# Patient Record
Sex: MALE | Race: WHITE | Employment: FULL TIME | URBAN - METROPOLITAN AREA
[De-identification: names, ages, dates, MRNs, and addresses within clinical notes are randomized per-mention and may not be internally consistent; named-entity substitution may affect disease eponyms.]

---

## 2017-05-17 ENCOUNTER — TELEPHONE (OUTPATIENT)
Dept: FAMILY MEDICINE | Facility: CLINIC | Age: 32
End: 2017-05-17

## 2017-05-17 NOTE — TELEPHONE ENCOUNTER
Acute Illness   Acute illness concerns: vomiting  Onset: this morning    Fever: YES    Chills/Sweats: YES    Headache (location?): YES    Sinus Pressure:no    Conjunctivitis:  no    Ear Pain: no    Rhinorrhea: no     Congestion: no     Sore Throat: no      Cough: no    Wheeze: no     Decreased Appetite: no     Nausea: YES    Vomiting: YES    Diarrhea:  no     Dysuria/Freq.: no     Fatigue/Achiness: YES    Sick/Strep Exposure: no      Therapies Tried and outcome: Advised taking small sips of fluids if feeling dehydated he would need to go to ER or UC.    The patient indicates understanding of these issues and agrees with the plan.    Zainab Nogueira RN    [facility]

## 2017-11-06 ENCOUNTER — HOSPITAL ENCOUNTER (INPATIENT)
Facility: CLINIC | Age: 32
LOS: 4 days | Discharge: HOME | DRG: 885 | End: 2017-11-10
Attending: EMERGENCY MEDICINE | Admitting: PSYCHIATRY & NEUROLOGY
Payer: COMMERCIAL

## 2017-11-06 DIAGNOSIS — F41.9 ANXIETY: ICD-10-CM

## 2017-11-06 DIAGNOSIS — E66.01 MORBID OBESITY (H): ICD-10-CM

## 2017-11-06 DIAGNOSIS — F33.2 SEVERE EPISODE OF RECURRENT MAJOR DEPRESSIVE DISORDER, WITHOUT PSYCHOTIC FEATURES (H): ICD-10-CM

## 2017-11-06 DIAGNOSIS — R07.89 OTHER CHEST PAIN: ICD-10-CM

## 2017-11-06 DIAGNOSIS — R45.851 SUICIDAL IDEATION: ICD-10-CM

## 2017-11-06 LAB
AMPHETAMINES UR QL SCN: NEGATIVE
BARBITURATES UR QL: NEGATIVE
BENZODIAZ UR QL: POSITIVE
CANNABINOIDS UR QL SCN: NEGATIVE
COCAINE UR QL: NEGATIVE
ETHANOL UR QL SCN: NEGATIVE
OPIATES UR QL SCN: NEGATIVE

## 2017-11-06 PROCEDURE — 80307 DRUG TEST PRSMV CHEM ANLYZR: CPT | Performed by: FAMILY MEDICINE

## 2017-11-06 PROCEDURE — 99285 EMERGENCY DEPT VISIT HI MDM: CPT | Mod: 25 | Performed by: EMERGENCY MEDICINE

## 2017-11-06 PROCEDURE — 93010 ELECTROCARDIOGRAM REPORT: CPT | Mod: Z6 | Performed by: EMERGENCY MEDICINE

## 2017-11-06 PROCEDURE — 25000132 ZZH RX MED GY IP 250 OP 250 PS 637: Performed by: PSYCHIATRY & NEUROLOGY

## 2017-11-06 PROCEDURE — 93005 ELECTROCARDIOGRAM TRACING: CPT | Performed by: EMERGENCY MEDICINE

## 2017-11-06 PROCEDURE — 80320 DRUG SCREEN QUANTALCOHOLS: CPT | Performed by: FAMILY MEDICINE

## 2017-11-06 PROCEDURE — 99223 1ST HOSP IP/OBS HIGH 75: CPT | Mod: AI | Performed by: PSYCHIATRY & NEUROLOGY

## 2017-11-06 PROCEDURE — 12400001 ZZH R&B MH UMMC

## 2017-11-06 PROCEDURE — 90791 PSYCH DIAGNOSTIC EVALUATION: CPT

## 2017-11-06 RX ORDER — ALPRAZOLAM 0.25 MG
0.5 TABLET ORAL 3 TIMES DAILY PRN
Status: DISCONTINUED | OUTPATIENT
Start: 2017-11-06 | End: 2017-11-10 | Stop reason: HOSPADM

## 2017-11-06 RX ORDER — OLANZAPINE 10 MG/2ML
10 INJECTION, POWDER, FOR SOLUTION INTRAMUSCULAR
Status: DISCONTINUED | OUTPATIENT
Start: 2017-11-06 | End: 2017-11-10 | Stop reason: HOSPADM

## 2017-11-06 RX ORDER — OLANZAPINE 10 MG/1
10 TABLET ORAL
Status: DISCONTINUED | OUTPATIENT
Start: 2017-11-06 | End: 2017-11-10 | Stop reason: HOSPADM

## 2017-11-06 RX ORDER — TRAZODONE HYDROCHLORIDE 50 MG/1
50 TABLET, FILM COATED ORAL
Status: DISCONTINUED | OUTPATIENT
Start: 2017-11-06 | End: 2017-11-10 | Stop reason: HOSPADM

## 2017-11-06 RX ORDER — HYDROXYZINE HYDROCHLORIDE 25 MG/1
25-50 TABLET, FILM COATED ORAL EVERY 4 HOURS PRN
Status: DISCONTINUED | OUTPATIENT
Start: 2017-11-06 | End: 2017-11-10 | Stop reason: HOSPADM

## 2017-11-06 RX ORDER — GABAPENTIN 100 MG/1
100-200 CAPSULE ORAL
Status: DISCONTINUED | OUTPATIENT
Start: 2017-11-06 | End: 2017-11-10 | Stop reason: HOSPADM

## 2017-11-06 RX ORDER — BISACODYL 10 MG
10 SUPPOSITORY, RECTAL RECTAL DAILY PRN
Status: DISCONTINUED | OUTPATIENT
Start: 2017-11-06 | End: 2017-11-10 | Stop reason: HOSPADM

## 2017-11-06 RX ORDER — TRAZODONE HYDROCHLORIDE 100 MG/1
200 TABLET ORAL AT BEDTIME
Status: DISCONTINUED | OUTPATIENT
Start: 2017-11-06 | End: 2017-11-10 | Stop reason: HOSPADM

## 2017-11-06 RX ORDER — ALUMINA, MAGNESIA, AND SIMETHICONE 2400; 2400; 240 MG/30ML; MG/30ML; MG/30ML
30 SUSPENSION ORAL EVERY 4 HOURS PRN
Status: DISCONTINUED | OUTPATIENT
Start: 2017-11-06 | End: 2017-11-10 | Stop reason: HOSPADM

## 2017-11-06 RX ORDER — ACETAMINOPHEN 325 MG/1
650 TABLET ORAL EVERY 4 HOURS PRN
Status: DISCONTINUED | OUTPATIENT
Start: 2017-11-06 | End: 2017-11-10 | Stop reason: HOSPADM

## 2017-11-06 RX ORDER — DULOXETIN HYDROCHLORIDE 30 MG/1
60 CAPSULE, DELAYED RELEASE ORAL DAILY
Status: DISCONTINUED | OUTPATIENT
Start: 2017-11-07 | End: 2017-11-07

## 2017-11-06 RX ADMIN — TRAZODONE HYDROCHLORIDE 200 MG: 100 TABLET ORAL at 20:47

## 2017-11-06 RX ADMIN — GABAPENTIN 200 MG: 100 CAPSULE ORAL at 20:47

## 2017-11-06 ASSESSMENT — ACTIVITIES OF DAILY LIVING (ADL)
ORAL_HYGIENE: INDEPENDENT
LAUNDRY: WITH SUPERVISION
DRESS: 0-->INDEPENDENT
LAUNDRY: WITH SUPERVISION
DRESS: INDEPENDENT
FALL_HISTORY_WITHIN_LAST_SIX_MONTHS: NO
BATHING: 0-->INDEPENDENT
RETIRED_EATING: 0-->INDEPENDENT
RETIRED_COMMUNICATION: 0-->UNDERSTANDS/COMMUNICATES WITHOUT DIFFICULTY
DRESS: INDEPENDENT
GROOMING: INDEPENDENT
ORAL_HYGIENE: INDEPENDENT
SWALLOWING: 0-->SWALLOWS FOODS/LIQUIDS WITHOUT DIFFICULTY
AMBULATION: 0-->INDEPENDENT
TRANSFERRING: 0-->INDEPENDENT
GROOMING: INDEPENDENT
TOILETING: 0-->INDEPENDENT
COGNITION: 0 - NO COGNITION ISSUES REPORTED

## 2017-11-06 ASSESSMENT — ENCOUNTER SYMPTOMS
DYSPHORIC MOOD: 1
FATIGUE: 1
APPETITE CHANGE: 1
NERVOUS/ANXIOUS: 1

## 2017-11-06 NOTE — PLAN OF CARE
Problem: Patient Care Overview  Goal: Plan of Care/Patient Progress Review  Personal Plan of Care     Reasons you are in the Hospital  1. Bottomed out   2. Couldn't put it off any longer   3.  4.     Goals for Discharge   1. Wants to know the plan and speak with doctor.   2.  3.

## 2017-11-06 NOTE — PLAN OF CARE
"Problem: Mood Impairment (Depressive Signs/Symptoms) (Adult)  Goal: Improved Mood Symptoms (Depressive Signs/Symptoms)  Signs and symptoms will be absent, minimized or managed:  A. Mood impairment  B. Feelings of worthlessness, hopelessness or excessive guilt  C. Decreased participation/engagement  D. Sleep impairment  E. Social/occupational or functional impairment  F. Psychomotor impairment  G. Energy/vigor impairment  H. Cognitive impairment  Outcome: No Change  Patient was admitted to station 10 from [facility] emergency department. Patient reports one previous mental health admission at [facility] in 2011 where he completed \"one course\" of ECT with Dr. Weber and currently sees Dr. Weber about once every 3 months. Patient has had several stressors including the death of 2 grandparents and a friend. Patient has felt increasingly suicidal over the last week and had thoughts to mix alcohol and pills to end his life while he was at a wedding on Saturday. States he did not follow through because he did not believe he had enough pills. Patient states he has never attempted suicide and if he had \"I would have succeeded.\"Patient states he currently does not wish to die because \"xanax helps, it's the only thing that helps.\" Patient denies abuse of xanax but does admit to taking 0.5 mg 3 times most days when it is prescribed for twice daily. Patient denies drug use except \"I smoke weed maybe once a month\" and drinks less than monthly but on occasions when he does drink he often has more than 10 drinks. Patient contracts for safety on the unit and states he will tell staff if he feels like hurting himself.       "

## 2017-11-06 NOTE — IP AVS SNAPSHOT
[address] [address]    Phone:  [phone]                                       After Visit Summary   11/6/2017    Abel Ward    MRN: [id number]           After Visit Summary Signature Page     I have received my discharge instructions, and my questions have been answered. I have discussed any challenges I see with this plan with the nurse or doctor.    ..........................................................................................................................................  Patient/Patient Representative Signature      ..........................................................................................................................................  Patient Representative Print Name and Relationship to Patient    ..................................................               ................................................  Date                                            Time    ..........................................................................................................................................  Reviewed by Signature/Title    ...................................................              ..............................................  Date                                                            Time

## 2017-11-06 NOTE — PLAN OF CARE
Problem: Patient Care Overview  Goal: Team Discussion  Team Plan:   BEHAVIORAL TEAM DISCUSSION     Participants: Dr. Chuckie Samaniego MD, Margarita SHERIFF, and Mildred Currie RN.   Progress: Initial team discussion.   Continued Stay Criteria/Rationale: Pt was voluntarily admitted due to increased suicidal ideations and depression.   Medical/Physical: See medical consult.   Precautions:   Behavioral Orders   Procedures     Suicide precautions     Plan: The plan is to assess the patient for mental health and medication needs.  The patient will be prescribed medications to treat the identified symptoms.  Upon discharge the patient will be referred to services as appropriate based on the assessment.  Rationale for change in precautions or plan: No change initial plan.

## 2017-11-06 NOTE — ED PROVIDER NOTES
History     Chief Complaint   Patient presents with     Suicidal     HPI  Abel Ward is a 32 year old male with a history of major depressive disorder and social anxiety disorder who presents to the ED for suicidal ideation. Patient states he has been having SI for the past week without a plan, but does have thoughts about using alcohol, medications, or razors to end his life. He states if he did have an attempt it would be with intent to harm himself. He states his family is his reason to live. He states he has had a significant amount of stressors in his life including, recently getting , 2 of his grandparents passed away earlier this year who he was very close to, work, and his older friend passed away last week from heart attack. He has been attending work, but had to leave today and had a bad day on Friday, 3 days ago, due to his mood. He is feeling fatigued, has less energy, and anxious. He also had complaints of chest tightness, headaches, and overeating. He has had intermittent chest tightness for the last 6-12 months which lasts seconds to minutes and comes about without trigger.  Occurs with activity and with rest.  Last episode was a few days ago.  Denies any associated SOB, lightheadedness, nausea or diaphoresis.  In addition he noted headaches which he describes as a sensation of fogginess for the last two years.  No vision changes, vomiting, focal weakness or recent trauma.  No fevers or neck pain.  He was previously on lisinopril for HTN which was discontinued 1 year ago and has had slowly increasing BP, no currently on medication.  He is currently taking Xanax and consuming 2 large energy drinks/day. He also admits to using marijuana 1x/month and was noted to consume a lot of alcohol at his sister's wedding. He is here seeking voluntary admission. He was last seen here in [id number] and is following up with a doctor as an outpatient.     PAST MEDICAL HISTORY  Past Medical History:    Diagnosis Date     Attention deficit disorder with hyperactivity(314.01) 2001     DEPRESS PSYCHOSIS-MILD 4/15/2008     Esophageal reflux     Dr Starks- had EGD ~2202     Hypertension      Infectious mononucleosis 12/03     Major depressive disorder, single episode in full remission (H) 10/9/2008     Mild intermittent asthma      NONSPECIFIC MEDICAL HISTORY     low testosterone - Dr Samson     Social anxiety disorder      PAST SURGICAL HISTORY  Past Surgical History:   Procedure Laterality Date     OTHER SURGICAL HISTORY      wisdom teeth extraction     FAMILY HISTORY  Family History   Problem Relation Age of Onset     DIABETES Paternal Grandfather      Hypertension Mother      Hypertension Father      Hypertension Maternal Grandfather      Hypertension Paternal Grandfather      Breast Cancer Paternal Grandmother      Depression Father      HEART DISEASE Paternal Grandfather      Lipids Father      Obesity Mother      Obesity Father      Thyroid Disease Mother      Thyroid Disease Father      SOCIAL HISTORY  Social History   Substance Use Topics     Smoking status: Never Smoker     Smokeless tobacco: Never Used     Alcohol use 0.0 oz/week      Comment: occasionally, last use 2 days ago at a wedding     MEDICATIONS  No current facility-administered medications for this encounter.      Current Outpatient Prescriptions   Medication     gabapentin (NEURONTIN) 100 MG capsule     TRAZODONE HCL PO     omeprazole (PRILOSEC) 20 MG capsule     ALPRAZolam (XANAX) 0.5 MG tablet     DULoxetine (CYMBALTA) 60 MG capsule     VITAMIN D, CHOLECALCIFEROL, PO     ALLERGIES  Allergies   Allergen Reactions     Lisinopril      Nausea, Lightheadedness and motion sickness       I have reviewed the Medications, Allergies, Past Medical and Surgical History, and Social History in the Epic system.    Review of Systems   Constitutional: Positive for appetite change (Eating more than usual) and fatigue.   Psychiatric/Behavioral: Positive for  "dysphoric mood and suicidal ideas. The patient is nervous/anxious.    All other systems reviewed and are negative.      Physical Exam   BP: (!) 146/103  Pulse: 73  Temp: 97.8  F (36.6  C)  Resp: 16  Height: 177.8 cm (5' 10\")  Weight: (!) 147.4 kg (325 lb)  SpO2: 100 %      Physical Exam   General: patient is alert and oriented and in no acute distress, flat affect  Head: atraumatic and normocephalic   EENT: moist mucus membranes without tonsillar erythema or exudates, pupils 3mm equal round and reactive   Neck: supple with full ROM  Cardiovascular: regular rate and rhythm, extremities warm and well perfused, no lower extremity edema  Pulmonary: lungs clear to auscultation bilaterally   Abdomen: soft, non-tender   Musculoskeletal: normal range of motion   Neurological: alert and oriented, moving all extremities symmetrically, CN II-XII intact, strength 5/5 and symmetric in , elbow flexion/extension, hip flexion/extension, knee flexion/extension and ankle plantar/dorsiflexion, sensation to light touch in distal upper and lower extremities intact, normal gait  Skin: warm, dry   Psych: flat affect, mood described as depressed, does not appear to be responding to internal stimuli, slow in movements and speech, endorses SI    ED Course     ED Course     Procedures             EKG Interpretation:      Interpreted by Jerilyn George  Time reviewed: 1028   Symptoms at time of EKG: none   Rhythm: normal sinus   Rate: normal  Axis: normal  Ectopy: none  Conduction: normal  ST Segments/ T Waves: No ST-T wave changes  Q Waves: none  Comparison to prior: No old EKG available    Clinical Impression: normal EKG            Critical Care time:  none           Labs Ordered and Resulted from Time of ED Arrival Up to the Time of Departure from the ED - No data to display         Assessments & Plan (with Medical Decision Making)   The patient is a 32 year old male with history of anxiety, depression, and hypertension who presents " with worsening depression and suicidal ideation.  He does meet multiple criteria for recurrent episode of major depression and does have active suicidal ideation without plan.  He does not have any psychotic features.  He denies recent ingestion or self injurious behavior. He does report associated headaches and episodes of chest pain over the last few years.  He describes these headaches as a general sense of fogginess.  He is neurologically intact. At this point, I do not feel that he requires emergent imaging.  In regards to his chest tightness,  he reports intermittent episodes lasting seconds to minutes that have been occurring over the past few months.  I have obtained an ECG which shows NSR without ischemic changes. I have a very low suspicion for pneumonia, pneumothorax, PE, or other emergent pathology.  He was previously on Lisinopril for hypertension. However,  this was discontinued,  and his blood pressure has been elevating.  Certainly this may be contributing to his headaches and intermittent chest pain.  In the Emergency Department,  his blood pressure is 146/103.  I have recommended that he follow up with his outpatient provider upon discharge for long-term blood pressure management.  He is voluntary and will be admitted to inpatient psychiatry for safety and stabilization.    This part of the medical record was transcribed by Carlos Evans Medical Scribe, from a dictation done by Jerilyn George MD.       I have reviewed the nursing notes.    I have reviewed the findings, diagnosis, plan and need for follow up with the patient.    New Prescriptions    No medications on file       Final diagnoses:   Severe episode of recurrent major depressive disorder, without psychotic features (H)   Suicidal ideation       11/6/2017   [facility], [place], EMERGENCY DEPARTMENT     Jerilyn George MD  11/06/17 1148

## 2017-11-06 NOTE — PROGRESS NOTES
11/06/17 1217   Patient Belongings   Did you bring any home meds/supplements to the hospital?  No   Patient Belongings clothing;shoes   Belongings Search Yes   Clothing Search Yes   Second Staff Carmen BENITO   General Info Comment In locker labeled with name    In locker:  1 pr of shoes  3 pr of socks  1 flannel shirt  1 pr of jeans  1 jacket  1 bottle of water    Pt had no medications, valuables (wallet, cash, credit cards) or cell phone in his belongings.     A               Admission:  I am responsible for any personal items that are not sent to the safe or pharmacy.  [facility] is not responsible for loss, theft or damage of any property in my possession.    Signature:  _________________________________ Date: _______  Time: _____                                              Staff Signature:  ____________________________ Date: ________  Time: _____      2nd Staff person, if patient is unable/unwilling to sign:    Signature: ________________________________ Date: ________  Time: _____     Discharge:  [facility] has returned all of my personal belongings:    Signature: _________________________________ Date: ________  Time: _____                                          Staff Signature:  ____________________________ Date: ________  Time: _____

## 2017-11-06 NOTE — H&P
"[facility]  Psychiatric History and Physical      Patient name: Abel Ward   MRN: [id number]    Age: 32 year old     YOB: 1985    Identifying information:   The patient is a 32 year old  male    Chief complaint:  \" the only thing that helps is Xanax.\"    History of present illness:   The patient has a history of major depressive disorder and anxiety who presented voluntarily to the emergency room reporting depressed mood and suicidal thoughts. On examination today, he reports that over the past few months, his mood and anxiety symptoms have progressively worsened. He is not able to relate this recent decompensation to any specific psychosocial stressor. Over the past few days, he has contemplated suicide further mentioning \"if I would actually try it I know it would be successful.\" He reports maintaining compliance with psychotropic medications and questions the effectiveness of his antidepressant. He stated several times that Xanax is the only medication that can alleviate his anxiety which results in some improvement in his mood. He did not report any overuse or misuse of the medicine. He inquired whether ECT would be helpful to address his current symptoms.    Psychiatric Review of Systems:    -- Depressive episode: Depressed mood later than a two-week period, low-energy, low appetite, anhedonia, Limited concentration, variable sleep, feels helpless and hopeless, endorses suicidal thoughts however is able to contract for safety on the unit, denied homicidal thoughts.  -- Sylvia:  denies symptoms  -- Psychosis:  denies symptoms  -- Anxiety:   Described himself as a generalized worrier, in excess of his peers, occasionally escalating to the point of panic.  -- PTSD: denies symptoms  -- OCD: denies  symptoms  -- Eating disorder: denies symptoms    Psychiatric History:    He has been receiving mental health treatment since an adolescent. He " "reports a diagnosis of major depressive disorder and anxiety. Prior hospitalizations reported. He recalled a prior course of ECT several years ago. He denied prior suicide attempts. His outpatient psychiatrist is Dr. Weber and he is currently prescribed Cymbalta and Xanax for management of his mental illness.    Substance Use History:     He reports occasional cannabis and alcohol usage with no specific pattern of usage corresponding to dependence or tolerance. He denied on verse effects of cannabis. He was able to identify enhancement of depressive symptoms from alcohol use which he states encourages him to refrain from using that substance.    Medical History: GERD      No current facility-administered medications on file prior to encounter.   Current Outpatient Prescriptions on File Prior to Encounter:  gabapentin (NEURONTIN) 100 MG capsule Take 1-2 capsules (100-200 mg) by mouth nightly as needed   TRAZODONE HCL PO Take by mouth At Bedtime   omeprazole (PRILOSEC) 20 MG capsule Take 1 capsule (20 mg) by mouth daily   ALPRAZolam (XANAX) 0.5 MG tablet Take 1 tablet (0.5 mg) by mouth 3 times daily as needed for anxiety (Patient taking differently: Take 0.5 mg by mouth 2 times daily as needed for anxiety )   DULoxetine (CYMBALTA) 60 MG capsule Take 1 capsule (60 mg) by mouth daily        Social History:  Refer to the psychosocial assessment completed by our .     Family History:    He suspects that an aunt may have bipolar disorder. No knowledge of completed suicides in the family.    Medical review of systems: 10 systems were reviewed and positive for psychiatric symptoms as noted above otherwise negative    Physical Exam:    B/P: 146/90, T: 98.3, P: 84, R: 18  Estimated body mass index is 45.15 kg/(m^2) as calculated from the following:    Height as of this encounter: 1.803 m (5' 11\").    Weight as of this encounter: 146.8 kg (323 lb 11.2 oz).    The rest of the physical examination was reviewed in the " emergency room note completed by the emergency room physician.      Mental status examination:  Appearance: He was laying in bed sleeping prior to meeting. Woke easily and sat up in bed.  Alert, fair hygiene, no acute distress.  Attitude:  Attempts to be cooperative  Eye Contact:  Mostly looking down or away.  Mood:  Depressed  Affect: Mood congruent and blunted  Speech:  Normal rates, tone, latency, volume. Not pushed or pressured.  Psychomotor Behavior:  No psychomotor abnormalities noted  Thought Process: Linear and logical; not tangential or circumstantial or disorganized  Associations:  Logical; no loose associations Noted  Thought Content:  No obvious paranoia, delusions, ideas of reference, or grandiosity noted. Denies auditory or visual hallucinations. Endorsed suicidal Ideations. Denies homicidal ideations.  Insight:  Fair  Judgment:  Fair  Oriented to:  time, person, and place  Attention Span and Concentration:  Intact  Recent and Remote Memory: Intact based on interviewing and details provided  Language: Appropriate based on interviewing  Fund of Knowledge: Appropriate based on interviewing  Muscle Strength and Tone: Normal upon visual inspection  Gait and Station: Normal upon visual inspection            Diagnoses:    1.  Major depressive disorder - recurrent, severe  2. Generalized anxiety disorder  3. Gerd      Plan:  1.  The patient has been admitted to our behavioral health unit under voluntary status for reports of depressed mood and suicidal thoughts. Treatment will be continued voluntarily.    2.  His outpatient medications of been resumed including Cymbalta and Xanax for management of mood and anxiety symptoms. Trazodone has been continued to assist with sleep. I sent an email to Dr. Weber to notify him of the patients status as well as inquire regarding medication recommendations. The patient did express some interest in ECT for which  ECT consultation with Dr. Weber was also requested.    3.  Psychosocial treatments to be addressed with social work consult and groups.    4.  Anticipate a hospital stay of approximately one week as we target improvement in mood and remission of suicidal thoughts.
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
## 2017-11-06 NOTE — IP AVS SNAPSHOT
MRN:[id number]                      After Visit Summary   11/6/2017    Abel Ward    MRN: [id number]           Thank you!     Thank you for choosing [facility] for your care. Our goal is always to provide you with excellent care.        Patient Information     Date Of Birth          1985        Designated Caregiver       Most Recent Value    Caregiver    Will someone help with your care after discharge? no      About your hospital stay     You were admitted on:  November 6, 2017 You last received care in the:  [facility]    You were discharged on:  November 10, 2017       Who to Call     For medical emergencies, please call 911.  For non-urgent questions about your medical care, please call your primary care provider or clinic, [phone]          Attending Provider     Provider Specialty    Jerilyn George MD Emergency Medicine    Chuckie Samaniego MD Psychiatry       Primary Care Provider Office Phone # Fax #    David Denney [phone]      Your next 10 appointments already scheduled     Nov 13, 2017  8:15 AM CST   Electroconvulsive Therapy with Froy Weber MD   [facility] ([facility])    [address]  [facility] [address]   [phone]              Future tests that were ordered for you     Electroconvulsive therapy: Begin Outpatient       Series of up to 12 treatments. Begin Date: 11/13/2017  Treating Psychiatrist providing ECT:  Dr Weber  Notified on:  November 10, 2017                  Further instructions from your care team        Behavioral Discharge Planning and Instructions      Summary:  You were admitted on 11/6/2017  due to Depression, Anxiety and Suicidal Ideations.  You were treated by Dr. Chuckie Samaniego MD and discharged on 11/10/17 from [facility] Home    Principal Diagnosis: Major depressive disorder, recurrent    Health Care Follow-up Appointments:    [facility], [facility]   [address] [place] [address]  Phone: [phone]  Fax: [phone] HUC TO FAX DISCHARGE PAPERWORK     *You have an intake appointment for psychiatric medication management with Vincent Suarez scheduled for Thursday, November 16th at 10:30 am, however, please arrive at 10 am to complete paperwork.   *You have an intake for individual therapy with Kaitlynn Reynaga scheduled for Tuesday, December 5th at 3:00 pm, however, please arrive at 2:30 pm to complete paperwork.     Attend all scheduled appointments with your outpatient providers. Call at least 24 hours in advance if you need to reschedule an appointment to ensure continued access to your outpatient providers.   Major Treatments, Procedures and Findings:  You were provided with: a psychiatric assessment, assessed for medical stability, medication evaluation and/or management, group therapy and milieu management    Symptoms to Report: feeling more aggressive, increased confusion, losing more sleep, mood getting worse or thoughts of suicide    Early warning signs can include: increased depression or anxiety sleep disturbances increased thoughts or behaviors of suicide or self-harm  increased unusual thinking, such as paranoia or hearing voices    Safety and Wellness:  Take all medicines as directed.  Make no changes unless your doctor suggests them.      Follow treatment recommendations.  Refrain from alcohol and non-prescribed drugs.  Ask your support system to help you reduce your access to items that could harm yourself or others. If there is a concern for safety, call 911.    Resources:   Crisis Intervention: 570.354.5407 or 735-933-5920 (TTY: 686.542.6383).  Call anytime for help.  National [place] on Mental Illness (www.[place].anayeli.org): 466.306.2657 or 059-686-7535.  [place] Association for Children's Mental Health (www.macmh.org): 536.904.1755.  Alcoholics Anonymous  "(www.alcoholics-anonymous.org): Check your phone book for your local chapter.  Suicide Awareness Voices of Education (SAVE) (www.save.org): 899-136-TSLM (8821)  National Suicide Prevention Line (www.mentalhealthmn.org): 449-360-DUVC (5860)  Mental Health Consumer/Survivor Network of [place] (www.mhcsn.net): 727.140.1855 or 591-853-5774  Mental Health Association of [place] (www.mentalhealth.org): 325.633.4285 or 341-777-6528  Self- Management and Recovery Training., SMART-- Toll free: 903.218.9218  www.[employer]  Text 4 Life: txt \"LIFE\" to [address] for immediate support and crisis intervention  Crisis text line: Text \"START\" to 252-036. Free, confidential, 24/7.  Crisis Intervention: 468.437.7764 or 826-817-9104. Call anytime for help.     The treatment team has appreciated the opportunity to work with you.     Please take care and make your recovery a daily recovery.   If you have any questions or concerns our unit number is [phone].   Thank you.         Pending Results     No orders found from 11/4/2017 to 11/7/2017.            Admission Information     Date & Time Provider Department Dept. Phone    11/6/2017 Chuckie Samaniego MD [address] [phone]      Your Vitals Were     Blood Pressure Pulse Temperature Respirations Height Weight    137/67 86 98.3  F (36.8  C) 16 1.803 m (5' 11\") 144.4 kg (318 lb 4.8 oz)    Pulse Oximetry BMI (Body Mass Index)                97% 44.39 kg/m2          [facility] Information     [employer] gives you secure access to your electronic health record. If you see a primary care provider, you can also send messages to your care team and make appointments. If you have questions, please call your primary care clinic.  If you do not have a primary care provider, please call [phone] and they will assist you.        Care EveryWhere ID     This is your Care EveryWhere ID. This could be used by other organizations to access your [facility] medical records  [id number]        Equal Access to " Services     [facility]: Hadii aad ku hadmarknixon Esteladamien, waaxda luqadaha, qaybta kaalmada michelle, lauren baird . So [facility] [phone].    ATENCIÓN: Si mellisala mame, tiene a bagley disposición servicios gratuitos de asistencia lingüística. Llame al [phone].    We comply with applicable federal civil rights laws and [place] laws. We do not discriminate on the basis of race, color, national origin, age, disability, sex, sexual orientation, or gender identity.               Review of your medicines      START taking        Dose / Directions    desvenlafaxine succinate 50 MG 24 hr tablet   Commonly known as:  PRISTIQ        Dose:  50 mg   Take 1 tablet (50 mg) by mouth daily   Quantity:  30 tablet   Refills:  0       trifluoperazine 2 MG tablet   Commonly known as:  STELAZINE   Used for:  Anxiety        Dose:  2-4 mg   Take 1-2 tablets (2-4 mg) by mouth 3 times daily as needed (severe anxiety)   Quantity:  60 tablet   Refills:  0         CONTINUE these medicines which may have CHANGED, or have new prescriptions. If we are uncertain of the size of tablets/capsules you have at home, strength may be listed as something that might have changed.        Dose / Directions    ALPRAZolam 0.5 MG tablet   Commonly known as:  XANAX   This may have changed:  when to take this   Used for:  Anxiety        Dose:  0.5 mg   Take 1 tablet (0.5 mg) by mouth 3 times daily as needed for anxiety   Quantity:  20 tablet   Refills:  0         CONTINUE these medicines which have NOT CHANGED        Dose / Directions    gabapentin 100 MG capsule   Commonly known as:  NEURONTIN   Used for:  Restless legs syndrome        Dose:  100-200 mg   Take 1-2 capsules (100-200 mg) by mouth nightly as needed   Refills:  0       omeprazole 20 MG CR capsule   Commonly known as:  priLOSEC   Used for:  Esophageal reflux        Dose:  20 mg   Take 1 capsule (20 mg) by mouth daily   Quantity:  90 capsule   Refills:  3        TRAZODONE HCL PO   Used for:  Morbid obesity (H)        Dose:  200 mg   Take 200 mg by mouth At Bedtime   Refills:  0         STOP taking     DULoxetine 60 MG EC capsule   Commonly known as:  CYMBALTA                Where to get your medicines      These medications were sent to [facility] [place], [place] - [address]  [address] [place] [address], [facility] [address]     Phone:  [phone]     desvenlafaxine succinate 50 MG 24 hr tablet    trifluoperazine 2 MG tablet                Protect others around you: Learn how to safely use, store and throw away your medicines at www.disposemymeds.org.             Medication List: This is a list of all your medications and when to take them. Check marks below indicate your daily home schedule. Keep this list as a reference.      Medications           Morning Afternoon Evening Bedtime As Needed    ALPRAZolam 0.5 MG tablet   Commonly known as:  XANAX   Take 1 tablet (0.5 mg) by mouth 3 times daily as needed for anxiety   Last time this was given:  0.5 mg on 11/8/2017  5:55 PM                                desvenlafaxine succinate 50 MG 24 hr tablet   Commonly known as:  PRISTIQ   Take 1 tablet (50 mg) by mouth daily   Last time this was given:  50 mg on 11/10/2017 11:07 AM                                gabapentin 100 MG capsule   Commonly known as:  NEURONTIN   Take 1-2 capsules (100-200 mg) by mouth nightly as needed   Last time this was given:  200 mg on 11/8/2017  8:24 PM                                omeprazole 20 MG CR capsule   Commonly known as:  priLOSEC   Take 1 capsule (20 mg) by mouth daily   Last time this was given:  20 mg on 11/10/2017  7:51 AM                                TRAZODONE HCL PO   Take 200 mg by mouth At Bedtime   Last time this was given:  50 mg on 11/9/2017 10:07 PM                                trifluoperazine 2 MG tablet   Commonly known as:  STELAZINE   Take 1-2 tablets (2-4 mg) by mouth 3 times daily as needed (severe  anxiety)   Last time this was given:  2 mg on 11/10/2017 11:13 AM

## 2017-11-07 PROBLEM — F33.2 SEVERE EPISODE OF RECURRENT MAJOR DEPRESSIVE DISORDER, WITHOUT PSYCHOTIC FEATURES (H): Status: ACTIVE | Noted: 2017-11-07

## 2017-11-07 LAB
ALBUMIN SERPL-MCNC: 3.7 G/DL (ref 3.4–5)
ALP SERPL-CCNC: 71 U/L (ref 40–150)
ALT SERPL W P-5'-P-CCNC: 43 U/L (ref 0–70)
ANION GAP SERPL CALCULATED.3IONS-SCNC: 4 MMOL/L (ref 3–14)
AST SERPL W P-5'-P-CCNC: 18 U/L (ref 0–45)
BASOPHILS # BLD AUTO: 0 10E9/L (ref 0–0.2)
BASOPHILS NFR BLD AUTO: 0.6 %
BILIRUB SERPL-MCNC: 0.7 MG/DL (ref 0.2–1.3)
BUN SERPL-MCNC: 16 MG/DL (ref 7–30)
CALCIUM SERPL-MCNC: 8.9 MG/DL (ref 8.5–10.1)
CHLORIDE SERPL-SCNC: 104 MMOL/L (ref 94–109)
CHOLEST SERPL-MCNC: 193 MG/DL
CO2 SERPL-SCNC: 31 MMOL/L (ref 20–32)
CREAT SERPL-MCNC: 0.91 MG/DL (ref 0.66–1.25)
DIFFERENTIAL METHOD BLD: NORMAL
EOSINOPHIL # BLD AUTO: 0.2 10E9/L (ref 0–0.7)
EOSINOPHIL NFR BLD AUTO: 3.4 %
ERYTHROCYTE [DISTWIDTH] IN BLOOD BY AUTOMATED COUNT: 12.3 % (ref 10–15)
GFR SERPL CREATININE-BSD FRML MDRD: >90 ML/MIN/1.7M2
GLUCOSE SERPL-MCNC: 98 MG/DL (ref 70–99)
HCT VFR BLD AUTO: 43.5 % (ref 40–53)
HDLC SERPL-MCNC: 58 MG/DL
HGB BLD-MCNC: 14.7 G/DL (ref 13.3–17.7)
IMM GRANULOCYTES # BLD: 0 10E9/L (ref 0–0.4)
IMM GRANULOCYTES NFR BLD: 0.3 %
LDLC SERPL CALC-MCNC: 116 MG/DL
LYMPHOCYTES # BLD AUTO: 1.8 10E9/L (ref 0.8–5.3)
LYMPHOCYTES NFR BLD AUTO: 28.1 %
MCH RBC QN AUTO: 28.4 PG (ref 26.5–33)
MCHC RBC AUTO-ENTMCNC: 33.8 G/DL (ref 31.5–36.5)
MCV RBC AUTO: 84 FL (ref 78–100)
MONOCYTES # BLD AUTO: 0.6 10E9/L (ref 0–1.3)
MONOCYTES NFR BLD AUTO: 10.2 %
NEUTROPHILS # BLD AUTO: 3.6 10E9/L (ref 1.6–8.3)
NEUTROPHILS NFR BLD AUTO: 57.4 %
NONHDLC SERPL-MCNC: 135 MG/DL
NRBC # BLD AUTO: 0 10*3/UL
NRBC BLD AUTO-RTO: 0 /100
PLATELET # BLD AUTO: 236 10E9/L (ref 150–450)
POTASSIUM SERPL-SCNC: 4.5 MMOL/L (ref 3.4–5.3)
PROT SERPL-MCNC: 7.5 G/DL (ref 6.8–8.8)
RBC # BLD AUTO: 5.18 10E12/L (ref 4.4–5.9)
SODIUM SERPL-SCNC: 139 MMOL/L (ref 133–144)
TRIGL SERPL-MCNC: 96 MG/DL
TSH SERPL DL<=0.005 MIU/L-ACNC: 0.64 MU/L (ref 0.4–4)
WBC # BLD AUTO: 6.3 10E9/L (ref 4–11)

## 2017-11-07 PROCEDURE — 90686 IIV4 VACC NO PRSV 0.5 ML IM: CPT | Performed by: PSYCHIATRY & NEUROLOGY

## 2017-11-07 PROCEDURE — 93005 ELECTROCARDIOGRAM TRACING: CPT

## 2017-11-07 PROCEDURE — 99232 SBSQ HOSP IP/OBS MODERATE 35: CPT | Performed by: PSYCHIATRY & NEUROLOGY

## 2017-11-07 PROCEDURE — 36415 COLL VENOUS BLD VENIPUNCTURE: CPT | Performed by: PSYCHIATRY & NEUROLOGY

## 2017-11-07 PROCEDURE — 25000132 ZZH RX MED GY IP 250 OP 250 PS 637: Performed by: PSYCHIATRY & NEUROLOGY

## 2017-11-07 PROCEDURE — 25000128 H RX IP 250 OP 636: Performed by: PSYCHIATRY & NEUROLOGY

## 2017-11-07 PROCEDURE — 80061 LIPID PANEL: CPT | Performed by: PSYCHIATRY & NEUROLOGY

## 2017-11-07 PROCEDURE — 80053 COMPREHEN METABOLIC PANEL: CPT | Performed by: PSYCHIATRY & NEUROLOGY

## 2017-11-07 PROCEDURE — 12400007 ZZH R&B MH INTERMEDIATE UMMC

## 2017-11-07 PROCEDURE — 85025 COMPLETE CBC W/AUTO DIFF WBC: CPT | Performed by: PSYCHIATRY & NEUROLOGY

## 2017-11-07 PROCEDURE — 84443 ASSAY THYROID STIM HORMONE: CPT | Performed by: PSYCHIATRY & NEUROLOGY

## 2017-11-07 RX ORDER — TRIFLUOPERAZINE HYDROCHLORIDE 1 MG/1
2-4 TABLET, FILM COATED ORAL 3 TIMES DAILY PRN
Status: DISCONTINUED | OUTPATIENT
Start: 2017-11-07 | End: 2017-11-10 | Stop reason: HOSPADM

## 2017-11-07 RX ORDER — DULOXETIN HYDROCHLORIDE 20 MG/1
40 CAPSULE, DELAYED RELEASE ORAL DAILY
Status: DISCONTINUED | OUTPATIENT
Start: 2017-11-08 | End: 2017-11-09

## 2017-11-07 RX ORDER — DESVENLAFAXINE 25 MG/1
25 TABLET, EXTENDED RELEASE ORAL DAILY
Status: DISCONTINUED | OUTPATIENT
Start: 2017-11-08 | End: 2017-11-09

## 2017-11-07 RX ADMIN — TRIFLUOPERAZINE HYDROCHLORIDE 2 MG: 1 TABLET, FILM COATED ORAL at 17:18

## 2017-11-07 RX ADMIN — DULOXETINE HYDROCHLORIDE 60 MG: 30 CAPSULE, DELAYED RELEASE ORAL at 08:25

## 2017-11-07 RX ADMIN — INFLUENZA A VIRUS A/MICHIGAN/45/2015 X-275 (H1N1) ANTIGEN (FORMALDEHYDE INACTIVATED), INFLUENZA A VIRUS A/HONG KONG/4801/2014 X-263B (H3N2) ANTIGEN (FORMALDEHYDE INACTIVATED), INFLUENZA B VIRUS B/PHUKET/3073/2013 ANTIGEN (FORMALDEHYDE INACTIVATED), AND INFLUENZA B VIRUS B/BRISBANE/60/2008 ANTIGEN (FORMALDEHYDE INACTIVATED) 0.5 ML: 15; 15; 15; 15 INJECTION, SUSPENSION INTRAMUSCULAR at 12:56

## 2017-11-07 RX ADMIN — OMEPRAZOLE 20 MG: 20 CAPSULE, DELAYED RELEASE ORAL at 08:26

## 2017-11-07 RX ADMIN — TRIFLUOPERAZINE HYDROCHLORIDE 4 MG: 1 TABLET, FILM COATED ORAL at 12:31

## 2017-11-07 ASSESSMENT — ACTIVITIES OF DAILY LIVING (ADL)
GROOMING: HANDWASHING
ORAL_HYGIENE: INDEPENDENT
LAUNDRY: WITH SUPERVISION
DRESS: SCRUBS (BEHAVIORAL HEALTH)

## 2017-11-07 NOTE — PROGRESS NOTES
"[facility], [place]   Psychiatric Progress Note         Interim History and Subjective Reports:   The patient's care was discussed with the treatment team during the daily team meeting.  Staff reports: no acute issues    Depression severity scale 0-10 (10=most severe):  Today: 8    Feels slightly better \"I know were going to do something to help so that's got me feeling a bit hopeful.\"  Suicidal thoughts are present however becoming more passive.   Energy is low, appetite is low, concentration is poor, +anhedonia.  Patient denies psychosis/AH/VH./paranoia.    Past med trials: he recalls various SSRi's, effexor, cymbalta, wellbutrin.            Scheduled Medications:       influenza quadrivalent (PF) vacc age 3 yrs and older  0.5 mL Intramuscular Prior to discharge     DULoxetine  60 mg Oral Daily     omeprazole  20 mg Oral Daily     traZODone (DESYREL) tablet 200 mg  200 mg Oral At Bedtime          Allergies:      Allergies   Allergen Reactions     Lisinopril      Nausea, Lightheadedness and motion sickness          Labs:     Recent Results (from the past 24 hour(s))   CBC with platelets differential    Collection Time: 11/07/17  8:00 AM   Result Value Ref Range    WBC 6.3 4.0 - 11.0 10e9/L    RBC Count 5.18 4.4 - 5.9 10e12/L    Hemoglobin 14.7 13.3 - 17.7 g/dL    Hematocrit 43.5 40.0 - 53.0 %    MCV 84 78 - 100 fl    MCH 28.4 26.5 - 33.0 pg    MCHC 33.8 31.5 - 36.5 g/dL    RDW 12.3 10.0 - 15.0 %    Platelet Count 236 150 - 450 10e9/L    Diff Method Automated Method     % Neutrophils 57.4 %    % Lymphocytes 28.1 %    % Monocytes 10.2 %    % Eosinophils 3.4 %    % Basophils 0.6 %    % Immature Granulocytes 0.3 %    Nucleated RBCs 0 0 /100    Absolute Neutrophil 3.6 1.6 - 8.3 10e9/L    Absolute Lymphocytes 1.8 0.8 - 5.3 10e9/L    Absolute Monocytes 0.6 0.0 - 1.3 10e9/L    Absolute Eosinophils 0.2 0.0 - 0.7 10e9/L    Absolute Basophils 0.0 0.0 - 0.2 10e9/L    Abs Immature Granulocytes 0.0 0 " "- 0.4 10e9/L    Absolute Nucleated RBC 0.0    Comprehensive metabolic panel    Collection Time: 11/07/17  8:00 AM   Result Value Ref Range    Sodium 139 133 - 144 mmol/L    Potassium 4.5 3.4 - 5.3 mmol/L    Chloride 104 94 - 109 mmol/L    Carbon Dioxide 31 20 - 32 mmol/L    Anion Gap 4 3 - 14 mmol/L    Glucose 98 70 - 99 mg/dL    Urea Nitrogen 16 7 - 30 mg/dL    Creatinine 0.91 0.66 - 1.25 mg/dL    GFR Estimate >90 >60 mL/min/1.7m2    GFR Estimate If Black >90 >60 mL/min/1.7m2    Calcium 8.9 8.5 - 10.1 mg/dL    Bilirubin Total 0.7 0.2 - 1.3 mg/dL    Albumin 3.7 3.4 - 5.0 g/dL    Protein Total 7.5 6.8 - 8.8 g/dL    Alkaline Phosphatase 71 40 - 150 U/L    ALT 43 0 - 70 U/L    AST 18 0 - 45 U/L   TSH with free T4 reflex and/or T3 as indicated    Collection Time: 11/07/17  8:00 AM   Result Value Ref Range    TSH 0.64 0.40 - 4.00 mU/L   Lipid panel    Collection Time: 11/07/17  8:00 AM   Result Value Ref Range    Cholesterol 193 <200 mg/dL    Triglycerides 96 <150 mg/dL    HDL Cholesterol 58 >39 mg/dL    LDL Cholesterol Calculated 116 (H) <100 mg/dL    Non HDL Cholesterol 135 (H) <130 mg/dL          Vitals:   /90  Pulse 84  Temp 96.5  F (35.8  C) (Oral)  Resp 16  Ht 1.803 m (5' 11\")  Wt (!) 145.8 kg (321 lb 8 oz)  SpO2 97%  BMI 44.84 kg/m2  Weight: 321 lbs 8 oz          Height: 5' 11\"           Body mass index is 44.84 kg/(m^2).        Mental Status Examination:   Appearance: awake, alert  Attitude:  cooperative  Eye Contact:  fair  Mood:  depressed  Affect:  intensity is blunted  Speech:  clear, coherent  Psychomotor Behavior:  no evidence of tardive dyskinesia, dystonia, or tics  Throught Process:  linear  Associations:  no loose associations  Thought Content:  no evidence of psychotic thought and passive suicidal ideation present  Insight:  fair  Judgement:  intact  Oriented to:  time, person, and place  Attention Span and Concentration:  intact  Recent and Remote Memory:  intact         DIagnoses:     1. " Major depressive disorder - recurrent, severe      Rule out bipolar disorder  2. Generalized anxiety disorder  3. GERD         Plan:   1. Biological treatments:  --begin to transition off Cymbalta and onto Pristiq to address symptoms of MDD and FATOUMATA.   --add stelazine prn anxiety to avoid increasing xanax  --risks and benefits of these medications were reviewed.   --I was able to make contact with Dr Weber to discuss plans for ECT: if he can achieve a BMI of 45 or less then we can expect ECT to begin Friday. His BMI was < 45 today.     2. Psychosocial treatments:   --addressed with SW consult and groups  --psychology consult for neuropsych testing for a second option on diagnosis    3. Dispo:  --home once improved.

## 2017-11-07 NOTE — PROGRESS NOTES
Psychological Evaluation Note: Patient seen 1:1 for diagnostic interview, MMPI-2, MCMI-III, Rorschach Test and BDI-II. The MMPI-2 and MCMI-III were not completed as of this session. Other testing suggested some difficulty with emotional expression and accessing emotional content. Their relationships with others appear to be superficial. They are not psychologically minded. There is no evidence of cognitive slippage or psychotic processing. Dx: MDD, recurrent, moderate; Unspecified Anxiety Disorder; h/o ADHD, combined type. I don't see evidence of Bipolarity at this point, but a full report will follow.

## 2017-11-07 NOTE — PROGRESS NOTES
Pt isolative at the beginning of the shift, but out in the lounge watching TV and using the phone later in the shift.  Pt reported having suicidal thoughts, but contracts for safety.  Pt has flat affect, mood is blunted.  Pt concerned that he wont be able to go to treatment for his CD issues.  This is causing him anxiety.  Encouraged him to discuss with MD CAICEDO the am.  Pt not engaged with peers.  Denied pain.

## 2017-11-07 NOTE — PROGRESS NOTES
11/07/17 1344   Behavioral Health   Hallucinations denies / not responding to hallucinations   Thinking poor concentration   Orientation person: oriented;place: oriented   Memory baseline memory   Insight poor   Judgement impaired   Eye Contact at examiner   Affect blunted, flat   Mood anxious;depressed   Physical Appearance/Attire neat   Hygiene well groomed   Suicidality thoughts only   Self Injury other (see comment)  (denies currently)   Elopement (no value)   Activity withdrawn;isolative   Speech clear;coherent   Medication Sensitivity no stated side effects   Psychomotor / Gait balanced;steady     Pt was mostly isolative to his room today with intermittent periods of being out in the milieu. Pt did not interact with any peers or staff. Pt's mood appears depressed with a blunted affect. Pt endorses depressive symptoms with passive thoughts of suicide (no plan or intent). No psychotic symptoms observed or reported. No behavioral issues this shift.

## 2017-11-08 LAB
INTERPRETATION ECG - MUSE: NORMAL
INTERPRETATION ECG - MUSE: NORMAL

## 2017-11-08 PROCEDURE — 25000132 ZZH RX MED GY IP 250 OP 250 PS 637: Performed by: PSYCHIATRY & NEUROLOGY

## 2017-11-08 PROCEDURE — 99222 1ST HOSP IP/OBS MODERATE 55: CPT | Performed by: PHYSICIAN ASSISTANT

## 2017-11-08 PROCEDURE — 96103 ZZHC PSYCH TEST BY COMP, MMPI-2 PROFILE: CPT

## 2017-11-08 PROCEDURE — 99232 SBSQ HOSP IP/OBS MODERATE 35: CPT | Performed by: PSYCHIATRY & NEUROLOGY

## 2017-11-08 PROCEDURE — 12400007 ZZH R&B MH INTERMEDIATE UMMC

## 2017-11-08 PROCEDURE — 99207 ZZC CONSULT E&M CHANGED TO INITIAL LEVEL: CPT | Performed by: PHYSICIAN ASSISTANT

## 2017-11-08 PROCEDURE — 96103 ZZHC PSYCH TEST BY COMP, MCMI-3 PROFILE: CPT

## 2017-11-08 RX ADMIN — TRIFLUOPERAZINE HYDROCHLORIDE 2 MG: 1 TABLET, FILM COATED ORAL at 17:26

## 2017-11-08 RX ADMIN — DULOXETINE HYDROCHLORIDE 40 MG: 20 CAPSULE, DELAYED RELEASE ORAL at 07:12

## 2017-11-08 RX ADMIN — TRAZODONE HYDROCHLORIDE 200 MG: 100 TABLET ORAL at 20:24

## 2017-11-08 RX ADMIN — OMEPRAZOLE 20 MG: 20 CAPSULE, DELAYED RELEASE ORAL at 07:14

## 2017-11-08 RX ADMIN — ALPRAZOLAM 0.5 MG: 0.25 TABLET ORAL at 17:55

## 2017-11-08 RX ADMIN — DESVENLAFAXINE SUCCINATE 25 MG: 25 TABLET, EXTENDED RELEASE ORAL at 08:15

## 2017-11-08 RX ADMIN — TRIFLUOPERAZINE HYDROCHLORIDE 4 MG: 1 TABLET, FILM COATED ORAL at 07:12

## 2017-11-08 RX ADMIN — TRIFLUOPERAZINE HYDROCHLORIDE 2 MG: 1 TABLET, FILM COATED ORAL at 13:08

## 2017-11-08 RX ADMIN — GABAPENTIN 200 MG: 100 CAPSULE ORAL at 20:24

## 2017-11-08 ASSESSMENT — ACTIVITIES OF DAILY LIVING (ADL)
LAUNDRY: UNABLE TO COMPLETE
DRESS: INDEPENDENT
DRESS: SCRUBS (BEHAVIORAL HEALTH)
ORAL_HYGIENE: INDEPENDENT
LAUNDRY: WITH SUPERVISION
GROOMING: PROMPTS
ORAL_HYGIENE: INDEPENDENT;PROMPTS
GROOMING: SHOWER;INDEPENDENT;PROMPTS

## 2017-11-08 NOTE — PLAN OF CARE
Problem: Patient Care Overview  Goal: Plan of Care/Patient Progress Review  Illness Management Recovery model:  Warning Signs.     Patient identified the following early warning signs which may indicate that a relapse of their illness is startin. isolation

## 2017-11-08 NOTE — PROGRESS NOTES
Pt said he is very depressed and said he hates to be around a lot of people so he doesn't come out much. Dr. Weber came to see his and he told him to get out of bed and shower. Pt showered and came out and sat in the lounge . Pt said He hopes to start ECT on Fri 11/08/17 1146   Behavioral Health   Hallucinations denies / not responding to hallucinations   Thinking distractable;poor concentration   Orientation person: oriented;place: oriented;date: oriented;time: oriented   Memory baseline memory   Insight poor   Judgement impaired   Eye Contact at examiner   Affect blunted, flat   Mood depressed;anxious   Physical Appearance/Attire neat   Hygiene well groomed  (took a shower)   Suicidality other (see comments)  (denies)   Self Injury other (see comment)  (denies)   Elopement (no statements obout eloping)   Activity isolative;withdrawn;restless   Psychomotor / Gait slow;balanced;steady   Sleep/Rest/Relaxation   Sleep/Rest/Relaxation sleeping between care;sleep interrupted;unable to go back to sleep;unable to stay asleep;feeling unrested   Safety   Suicidality status 15   Coping/Psychosocial   Verbalized Emotional State anxiety;depression;frustration;hopefulness   Psycho Education   Type of Intervention 1:1 intervention   Response participates with encouragement   Hours 0.5   Treatment Detail (warning)   Activities of Daily Living   Hygiene/Grooming shower;independent;prompts   Oral Hygiene independent;prompts   Dress scrubs (behavioral health)   Laundry with supervision   Room Organization prompts

## 2017-11-08 NOTE — PROGRESS NOTES
11/07/17 2137   Behavioral Health   Hallucinations denies / not responding to hallucinations   Thinking poor concentration   Orientation time: oriented;date: oriented;place: oriented   Memory baseline memory   Insight insight appropriate to events   Judgement impaired   Eye Contact at examiner   Affect blunted, flat   Mood depressed   Physical Appearance/Attire attire appropriate to age and situation   Hygiene neglected grooming - unclean body, hair, teeth   Suicidality (nothing observed or stated )   Self Injury (Nothing observed or stated )   Elopement (Nothing observed or satated )   Activity isolative   Speech coherent;clear   Medication Sensitivity no stated side effects   Psychomotor / Gait balanced   Psycho Education   Type of Intervention structured groups   Response participates with encouragement   Hours 1   Activities of Daily Living   Hygiene/Grooming handwashing   Oral Hygiene independent   Dress scrubs (behavioral health)   Laundry with supervision   Room Organization independent   Activity   Activity Assistance Provided independent     Pt. Denied SI/SIB. Pt was calm and isolated to his own room for the most part of the shift. Pt. Denied any paranoid thoughts and psychosis symptoms. Pt's appetite was good and sleeping well at night.

## 2017-11-08 NOTE — CONSULTS
"    [facility]  Internal Medicine Consult    Abel Ward MRN# [id number]   Age: 32 year old YOB: 1985     Date of Admission: 11/6/2017  Date of Consult:  11/8/2017    Requesting Service: Behavioral Health - Chuckie Samaniego MD  Reason for Consult: Pre ECT Medicine Evaluation   Indication for ECT: MDD     Chief Complaint: Suicidal Ideation    HPI:  Abel Ward is a 32 year old male with a history of MDD, FATOUMATA, ADHD, HTN, GERD, and childhood asthma who was admitted to inpatient behavioral health (station 10N) on 11/6/17 with worsening depression and suicidal ideation. Internal Medicine was consulted today for pre ECT medical evaluation.     Review of Systems:   Cardiovascular: Negative for palpitations, tachycardia, irregular heart beat, chest pain, exertional chest pain or pressure, paroxysmal nocturnal dyspnea, orthopnea, lower extremity edema, syncope or near-syncope, cyanosis, and claudication.  Pulmonary: No shortness of breath, dyspnea on exertion, cough, or hemoptysis.  Neurological: Positive for intermittent numbness and tingling of the hands related to prior upper extremity nerve damage and tension headaches. Negative for migraine headaches, syncope, stroke, seizures, paralysis, local weakness and numbness or tingling of feet    Past Medical History:   Prior Anesthesia: Yes  If yes, any complications: No    Prior ECT: Yes - 5 to 6 years ago  Response: Reported as \"good\" - unable to further specify  Side Effects: Headaches, irritability    Cardiovascular: CAD - No, MI - No, HTN - Yes, CHF - No, pacemaker or ICD - No  Pulmonary: Asthma/COPD - Yes, Prior respiratory failure or need for emergent intubation - No, On theophylline - No   Neurological: Brain tumor - No, TIA/CVA - No, Dementia - No, Neuromuscular Disease (including post polio syndrome) - No, Seizures and/or Epilepsy - No, Head Injury - No, Intracranial Hemorrhage - No    Past Surgical History: " "  Removal of [place] Teeth    Allergies:   Patient reports no known drug allergies. EMR indicates allergy to Lisinopril (nausea, light-headedness, motion sickness)    Medication list reviewed.    Physical Exam:  /90  Pulse 84  Temp 97.7  F (36.5  C) (Oral)  Resp 16  Ht 1.803 m (5' 11\")  Wt (!) 145.8 kg (321 lb 8 oz)  SpO2 97%  BMI 44.84 kg/m2   Cardiovascular: RRR. S1, S2. No murmurs, rubs, gallops.   Pulmonary: Effort normal. Lungs CTAB without wheezing, rales, or rhonchi.   Neurological: A&Ox3. Moves all extremities. No focal deficits.     Data:  EKG: EKG from 11/6/17 with normal sinus rhythm without ST-T wave abnormalities.           Repeat EKG from 11/7/17 with significant artifact most notable in leads II, III, and aVF.           Both EKGs reviewed with Dr. Major who agreed with above assessment and did not feel further investigation was warranted.    Head Imaging: No prior head imaging in our system or Care Everywhere.    Labs:   CBC:  Recent Labs   Lab Test  11/07/17   0800   WBC  6.3   RBC  5.18   HGB  14.7   HCT  43.5   MCV  84   MCH  28.4   MCHC  33.8   RDW  12.3   PLT  236     CMP:  Recent Labs   Lab Test  11/07/17   0800   NA  139   POTASSIUM  4.5   CHLORIDE  104   INEZ  8.9   CO2  31   BUN  16   CR  0.91   GLC  98   AST  18   ALT  43   BILITOTAL  0.7   ALBUMIN  3.7   PROTTOTAL  7.5   ALKPHOS  71       Assessment and Plan:  Pre ECT Evaluation - Patient does not have absolute medical contraindications to proceeding with ECT at this time.    - Treatment plan per psychiatry.   - Diuretics and oral hypoglycemics should be held the morning of ECT.   - All other antihypertensive medications can be continued.     History of HTN - Previously on Lisinopril which was stopped in 9/2016 due to normotension and side effects (nausea, light-headedness, motion sickness). Not on any medications PTA. /72 currently.  - Continue to monitor. Notify Medicine if BP persistently >150/90.    Dyslipidemia - Lipid " panel (11/7/17) significant for  (H) and non  (H), otherwise WNL.  - Encourage lifestyle modifications including weight loss, dietary changes, and increased physical activity.  - Follow up with PCP for ongoing monitoring and management.    GERD - Continue PTA Prilosec 20 mg QD.    Childhood Asthma - Clinically quiescent. Not on any inhalers PTA. No current respiratory symptoms.    Medicine will sign off. Please page the on-call [facility] for any intercurrent medical issues which arise.    Vicki Arrington PA-C  Hospitalist Service  Pager: [phone]

## 2017-11-08 NOTE — PROGRESS NOTES
"[facility], [place]   Psychiatric Progress Note         Interim History and Subjective Reports:   The patient's care was discussed with the treatment team during the daily team meeting.  Staff reports: no acute issues    Depression severity scale 0-10 (10=most severe):  Today: 7-8    Suicidal thoughts are present however becoming more passive.   Energy is low, appetite is low, concentration is poor, +anhedonia.  Patient denies psychosis/AH/VH./paranoia.    Tolerating Pristiq without issues. No change reported since decreasing cymbalta.  He asked me to complete FMLA paper work.            Scheduled Medications:       DULoxetine  40 mg Oral Daily     desvenlafaxine succinate  25 mg Oral Daily     omeprazole  20 mg Oral Daily     traZODone (DESYREL) tablet 200 mg  200 mg Oral At Bedtime          Allergies:      Allergies   Allergen Reactions     Lisinopril      Nausea, Lightheadedness and motion sickness          Labs:     Recent Results (from the past 24 hour(s))   EKG 12-lead, tracing only    Collection Time: 11/07/17  2:35 PM   Result Value Ref Range    Interpretation ECG Click View Image link to view waveform and result           Vitals:   /90  Pulse 84  Temp 97.7  F (36.5  C) (Oral)  Resp 16  Ht 1.803 m (5' 11\")  Wt (!) 145.8 kg (321 lb 8 oz)  SpO2 97%  BMI 44.84 kg/m2  Weight: 321 lbs 8 oz          Height: 5' 11\"           Body mass index is 44.84 kg/(m^2).        Mental Status Examination:   Appearance: awake, alert  Attitude:  cooperative  Eye Contact:  fair  Mood:  depressed  Affect:  intensity is blunted  Speech:  clear, coherent  Psychomotor Behavior:  no evidence of tardive dyskinesia, dystonia, or tics  Throught Process:  linear  Associations:  no loose associations  Thought Content:  no evidence of psychotic thought and passive suicidal ideation present  Insight:  fair  Judgement:  intact  Oriented to:  time, person, and place  Attention Span and Concentration:  " intact  Recent and Remote Memory:  intact         DIagnoses:     1. Major depressive disorder - recurrent, severe      Rule out bipolar disorder  2. Generalized anxiety disorder  3. GERD         Plan:   1. Biological treatments:  --cont to transition off Cymbalta and onto Pristiq to address symptoms of MDD and FATOUMATA. Increase Pristiq tomorrow morning.   --cont stelazine prn anxiety to avoid increasing xanax  --planning to start ECT this Friday pending medical clearance and maintaining a BMI <45    2. Psychosocial treatments:   --addressed with SW consult and groups  --psychology consult received, awaiting test results. Plan to review with patient afterwards.   --[facility] paperwork will be completed and returned to the patient.     3. Dispo:  --home once improved. Anticipating discharge early next week.
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
## 2017-11-09 PROCEDURE — 25000132 ZZH RX MED GY IP 250 OP 250 PS 637: Performed by: PSYCHIATRY & NEUROLOGY

## 2017-11-09 PROCEDURE — 12400007 ZZH R&B MH INTERMEDIATE UMMC

## 2017-11-09 PROCEDURE — 99232 SBSQ HOSP IP/OBS MODERATE 35: CPT | Performed by: PSYCHIATRY & NEUROLOGY

## 2017-11-09 RX ORDER — DULOXETIN HYDROCHLORIDE 30 MG/1
30 CAPSULE, DELAYED RELEASE ORAL DAILY
Status: COMPLETED | OUTPATIENT
Start: 2017-11-10 | End: 2017-11-10

## 2017-11-09 RX ORDER — DESVENLAFAXINE 50 MG/1
50 TABLET, FILM COATED, EXTENDED RELEASE ORAL DAILY
Status: DISCONTINUED | OUTPATIENT
Start: 2017-11-10 | End: 2017-11-10 | Stop reason: HOSPADM

## 2017-11-09 RX ORDER — DESVENLAFAXINE 50 MG/1
50 TABLET, FILM COATED, EXTENDED RELEASE ORAL DAILY
Qty: 30 TABLET | Refills: 0 | Status: SHIPPED | OUTPATIENT
Start: 2017-11-10 | End: 2018-01-26 | Stop reason: DRUGHIGH

## 2017-11-09 RX ORDER — DULOXETIN HYDROCHLORIDE 20 MG/1
20 CAPSULE, DELAYED RELEASE ORAL DAILY
Status: DISCONTINUED | OUTPATIENT
Start: 2017-11-11 | End: 2017-11-10 | Stop reason: HOSPADM

## 2017-11-09 RX ORDER — TRIFLUOPERAZINE HYDROCHLORIDE 2 MG/1
2-4 TABLET, FILM COATED ORAL 3 TIMES DAILY PRN
Qty: 60 TABLET | Refills: 0 | Status: SHIPPED | OUTPATIENT
Start: 2017-11-09 | End: 2018-01-26

## 2017-11-09 RX ADMIN — TRAZODONE HYDROCHLORIDE 50 MG: 50 TABLET ORAL at 20:41

## 2017-11-09 RX ADMIN — TRAZODONE HYDROCHLORIDE 50 MG: 50 TABLET ORAL at 22:07

## 2017-11-09 RX ADMIN — DULOXETINE HYDROCHLORIDE 40 MG: 20 CAPSULE, DELAYED RELEASE ORAL at 08:55

## 2017-11-09 RX ADMIN — TRIFLUOPERAZINE HYDROCHLORIDE 4 MG: 1 TABLET, FILM COATED ORAL at 20:41

## 2017-11-09 RX ADMIN — DESVENLAFAXINE SUCCINATE 25 MG: 25 TABLET, EXTENDED RELEASE ORAL at 08:55

## 2017-11-09 RX ADMIN — TRIFLUOPERAZINE HYDROCHLORIDE 2 MG: 1 TABLET, FILM COATED ORAL at 08:55

## 2017-11-09 RX ADMIN — OMEPRAZOLE 20 MG: 20 CAPSULE, DELAYED RELEASE ORAL at 08:55

## 2017-11-09 RX ADMIN — TRIFLUOPERAZINE HYDROCHLORIDE 4 MG: 1 TABLET, FILM COATED ORAL at 14:48

## 2017-11-09 ASSESSMENT — ACTIVITIES OF DAILY LIVING (ADL)
GROOMING: INDEPENDENT
LAUNDRY: WITH SUPERVISION
DRESS: SCRUBS (BEHAVIORAL HEALTH)
ORAL_HYGIENE: INDEPENDENT

## 2017-11-09 NOTE — PROGRESS NOTES
Pt signed ANNA for [facility].     Writer called and made the following appointments:   [facility] [place], [facility]   [address] [place] [id number]  Phone: [phone]  Fax: [phone] HUC TO FAX DISCHARGE PAPERWORK     *You have an intake appointment for psychiatric medication management with Vincent Suarez scheduled for Thursday, November 16th at 10:30 am, however, please arrive at 10 am to complete paperwork.   *You have an intake for individual therapy with Kaitlynn Reynaga scheduled for Tuesday, December 5th at 3:00 pm, however, please arrive at 2:30 pm to complete paperwork.
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
## 2017-11-09 NOTE — PROGRESS NOTES
11/09/17 1202   Behavioral Health   Hallucinations denies / not responding to hallucinations   Thinking confused;poor concentration   Orientation person: oriented;place: oriented   Memory baseline memory   Insight poor   Judgement impaired   Eye Contact into space;staring   Affect blunted, flat;incongruent   Mood depressed;hopeless   Physical Appearance/Attire attire appropriate to age and situation   Hygiene other (see comment)  (Pt untidy )   Suicidality (Pt denies)   Self Injury (Pt denies)   Activity isolative;withdrawn   Speech flight of ideas;incoherent   Medication Sensitivity no stated side effects   Psychomotor / Gait balanced;steady     Pt withdrawn, isolated sleeping during shift. Pt goal was to rest and feel better. Pt mentions having shoulders pain. Pt states having good appetite and sleeping good. Pt denies SI and SIB. Pt seemed distractible,poorly concentrating and depressed. Pt slurring his words with partly closed eyes while in bed. Overall, pt seemed depressed withdrawn and isolated from all activities. Pt ate breakfast and spent rest of morning napping and sleeping.

## 2017-11-09 NOTE — PLAN OF CARE
"Problem: Depressive Symptoms  Goal: Depressive Symptoms  Signs and symptoms of listed problems will be absent or manageable.  Patient will report reduction in depression and anxiety  Patient will deny suicidal and self-harm thoughts  Patient will report improved sleep as evidenced by less difficulty falling asleep and staying asleep  Outcome: No Change  Dwighter c/o \"bad anxiety \" and feeling \"Caged in this evening\", he has been obtaining frequent medications for anxiety all evening. He became particularly anxious prior to a visit by his Aunt. Shakir's affect has been flat and blunt. He reports passive thoughts of suicide, denies any hallucinations. Spent 50% or more of the shift in his room.       "

## 2017-11-09 NOTE — PROGRESS NOTES
"[facility], [place]   Psychiatric Progress Note         Interim History and Subjective Reports:   The patient's care was discussed with the treatment team during the daily team meeting.  Staff reports: no acute issues    Out in the common area watching tv  Depression severity scale 0-10 (10=most severe):  Today: 7  Patient denies SI/HI.  Poor sleep (envirnmental)  Patient denies psychosis/AH/VH./paranoia.  Tolerating medications well without significant side effects    He is hoping to discharge home on Saturday.             Scheduled Medications:       DULoxetine  40 mg Oral Daily     desvenlafaxine succinate  25 mg Oral Daily     omeprazole  20 mg Oral Daily     traZODone (DESYREL) tablet 200 mg  200 mg Oral At Bedtime          Allergies:      Allergies   Allergen Reactions     Lisinopril      Nausea, Lightheadedness and motion sickness          Labs:   No results found for this or any previous visit (from the past 24 hour(s)).       Vitals:   /90  Pulse 84  Temp 96.5  F (35.8  C) (Oral)  Resp 18  Ht 1.803 m (5' 11\")  Wt (!) 144.4 kg (318 lb 4.8 oz)  SpO2 97%  BMI 44.39 kg/m2  Weight: 318 lbs 4.8 oz          Height: 5' 11\"           Body mass index is 44.39 kg/(m^2).        Mental Status Examination:   Appearance: awake, alert  Attitude:  cooperative  Eye Contact:  fair  Mood:  depressed  Affect:  intensity is blunted  Speech:  clear, coherent  Psychomotor Behavior:  no evidence of tardive dyskinesia, dystonia, or tics  Throught Process:  linear  Associations:  no loose associations  Thought Content:  no evidence of suicidal ideation or homicidal ideation and no evidence of psychotic thought  Insight:  fair  Judgement:  intact  Oriented to:  time, person, and place  Attention Span and Concentration:  intact  Recent and Remote Memory:  intact         DIagnoses:     1.  Major depressive disorder - recurrent, severe  2. Generalized anxiety disorder  3. Gerd         Plan:   1. " Biological treatments:  --increase pristiq  --taper off cymbalta    2. Psychosocial treatments:   --addressed with SW consult and groups  --referral for DBT    3. Dispo:  --anticipate discharge on Saturday per patients request pending tolerability of ECT on Friday.

## 2017-11-10 ENCOUNTER — ANESTHESIA (OUTPATIENT)
Dept: BEHAVIORAL HEALTH | Facility: CLINIC | Age: 32
End: 2017-11-10

## 2017-11-10 ENCOUNTER — ANESTHESIA EVENT (OUTPATIENT)
Dept: BEHAVIORAL HEALTH | Facility: CLINIC | Age: 32
End: 2017-11-10

## 2017-11-10 VITALS
BODY MASS INDEX: 44.1 KG/M2 | WEIGHT: 315 LBS | HEART RATE: 86 BPM | DIASTOLIC BLOOD PRESSURE: 67 MMHG | RESPIRATION RATE: 16 BRPM | SYSTOLIC BLOOD PRESSURE: 137 MMHG | OXYGEN SATURATION: 97 % | TEMPERATURE: 98.3 F | HEIGHT: 71 IN

## 2017-11-10 PROCEDURE — 90870 ELECTROCONVULSIVE THERAPY: CPT

## 2017-11-10 PROCEDURE — 40000671 ZZH STATISTIC ANESTHESIA CASE

## 2017-11-10 PROCEDURE — 25000128 H RX IP 250 OP 636: Performed by: ANESTHESIOLOGY

## 2017-11-10 PROCEDURE — 25000125 ZZHC RX 250: Performed by: PSYCHIATRY & NEUROLOGY

## 2017-11-10 PROCEDURE — GZB0ZZZ ELECTROCONVULSIVE THERAPY, UNILATERAL-SINGLE SEIZURE: ICD-10-PCS | Performed by: PSYCHIATRY & NEUROLOGY

## 2017-11-10 PROCEDURE — 25000132 ZZH RX MED GY IP 250 OP 250 PS 637: Performed by: PSYCHIATRY & NEUROLOGY

## 2017-11-10 PROCEDURE — 25000128 H RX IP 250 OP 636: Performed by: PSYCHIATRY & NEUROLOGY

## 2017-11-10 PROCEDURE — 25000125 ZZHC RX 250: Performed by: ANESTHESIOLOGY

## 2017-11-10 PROCEDURE — 25000132 ZZH RX MED GY IP 250 OP 250 PS 637: Performed by: PHYSICIAN ASSISTANT

## 2017-11-10 RX ORDER — ONDANSETRON 2 MG/ML
4 INJECTION INTRAMUSCULAR; INTRAVENOUS
Status: CANCELLED
Start: 2017-11-10 | End: 2017-11-10

## 2017-11-10 RX ORDER — KETOROLAC TROMETHAMINE 30 MG/ML
30 INJECTION, SOLUTION INTRAMUSCULAR; INTRAVENOUS
Status: CANCELLED
Start: 2017-11-10 | End: 2017-11-10

## 2017-11-10 RX ORDER — CAFFEINE AND SODIUM BENZOATE 125 MG/ML
500 INJECTION, SOLUTION INTRAMUSCULAR; INTRAVENOUS
Status: COMPLETED | OUTPATIENT
Start: 2017-11-10 | End: 2017-11-10

## 2017-11-10 RX ORDER — CAFFEINE AND SODIUM BENZOATE 125 MG/ML
500 INJECTION, SOLUTION INTRAMUSCULAR; INTRAVENOUS
Status: CANCELLED
Start: 2017-11-10 | End: 2017-11-10

## 2017-11-10 RX ORDER — IBUPROFEN 400 MG/1
400 TABLET, FILM COATED ORAL EVERY 6 HOURS PRN
Status: DISCONTINUED | OUTPATIENT
Start: 2017-11-10 | End: 2017-11-10 | Stop reason: HOSPADM

## 2017-11-10 RX ORDER — ONDANSETRON 2 MG/ML
4 INJECTION INTRAMUSCULAR; INTRAVENOUS
Status: COMPLETED | OUTPATIENT
Start: 2017-11-10 | End: 2017-11-10

## 2017-11-10 RX ORDER — KETOROLAC TROMETHAMINE 30 MG/ML
30 INJECTION, SOLUTION INTRAMUSCULAR; INTRAVENOUS
Status: COMPLETED | OUTPATIENT
Start: 2017-11-10 | End: 2017-11-10

## 2017-11-10 RX ADMIN — ALPRAZOLAM 0.5 MG: 0.25 TABLET ORAL at 16:33

## 2017-11-10 RX ADMIN — ONDANSETRON 4 MG: 2 INJECTION INTRAMUSCULAR; INTRAVENOUS at 09:25

## 2017-11-10 RX ADMIN — IBUPROFEN 400 MG: 400 TABLET ORAL at 12:55

## 2017-11-10 RX ADMIN — DULOXETINE HYDROCHLORIDE 30 MG: 30 CAPSULE, DELAYED RELEASE ORAL at 11:07

## 2017-11-10 RX ADMIN — TRIFLUOPERAZINE HYDROCHLORIDE 2 MG: 1 TABLET, FILM COATED ORAL at 11:13

## 2017-11-10 RX ADMIN — METHOHEXITAL SODIUM 18 MG: 500 INJECTION, POWDER, LYOPHILIZED, FOR SOLUTION INTRAMUSCULAR; INTRAVENOUS; RECTAL at 09:37

## 2017-11-10 RX ADMIN — DESVENLAFAXINE SUCCINATE 50 MG: 50 TABLET, EXTENDED RELEASE ORAL at 11:07

## 2017-11-10 RX ADMIN — KETOROLAC TROMETHAMINE 30 MG: 30 INJECTION, SOLUTION INTRAMUSCULAR at 09:25

## 2017-11-10 RX ADMIN — CAFFEINE AND SODIUM BENZOATE 500 MG: 125 INJECTION, SOLUTION INTRAMUSCULAR; INTRAVENOUS at 09:25

## 2017-11-10 RX ADMIN — Medication 160 MG: at 09:37

## 2017-11-10 RX ADMIN — ACETAMINOPHEN 650 MG: 325 TABLET, FILM COATED ORAL at 10:11

## 2017-11-10 RX ADMIN — OMEPRAZOLE 20 MG: 20 CAPSULE, DELAYED RELEASE ORAL at 07:51

## 2017-11-10 ASSESSMENT — ACTIVITIES OF DAILY LIVING (ADL)
GROOMING: INDEPENDENT
LAUNDRY: WITH SUPERVISION
ORAL_HYGIENE: INDEPENDENT
GROOMING: INDEPENDENT
DRESS: INDEPENDENT
ORAL_HYGIENE: INDEPENDENT
DRESS: INDEPENDENT

## 2017-11-10 NOTE — DISCHARGE SUMMARY
Discharge instructions reviewed with patient, all paper works signed, including medication education and future appointments. Left with all of his belongings. Denied feeling depressed and suicidal and homocidal ideations.

## 2017-11-10 NOTE — PROCEDURES
Procedure/Surgery Information   [facility], [place]    Bedside Procedure Note  Date of Service (when I performed the procedure): 11/10/2017    Abel Ward is a 32 year old male patient.  1. Severe episode of recurrent major depressive disorder, without psychotic features (H)    2. Suicidal ideation    3. Other chest pain    4. Anxiety    5. Morbid obesity (H)      Past Medical History:   Diagnosis Date     Attention deficit disorder with hyperactivity(314.01) 2001     Esophageal reflux     Dr Starks- had EGD ~2202     Generalized anxiety disorder      Hypertension      Infectious mononucleosis 12/03     Low testosterone      MDD (major depressive disorder)      Mild intermittent asthma      Temp: 96.2  F (35.7  C)         Resp: 16 SpO2: 99 %        Procedures     Froy Weber     [facility], [place]   ECT Procedure Note  11/10/2017    Abel Ward [id number]   32 year old 1985     Patient Status: Inpatient    Is this the first in a series of 12 treatments?  Yes    History and Physical: Reviewed in medical record    Consent: Informed consent was signed by: patient    Date Consent Signed: 11/8/    Allergies   Allergen Reactions     Lisinopril      Nausea, Lightheadedness and motion sickness       Weight:  318 lbs 4.8 oz              Indications for ECT:   Medications ineffective and History of good ECT response in one or more previous episodes of illness         Clinical Narrative:   Patient was admitted with worsening depression and suidical ideation.  He's starting ECT for depression.   NPO after .  Alert and Oriented x 3.  Mood is depressed.  He plans for discharge tomorrow and come back Monday for outpatient ECT.           Diagnosis:   Major depression  (F33.2)         Pause for the Cause:     Right patient Yes   Right procedure/laterality settings: Yes          Intra-Procedure Documentation:     ECT #: 1   Treatment number this  series: 1   Total treatment number: 13     Type of ECT:  Right, unilateral ultrabrief    ECT Medications:   Caffeine:  500mg   Toradol: 30mg  Zofran: 4mg  Etomidate:  18mg  Succinyl Choline: 160mg    ECT Strip Summary:   Energy Level: 100 percent  Motor Seizure Duration: 47 seconds  EEG Seizure Duration:  62 seconds    Complications: No    Plan:   Next ECT is outpatient 11/13/17 at 8:15 AM.  The attending doc should get PA from intake for outpatient ECT and order outpatient ECT for Monday 11/13/17.      Froy Weber MD/

## 2017-11-10 NOTE — ANESTHESIA PREPROCEDURE EVALUATION
Anesthesia Evaluation     .             ROS/MED HX    ENT/Pulmonary:     (+)asthma , . .    Neurologic:       Cardiovascular:     (+) hypertension----. : . . . :. .       METS/Exercise Tolerance:     Hematologic:         Musculoskeletal:         GI/Hepatic:     (+) GERD       Renal/Genitourinary:         Endo:     (+) Obesity, .      Psychiatric:     (+) psychiatric history depression      Infectious Disease:         Malignancy:         Other:                     Physical Exam  Normal systems: cardiovascular and pulmonary    Airway   Mallampati: II  TM distance: >3 FB  Neck ROM: full    Dental     Cardiovascular   Rhythm and rate: regular and normal      Pulmonary    breath sounds clear to auscultation                    Anesthesia Plan      History & Physical Review      ASA Status:  3 .    NPO Status:  > 8 hours    Plan for General with Intravenous induction. Maintenance will be TIVA.           Postoperative Care  Postoperative pain management:  IV analgesics.      Consents  Anesthetic plan, risks, benefits and alternatives discussed with:  Patient.  Use of blood products discussed: No .   .        ANESTHESIA PREOP EVALUATION    NPO Status: more then 6 hours    Procedure:     HPI:     PMHx/PSHx/ROS:  PAST MEDICAL HISTORY:   Past Medical History:   Diagnosis Date     Attention deficit disorder with hyperactivity(314.01) 2001     Esophageal reflux     Dr Starks- had EGD ~2202     Generalized anxiety disorder      Hypertension      Infectious mononucleosis 12/03     Low testosterone      MDD (major depressive disorder)      Mild intermittent asthma        PAST SURGICAL HISTORY:   Past Surgical History:   Procedure Laterality Date     OTHER SURGICAL HISTORY      wisdom teeth extraction       FAMILY HISTORY:   Family History   Problem Relation Age of Onset     DIABETES Paternal Grandfather      Hypertension Mother      Hypertension Father      Hypertension Maternal Grandfather      Hypertension Paternal Grandfather       Breast Cancer Paternal Grandmother      Depression Father      HEART DISEASE Paternal Grandfather      Lipids Father      Obesity Mother      Obesity Father      Thyroid Disease Mother      Thyroid Disease Father          Past Anes Hx: No personal or family h/o anesthesia problems    Soc Hx:   Tobacco:   EtOH:    Allergies:   Allergies   Allergen Reactions     Lisinopril      Nausea, Lightheadedness and motion sickness       Meds:   Prescriptions Prior to Admission   Medication Sig Dispense Refill Last Dose     gabapentin (NEURONTIN) 100 MG capsule Take 1-2 capsules (100-200 mg) by mouth nightly as needed   Past Week at Unknown time     TRAZODONE HCL PO Take 200 mg by mouth At Bedtime    Past Week at Unknown time     omeprazole (PRILOSEC) 20 MG capsule Take 1 capsule (20 mg) by mouth daily 90 capsule 3 11/6/2017 at Unknown time     ALPRAZolam (XANAX) 0.5 MG tablet Take 1 tablet (0.5 mg) by mouth 3 times daily as needed for anxiety (Patient taking differently: Take 0.5 mg by mouth 2 times daily as needed for anxiety ) 20 tablet 0 11/6/2017 at Unknown time     [DISCONTINUED] DULoxetine (CYMBALTA) 60 MG capsule Take 1 capsule (60 mg) by mouth daily 90 capsule 1 11/6/2017 at Unknown time       Current Outpatient Prescriptions   Medication Sig Dispense Refill     desvenlafaxine succinate (PRISTIQ) 50 MG 24 hr tablet Take 1 tablet (50 mg) by mouth daily 30 tablet 0     trifluoperazine (STELAZINE) 2 MG tablet Take 1-2 tablets (2-4 mg) by mouth 3 times daily as needed (severe anxiety) 60 tablet 0       Physical Exam:  VS: T 96.2, P 84, /90, R 16, SpO2 99%         BMP:  Lab Results   Component Value Date     11/07/2017      Lab Results   Component Value Date    POTASSIUM 4.5 11/07/2017     Lab Results   Component Value Date    CHLORIDE 104 11/07/2017     Lab Results   Component Value Date    INEZ 8.9 11/07/2017     Lab Results   Component Value Date    CO2 31 11/07/2017     Lab Results   Component Value Date     BUN 16 11/07/2017     Lab Results   Component Value Date    CR 0.91 11/07/2017     Lab Results   Component Value Date    GLC 98 11/07/2017        CBC:  Lab Results   Component Value Date    WBC 6.3 11/07/2017     Lab Results   Component Value Date    HGB 14.7 11/07/2017     Lab Results   Component Value Date    HCT 43.5 11/07/2017     Lab Results   Component Value Date     11/07/2017        Coags/Type and Screen  Lab Results   Component Value Date    INR 1.01 07/08/2015     No results found for: PT  Type and Screen:      Mahendra Daily MD    11/10/2017  9:23 AM  Risks, benefits, side effects and intended purposes discussed.

## 2017-11-10 NOTE — PROGRESS NOTES
1. What PRN did patient receive?tylenol 650 mg      2. What was the patient doing that led to the PRN medication? C/o headache      3. Did they require R/S? No      4. Side effects to PRN medication?none      5. After 1 Hour, patient appeared: tbd    Report called to unit post treatment.

## 2017-11-10 NOTE — DISCHARGE INSTRUCTIONS
Behavioral Discharge Planning and Instructions      Summary:  You were admitted on 11/6/2017  due to Depression, Anxiety and Suicidal Ideations.  You were treated by Dr. Chuckie Samaniego MD and discharged on 11/10/17 from [facility] to Home    Principal Diagnosis: Major depressive disorder, recurrent    Health Care Follow-up Appointments:   [facility] [place], [facility]   [address] [place] [address]  Phone: [phone]  Fax: [phone] HUC TO FAX DISCHARGE PAPERWORK     *You have an intake appointment for psychiatric medication management with Vincent Suarez scheduled for Thursday, November 16th at 10:30 am, however, please arrive at 10 am to complete paperwork.   *You have an intake for individual therapy with Kaitlynn Reynaga scheduled for Tuesday, December 5th at 3:00 pm, however, please arrive at 2:30 pm to complete paperwork.     Attend all scheduled appointments with your outpatient providers. Call at least 24 hours in advance if you need to reschedule an appointment to ensure continued access to your outpatient providers.   Major Treatments, Procedures and Findings:  You were provided with: a psychiatric assessment, assessed for medical stability, medication evaluation and/or management, group therapy and milieu management    Symptoms to Report: feeling more aggressive, increased confusion, losing more sleep, mood getting worse or thoughts of suicide    Early warning signs can include: increased depression or anxiety sleep disturbances increased thoughts or behaviors of suicide or self-harm  increased unusual thinking, such as paranoia or hearing voices    Safety and Wellness:  Take all medicines as directed.  Make no changes unless your doctor suggests them.      Follow treatment recommendations.  Refrain from alcohol and non-prescribed drugs.  Ask your support system to help you reduce your access to items that could harm yourself or others. If there is a concern for  "safety, call 911.    Resources:   Crisis Intervention: 675.211.4952 or 719-522-9910 (TTY: 226.894.9532).  Call anytime for help.  National [place] on Mental Illness (www.mn.anayeli.org): 368.716.8467 or 304-759-5522.  [place] Association for Children's Mental Health (www.mac.org): 482.823.4807.  Alcoholics Anonymous (www.alcoholics-anonymous.org): Check your phone book for your local chapter.  Suicide Awareness Voices of Education (SAVE) (www.save.org): 962-147-MBSF (3841)  National Suicide Prevention Line (www.mentalhealthmn.org): 730-318-IPYO (2736)  Mental Health Consumer/Survivor Network of [place] (www.mhcsn.net): 475.320.8918 or 995-709-6477  Mental Health Association of [place] (www.mentalhealth.org): 345.611.5131 or 368-313-5570  Self- Management and Recovery Training., SMART-- Toll free: 698.616.1248  www.Nanochip.BuzzStarter  Text 4 Life: txt \"LIFE\" to 58634 for immediate support and crisis intervention  Crisis text line: Text \"START\" to 658-638. Free, confidential, 24/7.  Crisis Intervention: 249.304.3989 or 192-358-1719. Call anytime for help.     The treatment team has appreciated the opportunity to work with you.     Please take care and make your recovery a daily recovery.   If you have any questions or concerns our unit number is [phone].   Thank you.       "

## 2017-11-10 NOTE — DISCHARGE SUMMARY
[facility], [place]  Department of Psychiatry    DATE OF ADMISSION:  11/6/2017    DATE OF DISCHARGE:  November 10, 2017    DISCHARGE DIAGNOSES:   1. Major depressive disorder - recurrent, severe  2. Generalized anxiety disorder  3. Gerd    HOSPITAL COURSE: (Refer to H&P, progress notes, and consult notes for details)    The patient was admitted to our Behavioral Health Unit under Voluntary status for depressed mood and suicidal thoughts. His outpatient medications were resumed and ECT was initiated for treatment of his depressive disorder and suicidal thoughts. To address the severity of his depressive symptoms while noting a positive response that ECT in the past, ECT was restarted. Cymbalta was tapered off suspecting limited response and replaced with Pristiq better address mood and anxiety symptoms. He tolerated the transition well. He reported bothersome anxiety and requested interventions. In an attempt to minimize usage of benzodiazepines, a trial of Stelazine was offered and found to be beneficial. He may continue taking Stelazine over the next 3 to 4 weeks until his primary antidepressant and ECT have reached therapeutic benefit.     After receiving one treatment of ECT, he requested to be discharged home so that he may continue treatment sessions on an outpatient basis.  He was no longer reporting suicidal thoughts and appeared hopeful and future oriented. Since the patient did not meet criteria to be placed under an involuntary hold, he was discharged home on his request and his care was transitioned the outpatient providers.     The patient did request time off from work so that he may dedicate the next few weeks to treatment of his mental illness. He provided me with documentation to support this and requested that I complete them. Documentations were completed and left on his chart for review. After discharge, the patient had forgotten to review these documents which were  subsequently not forwarded to his employer. His wife contacted me to help coordinate completion of these documents again which were then forwarded to his employer.    CONDITION AT DISCHARGE:  Improved.  The patients acute suicide risk is low due to the following factors:  improved mood/anxiety symptoms.  Denies suicidal ideations. Denies psychotic symptoms.  Not actively intoxicated and plans to abstain from illicit substances and alcohol.  Denies access to guns.  Denies feeling hopeless or helpless. At the time of discharge Abel Ward was determined to not be an immediate danger to herself or others. The patient's acute risk will be higher if noncompliant with treatment plan, medications, follow-up or using illicit substances or alcohol.  These findings along with the risks of noncompliance with medications and treatment plan, which could potentially cause decompensation and increase the risk for suicide, were discussed with the patient.  The patients chronic suicide risk is moderate given the following factors: white race; diagnosis of MDD, Denied a family history of suicide.  Preventative factors include: social supports, stable housing, employment, .      MENTAL STATUS EXAMINATION AT TIME OF DISCHARGE:  The patient is 32 year old White male who appears their stated age and is appropriately dressed with good hygiene.  Calm and cooperative with the interview questions.  No psychomotor abnormalities are noted. Eye contact is appropriate. Speech has normal rate, tone, latency and volume and is not pushed or pressured. Mood is depressed and affect is blunted.  The patient does not seem overtly anxious, manic or irritable.  Thought process is linear, logical and future oriented.  Thought process is not tangential, circumstantial or disorganized.  Thought content is not significant for apperant paranoia, delusions, ideas of reference or grandiosity.  The patient denies suicidal and homicidal  ideations as well as auditory and visual hallucinations.  Insight and judgment are fair.  Cognition appears intact to interviewing including orientation, recent and remote memory, fund of knowledge, use of language, attention span and concentration.  Muscle strength, tone and gait appear normal on visual inspection.      DISPOSITION:  The patient is discharged home     FOLLOWUP APPOINTMENTS:  ( per social workers notes and after visit summary)  1.   Psychiatric follow-up through [facility] on November 16. Individual therapy on December 5.  2.  The patient will continue outpatient ECT with Dr. Weber with his next treatment session this upcoming Monday.    DISCHARGE MEDICATIONS:   Current Discharge Medication List      START taking these medications    Details   desvenlafaxine succinate (PRISTIQ) 50 MG 24 hr tablet Take 1 tablet (50 mg) by mouth daily  Qty: 30 tablet, Refills: 0    Associated Diagnoses: Severe episode of recurrent major depressive disorder, without psychotic features (H)      trifluoperazine (STELAZINE) 2 MG tablet Take 1-2 tablets (2-4 mg) by mouth 3 times daily as needed (severe anxiety)  Qty: 60 tablet, Refills: 0    Associated Diagnoses: Anxiety         CONTINUE these medications which have NOT CHANGED    Details   gabapentin (NEURONTIN) 100 MG capsule Take 1-2 capsules (100-200 mg) by mouth nightly as needed    Associated Diagnoses: Restless legs syndrome      TRAZODONE HCL PO Take 200 mg by mouth At Bedtime     Associated Diagnoses: Morbid obesity (H)      omeprazole (PRILOSEC) 20 MG capsule Take 1 capsule (20 mg) by mouth daily  Qty: 90 capsule, Refills: 3    Associated Diagnoses: Esophageal reflux      ALPRAZolam (XANAX) 0.5 MG tablet Take 1 tablet (0.5 mg) by mouth 3 times daily as needed for anxiety  Qty: 20 tablet, Refills: 0    Associated Diagnoses: Anxiety         STOP taking these medications       DULoxetine (CYMBALTA) 60 MG capsule Comments:   Reason for Stopping:                 LABORATORY RESULTS: (past 14 days)  Recent Results (from the past 336 hour(s))   Drug abuse screen 6 urine (tox)    Collection Time: 11/06/17 10:15 AM   Result Value Ref Range    Amphetamine Qual Urine Negative NEG^Negative    Barbiturates Qual Urine Negative NEG^Negative    Benzodiazepine Qual Urine Positive (A) NEG^Negative    Cannabinoids Qual Urine Negative NEG^Negative    Cocaine Qual Urine Negative NEG^Negative    Ethanol Qual Urine Negative NEG^Negative    Opiates Qualitative Urine Negative NEG^Negative   EKG 12-lead, tracing only    Collection Time: 11/06/17 10:24 AM   Result Value Ref Range    Interpretation ECG Click View Image link to view waveform and result    CBC with platelets differential    Collection Time: 11/07/17  8:00 AM   Result Value Ref Range    WBC 6.3 4.0 - 11.0 10e9/L    RBC Count 5.18 4.4 - 5.9 10e12/L    Hemoglobin 14.7 13.3 - 17.7 g/dL    Hematocrit 43.5 40.0 - 53.0 %    MCV 84 78 - 100 fl    MCH 28.4 26.5 - 33.0 pg    MCHC 33.8 31.5 - 36.5 g/dL    RDW 12.3 10.0 - 15.0 %    Platelet Count 236 150 - 450 10e9/L    Diff Method Automated Method     % Neutrophils 57.4 %    % Lymphocytes 28.1 %    % Monocytes 10.2 %    % Eosinophils 3.4 %    % Basophils 0.6 %    % Immature Granulocytes 0.3 %    Nucleated RBCs 0 0 /100    Absolute Neutrophil 3.6 1.6 - 8.3 10e9/L    Absolute Lymphocytes 1.8 0.8 - 5.3 10e9/L    Absolute Monocytes 0.6 0.0 - 1.3 10e9/L    Absolute Eosinophils 0.2 0.0 - 0.7 10e9/L    Absolute Basophils 0.0 0.0 - 0.2 10e9/L    Abs Immature Granulocytes 0.0 0 - 0.4 10e9/L    Absolute Nucleated RBC 0.0    Comprehensive metabolic panel    Collection Time: 11/07/17  8:00 AM   Result Value Ref Range    Sodium 139 133 - 144 mmol/L    Potassium 4.5 3.4 - 5.3 mmol/L    Chloride 104 94 - 109 mmol/L    Carbon Dioxide 31 20 - 32 mmol/L    Anion Gap 4 3 - 14 mmol/L    Glucose 98 70 - 99 mg/dL    Urea Nitrogen 16 7 - 30 mg/dL    Creatinine 0.91 0.66 - 1.25 mg/dL    GFR Estimate >90  >60 mL/min/1.7m2    GFR Estimate If Black >90 >60 mL/min/1.7m2    Calcium 8.9 8.5 - 10.1 mg/dL    Bilirubin Total 0.7 0.2 - 1.3 mg/dL    Albumin 3.7 3.4 - 5.0 g/dL    Protein Total 7.5 6.8 - 8.8 g/dL    Alkaline Phosphatase 71 40 - 150 U/L    ALT 43 0 - 70 U/L    AST 18 0 - 45 U/L   TSH with free T4 reflex and/or T3 as indicated    Collection Time: 11/07/17  8:00 AM   Result Value Ref Range    TSH 0.64 0.40 - 4.00 mU/L   Lipid panel    Collection Time: 11/07/17  8:00 AM   Result Value Ref Range    Cholesterol 193 <200 mg/dL    Triglycerides 96 <150 mg/dL    HDL Cholesterol 58 >39 mg/dL    LDL Cholesterol Calculated 116 (H) <100 mg/dL    Non HDL Cholesterol 135 (H) <130 mg/dL   EKG 12-lead, tracing only    Collection Time: 11/07/17  2:35 PM   Result Value Ref Range    Interpretation ECG Click View Image link to view waveform and result        >30 minutes was spent on this discharge to allow for reviewing the patient's response to treatment, reviewing plan of care, education on medications and diagnosis, and conducting a risk assessment.

## 2017-11-10 NOTE — PROGRESS NOTES
Patient received 400 mg ibuprofen at 1255 for persistent headache following ECT. Patient reports this was somewhat effective. Patient requesting discharge at this time, writer contacted provider who approved this discharge. Provider was not near a computer and stated he would enter the orders at about 1630. Orders need to be updated regarding cymbalta taper.

## 2017-11-10 NOTE — ANESTHESIA POSTPROCEDURE EVALUATION
Patient: Abel Ward    * No procedures listed *    Diagnosis:Severe recurrent major depression without psychotic features (H) [F33.2]  Diagnosis Additional Information: No value filed.    Anesthesia Type:  General    Note:  Anesthesia Post Evaluation    Patient location during evaluation: PACU  Patient participation: Able to fully participate in evaluation  Level of consciousness: awake  Pain management: adequate  Airway patency: patent  Cardiovascular status: acceptable and stable  Respiratory status: acceptable and room air  Hydration status: acceptable  PONV: none     Anesthetic complications: None          Last vitals:  Vitals:    11/10/17 0956 11/10/17 1006 11/10/17 1016   BP: 132/76 152/73 (P) 137/67   Pulse: 89 86 (P) 86   Resp: 16 16 (P) 16   Temp:      SpO2: 97% 96% (P) 96%         Electronically Signed By: Martín Daily MD  November 10, 2017  10:26 AM

## 2017-11-10 NOTE — PROGRESS NOTES
Pt was visible in the milieu for most of this shift, though kept to himself. Pt denies SI/SIB, rates depression 3/10, and rates anxiety 5/10 today. Pt reports being hopeful and looks forward to ECT tomorrow, but is concerned that it will impair his memory like it has in the past.       11/09/17 1852   Behavioral Health   Hallucinations denies / not responding to hallucinations   Thinking distractable   Orientation person: oriented;place: oriented;date: oriented   Memory baseline memory   Insight poor   Judgement impaired   Eye Contact at examiner   Affect blunted, flat   Mood depressed   Physical Appearance/Attire attire appropriate to age and situation   Hygiene other (see comment)  (Fair)   Suicidality other (see comments)  (Denies)   Self Injury other (see comment)  (Denies)   Elopement (None observed)   Activity other (see comment)  (Visible in milieu, though keeps to self)   Speech clear;coherent   Medication Sensitivity no stated side effects   Psychomotor / Gait balanced;steady   Safety   Elopement status 15   Psycho Education   Type of Intervention 1:1 intervention   Response participates, initiates socially appropriate   Hours 0.5   Treatment Detail (Feedback, MH Check-in)   Activities of Daily Living   Hygiene/Grooming independent   Oral Hygiene independent   Dress scrubs (behavioral health)   Laundry with supervision   Room Organization independent   Behavioral Health Interventions   Depression maintain safety precautions;monitor need to revise level of observation;maintain safe secure environment;provide emotional support;establish therapeutic relationship   Social and Therapeutic Interventions (Depression) encourage effective boundaries with peers;encourage participation in therapeutic groups and milieu activities;encourage socialization with peers

## 2017-11-10 NOTE — PROGRESS NOTES
Patient returned from ECT complaining of a severe headache. Vital signs temp 98.3 oral, resp 16, O2 97% room air, /88 sitting, pulse 69. Patient had been given Toradol and tylenol before returning from ECT which patient states has been ineffective. Writer paged internal medicine who advised monitoring until an hour and a half after tylenol administration before ordering any other pain meds. Patient ate cereal and milk, was given a cold pack for his head and is resting in bed.

## 2017-11-10 NOTE — PROGRESS NOTES
Intake was notified regarding obtaining a PA for outpatient ECT which will be scheduled for Monday Nov 13.

## 2017-11-13 ENCOUNTER — ANESTHESIA EVENT (OUTPATIENT)
Dept: BEHAVIORAL HEALTH | Facility: CLINIC | Age: 32
End: 2017-11-13

## 2017-11-13 ENCOUNTER — ANESTHESIA (OUTPATIENT)
Dept: BEHAVIORAL HEALTH | Facility: CLINIC | Age: 32
End: 2017-11-13

## 2017-11-13 ENCOUNTER — HOSPITAL ENCOUNTER (OUTPATIENT)
Dept: BEHAVIORAL HEALTH | Facility: CLINIC | Age: 32
End: 2017-11-13
Attending: PSYCHIATRY & NEUROLOGY
Payer: COMMERCIAL

## 2017-11-13 ENCOUNTER — TELEPHONE (OUTPATIENT)
Dept: FAMILY MEDICINE | Facility: CLINIC | Age: 32
End: 2017-11-13

## 2017-11-13 VITALS
RESPIRATION RATE: 20 BRPM | HEART RATE: 87 BPM | OXYGEN SATURATION: 97 % | DIASTOLIC BLOOD PRESSURE: 75 MMHG | TEMPERATURE: 98.4 F | SYSTOLIC BLOOD PRESSURE: 133 MMHG

## 2017-11-13 DIAGNOSIS — F33.2 SEVERE EPISODE OF RECURRENT MAJOR DEPRESSIVE DISORDER, WITHOUT PSYCHOTIC FEATURES (H): ICD-10-CM

## 2017-11-13 PROCEDURE — 25000125 ZZHC RX 250: Performed by: ANESTHESIOLOGY

## 2017-11-13 PROCEDURE — 25000128 H RX IP 250 OP 636: Performed by: PSYCHIATRY & NEUROLOGY

## 2017-11-13 PROCEDURE — 40000671 ZZH STATISTIC ANESTHESIA CASE

## 2017-11-13 PROCEDURE — 25000128 H RX IP 250 OP 636: Performed by: ANESTHESIOLOGY

## 2017-11-13 PROCEDURE — 90870 ELECTROCONVULSIVE THERAPY: CPT

## 2017-11-13 PROCEDURE — 25000125 ZZHC RX 250: Performed by: PSYCHIATRY & NEUROLOGY

## 2017-11-13 RX ORDER — KETOROLAC TROMETHAMINE 30 MG/ML
30 INJECTION, SOLUTION INTRAMUSCULAR; INTRAVENOUS
Status: CANCELLED
Start: 2017-11-13 | End: 2017-11-13

## 2017-11-13 RX ORDER — KETOROLAC TROMETHAMINE 30 MG/ML
30 INJECTION, SOLUTION INTRAMUSCULAR; INTRAVENOUS
Status: COMPLETED | OUTPATIENT
Start: 2017-11-13 | End: 2017-11-13

## 2017-11-13 RX ORDER — ONDANSETRON 2 MG/ML
4 INJECTION INTRAMUSCULAR; INTRAVENOUS
Status: CANCELLED
Start: 2017-11-13 | End: 2017-11-13

## 2017-11-13 RX ORDER — CAFFEINE AND SODIUM BENZOATE 125 MG/ML
250 INJECTION, SOLUTION INTRAMUSCULAR; INTRAVENOUS
Status: CANCELLED
Start: 2017-11-13 | End: 2017-11-13

## 2017-11-13 RX ORDER — CAFFEINE AND SODIUM BENZOATE 125 MG/ML
500 INJECTION, SOLUTION INTRAMUSCULAR; INTRAVENOUS
Status: COMPLETED | OUTPATIENT
Start: 2017-11-13 | End: 2017-11-13

## 2017-11-13 RX ORDER — ONDANSETRON 2 MG/ML
4 INJECTION INTRAMUSCULAR; INTRAVENOUS
Status: COMPLETED | OUTPATIENT
Start: 2017-11-13 | End: 2017-11-13

## 2017-11-13 RX ORDER — ETOMIDATE 2 MG/ML
INJECTION INTRAVENOUS PRN
Status: DISCONTINUED | OUTPATIENT
Start: 2017-11-13 | End: 2017-11-13

## 2017-11-13 RX ORDER — CAFFEINE AND SODIUM BENZOATE 125 MG/ML
500 INJECTION, SOLUTION INTRAMUSCULAR; INTRAVENOUS
Status: CANCELLED
Start: 2017-11-13 | End: 2017-11-13

## 2017-11-13 RX ADMIN — KETOROLAC TROMETHAMINE 30 MG: 30 INJECTION, SOLUTION INTRAMUSCULAR at 09:15

## 2017-11-13 RX ADMIN — ONDANSETRON 4 MG: 2 INJECTION INTRAMUSCULAR; INTRAVENOUS at 09:14

## 2017-11-13 RX ADMIN — CAFFEINE AND SODIUM BENZOATE 500 MG: 125 INJECTION, SOLUTION INTRAMUSCULAR; INTRAVENOUS at 09:16

## 2017-11-13 RX ADMIN — ETOMIDATE 18 MG: 2 INJECTION INTRAVENOUS at 09:21

## 2017-11-13 RX ADMIN — Medication 160 MG: at 09:22

## 2017-11-13 NOTE — TELEPHONE ENCOUNTER
"ED / Discharge Outreach Protocol    Patient Contact    Attempt # 1    Was call answered?  Yes.  \"May I please speak with Shakir\"  Is patient available?   No.  Spoke with wife; best time to call back is tomorrow (pt had ECT today), but wife reported all details of pts hospital stay with triage without triage asking any questions or divulging information.    ED/Discharge Protocol    \"Hi, my name is Jade Parr, a registered nurse, and I am calling on behalf of Dr. Denney's office at [facility].  I am calling to follow up and see how things are going for you after your recent visit.\"    \"I see that you were in the (ER/UC/IP) on 11/10/2017.    How are you doing now that you are home?\" pt is doing ok, had therapy today, is sleeping    Is patient experiencing symptoms that may require a hospital visit?  no    Discharge Instructions    \"Let's review your discharge instructions.  What is/are the follow-up recommendations?  Pt. Response: follow up with Vincent Suarez for Psych Medication Management, then therapy scheduled 12/5/17 with a female provider.     \"Were you instructed to make a follow-up appointment?\"  Pt. Response: Yes.  Has appointment been made?   Yes      \"When you see the provider, I would recommend that you bring your discharge instructions with you.    Medications    \"How many new medications are you on since your hospitalization/ED visit?\"    0-1. Went off Cymbalta and added another.   \"How many of your current medicines changed (dose, timing, name, etc.) while you were in the hospital/ED visit?\"   0-1  \"Do you have questions about your medications?\"   No  \"Were you newly diagnosed with heart failure, COPD, diabetes or did you have a heart attack?\"   No  For patients on insulin: \"Did you start on insulin in the hospital or did you have your insulin dose changed?\"   No    Medication reconciliation completed? No, due to wife reporting details. Did not want to go over pt specific information, no CTC on file (it " "is in media tab but is not signed)    Was MTM referral placed (*Make sure to put transitions as reason for referral)?   No    Call Summary    \"Do you have any questions or concerns about your condition or care plan at the moment?\"    No  Triage nurse advice given: continue on current medications as prescribed, follow up for hospital follow up if desired or recommended to, follow up with appts as scheduled.     Patient was in ER 1x in the past year (assess appropriateness of ER visits.)      \"If you have questions or things don't continue to improve, we encourage you contact us through the main clinic number,  [phone].  Even if the clinic is not open, triage nurses are available 24/7 to help you.     We would like you to know that our clinic has extended hours (provide information).  We also have urgent care (provide details on closest location and hours/contact info)\"      \"Thank you for your time and take care!\"      Meghan Parr RN  [facility]      "

## 2017-11-13 NOTE — TELEPHONE ENCOUNTER
Patient discharged from [facility] for inpatient hospital stay on 11/10 for severe episode of recurrent major depressive disorder without psychotic features.    Please contact patient to follow up; no appointment scheduled at this time.    ER / IP:  0/0    Care Coordination:  garcia Macdonald

## 2017-11-13 NOTE — ANESTHESIA POSTPROCEDURE EVALUATION
Patient: Abel Ward    * No procedures listed *    Diagnosis:Severe recurrent major depression without psychotic features (H) [F33.2]  Diagnosis Additional Information: No value filed.    Anesthesia Type:  General    Note:  Anesthesia Post Evaluation    Patient location during evaluation: PACU and Bedside  Patient participation: Able to fully participate in evaluation  Level of consciousness: awake  Pain management: adequate  Airway patency: patent  Cardiovascular status: acceptable  Respiratory status: acceptable  Hydration status: acceptable  PONV: none     Anesthetic complications: None          Last vitals:  Vitals:    11/13/17 0940 11/13/17 0950 11/13/17 1002   BP: 140/75 171/77 133/75   Pulse: 81 86 87   Resp: 12 20    Temp:      SpO2: 93% 98% 97%         Electronically Signed By: Myrna Badillo MD  November 13, 2017  10:25 AM

## 2017-11-13 NOTE — PROCEDURES
Procedure/Surgery Information   [facility], [place]    Bedside Procedure Note  Date of Service (when I performed the procedure): 11/13/2017    Abel Ward is a 32 year old male patient.  No diagnosis found.  Past Medical History:   Diagnosis Date     Attention deficit disorder with hyperactivity(314.01) 2001     Esophageal reflux     Dr Starks- had EGD ~2202     Generalized anxiety disorder      Hypertension      Infectious mononucleosis 12/03     Low testosterone      MDD (major depressive disorder)      Mild intermittent asthma                           Procedures     Froy Weber     [facility], [place]   ECT Procedure Note  11/13/2017    Abel Ward [id number]   32 year old 1985     Patient Status: Outpatient    Is this the first in a series of 12 treatments?  No    History and Physical: Reviewed in medical record    Consent: Informed consent was signed by: patient    Date Consent Signed: 11/8/17    Allergies   Allergen Reactions     Lisinopril      Nausea, Lightheadedness and motion sickness       Weight:  0 lbs 0 oz              Indications for ECT:   Medications ineffective and History of good ECT response in one or more previous episodes of illness         Clinical Narrative:   Patient was recently admitted with worsening depression and suidical ideation.  He's continuing ECT as an outpatient for depression.   NPO after .  Alert and Oriented x 3.  Mood is depressed.  He has muscle aches after last ECT.           Diagnosis:   Major depression  (F33.2)         Pause for the Cause:     Right patient Yes   Right procedure/laterality settings: Yes          Intra-Procedure Documentation:     ECT #: 2   Treatment number this series: 2   Total treatment number: 14     Type of ECT:  Right, unilateral ultrabrief    ECT Medications:   Caffeine:  500mg (decrease to 250mg next Tx)   Toradol: 30mg  Zofran: 4mg (if still has nausea next  Tx can increase dose to 8mg)  Etomidate:  18mg  Succinyl Choline: 160mg    ECT Strip Summary:   Energy Level: 100 percent  Motor Seizure Duration:  80 seconds  EEG Seizure Duration:   90 seconds    Complications: No    Plan:   Next ECT is 11/15/17 at 8:15 AM.  Have him take Tylenol 1000mg po at home with a sip of water before coming to next ECT.      Froy Weber MD/

## 2017-11-13 NOTE — IP AVS SNAPSHOT
MRN:[id number]                      After Visit Summary   11/13/2017    Abel Ward    MRN: [id number]           Visit Information        Provider Department      11/13/2017  8:15 AM Froy Weber MD [facility]           Review of your medicines      CONTINUE these medicines which have NOT CHANGED        Dose / Directions    ALPRAZolam 0.5 MG tablet   Commonly known as:  XANAX   Used for:  Anxiety        Dose:  0.5 mg   Take 1 tablet (0.5 mg) by mouth 3 times daily as needed for anxiety   Quantity:  20 tablet   Refills:  0       desvenlafaxine succinate 50 MG 24 hr tablet   Commonly known as:  PRISTIQ   Used for:  Severe episode of recurrent major depressive disorder, without psychotic features (H)        Dose:  50 mg   Take 1 tablet (50 mg) by mouth daily   Quantity:  30 tablet   Refills:  0       gabapentin 100 MG capsule   Commonly known as:  NEURONTIN   Used for:  Restless legs syndrome        Dose:  100-200 mg   Take 1-2 capsules (100-200 mg) by mouth nightly as needed   Refills:  0       omeprazole 20 MG CR capsule   Commonly known as:  priLOSEC   Used for:  Esophageal reflux        Dose:  20 mg   Take 1 capsule (20 mg) by mouth daily   Quantity:  90 capsule   Refills:  3       TRAZODONE HCL PO   Used for:  Morbid obesity (H)        Dose:  200 mg   Take 200 mg by mouth At Bedtime   Refills:  0       trifluoperazine 2 MG tablet   Commonly known as:  STELAZINE   Used for:  Anxiety        Dose:  2-4 mg   Take 1-2 tablets (2-4 mg) by mouth 3 times daily as needed (severe anxiety)   Quantity:  60 tablet   Refills:  0                Protect others around you: Learn how to safely use, store and throw away your medicines at www.disposemymeds.org.         Follow-ups after your visit        Your next 10 appointments already scheduled     Nov 15, 2017  9:00 AM CST   Electroconvulsive Therapy with Froy Weber MD   [facility] ([facility]  [facility])    [address]  [facility] [place] [address] [phone]               Care Instructions        Further instructions from your care team       ECT Discharge Instructions      During your ECT series:      Do not drive or work heavy equipment until 7 days after your last treatment.    Do not drink alcohol or use street drugs (illicit drugs) while you are having treatments.    Do not make important decisions, including legal decisions.    After each treatment:      Get plenty of rest. A responsible adult must stay with your for at least 6 hours.    Avoid heavy or risky activities for 24 hours.    If you feel light-headed, sit for a few minutes before standing. Have someone help you get up.    If you have nausea (feel sick to your stomach): Drink only clear liquids such as apple juice, ginger ale, broth or 7UP, Be sure to drink plenty of liquids. Move to a normal diet as you feel able.     If you received Toradol, wait 6 hours before taking ibuprofen.    Call your doctor if:     You have a fever over 100F (37.7 C) (taken under the tongue), or a fever that last more than 24 hours.    Your IV site is red, swollen, very painful or is getting more tender.    You have nausea that gets very bad or does not improve.      If you have any symptoms after ECT, tell our staff before your next treatment.    The ECT Department can be reached at [phone].  The ECT Department is open Mondays, Wednesdays and Fridays from 7:00 AM to 2:00 PM.    To speak to a doctor, call: Dr. Weber's office # [phone] Fax # [phone]    Other: You received Zofran 4mg at 915a for nausea.  You have received Toradol today at 915a.  Toradol is an NSAID.  Do not take any NSAID's including: Ibuprofen, Naproxen, Aspirin, Advil, Motrin, Aleve, Dainela, etc., until 6 hours after the above time (315p).  If you have any questions, contact your physician or pharmacist.        Transported by: Andrea  wife      _________________________________________________  ________________________  Patient/Responsible party Signature      Date          ________________________________________________   ________________________  Nurse Signature        Date     Additional Information About Your Visit        [employer] Information     [employer] gives you secure access to your electronic health record. If you see a primary care provider, you can also send messages to your care team and make appointments. If you have questions, please call your primary care clinic.  If you do not have a primary care provider, please call [phone] and they will assist you.        Care EveryWhere ID     This is your Care EveryWhere ID. This could be used by other organizations to access your [facility] medical records  [id number]        Your Vitals Were     Blood Pressure Pulse Temperature Respirations Pulse Oximetry       140/75 81 98.4  F (36.9  C) (Skin) 12 93%        Primary Care Provider Office Phone # Fax #    David Denney [phone]      Equal Access to Services     [facility] : Hadii cate riggso Noris, waaxda luqmike, qaybta kaalray martinez, lauren baird . So [facility] [phone].    ATENCIÓN: Si habla español, tiene a bagley disposición servicios gratuitos de asistencia lingüística. Anais al [phone].    We comply with applicable federal civil rights laws and [place] laws. We do not discriminate on the basis of race, color, national origin, age, disability, sex, sexual orientation, or gender identity.            Thank you!     Thank you for choosing [facility] for your care. Our goal is always to provide you with excellent care. Hearing back from our patients is one way we can continue to improve our services. Please take a few minutes to complete the written survey that you may receive in the mail after you visit with us. Thank you!             Medication List: This is a list of  all your medications and when to take them. Check marks below indicate your daily home schedule. Keep this list as a reference.      Medications           Morning Afternoon Evening Bedtime As Needed    ALPRAZolam 0.5 MG tablet   Commonly known as:  XANAX   Take 1 tablet (0.5 mg) by mouth 3 times daily as needed for anxiety                                desvenlafaxine succinate 50 MG 24 hr tablet   Commonly known as:  PRISTIQ   Take 1 tablet (50 mg) by mouth daily                                gabapentin 100 MG capsule   Commonly known as:  NEURONTIN   Take 1-2 capsules (100-200 mg) by mouth nightly as needed                                omeprazole 20 MG CR capsule   Commonly known as:  priLOSEC   Take 1 capsule (20 mg) by mouth daily                                TRAZODONE HCL PO   Take 200 mg by mouth At Bedtime                                trifluoperazine 2 MG tablet   Commonly known as:  STELAZINE   Take 1-2 tablets (2-4 mg) by mouth 3 times daily as needed (severe anxiety)

## 2017-11-13 NOTE — ANESTHESIA PREPROCEDURE EVALUATION
Anesthesia Evaluation     . Pt has had prior anesthetic. Type: General           ROS/MED HX    ENT/Pulmonary:     (+)asthma , . .    Neurologic:       Cardiovascular:     (+) hypertension----. : . . . :. .       METS/Exercise Tolerance:     Hematologic:         Musculoskeletal:         GI/Hepatic:     (+) GERD       Renal/Genitourinary:         Endo:     (+) Obesity, .      Psychiatric:     (+) psychiatric history depression      Infectious Disease:         Malignancy:         Other:                     Physical Exam  Normal systems: cardiovascular and pulmonary    Airway   Mallampati: II  TM distance: >3 FB  Neck ROM: full    Dental     Cardiovascular   Rhythm and rate: regular and normal      Pulmonary    breath sounds clear to auscultation                        Anesthesia Plan      History & Physical Review  History and physical reviewed and following examination; no interval change.    ASA Status:  3 .    NPO Status:  > 8 hours    Plan for General with Intravenous induction. Maintenance will be Other.           Postoperative Care  Postoperative pain management:  IV analgesics.      Consents  Anesthetic plan, risks, benefits and alternatives discussed with:  Patient.  Use of blood products discussed: No .   .        ANESTHESIA PREOP EVALUATION    NPO Status: more then 6 hours    Procedure:     HPI:     PMHx/PSHx/ROS:  PAST MEDICAL HISTORY:   Past Medical History:   Diagnosis Date     Attention deficit disorder with hyperactivity(314.01) 2001     Esophageal reflux     Dr Starks- had EGD ~2202     Generalized anxiety disorder      Hypertension      Infectious mononucleosis 12/03     Low testosterone      MDD (major depressive disorder)      Mild intermittent asthma        PAST SURGICAL HISTORY:   Past Surgical History:   Procedure Laterality Date     OTHER SURGICAL HISTORY      wisdom teeth extraction       FAMILY HISTORY:   Family History   Problem Relation Age of Onset     DIABETES Paternal Grandfather       Hypertension Mother      Hypertension Father      Hypertension Maternal Grandfather      Hypertension Paternal Grandfather      Breast Cancer Paternal Grandmother      Depression Father      HEART DISEASE Paternal Grandfather      Lipids Father      Obesity Mother      Obesity Father      Thyroid Disease Mother      Thyroid Disease Father          Past Anes Hx: No personal or family h/o anesthesia problems    Soc Hx:   Tobacco:   EtOH:    Allergies:   Allergies   Allergen Reactions     Lisinopril      Nausea, Lightheadedness and motion sickness       Meds:     (Not in a hospital admission)    Current Outpatient Prescriptions   Medication Sig Dispense Refill     desvenlafaxine succinate (PRISTIQ) 50 MG 24 hr tablet Take 1 tablet (50 mg) by mouth daily 30 tablet 0     trifluoperazine (STELAZINE) 2 MG tablet Take 1-2 tablets (2-4 mg) by mouth 3 times daily as needed (severe anxiety) 60 tablet 0     gabapentin (NEURONTIN) 100 MG capsule Take 1-2 capsules (100-200 mg) by mouth nightly as needed       TRAZODONE HCL PO Take 200 mg by mouth At Bedtime        omeprazole (PRILOSEC) 20 MG capsule Take 1 capsule (20 mg) by mouth daily 90 capsule 3     ALPRAZolam (XANAX) 0.5 MG tablet Take 1 tablet (0.5 mg) by mouth 3 times daily as needed for anxiety (Patient taking differently: Take 0.5 mg by mouth 2 times daily as needed for anxiety ) 20 tablet 0       Physical Exam:  VS: T 96.2, P 84, /90, R 16, SpO2           BMP:  Lab Results   Component Value Date     11/07/2017      Lab Results   Component Value Date    POTASSIUM 4.5 11/07/2017     Lab Results   Component Value Date    CHLORIDE 104 11/07/2017     Lab Results   Component Value Date    INEZ 8.9 11/07/2017     Lab Results   Component Value Date    CO2 31 11/07/2017     Lab Results   Component Value Date    BUN 16 11/07/2017     Lab Results   Component Value Date    CR 0.91 11/07/2017     Lab Results   Component Value Date    GLC 98 11/07/2017        CBC:  Lab  Results   Component Value Date    WBC 6.3 11/07/2017     Lab Results   Component Value Date    HGB 14.7 11/07/2017     Lab Results   Component Value Date    HCT 43.5 11/07/2017     Lab Results   Component Value Date     11/07/2017        Coags/Type and Screen  Lab Results   Component Value Date    INR 1.01 07/08/2015     No results found for: PT  Type and Screen:      Mahendra Daily MD    11/10/2017  9:23 AM  Risks, benefits, side effects and intended purposes discussed.

## 2017-11-13 NOTE — DISCHARGE INSTRUCTIONS
ECT Discharge Instructions      During your ECT series:      Do not drive or work heavy equipment until 7 days after your last treatment.    Do not drink alcohol or use street drugs (illicit drugs) while you are having treatments.    Do not make important decisions, including legal decisions.    After each treatment:      Get plenty of rest. A responsible adult must stay with your for at least 6 hours.    Avoid heavy or risky activities for 24 hours.    If you feel light-headed, sit for a few minutes before standing. Have someone help you get up.    If you have nausea (feel sick to your stomach): Drink only clear liquids such as apple juice, ginger ale, broth or 7UP, Be sure to drink plenty of liquids. Move to a normal diet as you feel able.     If you received Toradol, wait 6 hours before taking ibuprofen.    Call your doctor if:     You have a fever over 100F (37.7 C) (taken under the tongue), or a fever that last more than 24 hours.    Your IV site is red, swollen, very painful or is getting more tender.    You have nausea that gets very bad or does not improve.      If you have any symptoms after ECT, tell our staff before your next treatment.    The ECT Department can be reached at [phone].  The ECT Department is open Mondays, Wednesdays and Fridays from 7:00 AM to 2:00 PM.    To speak to a doctor, call: Dr. Weber's office # [phone] Fax # [phone]    Other: You received Zofran 4mg at 915a for nausea.  You have received Toradol today at 915a.  Toradol is an NSAID.  Do not take any NSAID's including: Ibuprofen, Naproxen, Aspirin, Advil, Motrin, Aleve, Daniela, etc., until 6 hours after the above time (315p).  If you have any questions, contact your physician or pharmacist.        Transported by: Andrea, wife      _________________________________________________  ________________________  Patient/Responsible party  Signature      Date          ________________________________________________   ________________________  Nurse Signature        Date

## 2017-11-13 NOTE — IP AVS SNAPSHOT
[facility]    [facility] [address] [address]    Phone:  [phone]                                       After Visit Summary   11/13/2017    Abel Ward    MRN: [id number]           After Visit Summary Signature Page     I have received my discharge instructions, and my questions have been answered. I have discussed any challenges I see with this plan with the nurse or doctor.    ..........................................................................................................................................  Patient/Patient Representative Signature      ..........................................................................................................................................  Patient Representative Print Name and Relationship to Patient    ..................................................               ................................................  Date                                            Time    ..........................................................................................................................................  Reviewed by Signature/Title    ...................................................              ..............................................  Date                                                            Time

## 2017-11-15 ENCOUNTER — HOSPITAL ENCOUNTER (OUTPATIENT)
Dept: BEHAVIORAL HEALTH | Facility: CLINIC | Age: 32
End: 2017-11-15
Attending: PSYCHIATRY & NEUROLOGY
Payer: COMMERCIAL

## 2017-11-15 ENCOUNTER — ANESTHESIA EVENT (OUTPATIENT)
Dept: BEHAVIORAL HEALTH | Facility: CLINIC | Age: 32
End: 2017-11-15

## 2017-11-15 ENCOUNTER — ANESTHESIA (OUTPATIENT)
Dept: BEHAVIORAL HEALTH | Facility: CLINIC | Age: 32
End: 2017-11-15

## 2017-11-15 VITALS
HEART RATE: 71 BPM | OXYGEN SATURATION: 95 % | SYSTOLIC BLOOD PRESSURE: 137 MMHG | RESPIRATION RATE: 20 BRPM | DIASTOLIC BLOOD PRESSURE: 84 MMHG | TEMPERATURE: 98.4 F

## 2017-11-15 DIAGNOSIS — F33.2 SEVERE EPISODE OF RECURRENT MAJOR DEPRESSIVE DISORDER, WITHOUT PSYCHOTIC FEATURES (H): ICD-10-CM

## 2017-11-15 PROCEDURE — 25000125 ZZHC RX 250: Performed by: PSYCHIATRY & NEUROLOGY

## 2017-11-15 PROCEDURE — 90870 ELECTROCONVULSIVE THERAPY: CPT

## 2017-11-15 PROCEDURE — 25000128 H RX IP 250 OP 636: Performed by: ANESTHESIOLOGY

## 2017-11-15 PROCEDURE — 25000125 ZZHC RX 250: Performed by: ANESTHESIOLOGY

## 2017-11-15 PROCEDURE — 40000671 ZZH STATISTIC ANESTHESIA CASE

## 2017-11-15 PROCEDURE — 25000128 H RX IP 250 OP 636: Performed by: PSYCHIATRY & NEUROLOGY

## 2017-11-15 RX ORDER — KETOROLAC TROMETHAMINE 30 MG/ML
30 INJECTION, SOLUTION INTRAMUSCULAR; INTRAVENOUS
Status: COMPLETED | OUTPATIENT
Start: 2017-11-15 | End: 2017-11-15

## 2017-11-15 RX ORDER — KETOROLAC TROMETHAMINE 30 MG/ML
30 INJECTION, SOLUTION INTRAMUSCULAR; INTRAVENOUS
Status: CANCELLED
Start: 2017-11-15 | End: 2017-11-15

## 2017-11-15 RX ORDER — ONDANSETRON 2 MG/ML
4 INJECTION INTRAMUSCULAR; INTRAVENOUS
Status: CANCELLED
Start: 2017-11-15 | End: 2017-11-15

## 2017-11-15 RX ORDER — ONDANSETRON 2 MG/ML
4 INJECTION INTRAMUSCULAR; INTRAVENOUS
Status: COMPLETED | OUTPATIENT
Start: 2017-11-15 | End: 2017-11-15

## 2017-11-15 RX ORDER — ETOMIDATE 2 MG/ML
INJECTION INTRAVENOUS PRN
Status: DISCONTINUED | OUTPATIENT
Start: 2017-11-15 | End: 2017-11-15

## 2017-11-15 RX ORDER — CAFFEINE AND SODIUM BENZOATE 125 MG/ML
250 INJECTION, SOLUTION INTRAMUSCULAR; INTRAVENOUS
Status: CANCELLED
Start: 2017-11-15 | End: 2017-11-15

## 2017-11-15 RX ORDER — CAFFEINE AND SODIUM BENZOATE 125 MG/ML
250 INJECTION, SOLUTION INTRAMUSCULAR; INTRAVENOUS
Status: COMPLETED | OUTPATIENT
Start: 2017-11-15 | End: 2017-11-15

## 2017-11-15 RX ADMIN — CAFFEINE AND SODIUM BENZOATE 250 MG: 125 INJECTION, SOLUTION INTRAMUSCULAR; INTRAVENOUS at 09:31

## 2017-11-15 RX ADMIN — Medication 100 MG: at 09:38

## 2017-11-15 RX ADMIN — ONDANSETRON 4 MG: 2 INJECTION INTRAMUSCULAR; INTRAVENOUS at 09:29

## 2017-11-15 RX ADMIN — KETOROLAC TROMETHAMINE 30 MG: 30 INJECTION, SOLUTION INTRAMUSCULAR at 09:30

## 2017-11-15 RX ADMIN — ETOMIDATE 18 MG: 2 INJECTION INTRAVENOUS at 09:38

## 2017-11-15 RX ADMIN — MIDAZOLAM HYDROCHLORIDE 2 MG: 1 INJECTION, SOLUTION INTRAMUSCULAR; INTRAVENOUS at 09:47

## 2017-11-15 RX ADMIN — SODIUM CHLORIDE, POTASSIUM CHLORIDE, SODIUM LACTATE AND CALCIUM CHLORIDE 1000 ML: 600; 310; 30; 20 INJECTION, SOLUTION INTRAVENOUS at 09:46

## 2017-11-15 RX ADMIN — ETOMIDATE 10 MG: 2 INJECTION INTRAVENOUS at 09:41

## 2017-11-15 RX ADMIN — Medication 100 MG: at 09:41

## 2017-11-15 NOTE — IP AVS SNAPSHOT
[facility]    [facility] [address] [address]    Phone:  [phone]                                       After Visit Summary   11/15/2017    Abel Ward    MRN: [id number]           After Visit Summary Signature Page     I have received my discharge instructions, and my questions have been answered. I have discussed any challenges I see with this plan with the nurse or doctor.    ..........................................................................................................................................  Patient/Patient Representative Signature      ..........................................................................................................................................  Patient Representative Print Name and Relationship to Patient    ..................................................               ................................................  Date                                            Time    ..........................................................................................................................................  Reviewed by Signature/Title    ...................................................              ..............................................  Date                                                            Time

## 2017-11-15 NOTE — ANESTHESIA PREPROCEDURE EVALUATION
Anesthesia Evaluation     . Pt has had prior anesthetic. Type: General           ROS/MED HX    ENT/Pulmonary:     (+)asthma , . .    Neurologic:       Cardiovascular:     (+) hypertension----. : . . . :. .       METS/Exercise Tolerance:     Hematologic:         Musculoskeletal:         GI/Hepatic:     (+) GERD       Renal/Genitourinary:         Endo:     (+) Obesity, .      Psychiatric:     (+) psychiatric history depression      Infectious Disease:         Malignancy:         Other:                     Physical Exam  Normal systems: cardiovascular and pulmonary    Airway   Mallampati: II  TM distance: >3 FB  Neck ROM: full    Dental     Cardiovascular   Rhythm and rate: regular and normal      Pulmonary    breath sounds clear to auscultation                        Anesthesia Plan      History & Physical Review  History and physical reviewed and following examination; no interval change.    ASA Status:  3 .    NPO Status:  > 8 hours    Plan for General with Intravenous induction. Maintenance will be Other.           Postoperative Care  Postoperative pain management:  IV analgesics.      Consents  Anesthetic plan, risks, benefits and alternatives discussed with:  Patient.  Use of blood products discussed: No .   .        ANESTHESIA PREOP EVALUATION    NPO Status: more then 6 hours    Procedure:     HPI:     PMHx/PSHx/ROS:  PAST MEDICAL HISTORY:   Past Medical History:   Diagnosis Date     Attention deficit disorder with hyperactivity(314.01) 2001     Esophageal reflux     Dr Starks- had EGD ~2202     Generalized anxiety disorder      Hypertension      Infectious mononucleosis 12/03     Low testosterone      MDD (major depressive disorder)      Mild intermittent asthma        PAST SURGICAL HISTORY:   Past Surgical History:   Procedure Laterality Date     OTHER SURGICAL HISTORY      wisdom teeth extraction       FAMILY HISTORY:   Family History   Problem Relation Age of Onset     DIABETES Paternal Grandfather       Hypertension Mother      Hypertension Father      Hypertension Maternal Grandfather      Hypertension Paternal Grandfather      Breast Cancer Paternal Grandmother      Depression Father      HEART DISEASE Paternal Grandfather      Lipids Father      Obesity Mother      Obesity Father      Thyroid Disease Mother      Thyroid Disease Father          Past Anes Hx: No personal or family h/o anesthesia problems    Soc Hx:   Tobacco:   EtOH:    Allergies:   No Known Allergies    Meds:     (Not in a hospital admission)    Current Outpatient Prescriptions   Medication Sig Dispense Refill     desvenlafaxine succinate (PRISTIQ) 50 MG 24 hr tablet Take 1 tablet (50 mg) by mouth daily 30 tablet 0     trifluoperazine (STELAZINE) 2 MG tablet Take 1-2 tablets (2-4 mg) by mouth 3 times daily as needed (severe anxiety) 60 tablet 0     gabapentin (NEURONTIN) 100 MG capsule Take 1-2 capsules (100-200 mg) by mouth nightly as needed       TRAZODONE HCL PO Take 200 mg by mouth At Bedtime        omeprazole (PRILOSEC) 20 MG capsule Take 1 capsule (20 mg) by mouth daily 90 capsule 3     ALPRAZolam (XANAX) 0.5 MG tablet Take 1 tablet (0.5 mg) by mouth 3 times daily as needed for anxiety (Patient taking differently: Take 0.5 mg by mouth 2 times daily as needed for anxiety ) 20 tablet 0       Physical Exam:  VS: T 96.2, P 84, /90, R 16, SpO2           BMP:  Lab Results   Component Value Date     11/07/2017      Lab Results   Component Value Date    POTASSIUM 4.5 11/07/2017     Lab Results   Component Value Date    CHLORIDE 104 11/07/2017     Lab Results   Component Value Date    INEZ 8.9 11/07/2017     Lab Results   Component Value Date    CO2 31 11/07/2017     Lab Results   Component Value Date    BUN 16 11/07/2017     Lab Results   Component Value Date    CR 0.91 11/07/2017     Lab Results   Component Value Date    GLC 98 11/07/2017        CBC:  Lab Results   Component Value Date    WBC 6.3 11/07/2017     Lab Results   Component  Value Date    HGB 14.7 11/07/2017     Lab Results   Component Value Date    HCT 43.5 11/07/2017     Lab Results   Component Value Date     11/07/2017        Coags/Type and Screen  Lab Results   Component Value Date    INR 1.01 07/08/2015     No results found for: PT  Type and Screen:      Mahendra Daily MD    11/10/2017  9:23 AM  Risks, benefits, side effects and intended purposes discussed.

## 2017-11-15 NOTE — PROCEDURES
Procedure/Surgery Information   [facility], [place]    Bedside Procedure Note  Date of Service (when I performed the procedure): 11/15/2017    Abel Ward is a 32 year old male patient.  1. Severe episode of recurrent major depressive disorder, without psychotic features (H)      Past Medical History:   Diagnosis Date     Attention deficit disorder with hyperactivity(314.01) 2001     Esophageal reflux     Dr Starks- had EGD ~2202     Generalized anxiety disorder      Hypertension      Infectious mononucleosis 12/03     Low testosterone      MDD (major depressive disorder)      Mild intermittent asthma      Temp: 96  F (35.6  C)   BP: 139/81 Pulse: 89   Resp: 16 SpO2: 94 %        Procedures     Froy Weber     [facility], [place]   ECT Procedure Note  11/15/2017    Abel Ward [id number]   32 year old 1985     Patient Status: Outpatient    Is this the first in a series of 12 treatments?  No    History and Physical: Reviewed in medical record    Consent: Informed consent was signed by: patient    Date Consent Signed: 11/8/17    No Known Allergies    Weight:  0 lbs 0 oz              Indications for ECT:   Medications ineffective and History of good ECT response in one or more previous episodes of illness         Clinical Narrative:   Patient was recently admitted with worsening depression and suidical ideation.  He's continuing ECT as an outpatient for depression.   NPO after 2400.  Alert and Oriented x 3.  Mood remains depressed.  He had nausea after last ECT, but he has nausea today before he has even had his IV placed or had treatment.           Diagnosis:   Major depression  (F33.2)         Pause for the Cause:     Right patient Yes   Right procedure/laterality settings: Yes          Intra-Procedure Documentation:     ECT #: 3   Treatment number this series: 3   Total treatment number: 15     Type of ECT:  Right, unilateral  ultrabrief    ECT Medications:   Lactated Ringers:  1 liter  Caffeine:  250mg   Toradol: 30mg  Zofran: 4mg   Etomidate:  18mg + 10mg (due to leaky IV and arm movement)  Succinyl Choline: 160mg + 100mg (due to leaky IV and arm movement)  Versed:  2mg IV after    ECT Strip Summary:   Energy Level: 80 percent  Motor Seizure Duration:  40 seconds  EEG Seizure Duration:   61 seconds    Complications: IV was loose and leaking    Plan:   Next ECT is 11/17/17   Have him take Tylenol 1000mg po at home with a sip of water before coming to next ECT.      Froy Weber MD/

## 2017-11-15 NOTE — PROCEDURES
Procedure/Surgery Information   [facility], [place]    Bedside Procedure Note  Date of Service (when I performed the procedure): 11/15/2017    Abel Ward is a 32 year old male patient.  1. Severe episode of recurrent major depressive disorder, without psychotic features (H)      Past Medical History:   Diagnosis Date     Attention deficit disorder with hyperactivity(314.01) 2001     Esophageal reflux     Dr Starks- had EGD ~2202     Generalized anxiety disorder      Hypertension      Infectious mononucleosis 12/03     Low testosterone      MDD (major depressive disorder)      Mild intermittent asthma      Temp: 96  F (35.6  C)   BP: 139/81 Pulse: 89   Resp: 16 SpO2: 94 %        Procedures     Froy Weber     [facility], [place]   ECT Procedure Note  11/15/2017    Abel Ward [id number]   32 year old 1985     Patient Status: Outpatient    Is this the first in a series of 12 treatments?  No    History and Physical: Reviewed in medical record    Consent: Informed consent was signed by: patient    Date Consent Signed: 11/8/17    No Known Allergies    Weight:  0 lbs 0 oz              Indications for ECT:   Medications ineffective and History of good ECT response in one or more previous episodes of illness         Clinical Narrative:   Patient was recently admitted with worsening depression and suidical ideation.  He's continuing ECT as an outpatient for depression.   NPO after 2400.  Alert and Oriented x 3.  Mood remains depressed.  He had nausea after last ECT, but he has nausea today before he has even had his IV placed or had treatment.           Diagnosis:   Major depression  (F33.2)         Pause for the Cause:     Right patient Yes   Right procedure/laterality settings: Yes          Intra-Procedure Documentation:     ECT #: 3   Treatment number this series: 3   Total treatment number: 15     Type of ECT:  Right, unilateral  ultrabrief    ECT Medications:   Lactated Ringers:  1 liter  Caffeine:  250mg   Toradol: 30mg  Zofran: 4mg   Etomidate:  18mg + 10mg (due to leaky IV and arm movement)  Succinyl Choline: 160mg + 100mg (due to leaky IV and arm movement)  Versed:      ECT Strip Summary:   Energy Level: 80 percent  Motor Seizure Duration:  40 seconds  EEG Seizure Duration:   61 seconds    Complications: IV was loose and leaking    Plan:   Next ECT is 11/17/17   Have him take Tylenol 1000mg po at home with a sip of water before coming to next ECT.      Froy Weber MD/

## 2017-11-15 NOTE — DISCHARGE INSTRUCTIONS
ECT Discharge Instructions      During your ECT series:      Do not drive or work heavy equipment until 7 days after your last treatment.    Do not drink alcohol or use street drugs (illicit drugs) while you are having treatments.    Do not make important decisions, including legal decisions.    After each treatment:      Get plenty of rest. A responsible adult must stay with your for at least 6 hours.    Avoid heavy or risky activities for 24 hours.    If you feel light-headed, sit for a few minutes before standing. Have someone help you get up.    If you have nausea (feel sick to your stomach): Drink only clear liquids such as apple juice, ginger ale, broth or 7UP, Be sure to drink plenty of liquids. Move to a normal diet as you feel able.     If you received Toradol, wait 6 hours before taking ibuprofen.    Call your doctor if:     You have a fever over 100F (37.7 C) (taken under the tongue), or a fever that last more than 24 hours.    Your IV site is red, swollen, very painful or is getting more tender.    You have nausea that gets very bad or does not improve.      If you have any symptoms after ECT, tell our staff before your next treatment.    The ECT Department can be reached at [phone].  The ECT Department is open Mondays, Wednesdays and Fridays from 7:00 AM to 2:00 PM.    To speak to a doctor, call: Dr. Weber's office # [phone] Fax # [phone]    Other: You have received Toradol today at 930a.  Toradol is an NSAID.  Do not take any NSAID's including: Ibuprofen, Naproxen, Aspirin, Advil, Motrin, Aleve, Daniela, etc., until 6 hours after the above time (330p).  If you have any questions, contact your physician or pharmacist.  You also received Zofran 4mg at 930a.       Transported by: Christina, mother      _________________________________________________  ________________________  Patient/Responsible party  Signature      Date          ________________________________________________   ________________________  Nurse Signature        Date

## 2017-11-15 NOTE — ANESTHESIA POSTPROCEDURE EVALUATION
Patient: Abel Ward    * No procedures listed *    Diagnosis:Severe recurrent major depression without psychotic features (H) [F33.2]  Diagnosis Additional Information: No value filed.    Anesthesia Type:  General    Note:  Anesthesia Post Evaluation    Patient location during evaluation: PACU  Patient participation: Able to fully participate in evaluation  Level of consciousness: awake and alert  Pain management: adequate  Airway patency: patent  Cardiovascular status: acceptable  Respiratory status: acceptable  Hydration status: acceptable  PONV: none     Anesthetic complications: None          Last vitals:  Vitals:    11/15/17 0859 11/15/17 0953 11/15/17 0958   BP: 139/81 (!) 153/93 143/82   Pulse: 89 79 83   Resp: 16 20 20   Temp: 35.6  C (96  F) 37.5  C (99.5  F)    SpO2: 94% 96% 94%         Electronically Signed By: Dottie Rdz MD  November 15, 2017  10:10 AM

## 2017-11-16 ENCOUNTER — TELEPHONE (OUTPATIENT)
Dept: PSYCHIATRY | Facility: CLINIC | Age: 32
End: 2017-11-16

## 2017-11-17 ENCOUNTER — ANESTHESIA EVENT (OUTPATIENT)
Dept: BEHAVIORAL HEALTH | Facility: CLINIC | Age: 32
End: 2017-11-17

## 2017-11-17 ENCOUNTER — HOSPITAL ENCOUNTER (OUTPATIENT)
Dept: BEHAVIORAL HEALTH | Facility: CLINIC | Age: 32
End: 2017-11-17
Attending: PSYCHIATRY & NEUROLOGY
Payer: COMMERCIAL

## 2017-11-17 ENCOUNTER — ANESTHESIA (OUTPATIENT)
Dept: BEHAVIORAL HEALTH | Facility: CLINIC | Age: 32
End: 2017-11-17

## 2017-11-17 VITALS
TEMPERATURE: 98.7 F | HEART RATE: 74 BPM | RESPIRATION RATE: 16 BRPM | OXYGEN SATURATION: 95 % | SYSTOLIC BLOOD PRESSURE: 148 MMHG | DIASTOLIC BLOOD PRESSURE: 80 MMHG

## 2017-11-17 DIAGNOSIS — F33.2 SEVERE EPISODE OF RECURRENT MAJOR DEPRESSIVE DISORDER, WITHOUT PSYCHOTIC FEATURES (H): ICD-10-CM

## 2017-11-17 PROCEDURE — 25000128 H RX IP 250 OP 636: Performed by: PSYCHIATRY & NEUROLOGY

## 2017-11-17 PROCEDURE — 40000671 ZZH STATISTIC ANESTHESIA CASE

## 2017-11-17 PROCEDURE — 25000125 ZZHC RX 250: Performed by: PSYCHIATRY & NEUROLOGY

## 2017-11-17 PROCEDURE — 25000125 ZZHC RX 250: Performed by: ANESTHESIOLOGY

## 2017-11-17 PROCEDURE — 90870 ELECTROCONVULSIVE THERAPY: CPT

## 2017-11-17 PROCEDURE — 25000128 H RX IP 250 OP 636: Performed by: ANESTHESIOLOGY

## 2017-11-17 RX ORDER — KETOROLAC TROMETHAMINE 30 MG/ML
30 INJECTION, SOLUTION INTRAMUSCULAR; INTRAVENOUS
Status: CANCELLED
Start: 2017-11-17 | End: 2017-11-17

## 2017-11-17 RX ORDER — CAFFEINE AND SODIUM BENZOATE 125 MG/ML
250 INJECTION, SOLUTION INTRAMUSCULAR; INTRAVENOUS
Status: CANCELLED
Start: 2017-11-17 | End: 2017-11-17

## 2017-11-17 RX ORDER — CAFFEINE AND SODIUM BENZOATE 125 MG/ML
250 INJECTION, SOLUTION INTRAMUSCULAR; INTRAVENOUS
Status: COMPLETED | OUTPATIENT
Start: 2017-11-17 | End: 2017-11-17

## 2017-11-17 RX ORDER — ONDANSETRON 2 MG/ML
4 INJECTION INTRAMUSCULAR; INTRAVENOUS
Status: COMPLETED | OUTPATIENT
Start: 2017-11-17 | End: 2017-11-17

## 2017-11-17 RX ORDER — ONDANSETRON 2 MG/ML
4 INJECTION INTRAMUSCULAR; INTRAVENOUS
Status: CANCELLED
Start: 2017-11-17 | End: 2017-11-17

## 2017-11-17 RX ORDER — ETOMIDATE 2 MG/ML
INJECTION INTRAVENOUS PRN
Status: DISCONTINUED | OUTPATIENT
Start: 2017-11-17 | End: 2017-11-17

## 2017-11-17 RX ORDER — KETOROLAC TROMETHAMINE 30 MG/ML
30 INJECTION, SOLUTION INTRAMUSCULAR; INTRAVENOUS
Status: COMPLETED | OUTPATIENT
Start: 2017-11-17 | End: 2017-11-17

## 2017-11-17 RX ADMIN — CAFFEINE AND SODIUM BENZOATE 250 MG: 125 INJECTION, SOLUTION INTRAMUSCULAR; INTRAVENOUS at 08:30

## 2017-11-17 RX ADMIN — KETOROLAC TROMETHAMINE 30 MG: 30 INJECTION, SOLUTION INTRAMUSCULAR at 08:31

## 2017-11-17 RX ADMIN — Medication 160 MG: at 08:40

## 2017-11-17 RX ADMIN — ONDANSETRON 4 MG: 2 INJECTION INTRAMUSCULAR; INTRAVENOUS at 08:28

## 2017-11-17 RX ADMIN — ETOMIDATE 26 MG: 2 INJECTION INTRAVENOUS at 08:40

## 2017-11-17 NOTE — IP AVS SNAPSHOT
[facility]    [facility] [address] [address]    Phone:  [phone]                                       After Visit Summary   11/17/2017    Abel Ward    MRN: [id number]           After Visit Summary Signature Page     I have received my discharge instructions, and my questions have been answered. I have discussed any challenges I see with this plan with the nurse or doctor.    ..........................................................................................................................................  Patient/Patient Representative Signature      ..........................................................................................................................................  Patient Representative Print Name and Relationship to Patient    ..................................................               ................................................  Date                                            Time    ..........................................................................................................................................  Reviewed by Signature/Title    ...................................................              ..............................................  Date                                                            Time

## 2017-11-17 NOTE — ANESTHESIA POSTPROCEDURE EVALUATION
Patient: Abel Ward    * No procedures listed *    Diagnosis:Severe recurrent major depression without psychotic features (H) [F33.2]  Diagnosis Additional Information: No value filed.    Anesthesia Type:  General    Note:  Anesthesia Post Evaluation    Patient location during evaluation: PACU  Patient participation: Able to fully participate in evaluation  Level of consciousness: awake and alert  Pain management: adequate  Airway patency: patent  Cardiovascular status: acceptable  Respiratory status: acceptable  Hydration status: acceptable  PONV: none     Anesthetic complications: None          Last vitals:  Vitals:    11/17/17 0734   BP: 147/65   Pulse: 83   Resp: 16   Temp: 35.8  C (96.5  F)   SpO2: 96%         Electronically Signed By: Dottie Rdz MD  November 17, 2017  9:05 AM

## 2017-11-17 NOTE — IP AVS SNAPSHOT
MRN:[id number]                      After Visit Summary   11/17/2017    Abel Ward    MRN: [id number]           Visit Information        Provider Department      11/17/2017  7:30 AM Froy Weber MD [facility]           Review of your medicines      CONTINUE these medicines which have NOT CHANGED        Dose / Directions    ALPRAZolam 0.5 MG tablet   Commonly known as:  XANAX   Used for:  Anxiety        Dose:  0.5 mg   Take 1 tablet (0.5 mg) by mouth 3 times daily as needed for anxiety   Quantity:  20 tablet   Refills:  0       desvenlafaxine succinate 50 MG 24 hr tablet   Commonly known as:  PRISTIQ   Used for:  Severe episode of recurrent major depressive disorder, without psychotic features (H)        Dose:  50 mg   Take 1 tablet (50 mg) by mouth daily   Quantity:  30 tablet   Refills:  0       gabapentin 100 MG capsule   Commonly known as:  NEURONTIN   Used for:  Restless legs syndrome        Dose:  100-200 mg   Take 1-2 capsules (100-200 mg) by mouth nightly as needed   Refills:  0       omeprazole 20 MG CR capsule   Commonly known as:  priLOSEC   Used for:  Esophageal reflux        Dose:  20 mg   Take 1 capsule (20 mg) by mouth daily   Quantity:  90 capsule   Refills:  3       TRAZODONE HCL PO   Used for:  Morbid obesity (H)        Dose:  200 mg   Take 200 mg by mouth At Bedtime   Refills:  0       trifluoperazine 2 MG tablet   Commonly known as:  STELAZINE   Used for:  Anxiety        Dose:  2-4 mg   Take 1-2 tablets (2-4 mg) by mouth 3 times daily as needed (severe anxiety)   Quantity:  60 tablet   Refills:  0                Protect others around you: Learn how to safely use, store and throw away your medicines at www.disposemymeds.org.         Follow-ups after your visit        Your next 10 appointments already scheduled     Nov 20, 2017  6:45 AM CST   Electroconvulsive Therapy with Froy Weber MD   [facility] ([facility]  [facility])    [address]  [facility] [address]   [phone]            Nov 22, 2017  8:00 AM CST   Electroconvulsive Therapy with Froy Weber MD   [facility] ([facility])    [address]  [facility] [address]   [phone]            Nov 24, 2017  7:00 AM CST   Electroconvulsive Therapy with Froy Weber MD   [facility] ([facility])    [address]  [facility] [address]   [phone]               Care Instructions        Further instructions from your care team       ECT Discharge Instructions      During your ECT series:      Do not drive for 24 hours after treatment.  If you will have more than one treatment within a week, no driving until 7 days after your last ECT treatment.    Do not drink alcohol or use street drugs (illicit drugs) while you are having treatments.    Do not make important decisions, including legal decisions.    After each treatment:      Get plenty of rest. A responsible adult must stay with your for at least 6 hours.    Avoid heavy or risky activities for 24 hours.    If you feel light-headed, sit for a few minutes before standing. Have someone help you get up.    If you have nausea (feel sick to your stomach): Drink only clear liquids such as apple juice, ginger ale, broth or 7UP, Be sure to drink plenty of liquids. Move to a normal diet as you feel able.     If you received Toradol, wait 6 hours before taking ibuprofen.    Call your doctor if:     You have a fever over 100F (37.7 C) (taken under the tongue), or a fever that last more than 24 hours.    Your IV site is red, swollen, very painful or is getting more tender.    You have nausea that gets very bad or does not improve.      If you have any symptoms after ECT, tell our staff before your next treatment.    The ECT Department can be reached at  [phone].  The ECT Department is open Mondays, Wednesdays and Fridays from 7:00 AM to 2:00 PM.      To speak to a doctor, call: Dr. Weber  Office #  [phone] and Fax # [phone]    Other: You had Toradol at  8:31 AM which is a NSAID medication.  Do not take any Ibuprofen, Motrin, Aspirin, Advil, Aleve, Excedrin, or any other NSAID medication until after 2:31 PM.   Questions,  check with your physician or pharmacist.    Take Tylenol 1000mg po at home with a small sip of water before coming to next ECT.     Additional Information About Your Visit        [employer] Team Connect Information     [employer] gives you secure access to your electronic health record. If you see a primary care provider, you can also send messages to your care team and make appointments. If you have questions, please call your primary care clinic.  If you do not have a primary care provider, please call [phone] and they will assist you.        Care EveryWhere ID     This is your Care EveryWhere ID. This could be used by other organizations to access your [facility] medical records  [id number]        Your Vitals Were     Blood Pressure Pulse Temperature Respirations Pulse Oximetry       147/65 83 96.5  F (35.8  C) 16 96%        Primary Care Provider Office Phone # Fax #    David Denney [phone]      Equal Access to Services     [facility] : Hadii cate power hadasho Sodamien, waaxda luqadaha, qaybta kaalmada lauren martinez . So [facility] [phone].    ATENCIÓN: Si habla español, tiene a bagley disposición servicios gratuitos de asistencia lingüística. Anais al [phone].    We comply with applicable federal civil rights laws and [place] laws. We do not discriminate on the basis of race, color, national origin, age, disability, sex, sexual orientation, or gender identity.            Thank you!     Thank you for choosing [facility] for your care. Our goal is always to provide you with excellent  care. Hearing back from our patients is one way we can continue to improve our services. Please take a few minutes to complete the written survey that you may receive in the mail after you visit with us. Thank you!             Medication List: This is a list of all your medications and when to take them. Check marks below indicate your daily home schedule. Keep this list as a reference.      Medications           Morning Afternoon Evening Bedtime As Needed    ALPRAZolam 0.5 MG tablet   Commonly known as:  XANAX   Take 1 tablet (0.5 mg) by mouth 3 times daily as needed for anxiety                                desvenlafaxine succinate 50 MG 24 hr tablet   Commonly known as:  PRISTIQ   Take 1 tablet (50 mg) by mouth daily                                gabapentin 100 MG capsule   Commonly known as:  NEURONTIN   Take 1-2 capsules (100-200 mg) by mouth nightly as needed                                omeprazole 20 MG CR capsule   Commonly known as:  priLOSEC   Take 1 capsule (20 mg) by mouth daily                                TRAZODONE HCL PO   Take 200 mg by mouth At Bedtime                                trifluoperazine 2 MG tablet   Commonly known as:  STELAZINE   Take 1-2 tablets (2-4 mg) by mouth 3 times daily as needed (severe anxiety)

## 2017-11-17 NOTE — PROCEDURES
Procedure/Surgery Information   [facility], [place]    Bedside Procedure Note  Date of Service (when I performed the procedure): 11/17/2017    Abel Ward is a 32 year old male patient.  1. Severe episode of recurrent major depressive disorder, without psychotic features (H)      Past Medical History:   Diagnosis Date     Attention deficit disorder with hyperactivity(314.01) 2001     Esophageal reflux     Dr Starks- had EGD ~2202     Generalized anxiety disorder      Hypertension      Infectious mononucleosis 12/03     Low testosterone      MDD (major depressive disorder)      Mild intermittent asthma      Temp: 96.5  F (35.8  C)   BP: 147/65 Pulse: 83   Resp: 16 SpO2: 96 %        Procedures     Froy Weber     [facility], [place]   ECT Procedure Note  11/17/2017    Abel Ward [id number]   32 year old 1985     Patient Status: Outpatient    Is this the first in a series of 12 treatments?  No    History and Physical: Reviewed in medical record    Consent: Informed consent was signed by: patient    Date Consent Signed: 11/8/17    No Known Allergies    Weight:  0 lbs 0 oz              Indications for ECT:   Medications ineffective and History of good ECT response in one or more previous episodes of illness         Clinical Narrative:   Patient was recently admitted with worsening depression and suidical ideation.  He's continuing ECT as an outpatient for depression.   NPO after 2400.  Alert and Oriented x 3.  Mood remains depressed.  He remembers choking with last ECT right as he was going under anesthesia.           Diagnosis:   Major depression  (F33.2)         Pause for the Cause:     Right patient Yes   Right procedure/laterality settings: Yes          Intra-Procedure Documentation:     ECT #: 4   Treatment number this series: 4   Total treatment number: 16     Type of ECT:  Right, unilateral ultrabrief    ECT Medications:    Lactated Ringers:  1 liter  Caffeine:  250mg   Toradol: 30mg  Zofran: 4mg   Etomidate:  20mg + 6mg  Succinyl Choline: 160mg     ECT Strip Summary:   Energy Level: 100 percent  Motor Seizure Duration:  39 seconds  EEG Seizure Duration:   61 seconds    Complications:  none    Plan:   Next ECT is 11/20/17  Have him take Tylenol 1000mg po at home with a sip of water before coming to next ECT.      Froy Weber MD/

## 2017-11-17 NOTE — DISCHARGE INSTRUCTIONS
ECT Discharge Instructions      During your ECT series:      Do not drive for 24 hours after treatment.  If you will have more than one treatment within a week, no driving until 7 days after your last ECT treatment.    Do not drink alcohol or use street drugs (illicit drugs) while you are having treatments.    Do not make important decisions, including legal decisions.    After each treatment:      Get plenty of rest. A responsible adult must stay with your for at least 6 hours.    Avoid heavy or risky activities for 24 hours.    If you feel light-headed, sit for a few minutes before standing. Have someone help you get up.    If you have nausea (feel sick to your stomach): Drink only clear liquids such as apple juice, ginger ale, broth or 7UP, Be sure to drink plenty of liquids. Move to a normal diet as you feel able.     If you received Toradol, wait 6 hours before taking ibuprofen.    Call your doctor if:     You have a fever over 100F (37.7 C) (taken under the tongue), or a fever that last more than 24 hours.    Your IV site is red, swollen, very painful or is getting more tender.    You have nausea that gets very bad or does not improve.      If you have any symptoms after ECT, tell our staff before your next treatment.    The ECT Department can be reached at [phone].  The ECT Department is open Mondays, Wednesdays and Fridays from 7:00 AM to 2:00 PM.      To speak to a doctor, call: Dr. Weber  Office #  [phone] and Fax # [phone]    Other: You had Toradol at  8:31 AM which is a NSAID medication.  Do not take any Ibuprofen, Motrin, Aspirin, Advil, Aleve, Excedrin, or any other NSAID medication until after 2:31 PM.   Questions,  check with your physician or pharmacist.    Take Tylenol 1000mg po at home with a small sip of water before coming to next ECT.

## 2017-11-20 ENCOUNTER — ANESTHESIA EVENT (OUTPATIENT)
Dept: BEHAVIORAL HEALTH | Facility: CLINIC | Age: 32
End: 2017-11-20

## 2017-11-20 ENCOUNTER — ANESTHESIA (OUTPATIENT)
Dept: BEHAVIORAL HEALTH | Facility: CLINIC | Age: 32
End: 2017-11-20

## 2017-11-20 NOTE — ADDENDUM NOTE
Encounter addended by: Froy Weber MD on: 11/20/2017  5:25 PM<BR>     Actions taken: Sign clinical note

## 2017-11-20 NOTE — PROGRESS NOTES
[facility]   Dr. Weber's Psychiatric Progress Note  2017      Patient:  Abel Ward   Medical Record Number:  [id number]  :  1985          Interim History:   The patient's care was discussed with the treatment team and chart notes were reviewed.  Pt came in for ECT today.  His wife was with him.  He's still very depressed.  He had a choking sensation with ECT #2 but not with his 3rd treatment.  He doesn't want to do any more ECT.  He hates the feeling of going under anesthesia.   He's depressed.  He went out with his wife to a couple social functions this weekend, but he says he did not enjoy them.  He just wants to go back to work.  Wife tells him he needs to get his depression better first.   He asks re: TMS.  He has [employer] insurance.     Mood:  Depressed  Sleep:  Too much  Appetite:normal  Eating:normal  Energy Level:LOW  Concentration/Memory Problems:  YES  Suicidal Thoughts:No  Homicidal Thoughts:No  Psychotic Symptoms: No  Medication Side Effects:No  Medication Compliance:Yes   Physical Complaints:negative         Medications:     Current Outpatient Prescriptions   Medication Sig     desvenlafaxine succinate (PRISTIQ) 50 MG 24 hr tablet Take 1 tablet (50 mg) by mouth daily     trifluoperazine (STELAZINE) 2 MG tablet Take 1-2 tablets (2-4 mg) by mouth 3 times daily as needed (severe anxiety)     gabapentin (NEURONTIN) 100 MG capsule Take 1-2 capsules (100-200 mg) by mouth nightly as needed     TRAZODONE HCL PO Take 200 mg by mouth At Bedtime      omeprazole (PRILOSEC) 20 MG capsule Take 1 capsule (20 mg) by mouth daily     No current facility-administered medications for this encounter.            Allergies:   No Known Allergies         Psychiatric Examination:   /80  Pulse 74  Temp 98.7  F (37.1  C) (Tympanic)  Resp 16  SpO2 95%  Weight is 0 lbs 0 oz  There is no height or weight on file to calculate BMI.    Appearance:  awake,  alert  Attitude:  cooperative  Eye Contact:  poor   Mood:  sad   Affect:  mood congruent  Speech:  clear, coherent  Psychomotor Behavior:  no evidence of tardive dyskinesia, dystonia, or tics  Throught Process:  logical  Associations:  no loose associations  Thought Content:  no evidence of suicidal ideation or homicidal ideation and no evidence of psychotic thought  Insight:  fair  Judgement:  fair  Oriented to:  time, person, and place  Attention Span and Concentration:  intact  Recent and Remote Memory:  intact  Gait:Normal    Risk/Potential for Dangerousness:  Multiple Active Diagnoses:HIGH  Self Care:HIGH  Suicide:LOW  Assault:LOW  Self Injurious Behaviors:LOW  Inappropriate Sexual Behavior:LOW         Labs:   No results found for this or any previous visit (from the past 24 hour(s)).     Recent Results (from the past 1008 hour(s))   Drug abuse screen 6 urine (tox)    Collection Time: 11/06/17 10:15 AM   Result Value Ref Range    Amphetamine Qual Urine Negative NEG^Negative    Barbiturates Qual Urine Negative NEG^Negative    Benzodiazepine Qual Urine Positive (A) NEG^Negative    Cannabinoids Qual Urine Negative NEG^Negative    Cocaine Qual Urine Negative NEG^Negative    Ethanol Qual Urine Negative NEG^Negative    Opiates Qualitative Urine Negative NEG^Negative   EKG 12-lead, tracing only    Collection Time: 11/06/17 10:24 AM   Result Value Ref Range    Interpretation ECG Click View Image link to view waveform and result    CBC with platelets differential    Collection Time: 11/07/17  8:00 AM   Result Value Ref Range    WBC 6.3 4.0 - 11.0 10e9/L    RBC Count 5.18 4.4 - 5.9 10e12/L    Hemoglobin 14.7 13.3 - 17.7 g/dL    Hematocrit 43.5 40.0 - 53.0 %    MCV 84 78 - 100 fl    MCH 28.4 26.5 - 33.0 pg    MCHC 33.8 31.5 - 36.5 g/dL    RDW 12.3 10.0 - 15.0 %    Platelet Count 236 150 - 450 10e9/L    Diff Method Automated Method     % Neutrophils 57.4 %    % Lymphocytes 28.1 %    % Monocytes 10.2 %    % Eosinophils 3.4  %    % Basophils 0.6 %    % Immature Granulocytes 0.3 %    Nucleated RBCs 0 0 /100    Absolute Neutrophil 3.6 1.6 - 8.3 10e9/L    Absolute Lymphocytes 1.8 0.8 - 5.3 10e9/L    Absolute Monocytes 0.6 0.0 - 1.3 10e9/L    Absolute Eosinophils 0.2 0.0 - 0.7 10e9/L    Absolute Basophils 0.0 0.0 - 0.2 10e9/L    Abs Immature Granulocytes 0.0 0 - 0.4 10e9/L    Absolute Nucleated RBC 0.0    Comprehensive metabolic panel    Collection Time: 11/07/17  8:00 AM   Result Value Ref Range    Sodium 139 133 - 144 mmol/L    Potassium 4.5 3.4 - 5.3 mmol/L    Chloride 104 94 - 109 mmol/L    Carbon Dioxide 31 20 - 32 mmol/L    Anion Gap 4 3 - 14 mmol/L    Glucose 98 70 - 99 mg/dL    Urea Nitrogen 16 7 - 30 mg/dL    Creatinine 0.91 0.66 - 1.25 mg/dL    GFR Estimate >90 >60 mL/min/1.7m2    GFR Estimate If Black >90 >60 mL/min/1.7m2    Calcium 8.9 8.5 - 10.1 mg/dL    Bilirubin Total 0.7 0.2 - 1.3 mg/dL    Albumin 3.7 3.4 - 5.0 g/dL    Protein Total 7.5 6.8 - 8.8 g/dL    Alkaline Phosphatase 71 40 - 150 U/L    ALT 43 0 - 70 U/L    AST 18 0 - 45 U/L   TSH with free T4 reflex and/or T3 as indicated    Collection Time: 11/07/17  8:00 AM   Result Value Ref Range    TSH 0.64 0.40 - 4.00 mU/L   Lipid panel    Collection Time: 11/07/17  8:00 AM   Result Value Ref Range    Cholesterol 193 <200 mg/dL    Triglycerides 96 <150 mg/dL    HDL Cholesterol 58 >39 mg/dL    LDL Cholesterol Calculated 116 (H) <100 mg/dL    Non HDL Cholesterol 135 (H) <130 mg/dL   EKG 12-lead, tracing only    Collection Time: 11/07/17  2:35 PM   Result Value Ref Range    Interpretation ECG Click View Image link to view waveform and result          Impression:   This is a 32 year old male presents with continuing depression.  Mood remains depressed.  He started a course of ECT stating that ECT helped in the past.  He's now quitting ECT after 3 treatments.           DIagnoses:     Axis I: Major Depressive Disorder, recurrent;  Anxiety Disorder NOS;  h/o Alcohol Abuse;  ADD,  inattentive (by hx);    Axis II: Borderline PD     Axis III: GERD, Vitamin D deficiency, Hypogonadism, Asthma, Obesity           Plan:     Patient is refusing more ECT.  He's ending this series after 3 treatments.  Follow up with Dr. Weber at [facility].  Will re-evaluate meds at that visit.    Patient and wife are asking re: TMS.  Will submit a PA to [employer] for that.      Froy Weber MD

## 2017-12-18 NOTE — PROGRESS NOTES
"  SUBJECTIVE:                                                    Abel Ward is a 32 year old male who presents to clinic today for the following health issues:    Hypertension  150's/ does not know bottom number  Headaches-notes he has a hx of headaches  Pt has been on lisinopril but discontinued it due to mild nausea and mild lightheadedness    Heel pain  Back of left greater than right heel- near insertion of achilles tendon  Has gotten better the last few days but will still hurt if he leans forward  If patient leans forward it will be aggravated  Shoes are comfortable- does not think they cut into his heel    Frequent Epistaxis    Lateral Right hip pain   Ongoing, but worsened the past month, intermittent  Does not shoot down legs  Aggravated when standing or sitting for long periods    Mental Health  He has been on stelazine and pristiq about a month  Pt is doing TMS rather than ECT  Drinks very litlle alcohol     Problem list and histories reviewed & adjusted, as indicated.  Additional history: as documented    ROS:  Constitutional, HEENT, cardiovascular, pulmonary, GI, , musculoskeletal, neuro, skin, endocrine and psych systems are negative, except as otherwise noted.    This document serves as a record of the services and decisions personally performed and made by David Denney MD. It was created on his behalf by Swapna Card, a trained medical scribe. The creation of this document is based the provider's statements to the medical scribe.  Swapna Card   OBJECTIVE:                                                    /90 (BP Location: Left arm, Patient Position: Sitting, Cuff Size: Adult Large)  Pulse 91  Temp 98.4  F (36.9  C) (Oral)  Ht 1.803 m (5' 11\")  Wt (!) 146.5 kg (323 lb)  SpO2 97%  BMI 45.05 kg/m2 Body mass index is 45.05 kg/(m^2).   GENERAL: healthy, alert, well nourished, well hydrated, no distress  EYES: Eyes grossly normal to inspection, extraocular movements - " "intact  HENT: ear canals- normal; TMs- normal; Nose- normal; Mouth- no ulcers, no lesions  NECK: no tenderness, no adenopathy, no asymmetry, no masses, no stiffness; thyroid- normal to palpation  RESP: lungs clear to auscultation - no rales, no rhonchi, no wheezes  CV: regular rates and rhythm, normal S1 S2, no S3 or S4 and no murmur, no click or rub -  MS: Tender near insertion of left achilles tendon, right hip- mild tenderness of buttock muscle with palpation , otherwise, extremities- no gross deformities noted, no edema  SKIN: no suspicious lesions, no rashes  PSYCH: Alert and oriented times 3; speech- coherent , normal rate and volume; able to articulate logical thoughts, able to abstract reason, no tangential thoughts, no hallucinations or delusions, affect- normal  Diagnostic test results:  none      ASSESSMENT/PLAN:         Abel was seen today for hypertension.    Diagnoses and all orders for this visit:    Strain of right buttock, initial encounter: Not well controlled, discussed working on stretches with the patient and may try ice and heat on area    Morbid obesity (H): Not well controlled, discussed a healthy diet and adequate physical activity    Essential hypertension with goal blood pressure less than 140/90: Not well controlled, begin medication  -     Albumin Random Urine Quantitative with Creat Ratio  -     losartan (COZAAR) 50 MG tablet; Take 1 tablet (50 mg) by mouth daily    Risks, benefits and alternatives of treatments discussed. Plan agreed on.      Followup: 10-14 days bp recheck    Will call, return to clinic, or go to ED if worsening or symptoms not improving as discussed.    See patient instructions.       BMI:   Estimated body mass index is 45.05 kg/(m^2) as calculated from the following:    Height as of this encounter: 1.803 m (5' 11\").    Weight as of this encounter: 146.5 kg (323 lb).   Weight management plan: Discussed healthy diet and exercise guidelines and patient will " follow up in 6 months in clinic to re-evaluate.      The information in this document, created by the medical scribe for me, accurately reflects the services I personally performed and the decisions made by me. I have reviewed and approved this document for accuracy prior to leaving the patient care area.  David Denney MD December 22, 2017 7:55 AM        Jovany Denney MD   Pager: [phone]

## 2017-12-22 ENCOUNTER — OFFICE VISIT (OUTPATIENT)
Dept: FAMILY MEDICINE | Facility: CLINIC | Age: 32
End: 2017-12-22
Payer: COMMERCIAL

## 2017-12-22 VITALS
SYSTOLIC BLOOD PRESSURE: 142 MMHG | HEIGHT: 71 IN | TEMPERATURE: 98.4 F | OXYGEN SATURATION: 97 % | DIASTOLIC BLOOD PRESSURE: 90 MMHG | BODY MASS INDEX: 44.1 KG/M2 | HEART RATE: 91 BPM | WEIGHT: 315 LBS

## 2017-12-22 DIAGNOSIS — S76.011A STRAIN OF RIGHT BUTTOCK, INITIAL ENCOUNTER: Primary | ICD-10-CM

## 2017-12-22 DIAGNOSIS — I10 ESSENTIAL HYPERTENSION WITH GOAL BLOOD PRESSURE LESS THAN 140/90: ICD-10-CM

## 2017-12-22 DIAGNOSIS — E66.01 MORBID OBESITY (H): ICD-10-CM

## 2017-12-22 PROCEDURE — 99214 OFFICE O/P EST MOD 30 MIN: CPT | Performed by: FAMILY MEDICINE

## 2017-12-22 PROCEDURE — 82043 UR ALBUMIN QUANTITATIVE: CPT | Performed by: FAMILY MEDICINE

## 2017-12-22 RX ORDER — LOSARTAN POTASSIUM 50 MG/1
50 TABLET ORAL DAILY
Qty: 90 TABLET | Refills: 1 | Status: SHIPPED | OUTPATIENT
Start: 2017-12-22 | End: 2018-07-03

## 2017-12-22 NOTE — PATIENT INSTRUCTIONS
Achilles Tendon Injury               What is an Achilles tendon injury?   The Achilles tendon connects the heel bone to the calf muscle of the leg. Injury to the tendon may cause it to become inflamed or torn. It lets you point your toes up and down and walk by putting your heel down first and then your toes.   Tendons, are strong bands of connective tissue that attach muscle to bone. When a tendon is acutely injured it is called a strain. Achilles tendinopathy is an injury to your Achilles tendon from overuse. Tendonitis is when a tendon is inflamed. When there are micro-tears in a tendon from repeated injury it is called tendinosis. Tendinopathy is the term for both inflammation and micro-tears. This causes pain at the back of your leg by the heel.   How does it occur?   Achilles tendinopathy can be caused by:   overuse of the Achilles tendon   tight calf muscles   tight Achilles tendons   lots of uphill running   increasing the amount or intensity of sports training, sometimes along with switching to racing flats, which are racing shoes with less heel lift   over-pronation, a problem where your feet roll inward and flatten out more than normal when you walk or run   wearing high heels at work and then switching to lower-heeled shoes for exercise   An Achilles tendon may tear during sudden activity. For example the tendon might tear when you jump or start sprinting.   What are the symptoms?   Achilles tendinopathy causes pain and may cause swelling over the Achilles tendon. The tendon is tender and may be swollen. You will have pain when you rise up on your toes and pain when you stretch the tendon. The range of motion of your ankle may be limited.   When the tendon tears or ruptures, you may feel a pop. If there is a complete tear, you will be unable to lift your heel off the ground or point your toes.   How is it diagnosed?   Your healthcare provider will examine your leg, looking for tenderness  and swelling. Your provider will watch your feet when you walk or run to see if you over-pronate.   How is it treated?   To treat this condition:   Put an ice pack, gel pack, or package of frozen vegetables, wrapped in a cloth on the area every 3 to 4 hours, for up to 20 minutes at a time.   You could also do ice massage. To do this, first freeze water in a Styrofoam cup, then peel the top of the cup away to expose the ice. Hold the bottom of the cup and rub the ice over your tendon for 5 to 10 minutes. Do this 3 to 5 times a day for the first 2 days.   Raise your foot by putting a pillow under your lower leg when you sit or lie down.   Take an anti-inflammatory such as ibuprofen, or other medicine as directed by your provider. Nonsteroidal anti-inflammatory medicines (NSAIDs) may cause stomach bleeding and other problems. These risks increase with age. Read the label and take as directed. Unless recommended by your healthcare provider, do not take for more than 10 days.   While you are recovering from your injury, change your sport or activity to one that does not make your condition worse. For example, you may need to swim instead of run.   Follow your provider's instructions for doing exercises to help you recover.   After you recover from your acute injury, use moist heat for 10 to 15 minutes at a time before you do warm-up and stretching exercises. Do not use heat if you have swelling.   If you over-pronate, your healthcare provider may recommend shoe inserts, called orthotics, to keep your foot stable. You can buy orthotics at a pharmacy or athletic shoe store or they can be custom-made. If your healthcare provider prescribes a heel lift insert for your shoe, wear it at least until your tendon heals and possibly longer. The lift prevents extra stretching of your Achilles tendon.   In some severe cases of Achilles tendonitis, your foot may be put in a cast for several weeks.   A tear of the tendon may require  surgery. If you don't have surgery, your foot may be put in a cast for 6 to 10 weeks.   How long will the effects last?   The length of recovery depends on many factors such as your age, health, and if you have had a previous injury. Recovery time also depends on the severity of the injury. A tendon that is only mildly inflamed and has just started to hurt may improve within a few weeks. A tendon that is significantly inflamed and may have many tiny tears that has been painful for a long time may take up to a few months to improve. You need to stop doing the activities that cause pain until the tendon has healed. If you continue doing activities that cause the tendon pain, your symptoms will return and it will take longer to recover.   When can I return to my normal activities?   Everyone recovers from an injury at a different rate. Return to your activity depends on how soon your Achilles tendon recovers, not by how many days or weeks it has been since your injury has occurred. In general, the longer you have symptoms before you start treatment, the longer it will take to get better. The goal of rehabilitation is to return you to your normal activities as soon as is safely possible. If you return too soon you may worsen your injury.   You may safely return to your normal activities when, starting from the top of the list and progressing to the end, each of the following is true:   You have full range of motion in the injured leg compared to the uninjured leg.   You have full strength of the injured leg compared to the uninjured leg.   You can walk straight ahead without pain or limping.   How can I prevent Achilles tendinopathy?   The best way to prevent Achilles tendon injury is to stretch your calf muscles and Achilles tendons before exercise. If you have tight Achilles tendons or calf muscles, stretch them twice a day whether or not you are doing any sports activities that day.   If you have a tendency to get  Achilles tendinopathy, try to avoid running uphill.     Published by [employer].  This content is reviewed periodically and is subject to change as new health information becomes available. The information is intended to inform and educate and is not a replacement for medical evaluation, advice, diagnosis or treatment by a healthcare professional.   Written by Luis Padgett MD, for [employer].   ? 2010 [facility] and/or its affiliates. All Rights Reserved.   Copyright   [employer] 2011  Adult Health Advisor    Achilles Tendon Injury Rehabilitation Exercises               You can do the towel stretch right away. When the towel stretch is easy, try the standing calf stretch, soleus stretch, and leg lift. When you no longer have sharp pain in your calf or tendon, you can do the step-up, heel raises, and static and dynamic balance exercises.   Towel stretch: Sit on a hard surface with your injured leg stretched out in front of you. Loop a towel around your toes and the ball of your foot and pull the towel toward your body keeping your leg straight. Hold this position for 15 to 30 seconds and then relax. Repeat 3 times.   Standing calf stretch: Stand facing a wall with your hands on the wall at about eye level. Keep your injured leg back with your heel on the floor. Keep the other leg forward with the knee bent. Turn your back foot slightly inward (as if you were pigeon-toed). Slowly lean into the wall until you feel a stretch in the back of your calf. Hold the stretch for 15 to 30 seconds. Return to the starting position. Repeat 3 times. Do this exercise several times each day.   Standing soleus stretch: Stand facing a wall with your hands on the wall at about chest height. Keep your injured leg back with your heel on the floor. Keep the other leg forward with the knee bent. Turn your back foot slightly inward (as if you were pigeon-toed). Bend your back knee slightly and gently lean into the wall  until you feel a stretch in the lower calf of your injured leg. Hold the stretch for 15 to 30 seconds. Return to the starting position. Repeat 3 times.   Side-lying leg lift: Lie on your uninjured side. Tighten the front thigh muscles on your injured leg and lift that leg 8 to 10 inches away from the other leg. Keep the leg straight and lower it slowly. Do 3 sets of 10.   Step-up: Stand with the foot of your injured leg on a support 3 to 5 inches high (like a small step or block of wood). Keep your other foot flat on the floor. Shift your weight onto the injured leg on the support. Straighten your injured leg as the other leg comes off the floor. Return to the starting position by bending your injured leg and slowly lowering your uninjured leg back to the floor. Do 3 sets of 10.   Heel raise: Balance yourself while standing behind a chair or counter. Using the chair or counter as a support to help you, raise your body up onto your toes and hold for 5 seconds. Then slowly lower yourself down without holding onto the support. (It's OK to keep holding onto the support if you need to.) When this exercise becomes less painful, try lowering yourself down on the injured leg only. Repeat 10 times. Do 3 sets of 10. Rest 30 seconds between sets.   Balance and reach exercises Stand next to a chair with your injured leg further from the chair. The chair will provide support if you need it. Stand on just the foot of your injured leg. Try to raise the arch of this foot while keeping your big toe on the floor.   0. Keep your foot in this position and with the hand that is further away from the chair, reach forward in front of you. Allow the knee on your injured side to bend. Repeat this 10 times while keeping the arch height. To make the exercise more challenging, reach farther in front of you. Do 2 sets of 10.   0.  the same position as above. While keeping your arch height, reach the hand that is further away from the  chair across your body toward the chair. The farther you reach, the more challenging the exercise. Do 2 sets of 10.   Published by [employer].  This content is reviewed periodically and is subject to change as new health information becomes available. The information is intended to inform and educate and is not a replacement for medical evaluation, advice, diagnosis or treatment by a healthcare professional.   Written by Dana Archer, MS, PT, and Jasmin Meadows PT, [facility], [facility], for [employer].

## 2017-12-22 NOTE — NURSING NOTE
"Chief Complaint   Patient presents with     Hypertension       Initial /90 (BP Location: Left arm, Patient Position: Sitting, Cuff Size: Adult Large)  Pulse 91  Temp 98.4  F (36.9  C) (Oral)  Ht 5' 11\" (1.803 m)  Wt (!) 323 lb (146.5 kg)  SpO2 97%  BMI 45.05 kg/m2 Estimated body mass index is 45.05 kg/(m^2) as calculated from the following:    Height as of this encounter: 5' 11\" (1.803 m).    Weight as of this encounter: 323 lb (146.5 kg).  Medication Reconciliation: complete  "

## 2017-12-22 NOTE — MR AVS SNAPSHOT
After Visit Summary   12/22/2017    Abel Ward    MRN: [id number]           Patient Information     Date Of Birth          1985        Visit Information        Provider Department      12/22/2017 4:00 PM David Denney MD [facility], [place]        Today's Diagnoses     Strain of right buttock, initial encounter    -  1    Morbid obesity (H)        Essential hypertension with goal blood pressure less than 140/90          Care Instructions                     Achilles Tendon Injury               What is an Achilles tendon injury?   The Achilles tendon connects the heel bone to the calf muscle of the leg. Injury to the tendon may cause it to become inflamed or torn. It lets you point your toes up and down and walk by putting your heel down first and then your toes.   Tendons, are strong bands of connective tissue that attach muscle to bone. When a tendon is acutely injured it is called a strain. Achilles tendinopathy is an injury to your Achilles tendon from overuse. Tendonitis is when a tendon is inflamed. When there are micro-tears in a tendon from repeated injury it is called tendinosis. Tendinopathy is the term for both inflammation and micro-tears. This causes pain at the back of your leg by the heel.   How does it occur?   Achilles tendinopathy can be caused by:   overuse of the Achilles tendon   tight calf muscles   tight Achilles tendons   lots of uphill running   increasing the amount or intensity of sports training, sometimes along with switching to racing flats, which are racing shoes with less heel lift   over-pronation, a problem where your feet roll inward and flatten out more than normal when you walk or run   wearing high heels at work and then switching to lower-heeled shoes for exercise   An Achilles tendon may tear during sudden activity. For example the tendon might tear when you jump or start sprinting.   What are the symptoms?   Achilles tendinopathy  causes pain and may cause swelling over the Achilles tendon. The tendon is tender and may be swollen. You will have pain when you rise up on your toes and pain when you stretch the tendon. The range of motion of your ankle may be limited.   When the tendon tears or ruptures, you may feel a pop. If there is a complete tear, you will be unable to lift your heel off the ground or point your toes.   How is it diagnosed?   Your healthcare provider will examine your leg, looking for tenderness and swelling. Your provider will watch your feet when you walk or run to see if you over-pronate.   How is it treated?   To treat this condition:   Put an ice pack, gel pack, or package of frozen vegetables, wrapped in a cloth on the area every 3 to 4 hours, for up to 20 minutes at a time.   You could also do ice massage. To do this, first freeze water in a Styrofoam cup, then peel the top of the cup away to expose the ice. Hold the bottom of the cup and rub the ice over your tendon for 5 to 10 minutes. Do this 3 to 5 times a day for the first 2 days.   Raise your foot by putting a pillow under your lower leg when you sit or lie down.   Take an anti-inflammatory such as ibuprofen, or other medicine as directed by your provider. Nonsteroidal anti-inflammatory medicines (NSAIDs) may cause stomach bleeding and other problems. These risks increase with age. Read the label and take as directed. Unless recommended by your healthcare provider, do not take for more than 10 days.   While you are recovering from your injury, change your sport or activity to one that does not make your condition worse. For example, you may need to swim instead of run.   Follow your provider's instructions for doing exercises to help you recover.   After you recover from your acute injury, use moist heat for 10 to 15 minutes at a time before you do warm-up and stretching exercises. Do not use heat if you have swelling.   If you over-pronate, your healthcare  provider may recommend shoe inserts, called orthotics, to keep your foot stable. You can buy orthotics at a pharmacy or athletic shoe store or they can be custom-made. If your healthcare provider prescribes a heel lift insert for your shoe, wear it at least until your tendon heals and possibly longer. The lift prevents extra stretching of your Achilles tendon.   In some severe cases of Achilles tendonitis, your foot may be put in a cast for several weeks.   A tear of the tendon may require surgery. If you don't have surgery, your foot may be put in a cast for 6 to 10 weeks.   How long will the effects last?   The length of recovery depends on many factors such as your age, health, and if you have had a previous injury. Recovery time also depends on the severity of the injury. A tendon that is only mildly inflamed and has just started to hurt may improve within a few weeks. A tendon that is significantly inflamed and may have many tiny tears that has been painful for a long time may take up to a few months to improve. You need to stop doing the activities that cause pain until the tendon has healed. If you continue doing activities that cause the tendon pain, your symptoms will return and it will take longer to recover.   When can I return to my normal activities?   Everyone recovers from an injury at a different rate. Return to your activity depends on how soon your Achilles tendon recovers, not by how many days or weeks it has been since your injury has occurred. In general, the longer you have symptoms before you start treatment, the longer it will take to get better. The goal of rehabilitation is to return you to your normal activities as soon as is safely possible. If you return too soon you may worsen your injury.   You may safely return to your normal activities when, starting from the top of the list and progressing to the end, each of the following is true:   You have full range of motion in the injured leg  compared to the uninjured leg.   You have full strength of the injured leg compared to the uninjured leg.   You can walk straight ahead without pain or limping.   How can I prevent Achilles tendinopathy?   The best way to prevent Achilles tendon injury is to stretch your calf muscles and Achilles tendons before exercise. If you have tight Achilles tendons or calf muscles, stretch them twice a day whether or not you are doing any sports activities that day.   If you have a tendency to get Achilles tendinopathy, try to avoid running uphill.     Published by [employer].  This content is reviewed periodically and is subject to change as new health information becomes available. The information is intended to inform and educate and is not a replacement for medical evaluation, advice, diagnosis or treatment by a healthcare professional.   Written by Luis Padgett MD, for [employer].   ? 2010 [employer] and/or its affiliates. All Rights Reserved.   Copyright   [employer] 2011  Adult Health Advisor    Achilles Tendon Injury Rehabilitation Exercises               You can do the towel stretch right away. When the towel stretch is easy, try the standing calf stretch, soleus stretch, and leg lift. When you no longer have sharp pain in your calf or tendon, you can do the step-up, heel raises, and static and dynamic balance exercises.   Towel stretch: Sit on a hard surface with your injured leg stretched out in front of you. Loop a towel around your toes and the ball of your foot and pull the towel toward your body keeping your leg straight. Hold this position for 15 to 30 seconds and then relax. Repeat 3 times.   Standing calf stretch: Stand facing a wall with your hands on the wall at about eye level. Keep your injured leg back with your heel on the floor. Keep the other leg forward with the knee bent. Turn your back foot slightly inward (as if you were pigeon-toed). Slowly lean into the wall until you feel  a stretch in the back of your calf. Hold the stretch for 15 to 30 seconds. Return to the starting position. Repeat 3 times. Do this exercise several times each day.   Standing soleus stretch: Stand facing a wall with your hands on the wall at about chest height. Keep your injured leg back with your heel on the floor. Keep the other leg forward with the knee bent. Turn your back foot slightly inward (as if you were pigeon-toed). Bend your back knee slightly and gently lean into the wall until you feel a stretch in the lower calf of your injured leg. Hold the stretch for 15 to 30 seconds. Return to the starting position. Repeat 3 times.   Side-lying leg lift: Lie on your uninjured side. Tighten the front thigh muscles on your injured leg and lift that leg 8 to 10 inches away from the other leg. Keep the leg straight and lower it slowly. Do 3 sets of 10.   Step-up: Stand with the foot of your injured leg on a support 3 to 5 inches high (like a small step or block of wood). Keep your other foot flat on the floor. Shift your weight onto the injured leg on the support. Straighten your injured leg as the other leg comes off the floor. Return to the starting position by bending your injured leg and slowly lowering your uninjured leg back to the floor. Do 3 sets of 10.   Heel raise: Balance yourself while standing behind a chair or counter. Using the chair or counter as a support to help you, raise your body up onto your toes and hold for 5 seconds. Then slowly lower yourself down without holding onto the support. (It's OK to keep holding onto the support if you need to.) When this exercise becomes less painful, try lowering yourself down on the injured leg only. Repeat 10 times. Do 3 sets of 10. Rest 30 seconds between sets.   Balance and reach exercises Stand next to a chair with your injured leg further from the chair. The chair will provide support if you need it. Stand on just the foot of your injured leg. Try to raise  the arch of this foot while keeping your big toe on the floor.   0. Keep your foot in this position and with the hand that is further away from the chair, reach forward in front of you. Allow the knee on your injured side to bend. Repeat this 10 times while keeping the arch height. To make the exercise more challenging, reach farther in front of you. Do 2 sets of 10.   0.  the same position as above. While keeping your arch height, reach the hand that is further away from the chair across your body toward the chair. The farther you reach, the more challenging the exercise. Do 2 sets of 10.   Published by [employer].  This content is reviewed periodically and is subject to change as new health information becomes available. The information is intended to inform and educate and is not a replacement for medical evaluation, advice, diagnosis or treatment by a healthcare professional.   Written by Dana Archer, MS, PT, and Jasmin Meadows PT, [facility], [facility], for [facility].                       Follow-ups after your visit        Who to contact     If you have questions or need follow up information about today's clinic visit or your schedule please contact [facility] [place] directly at [phone].  Normal or non-critical lab and imaging results will be communicated to you by MyChart, letter or phone within 4 business days after the clinic has received the results. If you do not hear from us within 7 days, please contact the clinic through [employer] or phone. If you have a critical or abnormal lab result, we will notify you by phone as soon as possible.  Submit refill requests through [employer] or call your pharmacy and they will forward the refill request to us. Please allow 3 business days for your refill to be completed.          Additional Information About Your Visit        [employer] Information     [employer] gives you secure access to your electronic health record. If you see a primary care provider, you  "can also send messages to your care team and make appointments. If you have questions, please call your primary care clinic.  If you do not have a primary care provider, please call [phone] and they will assist you.        Care EveryWhere ID     This is your Care EveryWhere ID. This could be used by other organizations to access your [place] medical records  [id number]        Your Vitals Were     Pulse Temperature Height Pulse Oximetry BMI (Body Mass Index)       91 98.4  F (36.9  C) (Oral) 5' 11\" (1.803 m) 97% 45.05 kg/m2        Blood Pressure from Last 3 Encounters:   12/22/17 142/90   11/20/17 141/79   11/17/17 148/80    Weight from Last 3 Encounters:   12/22/17 (!) 323 lb (146.5 kg)   11/09/17 (!) 318 lb 4.8 oz (144.4 kg)   09/09/16 (!) 320 lb (145.2 kg)              We Performed the Following     Albumin Random Urine Quantitative with Creat Ratio          Today's Medication Changes          These changes are accurate as of: 12/22/17  4:42 PM.  If you have any questions, ask your nurse or doctor.               Start taking these medicines.        Dose/Directions    losartan 50 MG tablet   Commonly known as:  COZAAR   Used for:  Essential hypertension with goal blood pressure less than 140/90   Started by:  David Denney MD        Dose:  50 mg   Take 1 tablet (50 mg) by mouth daily   Quantity:  90 tablet   Refills:  1            Where to get your medicines      These medications were sent to [facility]/pharmacy #[id number] - [place] - [address]  [address] [facility] [place] [address]     Phone:  [phone]     losartan 50 MG tablet                Primary Care Provider Office Phone # Fax #    David Denney [phone]       [facility] [address]        Equal Access to Services     VALENTIN BRAN : Amber lester Sodamien, waaxda luqadaha, qaybta kaalray martinez, lauren eugene. [facility] [phone].    ATENCIÓN: Si luisa " español, tiene a bagley disposición servicios gratuitos de asistencia lingüística. Anais belcher [phone].    We comply with applicable federal civil rights laws and [place] laws. We do not discriminate on the basis of race, color, national origin, age, disability, sex, sexual orientation, or gender identity.            Thank you!     Thank you for choosing [facility]  for your care. Our goal is always to provide you with excellent care. Hearing back from our patients is one way we can continue to improve our services. Please take a few minutes to complete the written survey that you may receive in the mail after your visit with us. Thank you!             Your Updated Medication List - Protect others around you: Learn how to safely use, store and throw away your medicines at www.disposemymeds.org.          This list is accurate as of: 12/22/17  4:42 PM.  Always use your most recent med list.                   Brand Name Dispense Instructions for use Diagnosis    desvenlafaxine succinate 50 MG 24 hr tablet    PRISTIQ    30 tablet    Take 1 tablet (50 mg) by mouth daily    Severe episode of recurrent major depressive disorder, without psychotic features (H)       gabapentin 100 MG capsule    NEURONTIN     Take 1-2 capsules (100-200 mg) by mouth nightly as needed    Restless legs syndrome       losartan 50 MG tablet    COZAAR    90 tablet    Take 1 tablet (50 mg) by mouth daily    Essential hypertension with goal blood pressure less than 140/90       omeprazole 20 MG CR capsule    priLOSEC    90 capsule    Take 1 capsule (20 mg) by mouth daily    Esophageal reflux       TRAZODONE HCL PO      Take 200 mg by mouth At Bedtime    Morbid obesity (H)       trifluoperazine 2 MG tablet    STELAZINE    60 tablet    Take 1-2 tablets (2-4 mg) by mouth 3 times daily as needed (severe anxiety)    Anxiety

## 2017-12-23 LAB
CREAT UR-MCNC: 110 MG/DL
MICROALBUMIN UR-MCNC: 5 MG/L
MICROALBUMIN/CREAT UR: 4.86 MG/G CR (ref 0–17)

## 2017-12-26 NOTE — PROGRESS NOTES
Dear Shakir,    Here is a summary of your recent test results:  -Microalbumin (urine protein) test is normal.  ADVISE: recheck annually    Thank you very much for trusting me and [facility] [place].     Healthy regards,  Jovany Denney MD

## 2018-01-26 ENCOUNTER — HOSPITAL ENCOUNTER (INPATIENT)
Facility: CLINIC | Age: 33
LOS: 3 days | Discharge: HOME | DRG: 885 | End: 2018-01-29
Attending: PSYCHIATRY & NEUROLOGY | Admitting: PSYCHIATRY & NEUROLOGY
Payer: COMMERCIAL

## 2018-01-26 DIAGNOSIS — F33.2 SEVERE EPISODE OF RECURRENT MAJOR DEPRESSIVE DISORDER, WITHOUT PSYCHOTIC FEATURES (H): ICD-10-CM

## 2018-01-26 DIAGNOSIS — R45.851 SUICIDAL IDEATION: ICD-10-CM

## 2018-01-26 PROCEDURE — 90791 PSYCH DIAGNOSTIC EVALUATION: CPT

## 2018-01-26 PROCEDURE — 80320 DRUG SCREEN QUANTALCOHOLS: CPT | Performed by: FAMILY MEDICINE

## 2018-01-26 PROCEDURE — 99285 EMERGENCY DEPT VISIT HI MDM: CPT | Mod: 25 | Performed by: PSYCHIATRY & NEUROLOGY

## 2018-01-26 PROCEDURE — 80307 DRUG TEST PRSMV CHEM ANLYZR: CPT | Performed by: FAMILY MEDICINE

## 2018-01-26 PROCEDURE — 99285 EMERGENCY DEPT VISIT HI MDM: CPT | Mod: Z6 | Performed by: PSYCHIATRY & NEUROLOGY

## 2018-01-26 PROCEDURE — 12400007 ZZH R&B MH INTERMEDIATE UMMC

## 2018-01-26 RX ORDER — ALPRAZOLAM 1 MG
.5-1 TABLET ORAL 2 TIMES DAILY PRN
COMMUNITY
End: 2018-06-28

## 2018-01-26 RX ORDER — ALPRAZOLAM 0.25 MG
.5-1 TABLET ORAL 2 TIMES DAILY PRN
Status: DISCONTINUED | OUTPATIENT
Start: 2018-01-26 | End: 2018-01-29 | Stop reason: HOSPADM

## 2018-01-26 RX ORDER — DESVENLAFAXINE 100 MG/1
100 TABLET, EXTENDED RELEASE ORAL DAILY
COMMUNITY
End: 2019-02-05

## 2018-01-26 RX ORDER — LOSARTAN POTASSIUM 50 MG/1
50 TABLET ORAL DAILY
Status: DISCONTINUED | OUTPATIENT
Start: 2018-01-27 | End: 2018-01-29 | Stop reason: HOSPADM

## 2018-01-26 RX ORDER — TRAZODONE HYDROCHLORIDE 50 MG/1
50-150 TABLET, FILM COATED ORAL
COMMUNITY

## 2018-01-26 RX ORDER — HYDROXYZINE HYDROCHLORIDE 25 MG/1
25-50 TABLET, FILM COATED ORAL EVERY 4 HOURS PRN
Status: DISCONTINUED | OUTPATIENT
Start: 2018-01-26 | End: 2018-01-29 | Stop reason: HOSPADM

## 2018-01-26 RX ORDER — ALUMINA, MAGNESIA, AND SIMETHICONE 2400; 2400; 240 MG/30ML; MG/30ML; MG/30ML
30 SUSPENSION ORAL EVERY 4 HOURS PRN
Status: DISCONTINUED | OUTPATIENT
Start: 2018-01-26 | End: 2018-01-29 | Stop reason: HOSPADM

## 2018-01-26 RX ORDER — TRIFLUOPERAZINE HYDROCHLORIDE 1 MG/1
1-2 TABLET, FILM COATED ORAL 3 TIMES DAILY PRN
Status: DISCONTINUED | OUTPATIENT
Start: 2018-01-26 | End: 2018-01-29 | Stop reason: HOSPADM

## 2018-01-26 RX ORDER — ACETAMINOPHEN 325 MG/1
650 TABLET ORAL EVERY 4 HOURS PRN
Status: DISCONTINUED | OUTPATIENT
Start: 2018-01-26 | End: 2018-01-29 | Stop reason: HOSPADM

## 2018-01-26 RX ORDER — BISACODYL 10 MG
10 SUPPOSITORY, RECTAL RECTAL DAILY PRN
Status: DISCONTINUED | OUTPATIENT
Start: 2018-01-26 | End: 2018-01-29 | Stop reason: HOSPADM

## 2018-01-26 RX ORDER — GABAPENTIN 100 MG/1
100-200 CAPSULE ORAL
Status: DISCONTINUED | OUTPATIENT
Start: 2018-01-26 | End: 2018-01-29 | Stop reason: HOSPADM

## 2018-01-26 RX ORDER — DESVENLAFAXINE 50 MG/1
100 TABLET, FILM COATED, EXTENDED RELEASE ORAL DAILY
Status: DISCONTINUED | OUTPATIENT
Start: 2018-01-27 | End: 2018-01-29 | Stop reason: HOSPADM

## 2018-01-26 RX ORDER — MELATONIN 10 MG
5000 TABLET, SUBLINGUAL SUBLINGUAL DAILY
COMMUNITY

## 2018-01-26 RX ORDER — TRAZODONE HYDROCHLORIDE 50 MG/1
50-150 TABLET, FILM COATED ORAL
Status: DISCONTINUED | OUTPATIENT
Start: 2018-01-26 | End: 2018-01-29 | Stop reason: HOSPADM

## 2018-01-26 RX ORDER — TRIFLUOPERAZINE HYDROCHLORIDE 1 MG/1
1-2 TABLET, FILM COATED ORAL PRN
Status: ON HOLD | COMMUNITY
End: 2018-07-03

## 2018-01-26 ASSESSMENT — ENCOUNTER SYMPTOMS
DECREASED CONCENTRATION: 1
APPETITE CHANGE: 1
NERVOUS/ANXIOUS: 1
EYES NEGATIVE: 1
ACTIVITY CHANGE: 1
NEUROLOGICAL NEGATIVE: 1
ENDOCRINE NEGATIVE: 1
RESPIRATORY NEGATIVE: 1
HALLUCINATIONS: 0
CARDIOVASCULAR NEGATIVE: 1
GASTROINTESTINAL NEGATIVE: 1
MUSCULOSKELETAL NEGATIVE: 1
HEMATOLOGIC/LYMPHATIC NEGATIVE: 1
SLEEP DISTURBANCE: 1
HYPERACTIVE: 0

## 2018-01-26 ASSESSMENT — ACTIVITIES OF DAILY LIVING (ADL)
DRESS: INDEPENDENT
ORAL_HYGIENE: INDEPENDENT
LAUNDRY: WITH SUPERVISION
GROOMING: INDEPENDENT

## 2018-01-26 NOTE — PHARMACY-ADMISSION MEDICATION HISTORY
"Admission medication history for the January 26, 2018 admission is complete.     Interview sources:  Patient, patient's mother, [facility]; [phone])    Reliability of source: Moderate - Patient used monotone voice throughout interview and answered questions with very short response.s    Medication compliance: Moderate - refill history was up to date, appears patient rarely uses prn medications (gabapentin, trazodone) and he stopped as needed Stelazine on own    Changes made to PTA medication list (reason)  Added: Stelazine (per pt and pharmacy), vitamin D (per pt)  Deleted: none  Changed:   - Pristiq 50 mg daily --> 100 mg daily (dose increase per pt and pharmacy)  - trazodone 200 mg at bedtime -->  mg at bedtime as needed (per pharmacy)    Additional medication history information:   - alprazolam - dispensed on 1/18/18, # 60 tablets (30 day supply), pt reports he always takes at least 1 mg daily, today he took 1.5 mg prior to coming to the hospital  - gabapentin - for RLS -rarely uses; dispensed on 11/16/17, #60 capsules (30 day supply)   - Stelazine - prescribed to take as needed for anxiety, pt reports he didn't think this was helpful so he stopped it about 1 month ago  - omeprazole - pt reports he buys this over-the-counter because insurance does not cover it  - vitamin D 10,000 units - pt's mother was having pt take this dose because they have had \"historically low vitamin D levels in the family.\" Consider evaluating if this high of a daily dose is necessary.   - Influenza vaccine status: completed 11/7/17 (per pt and [facility])  - Medication allergies: pt confirmed NKDA    Prior to Admission medications    Medication Sig Last Dose Taking? Auth Provider   Cholecalciferol (VITAMIN D3) 23263 UNITS TABS Take 10,000 Units by mouth daily (patient purchases over-the-counter) this dose was NOT prescribed by a doctor. 1/26/2018 at AM Yes Unknown, Entered By History   ALPRAZolam (XANAX) 1 MG tablet Take 0.5-1 " mg by mouth 2 times daily as needed for anxiety 1/26/2018 at AM Yes Unknown, Entered By History   desvenlafaxine succinate (PRISTIQ) 100 MG 24 hr tablet Take 100 mg by mouth daily 1/26/2018 at AM Yes Unknown, Entered By History   traZODone (DESYREL) 50 MG tablet Take  mg by mouth nightly as needed for sleep Past Month Yes Unknown, Entered By History   trifluoperazine (STELAZINE) 1 MG tablet Take 1-2 mg by mouth 3 times daily as needed (anxiety)  Yes Unknown, Entered By History   losartan (COZAAR) 50 MG tablet Take 1 tablet (50 mg) by mouth daily 1/26/2018 at AM Yes David Denney MD   gabapentin (NEURONTIN) 100 MG capsule Take 100-200 mg by mouth nightly as needed (restless leg syndrome)  Past Month at prn Yes David Denney MD   omeprazole (PRILOSEC) 20 MG capsule Take 1 capsule (20 mg) by mouth daily 1/26/2018 at Unknown time Yes David Denney MD       Time spent: 30 minutes    Medication history completed by:   Meghan Dietrich, PharmD  [facility]  Available by pager daily from 1-9 PM: [phone]

## 2018-01-26 NOTE — IP AVS SNAPSHOT
[address] [address]    Phone:  [phone]                                       After Visit Summary   1/26/2018    Abel Ward    MRN: [id number]           After Visit Summary Signature Page     I have received my discharge instructions, and my questions have been answered. I have discussed any challenges I see with this plan with the nurse or doctor.    ..........................................................................................................................................  Patient/Patient Representative Signature      ..........................................................................................................................................  Patient Representative Print Name and Relationship to Patient    ..................................................               ................................................  Date                                            Time    ..........................................................................................................................................  Reviewed by Signature/Title    ...................................................              ..............................................  Date                                                            Time

## 2018-01-26 NOTE — ED PROVIDER NOTES
History     Chief Complaint   Patient presents with     Suicidal     Hx of depression, gradually gotten worse over past few days, having suicidal thoughts      The history is provided by the patient and medical records.     Abel Ward is a 33 year old male who is here brought by mother who is worried about his state of depression. Patient has recurrent MDD. He was hospitalized here in November 2017 and received ECT. He had good response and continued treatment with Dr Weber in the outpatient setting. He segued to TMS and has had 19 sessions. Patient also had Pristiq increased to 100 mg last week. Patient is not noticing any benefit. He has chronic SI and has been feeling worse past couple days when he was going through multiple plans. He was not able to contact his psychiatrist today and decided to come to the ED. Patient admits to feeling hopeless, withdrawn, helpless. He is reticent about being hospitalized as he feels it is boring being in the hospital. He was opting to go home and talk to his psychiatrist on Monday. After he spoke with his mother, he agreed to come in for stabilization. Patient does not exhibit psychosis or a thought disorder.     Please see DEC Crisis Assessment on 1/26/18 in [facility] for further details.    PERSONAL MEDICAL HISTORY  Past Medical History:   Diagnosis Date     Attention deficit disorder with hyperactivity(314.01) 2001     Esophageal reflux     Dr Starks- had EGD ~2202     Generalized anxiety disorder      Hypertension      Infectious mononucleosis 12/03     Low testosterone      Major depressive disorder, single episode in full remission (H)      Major depressive disorder, single episode, mild (H)      MDD (major depressive disorder)      Mild intermittent asthma      PAST SURGICAL HISTORY  Past Surgical History:   Procedure Laterality Date     OTHER SURGICAL HISTORY      wisdom teeth extraction     FAMILY HISTORY  Family History   Problem Relation Age of Onset      Hypertension Father      Depression Father      Lipids Father      Obesity Father      Thyroid Disease Father      DIABETES Paternal Grandfather      Hypertension Paternal Grandfather      HEART DISEASE Paternal Grandfather      Breast Cancer Paternal Grandmother      Hypertension Mother      Obesity Mother      Thyroid Disease Mother      Hypertension Maternal Grandfather      SOCIAL HISTORY  Social History   Substance Use Topics     Smoking status: Never Smoker     Smokeless tobacco: Never Used     Alcohol use 0.0 oz/week      Comment: occasionally, last use 2 days ago at a wedding     MEDICATIONS  No current facility-administered medications for this encounter.      Current Outpatient Prescriptions   Medication     ALPRAZolam (XANAX PO)     losartan (COZAAR) 50 MG tablet     desvenlafaxine succinate (PRISTIQ) 50 MG 24 hr tablet     gabapentin (NEURONTIN) 100 MG capsule     TRAZODONE HCL PO     omeprazole (PRILOSEC) 20 MG capsule     ALLERGIES  No Known Allergies      I have reviewed the Medications, Allergies, Past Medical and Surgical History, and Social History in the Epic system.    Review of Systems   Constitutional: Positive for activity change and appetite change.   HENT: Negative.    Eyes: Negative.    Respiratory: Negative.    Cardiovascular: Negative.    Gastrointestinal: Negative.    Endocrine: Negative.    Genitourinary: Negative.    Musculoskeletal: Negative.    Skin: Negative.    Neurological: Negative.    Hematological: Negative.    Psychiatric/Behavioral: Positive for decreased concentration, sleep disturbance and suicidal ideas. Negative for hallucinations. The patient is nervous/anxious. The patient is not hyperactive.    All other systems reviewed and are negative.      Physical Exam   BP: 141/84  Heart Rate: 68  Temp: 98  F (36.7  C)  Resp: 16  SpO2: 97 %      Physical Exam   Constitutional: He appears well-developed and well-nourished.   HENT:   Head: Normocephalic.   Eyes: Pupils are equal,  round, and reactive to light.   Neck: Normal range of motion.   Cardiovascular: Normal rate.    Pulmonary/Chest: Effort normal.   Abdominal: Soft.   Musculoskeletal: Normal range of motion.   Neurological: He is alert.   Skin: Skin is warm.   Psychiatric: His speech is normal. Judgment normal. His mood appears anxious. He is withdrawn. He is not agitated, not aggressive, not hyperactive, not actively hallucinating and not combative. Thought content is not paranoid and not delusional. Cognition and memory are normal. He exhibits a depressed mood. He expresses suicidal ideation. He expresses no homicidal ideation.   Nursing note and vitals reviewed.      ED Course     ED Course     Procedures      Labs Ordered and Resulted from Time of ED Arrival Up to the Time of Departure from the ED   DRUG ABUSE SCREEN 6 CHEM DEP URINE ([facility]) - Abnormal; Notable for the following:        Result Value    Benzodiazepine Qual Urine Positive (*)     All other components within normal limits            Assessments & Plan (with Medical Decision Making)   Patient with severe recurrent MDD who has been feeling suicidal past couple days. He has had multiple plans. He presently agrees to be hospitalized for stabilization.    I have reviewed the nursing notes.    I have reviewed the findings, diagnosis, plan and need for follow up with the patient.    New Prescriptions    No medications on file       Final diagnoses:   Severe episode of recurrent major depressive disorder, without psychotic features (H)       1/26/2018   [facility], [place], EMERGENCY DEPARTMENT     Cullen Mitchell MD  01/26/18 6340

## 2018-01-26 NOTE — LETTER
To Whom It May Concern,    Abel Ward was hospitalized at [facility] from 1/27/2018 - 1/29/2018. He has improved with medication and therapy. Please excuse his absence from work related responsibilities. He will be ok to return to work this week.    Sincerely,      Scotty Campo MD  [facility]

## 2018-01-26 NOTE — IP AVS SNAPSHOT
MRN:[id number]                      After Visit Summary   1/26/2018    Abel Ward    MRN: [id number]           Thank you!     Thank you for choosing [facility] for your care. Our goal is always to provide you with excellent care.        Patient Information     Date Of Birth          1985        About your hospital stay     You were admitted on:  January 26, 2018 You last received care in the:  [facility]    You were discharged on:  January 29, 2018       Who to Call     For medical emergencies, please call 911.  For non-urgent questions about your medical care, please call your primary care provider or clinic, [phone]          Attending Provider     Provider Specialty    Cullen Mitchell MD Psychiatry    Dongre, Scotty Michaels MD Psychiatry       Primary Care Provider Office Phone # Fax #    David Denney [phone]      Further instructions from your care team        Behavioral Discharge Planning and Instructions      Summary:  You were admitted on 1/26/2018  due to Depression.  You were treated by Dr Scotty Campo MD and discharged on 1/29/18 from [facility] to Home      Principal Diagnosis: Major Depressive Disorder, Recurrent, Severe      Health Care Follow-up Appointments:   Dr. Froy Weber (You plan to continue TMS on Tuesday, 1/30/18)  [facility]  [address] [place] [address]  [phone]  FAX: [phone]    Attend all scheduled appointments with your outpatient providers. Call at least 24 hours in advance if you need to reschedule an appointment to ensure continued access to your outpatient providers.   Major Treatments, Procedures and Findings:  You were provided with: a psychiatric assessment, assessed for medical stability, medication evaluation and/or management, group therapy and milieu management    Symptoms to Report: losing more sleep, mood getting worse or thoughts of  suicide    Early warning signs can include: increased depression or anxiety sleep disturbances increased thoughts or behaviors of suicide or self-harm     Safety and Wellness:  Take all medicines as directed.  Make no changes unless your doctor suggests them.      Follow treatment recommendations.  Refrain from alcohol and non-prescribed drugs.  If there is a concern for safety, call 911.    Resources:   Crisis Intervention: 460.996.9185 or [phone] (TTY: 925.152.4011).  Call anytime for help.  National [place] on Mental Illness (www.mn.anayeli.org): 325.981.1479 or 033-604-8865.    The treatment team has appreciated the opportunity to work with you.     If you have any questions or concerns our unit number is [phone].          Pending Results     No orders found from 1/24/2018 to 1/27/2018.            Admission Information     Date & Time Provider Department Dept. Phone    1/26/2018 Scotty Campo MD [address] [phone]      Your Vitals Were     Blood Pressure Pulse Temperature Respirations Weight Pulse Oximetry    136/85 66 97.2  F (36.2  C) (Oral) 16 143.7 kg (316 lb 12.8 oz) 96%    BMI (Body Mass Index)                   44.18 kg/m2           [facility] Information     [facility] gives you secure access to your electronic health record. If you see a primary care provider, you can also send messages to your care team and make appointments. If you have questions, please call your primary care clinic.  If you do not have a primary care provider, please call [phone] and they will assist you.        Care EveryWhere ID     This is your Care EveryWhere ID. This could be used by other organizations to access your [facility] medical records  [id number]        Equal Access to Services     ZANDER BRAN : lalit Kirk, lauren morgan. So [facility] [phone].    ATENCIÓN: Si habla español, tiene a bagley disposición servicios  jomar de asistencia lingüística. Anais belcher [phone].    We comply with applicable federal civil rights laws and [place] laws. We do not discriminate on the basis of race, color, national origin, age, disability, sex, sexual orientation, or gender identity.               Review of your medicines      START taking        Dose / Directions    ARIPiprazole 10 MG tablet   Commonly known as:  ABILIFY   Used for:  Severe episode of recurrent major depressive disorder, without psychotic features (H)        Dose:  10 mg   Start taking on:  1/30/2018   Take 1 tablet (10 mg) by mouth daily   Quantity:  30 tablet   Refills:  1         CONTINUE these medicines which have NOT CHANGED        Dose / Directions    ALPRAZolam 1 MG tablet   Commonly known as:  XANAX        Dose:  0.5-1 mg   Take 0.5-1 mg by mouth 2 times daily as needed for anxiety   Refills:  0       gabapentin 100 MG capsule   Commonly known as:  NEURONTIN   Used for:  Restless legs syndrome        Dose:  100-200 mg   Take 100-200 mg by mouth nightly as needed (restless leg syndrome)   Refills:  0       losartan 50 MG tablet   Commonly known as:  COZAAR   Used for:  Essential hypertension with goal blood pressure less than 140/90        Dose:  50 mg   Take 1 tablet (50 mg) by mouth daily   Quantity:  90 tablet   Refills:  1       omeprazole 20 MG CR capsule   Commonly known as:  priLOSEC   Used for:  Esophageal reflux        Dose:  20 mg   Take 1 capsule (20 mg) by mouth daily   Quantity:  90 capsule   Refills:  3       PRISTIQ 100 MG 24 hr tablet   Generic drug:  desvenlafaxine succinate        Dose:  100 mg   Take 100 mg by mouth daily   Refills:  0       traZODone 50 MG tablet   Commonly known as:  DESYREL        Dose:   mg   Take  mg by mouth nightly as needed for sleep   Refills:  0       trifluoperazine 1 MG tablet   Commonly known as:  STELAZINE        Dose:  1-2 mg   Take 1-2 mg by mouth 3 times daily as needed (anxiety)   Refills:  0        Vitamin D3 61814 UNITS Tabs        Dose:  15385 Units   Take 10,000 Units by mouth daily (patient purchases over-the-counter) this dose was NOT prescribed by a doctor.   Refills:  0            Where to get your medicines      These medications were sent to [facility] [place], [place] - [address]  [address] [facility], [facility] [address]     Phone:  [phone]     ARIPiprazole 10 MG tablet                Protect others around you: Learn how to safely use, store and throw away your medicines at www.disposemymeds.org.             Medication List: This is a list of all your medications and when to take them. Check marks below indicate your daily home schedule. Keep this list as a reference.      Medications           Morning Afternoon Evening Bedtime As Needed    ALPRAZolam 1 MG tablet   Commonly known as:  XANAX   Take 0.5-1 mg by mouth 2 times daily as needed for anxiety   Last time this was given:  1 mg on 1/29/2018  5:29 PM                                ARIPiprazole 10 MG tablet   Commonly known as:  ABILIFY   Take 1 tablet (10 mg) by mouth daily   Start taking on:  1/30/2018   Last time this was given:  10 mg on 1/29/2018  8:46 AM                                gabapentin 100 MG capsule   Commonly known as:  NEURONTIN   Take 100-200 mg by mouth nightly as needed (restless leg syndrome)                                losartan 50 MG tablet   Commonly known as:  COZAAR   Take 1 tablet (50 mg) by mouth daily   Last time this was given:  50 mg on 1/29/2018  8:46 AM                                omeprazole 20 MG CR capsule   Commonly known as:  priLOSEC   Take 1 capsule (20 mg) by mouth daily   Last time this was given:  20 mg on 1/29/2018  8:46 AM                                PRISTIQ 100 MG 24 hr tablet   Take 100 mg by mouth daily   Last time this was given:  100 mg on 1/29/2018  8:46 AM   Generic drug:  desvenlafaxine succinate                                traZODone 50 MG tablet    Commonly known as:  DESYREL   Take  mg by mouth nightly as needed for sleep                                trifluoperazine 1 MG tablet   Commonly known as:  STELAZINE   Take 1-2 mg by mouth 3 times daily as needed (anxiety)                                Vitamin D3 59434 UNITS Tabs   Take 10,000 Units by mouth daily (patient purchases over-the-counter) this dose was NOT prescribed by a doctor.                                          More Information        Depression  Depression is one of the most common mental health problems today. It is not just a state of unhappiness or sadness. It is a true disease. The cause seems to be related to a decrease in chemicals that transmit signals in the brain. Having a family history of depression, alcoholism, or suicide increases the risk. Chronic illness, chronic pain, migraine headaches and high emotional stress also increase the risk.  Depression is something we tend to recognize in others, but may have a hard time seeing in ourselves. It can show in many physical and emotional ways:    Loss of appetite    Over-eating    Not being able to sleep    Sleeping too much    Tiredness not related to physical exertion    Restlessness or irritability    Slowness of movement or speech    Feeling depressed or withdrawn    Loss of interest in things you once enjoyed    Trouble concentrating, poor memory, trouble making decisions    Thoughts of harming or killing oneself, or thoughts that life is not worth living    Low self-esteem  The treatment for depression may include both medicine and psychotherapy. Antidepressants can reduce suffering and can improve the ability to function during the depressed period. Therapy can offer emotional support and help you understand emotional factors that may be causing the depression.  Home care    On-going care and support helps people manage this disease.  Find a healthcare provider and therapist who meet your needs. Seek help when you feel  like you may be getting ill.    Be kind to yourself. Make it a point to do things that you enjoy (gardening, walking in nature, going to a movie, etc.). Reward yourself for small successes.    Take care of your physical body. Eat a balanced diet (low in saturated fat and high in fruits and vegetables). Exercise at least 3 times a week for 30 minutes. Even mild-moderate exercise (like brisk walking) can make you feel better.    Avoid alcohol, which can make depression worse.    Take medicine as prescribed.    Tell each of your healthcare providers about all of the prescription drugs, over-the-counter medicines, vitamins, and supplements you take. Certain supplements interact with medicines and can result in dangerous side effects. Ask your pharmacist when you have questions about drug interactions.    Talk with your family and trusted friends about your feelings and thoughts. Ask them to help you recognize behavior changes early so you can get help and, if needed, medicine can be adjusted.  Follow-up care  Follow up with your healthcare provider, or as advised.  Call 911  Call 911 if you:    Have suicidal thoughts, a suicide plan, and the means to carry out the plan    Have trouble breathing    Are very confused    Feel very drowsy or have trouble awakening    Faint or lose consciousness    Have new chest pain that becomes more severe, lasts longer, or spreads into your shoulder, arm, neck, jaw or back  When to seek medical advice  Call your healthcare provider right away if any of these occur:    Feeling extreme depression, fear, anxiety, or anger toward yourself or others    Feeling out of control    Feeling that you may try to harm yourself or another    Hearing voices that others do not hear    Seeing things that others do not see    Can t sleep or eat for 3 days in a row    Friends or family express concern over your behavior and ask you to seek help  Date Last Reviewed: 9/29/2015 2000-2017 [facility]  [employer]. [address] [place] [address], [place] [address]. All rights reserved. This information is not intended as a substitute for professional medical care. Always follow your healthcare professional's instructions.

## 2018-01-27 LAB
ALBUMIN SERPL-MCNC: 3.7 G/DL (ref 3.4–5)
ALP SERPL-CCNC: 82 U/L (ref 40–150)
ALT SERPL W P-5'-P-CCNC: 66 U/L (ref 0–70)
ANION GAP SERPL CALCULATED.3IONS-SCNC: 7 MMOL/L (ref 3–14)
AST SERPL W P-5'-P-CCNC: 30 U/L (ref 0–45)
BASOPHILS # BLD AUTO: 0 10E9/L (ref 0–0.2)
BASOPHILS NFR BLD AUTO: 0.5 %
BILIRUB SERPL-MCNC: 0.7 MG/DL (ref 0.2–1.3)
BUN SERPL-MCNC: 14 MG/DL (ref 7–30)
CALCIUM SERPL-MCNC: 8.9 MG/DL (ref 8.5–10.1)
CHLORIDE SERPL-SCNC: 105 MMOL/L (ref 94–109)
CO2 SERPL-SCNC: 28 MMOL/L (ref 20–32)
CREAT SERPL-MCNC: 0.78 MG/DL (ref 0.66–1.25)
DEPRECATED CALCIDIOL+CALCIFEROL SERPL-MC: 31 UG/L (ref 20–75)
DIFFERENTIAL METHOD BLD: NORMAL
EOSINOPHIL # BLD AUTO: 0.2 10E9/L (ref 0–0.7)
EOSINOPHIL NFR BLD AUTO: 2.8 %
ERYTHROCYTE [DISTWIDTH] IN BLOOD BY AUTOMATED COUNT: 12.4 % (ref 10–15)
GFR SERPL CREATININE-BSD FRML MDRD: >90 ML/MIN/1.7M2
GLUCOSE SERPL-MCNC: 98 MG/DL (ref 70–99)
HCT VFR BLD AUTO: 44.3 % (ref 40–53)
HGB BLD-MCNC: 15.1 G/DL (ref 13.3–17.7)
IMM GRANULOCYTES # BLD: 0 10E9/L (ref 0–0.4)
IMM GRANULOCYTES NFR BLD: 0.2 %
LYMPHOCYTES # BLD AUTO: 1.4 10E9/L (ref 0.8–5.3)
LYMPHOCYTES NFR BLD AUTO: 25 %
MCH RBC QN AUTO: 28.7 PG (ref 26.5–33)
MCHC RBC AUTO-ENTMCNC: 34.1 G/DL (ref 31.5–36.5)
MCV RBC AUTO: 84 FL (ref 78–100)
MONOCYTES # BLD AUTO: 0.5 10E9/L (ref 0–1.3)
MONOCYTES NFR BLD AUTO: 8 %
NEUTROPHILS # BLD AUTO: 3.6 10E9/L (ref 1.6–8.3)
NEUTROPHILS NFR BLD AUTO: 63.5 %
NRBC # BLD AUTO: 0 10*3/UL
NRBC BLD AUTO-RTO: 0 /100
PLATELET # BLD AUTO: 246 10E9/L (ref 150–450)
POTASSIUM SERPL-SCNC: 4.2 MMOL/L (ref 3.4–5.3)
PROT SERPL-MCNC: 7.8 G/DL (ref 6.8–8.8)
RBC # BLD AUTO: 5.27 10E12/L (ref 4.4–5.9)
SODIUM SERPL-SCNC: 140 MMOL/L (ref 133–144)
TSH SERPL DL<=0.005 MIU/L-ACNC: 0.62 MU/L (ref 0.4–4)
WBC # BLD AUTO: 5.7 10E9/L (ref 4–11)

## 2018-01-27 PROCEDURE — 36415 COLL VENOUS BLD VENIPUNCTURE: CPT | Performed by: PSYCHIATRY & NEUROLOGY

## 2018-01-27 PROCEDURE — 25000132 ZZH RX MED GY IP 250 OP 250 PS 637: Performed by: PSYCHIATRY & NEUROLOGY

## 2018-01-27 PROCEDURE — 80053 COMPREHEN METABOLIC PANEL: CPT | Performed by: PSYCHIATRY & NEUROLOGY

## 2018-01-27 PROCEDURE — 99207 ZZC CDG-MDM COMPONENT: MEETS LOW - DOWN CODED: CPT | Performed by: PSYCHIATRY & NEUROLOGY

## 2018-01-27 PROCEDURE — 82306 VITAMIN D 25 HYDROXY: CPT | Performed by: PSYCHIATRY & NEUROLOGY

## 2018-01-27 PROCEDURE — 99222 1ST HOSP IP/OBS MODERATE 55: CPT | Mod: AI | Performed by: PSYCHIATRY & NEUROLOGY

## 2018-01-27 PROCEDURE — 12400007 ZZH R&B MH INTERMEDIATE UMMC

## 2018-01-27 PROCEDURE — 85025 COMPLETE CBC W/AUTO DIFF WBC: CPT | Performed by: PSYCHIATRY & NEUROLOGY

## 2018-01-27 PROCEDURE — 84443 ASSAY THYROID STIM HORMONE: CPT | Performed by: PSYCHIATRY & NEUROLOGY

## 2018-01-27 RX ORDER — ARIPIPRAZOLE 10 MG/1
10 TABLET ORAL DAILY
Status: DISCONTINUED | OUTPATIENT
Start: 2018-01-28 | End: 2018-01-29 | Stop reason: HOSPADM

## 2018-01-27 RX ORDER — ARIPIPRAZOLE 5 MG/1
5 TABLET ORAL DAILY
Status: COMPLETED | OUTPATIENT
Start: 2018-01-27 | End: 2018-01-27

## 2018-01-27 RX ADMIN — LOSARTAN POTASSIUM 50 MG: 50 TABLET ORAL at 09:31

## 2018-01-27 RX ADMIN — ALPRAZOLAM 1 MG: 0.25 TABLET ORAL at 18:15

## 2018-01-27 RX ADMIN — ARIPIPRAZOLE 5 MG: 5 TABLET ORAL at 20:07

## 2018-01-27 RX ADMIN — DESVENLAFAXINE SUCCINATE 100 MG: 50 TABLET, EXTENDED RELEASE ORAL at 09:31

## 2018-01-27 RX ADMIN — OMEPRAZOLE 20 MG: 20 CAPSULE, DELAYED RELEASE ORAL at 09:31

## 2018-01-27 ASSESSMENT — ACTIVITIES OF DAILY LIVING (ADL)
TOILETING: 0-->INDEPENDENT
ORAL_HYGIENE: INDEPENDENT
DRESS: 0-->INDEPENDENT
RETIRED_COMMUNICATION: 0-->UNDERSTANDS/COMMUNICATES WITHOUT DIFFICULTY
BATHING: 0-->INDEPENDENT
GROOMING: INDEPENDENT
DRESS: INDEPENDENT
RETIRED_EATING: 0-->INDEPENDENT
SWALLOWING: 0-->SWALLOWS FOODS/LIQUIDS WITHOUT DIFFICULTY

## 2018-01-27 NOTE — PROGRESS NOTES
01/26/18 1904   Patient Belongings   Did you bring any home meds/supplements to the hospital?  No   Patient Belongings none   Belongings Search Yes   Clothing Search Yes   Second Staff OSMIN   General Info Comment NO BELONGINGS   A               Admission:  I am responsible for any personal items that are not sent to the safe or pharmacy.  [facility] is not responsible for loss, theft or damage of any property in my possession.    Signature:  _________________________________ Date: _______  Time: _____                                              Staff Signature:  ____________________________ Date: ________  Time: _____      2nd Staff person, if patient is unable/unwilling to sign:    Signature: ________________________________ Date: ________  Time: _____     Discharge:  [facility] has returned all of my personal belongings:    Signature: _________________________________ Date: ________  Time: _____                                          Staff Signature:  ____________________________ Date: ________  Time: _____

## 2018-01-27 NOTE — PROGRESS NOTES
01/27/18 1446   Behavioral Health   Hallucinations denies / not responding to hallucinations   Thinking poor concentration;distractable   Orientation person: oriented;place: oriented   Memory baseline memory   Insight poor   Judgement impaired   Eye Contact into space;at floor   Affect blunted, flat;sad   Mood depressed   Physical Appearance/Attire untidy   Hygiene neglected grooming - unclean body, hair, teeth   Suicidality other (see comments)  (denies currently)   1. Wish to be Dead Yes   2. Non-Specific Active Suicidal Thoughts  No   Self Injury other (see comment)  (denies currently)   Elopement (no value)   Activity isolative;withdrawn   Speech clear;coherent   Medication Sensitivity no stated side effects   Psychomotor / Gait balanced;steady     Pt was isolative to his room all day and appeared to be sleeping almost the entire shift. Mood appeared calm with a depressed affect. No current SI/SIB indicated. No psychotic symptoms, hallucinations, or paranoia. No behavioral issues.

## 2018-01-27 NOTE — PLAN OF CARE
"Problem: Depressive Symptoms  Goal: Depressive Symptoms  Signs and symptoms of listed problems will be absent or manageable.  Outcome: No Change    ADMISSION    33 year old male admitted on voluntary basis with increasing depression and SI. Plan to cut self or OD on meds. Reports increased sleep, anhedonia, social isolation, decreased functioning, hopelessness. Inpatient in November at which time he received ECT. Cooperative on arrival. Appears flat and depressed. Minimal verbal responses. Pt states he has been thinking about suicide recently and has thought of plans \"off and on\" but denied plan or intent currently. Agreed to inform staff if suicidal ideation worsens. Went straight to bed on arrival and stated \"I just want to sleep.\" Poor energy, psychomotor retardation. Profile not completed due to lack of energy, lack of motivation to participate. Safety search completed. VS stable except for elevated SBP. 15 min checks and suicide precautions initiated. Treatment plan initiated. Brief orientation to unit provided. See patient profile.      "
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
## 2018-01-27 NOTE — ED NOTES
"ED to Behavioral Floor Handoff    SITUATION  Abel Ward is a 33 year old male who speaks English and lives in a home with family members The patient arrived in the ED by private car from work   with a complaint of suicidal plans .The patient's current symptoms started/worsened for week(s) ago and during this time the symptoms have increased.   In the ED, pt was diagnosed with   Final diagnoses:   Severe episode of recurrent major depressive disorder, without psychotic features (H)   Suicidal ideation        Initial vitals were: BP: 141/84  Heart Rate: 68  Temp: 98  F (36.7  C)  Resp: 16  SpO2: 97 %   --------  Is the patient diabetic? No   If yes, last blood glucose? --     If yes, was this treated in the ED? --  --------  Is the patient inebriated (ETOH) No or Impaired on other substances? No  MSSA done? N/A  Last MSSA score: --    Were withdrawal symptoms treated? N/A  Does the patient have a seizure history? No. If yes, date of most recent seizure--  --------  Is the patient patient experiencing suicidal ideation? Yes, intent\" anything will do\"    Homicidal ideation? denies current or recent homicidal ideation or behaviors.    Self-injurious behavior/urges? denies current or recent self injurious behavior or ideation.  ------  Was pt aggressive in the ED No  Was a code called No  Is the pt now cooperative? Yes  -------  Meds given in ED: Medications - No data to display   Family present during ED course? Yes  Family currently present? No    BACKGROUND  Does the patient have a cognitive impairment or developmental disability? No  Allergies: No Known Allergies.   Social demographics are   Social History     Social History     Marital status:      Spouse name: N/A     Number of children: 0     Years of education: N/A     Occupational History      /  [employer]     Social History Main Topics     Smoking status: Never Smoker     Smokeless tobacco: Never Used     Alcohol use 0.0 " oz/week      Comment: occasionally, last use 2 days ago at a wedding     Drug use: Yes     Special: Marijuana      Comment: once a month or less, > 1 month     Sexual activity: Yes     Partners: Female     Birth control/ protection: Injection     Other Topics Concern      Service No     Blood Transfusions No     Caffeine Concern Yes     occas     Exercise No     Seat Belt Yes     Parent/Sibling W/ Cabg, Mi Or Angioplasty Before 65f 55m? No     Social History Narrative        ASSESSMENT  Labs results   Labs Ordered and Resulted from Time of ED Arrival Up to the Time of Departure from the ED   DRUG ABUSE SCREEN 6 CHEM DEP URINE ([facility]) - Abnormal; Notable for the following:        Result Value    Benzodiazepine Qual Urine Positive (*)     All other components within normal limits      Imaging Studies: No results found for this or any previous visit (from the past 24 hour(s)).   Most recent vital signs /84  Temp 98  F (36.7  C) (Oral)  Resp 16  SpO2 97%   Abnormal labs/tests/findings requiring intervention:---   Pain control: pt had none  Nausea control: pt had none    RECOMMENDATION  Are any infection precautions needed (MRSA, VRE, etc.)? No If yes, what infection? --  ---  Does the patient have mobility issues? independently. If yes, what device does the pt use? ---  ---  Is patient on 72 hour hold or commitment? No If on 72 hour hold, have hold and rights been given to patient? No  Are admitting orders written if after 10 p.m. ?No  Tasks needing to be completed:---     Kalyani hill-- [phone] [facility]   [phone] Psychiatric ED

## 2018-01-28 PROCEDURE — 12400007 ZZH R&B MH INTERMEDIATE UMMC

## 2018-01-28 PROCEDURE — 25000132 ZZH RX MED GY IP 250 OP 250 PS 637: Performed by: PSYCHIATRY & NEUROLOGY

## 2018-01-28 RX ADMIN — DESVENLAFAXINE SUCCINATE 100 MG: 50 TABLET, EXTENDED RELEASE ORAL at 08:19

## 2018-01-28 RX ADMIN — ALPRAZOLAM 1 MG: 0.25 TABLET ORAL at 17:42

## 2018-01-28 RX ADMIN — ARIPIPRAZOLE 10 MG: 10 TABLET ORAL at 08:19

## 2018-01-28 RX ADMIN — LOSARTAN POTASSIUM 50 MG: 50 TABLET ORAL at 08:19

## 2018-01-28 RX ADMIN — ALPRAZOLAM 1 MG: 0.25 TABLET ORAL at 08:19

## 2018-01-28 RX ADMIN — OMEPRAZOLE 20 MG: 20 CAPSULE, DELAYED RELEASE ORAL at 08:19

## 2018-01-28 ASSESSMENT — ACTIVITIES OF DAILY LIVING (ADL)
ORAL_HYGIENE: INDEPENDENT
LAUNDRY: WITH SUPERVISION
DRESS: SCRUBS (BEHAVIORAL HEALTH);INDEPENDENT
GROOMING: INDEPENDENT

## 2018-01-28 NOTE — PLAN OF CARE
Problem: Overarching Goals (Adult)  Goal: Adheres to Safety Considerations for Self and Others    Intervention: Develop/Maintain Individualized Safety Plan   01/28/18 1243   Develop/Maintain Individualized Safety Plan   Safety Measures belongings check completed;clinical history reviewed;environmental rounds completed;safety plan mutually developed;safety plan reviewed;safety rounds completed;self-directed behavior promoted;suicide assessment completed;suicide check-in completed   Homicide Risk   Feels Like Hurting Others no   Suicide Risk   Have you ever thought about hurting yourself now or in the past? yes       Goal: Optimized Coping Skills in Response to Life Stressors  Outcome: No Change   01/28/18 1243   OTHER   Optimized Coping Skills in Response to Life Stressors achieves outcome;making progress toward outcome     Intervention: Promote Effective Coping Strategies   01/28/18 1243   Coping/Psychosocial Interventions   Supportive Measures active listening utilized;decision-making supported;goal setting facilitated;journaling promoted;positive reinforcement provided;relaxation techniques promoted;self-care encouraged;self-reflection promoted;self-responsibility promoted       Pts BP was 219/118 sitting and standing was 147/68. Recheck sitting BP was 131/80. Pt asked if he could leave the hospital. Writer went over plan and pt stated he would stay and see the doctor on Monday. Pt understands he started Abilify last night and it was increased today.  Pt was given PRN xanax with some relief.  Pt has been in his room laying in bed except for meals, medications and to take a shower. Pt denies SI and SIB. Rates his depression 5/10 and his anxiety 6/10 shortly after taking the xanax.  Pt's appetite is good. No goal for today.  Pt is isolative, depressed and cooperative.

## 2018-01-28 NOTE — H&P
"[facility]   Psychiatric History & Physical  Admission date: 1/26/2018        Chief Complaint:   \"I just want medications to help with my depression\"         HPI:     Shakir is a 33 year old male with history of Major Depressive Disorder and Anxiety who was admitted on a voluntary basis for worsening depression. The patient had undergone ECT during his last hospitalization in 11/2017, and has more recently gone though 19 sessions of TMS (which he gets 4-5x a week), although has not found any significant relief from his depression. He continues to endorse low depressive moods, fatigue, ruminative thoughts, guilt, hopelessness, worthlessness, low energy, diminished appetite, poor libido, along with chronic SI. Denies any current suicidal thoughts. Patient mentions he's been on many antidepressants in the past since adolescence. Recently , he had his Pristiq increase to 100 mg last week. He has trouble getting though the day, and admits that his depressive moods lead to much dysfunction in his occupational, social, and interpersonal life.         Past Psychiatric History:   Past inpatient treatment: Recent [facility] admission from 11/6/17-11/10/17.   Current outpatient psychiatrist: Dr. Weber  Current outpatient therapist: None  Other (ACT team etc): None  Past suicide attempts: Denies   History of commitments: Denies   History of ECT: Recent ECT treatment in 11/2017. Also had 19 session of TMS.         Substance Use and History:   Occational cannabis and alcohol use. Utox was negative.         Past Medical History:   PAST MEDICAL HISTORY:   Past Medical History:   Diagnosis Date     Attention deficit disorder with hyperactivity(314.01) 2001     Esophageal reflux     Dr Starks- had EGD ~2202     Generalized anxiety disorder      Hypertension      Infectious mononucleosis 12/03     Low testosterone      Major depressive disorder, single episode in full remission (H)      Major " depressive disorder, single episode, mild (H)      MDD (major depressive disorder)      Mild intermittent asthma        PAST SURGICAL HISTORY:   Past Surgical History:   Procedure Laterality Date     OTHER SURGICAL HISTORY      wisdom teeth extraction             Family History:   FAMILY HISTORY:   Family History   Problem Relation Age of Onset     Hypertension Father      Depression Father      Lipids Father      Obesity Father      Thyroid Disease Father      DIABETES Paternal Grandfather      Hypertension Paternal Grandfather      HEART DISEASE Paternal Grandfather      Breast Cancer Paternal Grandmother      Hypertension Mother      Obesity Mother      Thyroid Disease Mother      Hypertension Maternal Grandfather            Social History:   SOCIAL HISTORY:   Social History   Substance Use Topics     Smoking status: Never Smoker     Smokeless tobacco: Never Used     Alcohol use 0.0 oz/week      Comment: occasionally, last use 2 days ago at a wedding            Physical ROS:   The patient endorsed fatigue. The remainder of 10-point review of systems was negative except as noted in HPI.         PTA Medications:     Prescriptions Prior to Admission   Medication Sig Dispense Refill Last Dose     Cholecalciferol (VITAMIN D3) 70971 UNITS TABS Take 10,000 Units by mouth daily (patient purchases over-the-counter) this dose was NOT prescribed by a doctor.   1/26/2018 at AM     ALPRAZolam (XANAX) 1 MG tablet Take 0.5-1 mg by mouth 2 times daily as needed for anxiety   1/26/2018 at AM     desvenlafaxine succinate (PRISTIQ) 100 MG 24 hr tablet Take 100 mg by mouth daily   1/26/2018 at AM     traZODone (DESYREL) 50 MG tablet Take  mg by mouth nightly as needed for sleep   Past Week at HS     trifluoperazine (STELAZINE) 1 MG tablet Take 1-2 mg by mouth 3 times daily as needed (anxiety)   Past Month at Unknown time     losartan (COZAAR) 50 MG tablet Take 1 tablet (50 mg) by mouth daily 90 tablet 1 1/26/2018 at AM      gabapentin (NEURONTIN) 100 MG capsule Take 100-200 mg by mouth nightly as needed (restless leg syndrome)    Past Month at prn     omeprazole (PRILOSEC) 20 MG capsule Take 1 capsule (20 mg) by mouth daily 90 capsule 3 1/26/2018 at AM          Allergies:   No Known Allergies       Labs:     Recent Results (from the past 48 hour(s))   Drug abuse screen 6 urine (chem dep)    Collection Time: 01/26/18 11:27 AM   Result Value Ref Range    Amphetamine Qual Urine Negative NEG^Negative    Barbiturates Qual Urine Negative NEG^Negative    Benzodiazepine Qual Urine Positive (A) NEG^Negative    Cannabinoids Qual Urine Negative NEG^Negative    Cocaine Qual Urine Negative NEG^Negative    Ethanol Qual Urine Negative NEG^Negative    Opiates Qualitative Urine Negative NEG^Negative   CBC with platelets differential    Collection Time: 01/27/18  7:20 AM   Result Value Ref Range    WBC 5.7 4.0 - 11.0 10e9/L    RBC Count 5.27 4.4 - 5.9 10e12/L    Hemoglobin 15.1 13.3 - 17.7 g/dL    Hematocrit 44.3 40.0 - 53.0 %    MCV 84 78 - 100 fl    MCH 28.7 26.5 - 33.0 pg    MCHC 34.1 31.5 - 36.5 g/dL    RDW 12.4 10.0 - 15.0 %    Platelet Count 246 150 - 450 10e9/L    Diff Method Automated Method     % Neutrophils 63.5 %    % Lymphocytes 25.0 %    % Monocytes 8.0 %    % Eosinophils 2.8 %    % Basophils 0.5 %    % Immature Granulocytes 0.2 %    Nucleated RBCs 0 0 /100    Absolute Neutrophil 3.6 1.6 - 8.3 10e9/L    Absolute Lymphocytes 1.4 0.8 - 5.3 10e9/L    Absolute Monocytes 0.5 0.0 - 1.3 10e9/L    Absolute Eosinophils 0.2 0.0 - 0.7 10e9/L    Absolute Basophils 0.0 0.0 - 0.2 10e9/L    Abs Immature Granulocytes 0.0 0 - 0.4 10e9/L    Absolute Nucleated RBC 0.0    Comprehensive metabolic panel    Collection Time: 01/27/18  7:20 AM   Result Value Ref Range    Sodium 140 133 - 144 mmol/L    Potassium 4.2 3.4 - 5.3 mmol/L    Chloride 105 94 - 109 mmol/L    Carbon Dioxide 28 20 - 32 mmol/L    Anion Gap 7 3 - 14 mmol/L    Glucose 98 70 - 99 mg/dL    Urea  Nitrogen 14 7 - 30 mg/dL    Creatinine 0.78 0.66 - 1.25 mg/dL    GFR Estimate >90 >60 mL/min/1.7m2    GFR Estimate If Black >90 >60 mL/min/1.7m2    Calcium 8.9 8.5 - 10.1 mg/dL    Bilirubin Total 0.7 0.2 - 1.3 mg/dL    Albumin 3.7 3.4 - 5.0 g/dL    Protein Total 7.8 6.8 - 8.8 g/dL    Alkaline Phosphatase 82 40 - 150 U/L    ALT 66 0 - 70 U/L    AST 30 0 - 45 U/L   TSH with free T4 reflex and/or T3 as indicated    Collection Time: 01/27/18  7:20 AM   Result Value Ref Range    TSH 0.62 0.40 - 4.00 mU/L   Vitamin D    Collection Time: 01/27/18  7:20 AM   Result Value Ref Range    Vitamin D Deficiency screening 31 20 - 75 ug/L          Physical and Psychiatric Examination:     /85  Pulse 66  Temp 97  F (36.1  C) (Oral)  Resp 16  SpO2 96%  Weight is 0 lbs 0 oz  There is no height or weight on file to calculate BMI.    Physical Exam:  I have reviewed the physical exam as documented in ED, and agree with findings and assessment and have no additional findings to add at this time.    Mental Status Exam:  Appearance: Overweight male, disheveled, malodorous. No acute distress.   Attitude:  cooperative and guarded  Eye Contact:  poor   Mood:  depressed  Affect:  intensity is blunted  Speech:  clear, coherent  Language: fluent and intact in English  Psychomotor, Gait, Musculoskeletal:  no evidence of tardive dyskinesia, dystonia, or tics  Throught Process:  logical and linear  Associations:  no loose associations  Thought Content:  no evidence of suicidal ideation or homicidal ideation, no evidence of psychotic thought, no auditory hallucinations present and no visual hallucinations present  Insight:  fair  Judgement:  fair  Oriented to:  time, person, and place  Attention Span and Concentration:  fair  Recent and Remote Memory:  fair  Fund of Knowledge:  appropriate         Admission Diagnoses:      Severe episode of recurrent major depressive disorder, without psychotic features (H)  Generalized Anxiety Disorder           Assessment & Plan:     Assessment:  Patient appears to have treatment resistent depression. Has undergone ECT and 19 sessions of TMS recently. Discussed adding on Abilify, which he was open to. Wants to continue TMS procedures.     Plan:  - Start Abilify 5 mg today, and increase to 10 mg tomorrow.   - Continue Pristiq 100 mg  - Continue Xanax 0.5-1 mg    Legal:  Voluntary     Disposition:  Likely back home in a few days.      Scotty Campo MD  [facility]

## 2018-01-28 NOTE — PROGRESS NOTES
Abel was isolative to room entirety of evening, sleeping on and off. He had a visit with his wife this evening and signed a ANNA for his wife afterwards. Pt appears depressed, sad, with a flat/blunted affect. Pt did not voice any concerns.       01/27/18 2200   Behavioral Health   Hallucinations denies / not responding to hallucinations   Thinking other (see comment)  (difficult to assess, sleeping most of shift)   Orientation person: oriented;place: oriented   Memory baseline memory   Insight poor   Judgement impaired   Eye Contact into space   Affect blunted, flat;sad   Mood depressed   Physical Appearance/Attire untidy   Hygiene neglected grooming - unclean body, hair, teeth   Suicidality other (see comments)  (denies)   1. Wish to be Dead No   2. Non-Specific Active Suicidal Thoughts  No   Self Injury other (see comment)  (denies)   Elopement (none stated or observed)   Activity isolative;withdrawn   Speech clear;coherent   Medication Sensitivity sedation;no stated side effects   Psychomotor / Gait balanced;steady   Psycho Education   Type of Intervention 1:1 intervention   Response participates, initiates socially appropriate   Hours 0.5   Activities of Daily Living   Hygiene/Grooming independent   Oral Hygiene independent   Dress independent   Room Organization independent   Behavioral Health Interventions   Depression maintain safety precautions;monitor need to revise level of observation;maintain safe secure environment;encourage participation / independence with adls   Social and Therapeutic Interventions (Depression) encourage socialization with peers;encourage effective boundaries with peers;encourage participation in therapeutic groups and milieu activities

## 2018-01-29 VITALS
OXYGEN SATURATION: 96 % | SYSTOLIC BLOOD PRESSURE: 136 MMHG | DIASTOLIC BLOOD PRESSURE: 85 MMHG | BODY MASS INDEX: 44.18 KG/M2 | WEIGHT: 315 LBS | HEART RATE: 66 BPM | RESPIRATION RATE: 16 BRPM | TEMPERATURE: 97.2 F

## 2018-01-29 PROCEDURE — 25000132 ZZH RX MED GY IP 250 OP 250 PS 637: Performed by: PSYCHIATRY & NEUROLOGY

## 2018-01-29 PROCEDURE — 99238 HOSP IP/OBS DSCHRG MGMT 30/<: CPT | Performed by: PSYCHIATRY & NEUROLOGY

## 2018-01-29 RX ORDER — ARIPIPRAZOLE 10 MG/1
10 TABLET ORAL DAILY
Qty: 30 TABLET | Refills: 1 | Status: ON HOLD | OUTPATIENT
Start: 2018-01-30 | End: 2018-07-03

## 2018-01-29 RX ADMIN — HYDROXYZINE HYDROCHLORIDE 50 MG: 25 TABLET ORAL at 13:46

## 2018-01-29 RX ADMIN — ALPRAZOLAM 1 MG: 0.25 TABLET ORAL at 08:45

## 2018-01-29 RX ADMIN — ALPRAZOLAM 1 MG: 0.25 TABLET ORAL at 17:29

## 2018-01-29 RX ADMIN — ARIPIPRAZOLE 10 MG: 10 TABLET ORAL at 08:46

## 2018-01-29 RX ADMIN — LOSARTAN POTASSIUM 50 MG: 50 TABLET ORAL at 08:46

## 2018-01-29 RX ADMIN — OMEPRAZOLE 20 MG: 20 CAPSULE, DELAYED RELEASE ORAL at 08:46

## 2018-01-29 RX ADMIN — DESVENLAFAXINE SUCCINATE 100 MG: 50 TABLET, EXTENDED RELEASE ORAL at 08:46

## 2018-01-29 ASSESSMENT — ACTIVITIES OF DAILY LIVING (ADL)
ORAL_HYGIENE: INDEPENDENT
HYGIENE/GROOMING: INDEPENDENT
DRESS: SCRUBS (BEHAVIORAL HEALTH)
LAUNDRY: UNABLE TO COMPLETE

## 2018-01-29 NOTE — PROGRESS NOTES
Pt has not attended scheduled occupational therapy sessions. Encourage attendance and participation.

## 2018-01-29 NOTE — PROGRESS NOTES
Patient is being discharged home today.  He has ongoing appointments with Dr. Weber and will continue his TMS treatment with him on Tuesday, 1/30/18.

## 2018-01-29 NOTE — DISCHARGE INSTRUCTIONS
Behavioral Discharge Planning and Instructions      Summary:  You were admitted on 1/26/2018  due to Depression.  You were treated by Dr Scotty Campo MD and discharged on 1/29/18 from [facility]      Principal Diagnosis: Major Depressive Disorder, Recurrent, Severe      Health Care Follow-up Appointments:   Dr. Froy Weber (You plan to continue TMS on Tuesday, 1/30/18)  [facility]  [address] [place] [address]  [phone]  FAX: [phone]    Attend all scheduled appointments with your outpatient providers. Call at least 24 hours in advance if you need to reschedule an appointment to ensure continued access to your outpatient providers.   Major Treatments, Procedures and Findings:  You were provided with: a psychiatric assessment, assessed for medical stability, medication evaluation and/or management, group therapy and milieu management    Symptoms to Report: losing more sleep, mood getting worse or thoughts of suicide    Early warning signs can include: increased depression or anxiety sleep disturbances increased thoughts or behaviors of suicide or self-harm     Safety and Wellness:  Take all medicines as directed.  Make no changes unless your doctor suggests them.      Follow treatment recommendations.  Refrain from alcohol and non-prescribed drugs.  If there is a concern for safety, call 911.    Resources:   Crisis Intervention: 825.133.2813 or [phone] (TTY: 551.113.3427).  Call anytime for help.  [employer] [place] [employer]ness (www.mn.anayeli.org): 556.524.7043 or 578-133-4791.    The treatment team has appreciated the opportunity to work with you.     If you have any questions or concerns our unit number is [phone].

## 2018-01-29 NOTE — PROGRESS NOTES
01/29/18 1438   Behavioral Health   Thinking distractable;poor concentration   Orientation person: oriented;place: oriented;date: oriented   Memory baseline memory   Insight poor   Judgement impaired   Eye Contact at floor   Affect blunted, flat   Mood depressed;anxious   Physical Appearance/Attire attire appropriate to age and situation   Hygiene neglected grooming - unclean body, hair, teeth   Suicidality other (see comments)  (Pt. denies )   1. Wish to be Dead No   2. Non-Specific Active Suicidal Thoughts  No   Activities of Daily Living   Hygiene/Grooming independent   Oral Hygiene independent   Dress scrubs (behavioral health)   Laundry unable to complete   Room Organization independent   Behavioral Health Interventions   Depression maintain safety precautions   Social and Therapeutic Interventions (Depression) encourage socialization with peers     Pt. Was isolative and withdrawn Pt. Did not attend groups. PT. Spent most of the shift in his room sleeping. PT. Only came out for meals. PT. Says that he feels anxious about starting work. PT. Denies SI and SIB.

## 2018-01-29 NOTE — PLAN OF CARE
"Problem: Depressive Symptoms  Goal: Depressive Symptoms  Signs and symptoms of listed problems will be absent or manageable.   Outcome: Improving    RN ASSESSMENT    Pt continues to c/o anxiety. Currently rates severity at 6/10. Requested & received Xanax 1 mg po PRN. Pt showered today. Eye contact improved. Speech more spontaneous, less guarded. Energy appears improved. Visible in lounge watching TV for first time today. Reports depression is \"better\" today. Continues to deny SI/SIB currently. Appetite improved. Sleep good, probably excessive, but improving. Hygiene good. Med-compliant. Pleasant & cooperative. Continue with current treatment plan and recommendations. Continue to monitor and reassess symptoms. Monitor response to medications. Monitor progress towards treatment goals. Encourage groups and participation.      "

## 2018-01-30 ENCOUNTER — TELEPHONE (OUTPATIENT)
Dept: FAMILY MEDICINE | Facility: CLINIC | Age: 33
End: 2018-01-30

## 2018-01-30 NOTE — TELEPHONE ENCOUNTER
Attempt # 1    Called # [phone] (home)     Left a non detailed VM     Carmen Campos RN, BSN  [facility]

## 2018-01-30 NOTE — TELEPHONE ENCOUNTER
Patient discharged from [facility] for inpatient hospital stay on 1/29 for severe episode of recurrent major depressive disorder without psychotic features.    Please contact patient to follow up; no appointment scheduled at this time.    ER / IP:  0/0    Care Coordination:  garcia Macdonald

## 2018-01-31 NOTE — TELEPHONE ENCOUNTER
Attempt # 2    Called # [phone] (home)     Left a non detailed VM     Carmen Campos RN, BSN  [facility]

## 2018-02-01 NOTE — TELEPHONE ENCOUNTER
ED / Discharge Outreach Protocol    Patient Contact    Attempt # 3    Was call answered?  No.  Left message on voicemail with information to call me back.    Estelle Friend RN  [facility]

## 2018-02-02 NOTE — DISCHARGE SUMMARY
[facility], [place]   Psychiatric Discharge Summary      Abel Ward MRN# [id number]   Age: 33 year old YOB: 1985     Date of Admission:  1/26/2018  Date of Discharge:  01/29/18  Admitting Physician:  Scotty Campo,     Discharge Physician:  Scotty Campo         Summary/Hospital Course/Disposition:   Reason for Hospitalization: Shakir is a 33 year old male with history of Major Depressive Disorder and Anxiety who was admitted on a voluntary basis for worsening depression. The patient had undergone ECT during his last hospitalization in 11/2017, and has more recently gone though 19 sessions of TMS (which he gets 4-5x a week), although has not found any significant relief from his depression. He continues to endorse low depressive moods, fatigue, ruminative thoughts, guilt, hopelessness, worthlessness, low energy, diminished appetite, poor libido, along with chronic SI.    Hospital Course: Patient was started on Abilify 5 mg, and quicky titrated up to 10 mg, which he tolerated. His Pristiq was continued, along with his Xanax. He endorsed subjective benefit in his depressive moods and ruminative thoughts enough to be discharged. He denied any residual suicidal ideation, intent or plan. He was future oriented, and plans to continuing TMS procedures with Dr. Weber on an outpatient basis.          DIagnoses:   Severe episode of recurrent major depressive disorder, without psychotic features (H)  Generalized Anxiety Disorder          Labs:   No results found for this or any previous visit (from the past 24 hour(s)).         Consults:   None            Discharge Medications:        Review of your medicines      START taking       Dose / Directions    ARIPiprazole 10 MG tablet   Commonly known as:  ABILIFY   Used for:  Severe episode of recurrent major depressive disorder, without psychotic features (H)        Dose:  10 mg   Take 1 tablet (10 mg) by mouth daily    Quantity:  30 tablet   Refills:  1         CONTINUE these medicines which have NOT CHANGED       Dose / Directions    ALPRAZolam 1 MG tablet   Commonly known as:  XANAX        Dose:  0.5-1 mg   Take 0.5-1 mg by mouth 2 times daily as needed for anxiety   Refills:  0       gabapentin 100 MG capsule   Commonly known as:  NEURONTIN   Used for:  Restless legs syndrome        Dose:  100-200 mg   Take 100-200 mg by mouth nightly as needed (restless leg syndrome)   Refills:  0       losartan 50 MG tablet   Commonly known as:  COZAAR   Used for:  Essential hypertension with goal blood pressure less than 140/90        Dose:  50 mg   Take 1 tablet (50 mg) by mouth daily   Quantity:  90 tablet   Refills:  1       omeprazole 20 MG CR capsule   Commonly known as:  priLOSEC   Used for:  Esophageal reflux        Dose:  20 mg   Take 1 capsule (20 mg) by mouth daily   Quantity:  90 capsule   Refills:  3       PRISTIQ 100 MG 24 hr tablet   Generic drug:  desvenlafaxine succinate        Dose:  100 mg   Take 100 mg by mouth daily   Refills:  0       traZODone 50 MG tablet   Commonly known as:  DESYREL        Dose:   mg   Take  mg by mouth nightly as needed for sleep   Refills:  0       trifluoperazine 1 MG tablet   Commonly known as:  STELAZINE        Dose:  1-2 mg   Take 1-2 mg by mouth 3 times daily as needed (anxiety)   Refills:  0       Vitamin D3 73236 UNITS Tabs        Dose:  75322 Units   Take 10,000 Units by mouth daily (patient purchases over-the-counter) this dose was NOT prescribed by a doctor.   Refills:  0            Where to get your medicines      These medications were sent to [facility] [place], [place] - [address]  [address] [address]     Phone:  [phone]      ARIPiprazole 10 MG tablet                  Mental Status Examination:   Appearance: Overweight male, disheveled, malodorous. No acute distress.   Attitude:  cooperative and guarded  Eye Contact:   poor   Mood:  depressed  Affect:  intensity is blunted  Speech:  clear, coherent  Language: fluent and intact in English  Psychomotor, Gait, Musculoskeletal:  no evidence of tardive dyskinesia, dystonia, or tics  Throught Process:  logical and linear  Associations:  no loose associations  Thought Content:  no evidence of suicidal ideation or homicidal ideation, no evidence of psychotic thought, no auditory hallucinations present and no visual hallucinations present  Insight:  fair  Judgement:  fair  Oriented to:  time, person, and place  Attention Span and Concentration:  fair  Recent and Remote Memory:  fair  Fund of Knowledge:  appropriate         Discharge Plan:   No discharge procedures on file.    - Patient was discharged back home     Scotty Campo MD  [facility]

## 2018-03-09 ENCOUNTER — OFFICE VISIT (OUTPATIENT)
Dept: SLEEP MEDICINE | Facility: CLINIC | Age: 33
End: 2018-03-09
Payer: COMMERCIAL

## 2018-03-09 VITALS
HEIGHT: 71 IN | BODY MASS INDEX: 44.1 KG/M2 | RESPIRATION RATE: 18 BRPM | HEART RATE: 114 BPM | OXYGEN SATURATION: 97 % | SYSTOLIC BLOOD PRESSURE: 128 MMHG | DIASTOLIC BLOOD PRESSURE: 83 MMHG | WEIGHT: 315 LBS

## 2018-03-09 DIAGNOSIS — E61.1 IRON DEFICIENCY: ICD-10-CM

## 2018-03-09 DIAGNOSIS — G47.33 OSA (OBSTRUCTIVE SLEEP APNEA): Primary | ICD-10-CM

## 2018-03-09 DIAGNOSIS — R09.02 HYPOXEMIA: ICD-10-CM

## 2018-03-09 PROCEDURE — 99205 OFFICE O/P NEW HI 60 MIN: CPT | Performed by: INTERNAL MEDICINE

## 2018-03-09 NOTE — PROGRESS NOTES
[facility] [place]  Outpatient Sleep Medicine Consultation  March 9, 2018      Name: Abel Ward MRN# [id number]   Age: 33 year old YOB: 1985     Date of Consultation: March 9, 2018  Consultation is requested by: No referring provider defined for this encounter.  Primary care provider: David Denney           Reason for Sleep Consult:     Abel Ward is a 33 year old male with complaints of restless sleep, daytime sleepiness, loud snoring developing over past 10 years with 90#          Assessment and Plan:     Summary Sleep Diagnoses:      Obstructive sleep apnea    Restless legs syndrome on gabapentin    Psychophysiological insomnia     Hypertension    GERD    Depression/anxiety on alprazolam in the morning, lithium at night    Summary Recommendations:      Sleep study with CO2 monitoring and CPAP titration    Ferritin  Discuss your omeprazole, trazodone with your doctor    Summary Counseling:  See instructions    Counseling included a comprehensive review of diagnostic and therapeutic strategies as well as risks of inadequate therapy.  Educational materials provided in instructions.             History of Present Illness:     Abel Ward is a 33 year old male with chronic depression and anxiety who is having increasing daytime sleepiness and nonrestorative sleep over the past 10 years associated with increased snoring and a 90 pound weight gain  To his current peak lifetime weight and BMI of 46.  He snores 7 nights per week and has pathologic daytime sleepiness with an [place] Sleepiness Scale of 11 but no near miss car accidents or drowsy driving.  He does not have features of hypocretin deficiency such as cataplexy or hypnagogic hallucinations and is only had sleep paralysis on one occasion.      GERD-currently controlled with proton pump inhibitor however he does use this agent continuously for several years        SLEEP-WAKE SCHEDULE:     Weekday and  weekend bedtime 8 PM  Awakens 5:30 AM on weekdays without an alarm clock and 7 AM on weekends  30 minute sleep latency without nocturnal awakenings  Estimated sleep time 8-1/2-10 hours    2 naps per week each lasting 2 hours    Poor sleep habits with use of television [because of active mind] phones, computer in bed, reading in bed and eating in bed. Prominent symptoms suggesting restless leg syndrome with prior diagnosis    Loud snoring 7 nights per week without choking or gasping    SCALES       SLEEP APNEA: [place] score 4       SLEEPINESS: [place] sleepiness scale (ESS):  11   Drowsy driving/near accidents over past 3 months/year: 0       SLEEP COMPLAINTS:  Cardio-respiratory    Snoring- 0/week  Dyspnea- no  Morning headaches or confusion-no  Coexisting Lung disease: no    Coexisting Heart disease: no    Does patient have a bed partner: no  Has bed partner been sleeping separately because of snoring:  no            RLS Screen: When you try to relax in the evening or sleep at  night, do you ever have unpleasant, restless feelings in your  legs that can be relieved by walking or movement? Yes, urge to move 2-3x/week on gabapentin    Periodic limb movement: no    Narcolepsy:       denies sudden urges of sleep attacks     denies cataplexy     denies sleep paralysis      denies hallucinations     Sleep Behaviors:     denies leg symptoms/movements     denies motor restlessness     denies night terrors     denies bruxism     denies automatic behaviors    Other subjective complaints:     denies anxiety or rumination      denies pain and discomfort at  night     denies waking up with heart pounding or racing     denies GERD or aspiration on omeprazole         Parasomnia:   NREM - denies recurrent persistent confusional arousal, night eating, sleep walking or sleep terrors   REM  - denies dream enactment; injuries              Therapy & Medications:     Sleep Treatments  02 supplement at night    Medications  Current  Outpatient Prescriptions   Medication Sig     LITHIUM CARBONATE PO      ARIPiprazole (ABILIFY) 10 MG tablet Take 1 tablet (10 mg) by mouth daily     Cholecalciferol (VITAMIN D3) 76295 UNITS TABS Take 10,000 Units by mouth daily (patient purchases over-the-counter) this dose was NOT prescribed by a doctor.     ALPRAZolam (XANAX) 1 MG tablet Take 0.5-1 mg by mouth 2 times daily as needed for anxiety     desvenlafaxine succinate (PRISTIQ) 100 MG 24 hr tablet Take 100 mg by mouth daily     traZODone (DESYREL) 50 MG tablet Take  mg by mouth nightly as needed for sleep     trifluoperazine (STELAZINE) 1 MG tablet Take 1-2 mg by mouth 3 times daily as needed (anxiety)     losartan (COZAAR) 50 MG tablet Take 1 tablet (50 mg) by mouth daily     gabapentin (NEURONTIN) 100 MG capsule Take 100-200 mg by mouth nightly as needed (restless leg syndrome)      omeprazole (PRILOSEC) 20 MG capsule Take 1 capsule (20 mg) by mouth daily     No current facility-administered medications for this visit.         No Known Allergies         Past Medical History:      Past Medical History:   Diagnosis Date     Attention deficit disorder with hyperactivity(314.01) 2001     Esophageal reflux     Dr Starks- had EGD ~2202     Generalized anxiety disorder      Hypertension      Infectious mononucleosis 12/03     Low testosterone      Major depressive disorder, single episode in full remission (H)      Major depressive disorder, single episode, mild (H)      MDD (major depressive disorder)      Mild intermittent asthma              Past Surgical History:     previous upper airway surgery   Past Surgical History:   Procedure Laterality Date     OTHER SURGICAL HISTORY      wisdom teeth extraction            Social History:     Social History   Substance Use Topics     Smoking status: Never Smoker     Smokeless tobacco: Never Used     Alcohol use 0.0 oz/week      Comment: occasionally, last use 2 days ago at a wedding         Chemical History:      Tobacco: no     Uses 2 energy drink in morning    EtOH: occasionally               Family History:     Family History   Problem Relation Age of Onset     Hypertension Father      Depression Father      Lipids Father      Obesity Father      Thyroid Disease Father      DIABETES Paternal Grandfather      Hypertension Paternal Grandfather      HEART DISEASE Paternal Grandfather      Breast Cancer Paternal Grandmother      Hypertension Mother      Obesity Mother      Thyroid Disease Mother      Hypertension Maternal Grandfather         Sleep Family Hx:        RLS- no   MENDEZ - father           Review of Systems:     A complete 10 point review of systems was negative other than HPI or as commented below:         CONSTITUTIONAL: NEGATIVE for weight gain/loss, fever, chills, sweats or night sweats, drug allergies.  EYES:  NEGATIVE for changes in vision, blind spots, double vision.  ENT:  NEGATIVE for ear pain, sore throat, sinus pain, post-nasal drip, runny nose, bloody nose  CARDIAC:  NEGATIVE for fast heartbeats or fluttering in chest, chest pain or pressure, breathlessness when lying flat, swollen legs or swollen feet.  NEUROLOGIC:  NEGATIVE headaches, weakness or numbness in the arms or legs.  DERMATOLOGIC:  NEGATIVE for rashes, new moles or change in mole(s)  PULMONARY:  NEGATIVE SOB at rest, SOB with activity, dry cough, productive cough, coughing up blood, wheezing or whistling when breathing.    GASTROINTESTINAL:  NEGATIVE for nausea or vomitting, loose or watery stools, fat or grease in stools, constipation, abdominal pain, bowel movements black in color or blood noted.  GENITOURINARY:  NEGATIVE for pain during urination, blood in urine, urinating more frequently than usual,  MUSCULOSKELETAL: NEGATIVE for muscle pain, bone or joint pain, swollen joints.  ENDOCRINE: NEGATIVE for increased thirst or urination, diabetes.  LYMPHATIC: NEGATIVE for swollen lymph nodes, lumps or bumps in the breasts or nipple  "discharge.         Physical Examination:   /83  Pulse 114  Resp 18  Ht 1.803 m (5' 11\")  Wt (!) 149.7 kg (330 lb)  SpO2 97%  BMI 46.03 kg/m2   Neck Circumference: 19 inches    Constitutional: . Awake, alert, cooperative, dressed casually, good eye contact, comfortably sitting in a chair, in no apparent distress  Mood: euthymic; affect congruent with full range and intensity.  Attention/Concentration:  Normal   Eyes: No icterus.  ENT: Mallampati Class: 2.            Data: All pertinent previous laboratory data reviewed     No results found for: PH, PHARTERIAL, PO2, [id number], SAT, PCO2, HCO3, BASEEXCESS, LOIDA, BEB  Lab Results   Component Value Date    TSH 0.62 01/27/2018    TSH 0.64 11/07/2017     Lab Results   Component Value Date    GLC 98 01/27/2018    GLC 98 11/07/2017     Lab Results   Component Value Date    HGB 15.1 01/27/2018    HGB 14.7 11/07/2017     Lab Results   Component Value Date    BUN 14 01/27/2018    BUN 16 11/07/2017    CR 0.78 01/27/2018    CR 0.91 11/07/2017           Copy to: David Denney MD 3/9/2018       Total time spent with patient: 60 min >50% counseling            "

## 2018-03-09 NOTE — NURSING NOTE
"Chief Complaint   Patient presents with     Consult     Snores per wife, Takes trazadone, Sleep study at age 14 but doesn't remember where.  No Cpap       Initial /83  Pulse 114  Resp 18  Ht 1.803 m (5' 11\")  Wt (!) 149.7 kg (330 lb)  SpO2 97%  BMI 46.03 kg/m2 Estimated body mass index is 46.03 kg/(m^2) as calculated from the following:    Height as of this encounter: 1.803 m (5' 11\").    Weight as of this encounter: 149.7 kg (330 lb).  Medication Reconciliation: complete       Neck 48cm  19in  Ess 11    Geraldine Johnson LPN/CMA  "

## 2018-03-09 NOTE — MR AVS SNAPSHOT
"              After Visit Summary   3/9/2018    Abel Ward    MRN: [id number]           Patient Information     Date Of Birth          1985        Visit Information        Provider Department      3/9/2018 1:00 PM Virgil Nazario MD [facility]        Today's Diagnoses     MENDEZ (obstructive sleep apnea)    -  1    Iron deficiency        Hypoxemia          Care Instructions    MY TREATMENT INFORMATION FOR SLEEP APNEA-  Abel Ward    DOCTOR : VIRGIL NAZARIO  Summary Sleep Diagnoses:      Obstructive sleep apnea    Restless legs syndrome on gabapentin    Psychophysiological insomnia     Hypertension    GERD    Depression/anxiety on alprazolam in the morning, lithium at night    Summary Recommendations:      Sleep study with CO2 monitoring and CPAP titration    Ferritin  Discuss your omeprazole, trazodone with your doctor    Am I having a sleep study at a sleep center?  Make sure you have an appointment for the study before you leave!    Am I having a home sleep study?  Watch this video:  https://www.UCAN.com/watch?v=CteI_GhyP9g&[email or web address]    Frequently asked questions:  1. What is Obstructive Sleep Apnea (MENDEZ)? MENDEZ is the most common type of sleep apnea. Apnea means, \"without breath.\"  Apnea is most often caused by narrowing or collapse of the upper airway as muscles relax during sleep.   Almost everyone has occasional apneas. Most people with sleep apnea have had brief interruptions at night frequently for many years.  The severity of sleep apnea is related to how frequent and severe the events are.   2. What are the consequences of MENDEZ? Symptoms include: feeling sleepy during the day, snoring loudly, gasping or stopping of breathing, trouble sleeping, and occasionally morning headaches or heartburn at night.  Sleepiness can be serious and even increase the risk of falling asleep while driving. Other health consequences may include " development of high blood pressure and other cardiovascular disease in persons who are susceptible. Untreated MENDEZ  can contribute to heart disease, stroke and diabetes.   3. What are the treatment options? In most situations, sleep apnea is a lifelong disease that must be managed with daily therapy. Medications are not effective for sleep apnea and surgery is generally not considered until other therapies have been tried. Your treatment is your choice . Continuous Positive Airway (CPAP) works right away and is the therapy that is effective in nearly everyone. An oral device to hold your jaw forward is usually the next most reliable option. Other options include postioning devices (to keep you off your back), weight loss, and surgery including a tongue pacing device. There is more detail about some of these options below.    Important tips for using CPAP and similar devices   Know your equipment:  CPAP is continuous positive airway pressure that prevents obstructive sleep apnea by keeping the throat from collapsing while you are sleeping. In most cases, the device is  smart  and can slowly self-adjusts if your throat collapses and keeps a record every day of how well you are treated-this information is available to you and your care team.  BPAP is bilevel positive airway pressure that keeps your throat open and also assists each breath with a pressure boost to maintain adequate breathing.  Special kinds of BPAP are used in patients who have inadequate breathing from lung or heart disease. In most cases, the device is  smart  and can slowly self-adjusts to assist breathing. Like CPAP, the device keeps a record of how well you are treated.  Your mask is your connection to the device. You get to choose what feels most comfortable and the staff will help to make sure if fits. Here: are some examples of the different masks that are available:       Key points to remember on your journey with sleep apnea:  1. Sleep study.   PAP devices often need to be adjusted during a sleep study to show that they are effective and adjusted right.  2. Good tips to remember: Try wearing just the mask during a quiet time during the day so your body adapts to wearing it. A humidifier is recommended for comfort in most cases to prevent drying of your nose and throat. Allergy medication from your provider may help you if you are having nasal congestion.  3. Getting settled-in. It takes more than one night for most of us to get used to wearing a mask. Try wearing just the mask during a quiet time during the day so your body adapts to wearing it. A humidifier is recommended for comfort in most cases. Our team will work with you carefully on the first day and will be in contact within 4 days and again at 2 and 4 weeks for advice and remote device adjustments. Your therapy is evaluated by the device each day.   4. Use it every night. The more you are able to sleep naturally for 7-8 hours, the more likely you will have good sleep and to prevent health risks or symptoms from sleep apnea. Even if you use it 4 hours it helps. Occasionally all of us are unable to use a medical therapy, in sleep apnea, it is not dangerous to miss one night.   5. Communicate. Call our skilled team on the number provided on the first day if your visit for problems that make it difficult to wear the device. Over 2 out of 3 patients can learn to wear the device long-term with help from our team. Remember to call our team or your sleep providers if you are unable to wear the device as we may have other solutions for those who cannot adapt to mask CPAP therapy. It is recommended that you sleep your sleep provider within the first 3 months and yearly after that if you are not having problems.   Take care of your equipment. Make sure you clean your mask and tubing using directions every day and that your filter and mask are replaced as recommended or if they are not working.     BESIDES  CPAP, WHAT OTHER THERAPIES ARE THERE?    Positioning Device  Positioning devices are generally used when sleep apnea is mild and only occurs on your back.This example shows a pillow that straps around the waist. It may be appropriate for those whose sleep study shows milder sleep apnea that occurs primarily when lying flat on one's back. Preliminary studies have shown benefit but effectiveness at home may need to be verified by a home sleep test. These devices are generally not covered by medical insurance.  Examples of devices that maintain sleeping on the back to prevent snoring and mild sleep apnea.    Belt type body positioner  Http://[place].Peach/    Electronic reminder  Http://nightshifttherapy.com/  Http://www.[place].Peach.au/    Oral Appliance  What is oral appliance therapy?  An oral appliance device fits on your teeth at night like a retainer used after having braces. The device is made by a specialized dentist and requires several visits over 1-2 months before a manufactured device is made to fit your teeth and is adjusted to prevent your sleep apnea. Once an oral device is working properly, snoring should be improved. A home sleep test may be recommended at that time if to determine whether the sleep apnea is adequately treated.       Some things to remember:  -Oral devices are often, but not always, covered by your medical insurance. Be sure to check with your insurance provider.   -If you are referred for oral therapy, you will be given a list of specialized dentists to consider or you may choose to visit the Web site of the American Academy of Dental Sleep Medicine  -Oral devices are less likely to work if you have severe sleep apnea or are extremely overweight.     More detailed information  An oral appliance is a small acrylic device that fits over the upper and lower teeth  (similar to a retainer or a mouth guard). This device slightly moves jaw forward, which moves the base of the tongue forward,  opens the airway, improves breathing for effective treat snoring and obstructive sleep apnea in perhaps 7 out of 10 people .  The best working devices are custom-made by a dental device  after a mold is made of the teeth 1, 2, 3.  When is an oral appliance indicated?  Oral appliance therapy is recommended as a first-line treatment for patients with primary snoring, mild sleep apnea, and for patients with moderate sleep apnea who prefer appliance therapy to use of CPAP4, 5. Severity of sleep apnea is determined by sleep testing and is based on the number of respiratory events per hour of sleep.   How successful is oral appliance therapy?  The success rate of oral appliance therapy in patients with mild sleep apnea is 75-80% while in patients with moderate sleep apnea it is 50-70%. The chance of success in patients with severe sleep apnea is 40-50%. The research also shows that oral appliances have a beneficial effect on the cardiovascular health of MENDEZ patients at the same magnitude as CPAP therapy7.  Oral appliances should be a second-line treatment in cases of severe sleep apnea, but if not completely successful then a combination therapy utilizing CPAP plus oral appliance therapy may be effective. Oral appliances tend to be effective in a broad range of patients although studies show that the patients who have the highest success are females, younger patients, those with milder disease, and less severe obesity. 3, 6.   Finding a dentist that practices dental sleep medicine  Specific training is available through the American Academy of Dental Sleep Medicine for dentists interested in working in the field of sleep. To find a dentist who is educated in the field of sleep and the use of oral appliances, near you, visit the Web site of the American Academy of Dental Sleep Medicine.    References  1. Chel et al. Objectively measured vs self-reported compliance during oral appliance therapy for  sleep-disordered breathing. Chest 2013; 144(5): 5420-5506.  2. Aym, et al. Objective measurement of compliance during oral appliance therapy for sleep-disordered breathing. Thorax 2013; 68(1): 91-96.  3. Daniel et al. Mandibular advancement devices in 620 men and women with MENDEZ and snoring: tolerability and predictors of treatment success. Chest 2004; 125: 6972-8396.  4. Reed et al. Oral appliances for snoring and MENDEZ: a review. Sleep 2006; 29: 244-262.  5. Dulce et al. Oral appliance treatment for MENDEZ: an update. J Clin Sleep Med 2014; 10(2): 215-227.  6. Gema et al. Predictors of OSAH treatment outcome. J Dent Res 2007; 86: 4849-7781.      Weight Loss:    Weight loss is a long-term strategy that may improve sleep apnea in some patients.    Weight management is a personal decision and the decision should be based on your interest and the potential benefits.  If you are interested in exploring weight loss strategies, the following discussion covers the impact on weight loss on sleep apnea and the approaches that may be successful.    Being overweight does not necessarily mean you will have health consequences.  Those who have BMI over 35 or over 27 with existing medical conditions carries greater risk.   Weight loss decreases severity of sleep apnea in most people with obesity. For those with mild obesity who have developed snoring with weight gain, even 15-30 pound weight loss can improve and occasionally eliminate sleep apnea.  Structured and life-long dietary and health habits are necessary to lose weight and keep healthier weight levels.     Though there may be significant health benefits from weight loss, long-term weight loss is very difficult to achieve- studies show success with dietary management in less than 10% of people. In addition, substantial weight loss may require years of dietary control and may be difficult if patients have severe obesity. In these cases, surgical  management may be considered.  Finally, older individuals who have tolerated obesity without health complications may be less likely to benefit from weight loss strategies.        Your BMI is Body mass index is 46.03 kg/(m^2).  Weight management is a personal decision.  If you are interested in exploring weight loss strategies, the following discussion covers the approaches that may be successful. Body mass index (BMI) is one way to tell whether you are at a healthy weight, overweight, or obese. It measures your weight in relation to your height.  A BMI of 18.5 to 24.9 is in the healthy range. A person with a BMI of 25 to 29.9 is considered overweight, and someone with a BMI of 30 or greater is considered obese. More than two-thirds of American adults are considered overweight or obese.  Being overweight or obese increases the risk for further weight gain. Excess weight may lead to heart disease and diabetes.  Creating and following plans for healthy eating and physical activity may help you improve your health.  Weight control is part of healthy lifestyle and includes exercise, emotional health, and healthy eating habits. Careful eating habits lifelong are the mainstay of weight control. Though there are significant health benefits from weight loss, long-term weight loss with diet alone may be very difficult to achieve- studies show long-term success with dietary management in less than 10% of people. Attaining a healthy weight may be especially difficult to achieve in those with severe obesity. In some cases, medications, devices and surgical management might be considered.  What can you do?  If you are overweight or obese and are interested in methods for weight loss, you should discuss this with your provider.     Consider reducing daily calorie intake by 500 calories.     Keep a food journal.     Avoiding skipping meals, consider cutting portions instead.    Diet combined with exercise helps maintain muscle  while optimizing fat loss. Strength training is particularly important for building and maintaining muscle mass. Exercise helps reduce stress, increase energy, and improves fitness. Increasing exercise without diet control, however, may not burn enough calories to loose weight.       Start walking three days a week 10-20 minutes at a time    Work towards walking thirty minutes five days a week     Eventually, increase the speed of your walking for 1-2 minutes at time    In addition, we recommend that you review healthy lifestyles and methods for weight loss available through the National Institutes of Health patient information sites:  http://win.niddk.nih.gov/publications/index.htm    And look into health and wellness programs that may be available through your health insurance provider, employer, local community center, or angel club.    Weight management plan: Patient was referred to their PCP to discuss a diet and exercise plan.      Surgery:    Surgery for obstructive sleep apnea is considered generally only when other therapies fail to work. Surgery may be discussed with you if you are having a difficult time tolerating CPAP and or when there is an abnormal structure that requires surgical correction.  Nose and throat surgeries often enlarge the airway to prevent collapse.  Most of these surgeries create pain for 1-2 weeks and up to half of the most common surgeries are not effective throughout life.  You should carefully discuss the benefits and drawbacks to surgery with your sleep provider and surgeon to determine if it is the best solution for you.   More information  Surgery for MENDEZ is directed at areas that are responsible for narrowing or complete obstruction of the airway during sleep.  There are a wide range of procedures available to enlarge and/or stabilize the airway to prevent blockage of breathing in the three major areas where it can occur: the palate, tongue, and nasal regions.  Successful  surgical treatment depends on the accurate identification of the factors responsible for obstructive sleep apnea in each person.  A personalized approach is required because there is no single treatment that works well for everyone.  Because of anatomic variation, consultation with an examination by a sleep surgeon is a critical first step in determining what surgical options are best for each patient.  In some cases, examination during sedation may be recommended in order to guide the selection of procedures.  Patients will be counseled about risks and benefits as well as the typical recovery course after surgery. Surgery is typically not a cure for a person s MENDEZ.  However, surgery will often significantly improve one s MENDEZ severity (termed  success rate ).  Even in the absence of a cure, surgery will decrease the cardiovascular risk associated with OSA7; improve overall quality of life8 (sleepiness, functionality, sleep quality, etc).      Palate Procedures:  Patients with MENDEZ often have narrowing of their airway in the region of their tonsils and uvula.  The goals of palate procedures are to widen the airway in this region as well as to help the tissues resist collapse.  Modern palate procedure techniques focus on tissue conservation and soft tissue rearrangement, rather than tissue removal.  Often the uvula is preserved in this procedure. Residual sleep apnea is common in patient after pharyngoplasty with an average reduction in sleep apnea events of 33%2.      Tongue Procedures:  ExamWhile patients are awake, the muscles that surround the throat are active and keep this region open for breathing. These muscles relax during sleep, allowing the tongue and other structures to collapse and block breathing.  There are several different tongue procedures available.  Selection of a tongue base procedure depends on characteristics seen on physical exam.  Generally, procedures are aimed at removing bulky tissues in  this area or preventing the back of the tongue from falling back during sleep.  Success rates for tongue surgery range from 50-62%3.    Hypoglossal Nerve Stimulation:  Hypoglossal nerve stimulation has recently received approval from the [place] Food and Drug Administration for the treatment of obstructive sleep apnea.  This is based on research showing that the system was safe and effective in treating sleep apnea6.  Results showed that the median AHI score decreased 68%, from 29.3 to 9.0. This therapy uses an implant system that senses breathing patterns and delivers mild stimulation to airway muscles, which keeps the airway open during sleep.  The system consists of three fully implanted components: a small generator (similar in size to a pacemaker), a breathing sensor, and a stimulation lead.  Using a small handheld remote, a patient turns the therapy on before bed and off upon awakening.    Candidates for this device must be greater than 22 years of age, have moderate to severe MENDEZ (AHI between 20-65), BMI less than 32, have tried CPAP/oral appliance without success, and have appropriate upper airway anatomy (determined by a sleep endoscopy performed by Dr. Braxton).    Hypoglossal Nerve Stimulation Pathway:    The sleep surgeon s office will work with the patient through the insurance prior-authorization process (including communications and appeals).    Nasal Procedures:  Nasal obstruction can interfere with nasal breathing during the day and night.  Studies have shown that relief of nasal obstruction can improve the ability of some patients to tolerate positive airway pressure therapy for obstructive sleep apnea1.  Treatment options include medications such as nasal saline, topical corticosteroid and antihistamine sprays, and oral medications such as antihistamines or decongestants. Non-surgical treatments can include external nasal dilators for selected patients. If these are not successful by  themselves, surgery can improve the nasal airway either alone or in combination with these other options.      Combination Procedures:  Combination of surgical procedures and other treatments may be recommended, particularly if patients have more than one area of narrowing or persistent positional disease.  The success rate of combination surgery ranges from 66-80%2,3.    References  1. Livan WEBSTER. The Role of the Nose in Snoring and Obstructive Sleep Apnoea: An Update.  Eur Arch Otorhinolaryngol. 2011; 268: 1365-73.  2.  Dania SM; Neeraj JA; Josefina JR; Pallanch JF; Blayne MB; Vini SG; Miguel BOBBY. Surgical modifications of the upper airway for obstructive sleep apnea in adults: a systematic review and meta-analysis. SLEEP 2010;33(10):7295-5568. Carmella MCADAMS. Hypopharyngeal surgery in obstructive sleep apnea: an evidence-based medicine review.  Arch Otolaryngol Head Neck Surg. 2006 Feb;132(2):206-13.  3. Valeriano YH1, Anand Y, Chester WARREN. The efficacy of anatomically based multilevel surgery for obstructive sleep apnea. Otolaryngol Head Neck Surg. 2003 Oct;129(4):327-35.  4. Carmella MCADAMS, Goldberg A. Hypopharyngeal Surgery in Obstructive Sleep Apnea: An Evidence-Based Medicine Review. Arch Otolaryngol Head Neck Surg. 2006 Feb;132(2):206-13.  5. Renee PJ et al. Upper-Airway Stimulation for Obstructive Sleep Apnea.  N Engl J Med. 2014 Jan 9;370(2):139-49.  6. Flora Y et al. Increased Incidence of Cardiovascular Disease in Middle-aged Men with Obstructive Sleep Apnea. Am J Respir Crit Care Med; 2002 166: 159-165  7. Mariscal EM et al. Studying Life Effects and Effectiveness of Palatopharyngoplasty (SLEEP) study: Subjective Outcomes of Isolated Uvulopalatopharyngoplasty. Otolaryngol Head Neck Surg. 2011; 144: 623-631.                Your BMI is Body mass index is 46.03 kg/(m^2).  Weight management is a personal decision.  If you are interested in exploring weight loss strategies, the following discussion covers the approaches  that may be successful. Body mass index (BMI) is one way to tell whether you are at a healthy weight, overweight, or obese. It measures your weight in relation to your height.  A BMI of 18.5 to 24.9 is in the healthy range. A person with a BMI of 25 to 29.9 is considered overweight, and someone with a BMI of 30 or greater is considered obese. More than two-thirds of American adults are considered overweight or obese.  Being overweight or obese increases the risk for further weight gain. Excess weight may lead to heart disease and diabetes.  Creating and following plans for healthy eating and physical activity may help you improve your health.  Weight control is part of healthy lifestyle and includes exercise, emotional health, and healthy eating habits. Careful eating habits lifelong are the mainstay of weight control. Though there are significant health benefits from weight loss, long-term weight loss with diet alone may be very difficult to achieve- studies show long-term success with dietary management in less than 10% of people. Attaining a healthy weight may be especially difficult to achieve in those with severe obesity. In some cases, medications, devices and surgical management might be considered.  What can you do?  If you are overweight or obese and are interested in methods for weight loss, you should discuss this with your provider.     Consider reducing daily calorie intake by 500 calories.     Keep a food journal.     Avoiding skipping meals, consider cutting portions instead.    Diet combined with exercise helps maintain muscle while optimizing fat loss. Strength training is particularly important for building and maintaining muscle mass. Exercise helps reduce stress, increase energy, and improves fitness. Increasing exercise without diet control, however, may not burn enough calories to loose weight.       Start walking three days a week 10-20 minutes at a time    Work towards walking thirty minutes  five days a week     Eventually, increase the speed of your walking for 1-2 minutes at time    In addition, we recommend that you review healthy lifestyles and methods for weight loss available through the National Institutes of Health patient information sites:  http://win.niddk.nih.gov/publications/index.htm    And look into health and wellness programs that may be available through your health insurance provider, employer, local community center, or angel club.    Weight management plan: Patient was referred to their PCP to discuss a diet and exercise plan.     Your blood pressure was checked while you were in clinic today.  Please read the guidelines below about what these numbers mean and what you should do about them.  Your systolic blood pressure is the top number.  This is the pressure when the heart is pumping.  Your diastolic blood pressure is the bottom number.  This is the pressure in between beats.  If your systolic blood pressure is less than 120 and your diastolic blood pressure is less than 80, then your blood pressure is normal. There is nothing more that you need to do about it  If your systolic blood pressure is 120-139 or your diastolic blood pressure is 80-89, your blood pressure may be higher than it should be.  You should have your blood pressure re-checked within a year by a primary care provider.  If your systolic blood pressure is 140 or greater or your diastolic blood pressure is 90 or greater, you may have high blood pressure.  High blood pressure is treatable, but if left untreated over time it can put you at risk for heart attack, stroke, or kidney failure.  You should have your blood pressure re-checked by a primary care provider within the next four weeks.              Follow-ups after your visit        Follow-up notes from your care team     Return if symptoms worsen or fail to improve.      Your next 10 appointments already scheduled     Mar 20, 2018  8:30 PM CDT   PSG Split with  BED 4 SH SLEEP   [facility] ([facility])    [address]  [place] [id number] [phone]            Apr 06, 2018  1:00 PM CDT   Return Sleep Patient with Virgil Nazario MD   [facility] ([facility]    [address]  [place] [facility] [place] [id number] [phone]              Future tests that were ordered for you today     Open Future Orders        Priority Expected Expires Ordered    Comprehensive Sleep Study Routine  9/5/2018 3/9/2018    Ferritin Routine  5/8/2018 3/9/2018    ABG-Blood Gas Arterial ([place] / [place]) Routine  5/8/2018 3/9/2018            Who to contact     If you have questions or need follow up information about today's clinic visit or your schedule please contact [facility] directly at [phone].  Normal or non-critical lab and imaging results will be communicated to you by [employer], letter or phone within 4 business days after the clinic has received the results. If you do not hear from us within 7 days, please contact the clinic through [employer] or phone. If you have a critical or abnormal lab result, we will notify you by phone as soon as possible.  Submit refill requests through [employer] or call your pharmacy and they will forward the refill request to us. Please allow 3 business days for your refill to be completed.          Additional Information About Your Visit        [employer] Information     [employer] gives you secure access to your electronic health record. If you see a primary care provider, you can also send messages to your care team and make appointments. If you have questions, please call your primary care clinic.  If you do not have a primary care provider, please call [phone] and they will assist you.        Care EveryWhere ID     This is your Care EveryWhere ID. This could be used by other organizations to access your  "[place] medical records  [id number]        Your Vitals Were     Pulse Respirations Height Pulse Oximetry BMI (Body Mass Index)       114 18 1.803 m (5' 11\") 97% 46.03 kg/m2        Blood Pressure from Last 3 Encounters:   03/09/18 128/83   01/26/18 136/85   12/22/17 142/90    Weight from Last 3 Encounters:   03/09/18 (!) 149.7 kg (330 lb)   01/28/18 (!) 143.7 kg (316 lb 12.8 oz)   12/22/17 (!) 146.5 kg (323 lb)               Primary Care Provider Office Phone # Fax #    David Denney [phone] [address] [facility] [address]        Equal Access to Services     [facility] YINA : Hadii cate power hadasho Soomaali, waaxda luqadaha, qaybta kaalmada adeegyada, lauren baird . So [facility] [phone].    ATENCIÓN: Si habla español, tiene a bagley disposición servicios gratuitos de asistencia lingüística. Anais al [phone].    We comply with applicable federal civil rights laws and [place] laws. We do not discriminate on the basis of race, color, national origin, age, disability, sex, sexual orientation, or gender identity.            Thank you!     Thank you for choosing [facility]  for your care. Our goal is always to provide you with excellent care. Hearing back from our patients is one way we can continue to improve our services. Please take a few minutes to complete the written survey that you may receive in the mail after your visit with us. Thank you!             Your Updated Medication List - Protect others around you: Learn how to safely use, store and throw away your medicines at www.disposemymeds.org.          This list is accurate as of 3/9/18  2:24 PM.  Always use your most recent med list.                   Brand Name Dispense Instructions for use Diagnosis    ALPRAZolam 1 MG tablet    XANAX     Take 0.5-1 mg by mouth 2 times daily as needed for anxiety        ARIPiprazole 10 MG tablet    ABILIFY    30 tablet    Take 1 tablet " (10 mg) by mouth daily    Severe episode of recurrent major depressive disorder, without psychotic features (H)       gabapentin 100 MG capsule    NEURONTIN     Take 100-200 mg by mouth nightly as needed (restless leg syndrome)    Restless legs syndrome       LITHIUM CARBONATE PO           losartan 50 MG tablet    COZAAR    90 tablet    Take 1 tablet (50 mg) by mouth daily    Essential hypertension with goal blood pressure less than 140/90       omeprazole 20 MG CR capsule    priLOSEC    90 capsule    Take 1 capsule (20 mg) by mouth daily    Esophageal reflux       PRISTIQ 100 MG 24 hr tablet   Generic drug:  desvenlafaxine succinate      Take 100 mg by mouth daily        traZODone 50 MG tablet    DESYREL     Take  mg by mouth nightly as needed for sleep        trifluoperazine 1 MG tablet    STELAZINE     Take 1-2 mg by mouth 3 times daily as needed (anxiety)        Vitamin D3 89458 UNITS Tabs      Take 10,000 Units by mouth daily (patient purchases over-the-counter) this dose was NOT prescribed by a doctor.

## 2018-03-09 NOTE — PATIENT INSTRUCTIONS
"MY TREATMENT INFORMATION FOR SLEEP APNEA-  Abel Ward    DOCTOR : ROSALINDA TURPIN  Summary Sleep Diagnoses:      Obstructive sleep apnea    Restless legs syndrome on gabapentin    Psychophysiological insomnia     Hypertension    GERD    Depression/anxiety on alprazolam in the morning, lithium at night    Summary Recommendations:      Sleep study with CO2 monitoring and CPAP titration    Ferritin  Discuss your omeprazole, trazodone with your doctor    Am I having a sleep study at a sleep center?  Make sure you have an appointment for the study before you leave!    Am I having a home sleep study?  Watch this video:  https://www.[employer].com/watch?v=CteI_GhyP9g&[email or web address]    Frequently asked questions:  1. What is Obstructive Sleep Apnea (MENDEZ)? MENDEZ is the most common type of sleep apnea. Apnea means, \"without breath.\"  Apnea is most often caused by narrowing or collapse of the upper airway as muscles relax during sleep.   Almost everyone has occasional apneas. Most people with sleep apnea have had brief interruptions at night frequently for many years.  The severity of sleep apnea is related to how frequent and severe the events are.   2. What are the consequences of MENDEZ? Symptoms include: feeling sleepy during the day, snoring loudly, gasping or stopping of breathing, trouble sleeping, and occasionally morning headaches or heartburn at night.  Sleepiness can be serious and even increase the risk of falling asleep while driving. Other health consequences may include development of high blood pressure and other cardiovascular disease in persons who are susceptible. Untreated MENDEZ  can contribute to heart disease, stroke and diabetes.   3. What are the treatment options? In most situations, sleep apnea is a lifelong disease that must be managed with daily therapy. Medications are not effective for sleep apnea and surgery is generally not considered until other therapies have been " tried. Your treatment is your choice . Continuous Positive Airway (CPAP) works right away and is the therapy that is effective in nearly everyone. An oral device to hold your jaw forward is usually the next most reliable option. Other options include postioning devices (to keep you off your back), weight loss, and surgery including a tongue pacing device. There is more detail about some of these options below.    Important tips for using CPAP and similar devices   Know your equipment:  CPAP is continuous positive airway pressure that prevents obstructive sleep apnea by keeping the throat from collapsing while you are sleeping. In most cases, the device is  smart  and can slowly self-adjusts if your throat collapses and keeps a record every day of how well you are treated-this information is available to you and your care team.  BPAP is bilevel positive airway pressure that keeps your throat open and also assists each breath with a pressure boost to maintain adequate breathing.  Special kinds of BPAP are used in patients who have inadequate breathing from lung or heart disease. In most cases, the device is  smart  and can slowly self-adjusts to assist breathing. Like CPAP, the device keeps a record of how well you are treated.  Your mask is your connection to the device. You get to choose what feels most comfortable and the staff will help to make sure if fits. Here: are some examples of the different masks that are available:       Key points to remember on your journey with sleep apnea:  1. Sleep study.  PAP devices often need to be adjusted during a sleep study to show that they are effective and adjusted right.  2. Good tips to remember: Try wearing just the mask during a quiet time during the day so your body adapts to wearing it. A humidifier is recommended for comfort in most cases to prevent drying of your nose and throat. Allergy medication from your provider may help you if you are having nasal  congestion.  3. Getting settled-in. It takes more than one night for most of us to get used to wearing a mask. Try wearing just the mask during a quiet time during the day so your body adapts to wearing it. A humidifier is recommended for comfort in most cases. Our team will work with you carefully on the first day and will be in contact within 4 days and again at 2 and 4 weeks for advice and remote device adjustments. Your therapy is evaluated by the device each day.   4. Use it every night. The more you are able to sleep naturally for 7-8 hours, the more likely you will have good sleep and to prevent health risks or symptoms from sleep apnea. Even if you use it 4 hours it helps. Occasionally all of us are unable to use a medical therapy, in sleep apnea, it is not dangerous to miss one night.   5. Communicate. Call our skilled team on the number provided on the first day if your visit for problems that make it difficult to wear the device. Over 2 out of 3 patients can learn to wear the device long-term with help from our team. Remember to call our team or your sleep providers if you are unable to wear the device as we may have other solutions for those who cannot adapt to mask CPAP therapy. It is recommended that you sleep your sleep provider within the first 3 months and yearly after that if you are not having problems.   Take care of your equipment. Make sure you clean your mask and tubing using directions every day and that your filter and mask are replaced as recommended or if they are not working.     BESIDES CPAP, WHAT OTHER THERAPIES ARE THERE?    Positioning Device  Positioning devices are generally used when sleep apnea is mild and only occurs on your back.This example shows a pillow that straps around the waist. It may be appropriate for those whose sleep study shows milder sleep apnea that occurs primarily when lying flat on one's back. Preliminary studies have shown benefit but effectiveness at home may  need to be verified by a home sleep test. These devices are generally not covered by medical insurance.  Examples of devices that maintain sleeping on the back to prevent snoring and mild sleep apnea.    Belt type body positioner  Http://[employer].Datometry/    Electronic reminder  Http://nightshifttherapy.com/  Http://www.[employer]rics.Datometry.au/    Oral Appliance  What is oral appliance therapy?  An oral appliance device fits on your teeth at night like a retainer used after having braces. The device is made by a specialized dentist and requires several visits over 1-2 months before a manufactured device is made to fit your teeth and is adjusted to prevent your sleep apnea. Once an oral device is working properly, snoring should be improved. A home sleep test may be recommended at that time if to determine whether the sleep apnea is adequately treated.       Some things to remember:  -Oral devices are often, but not always, covered by your medical insurance. Be sure to check with your insurance provider.   -If you are referred for oral therapy, you will be given a list of specialized dentists to consider or you may choose to visit the Web site of the American Academy of Dental Sleep Medicine  -Oral devices are less likely to work if you have severe sleep apnea or are extremely overweight.     More detailed information  An oral appliance is a small acrylic device that fits over the upper and lower teeth  (similar to a retainer or a mouth guard). This device slightly moves jaw forward, which moves the base of the tongue forward, opens the airway, improves breathing for effective treat snoring and obstructive sleep apnea in perhaps 7 out of 10 people .  The best working devices are custom-made by a dental device  after a mold is made of the teeth 1, 2, 3.  When is an oral appliance indicated?  Oral appliance therapy is recommended as a first-line treatment for patients with primary snoring, mild sleep apnea, and for  patients with moderate sleep apnea who prefer appliance therapy to use of CPAP4, 5. Severity of sleep apnea is determined by sleep testing and is based on the number of respiratory events per hour of sleep.   How successful is oral appliance therapy?  The success rate of oral appliance therapy in patients with mild sleep apnea is 75-80% while in patients with moderate sleep apnea it is 50-70%. The chance of success in patients with severe sleep apnea is 40-50%. The research also shows that oral appliances have a beneficial effect on the cardiovascular health of MENDEZ patients at the same magnitude as CPAP therapy7.  Oral appliances should be a second-line treatment in cases of severe sleep apnea, but if not completely successful then a combination therapy utilizing CPAP plus oral appliance therapy may be effective. Oral appliances tend to be effective in a broad range of patients although studies show that the patients who have the highest success are females, younger patients, those with milder disease, and less severe obesity. 3, 6.   Finding a dentist that practices dental sleep medicine  Specific training is available through the American Academy of Dental Sleep Medicine for dentists interested in working in the field of sleep. To find a dentist who is educated in the field of sleep and the use of oral appliances, near you, visit the Web site of the American Academy of Dental Sleep Medicine.    References  1. hCel, et al. Objectively measured vs self-reported compliance during oral appliance therapy for sleep-disordered breathing. Chest 2013; 144(5): 3581-7375.  2. Amy, et al. Objective measurement of compliance during oral appliance therapy for sleep-disordered breathing. Thorax 2013; 68(1): 91-96.  3. Daniel et al. Mandibular advancement devices in 620 men and women with MENDEZ and snoring: tolerability and predictors of treatment success. Chest 2004; 125: 9223-3118.  4. Reed et al. Oral  appliances for snoring and MENDEZ: a review. Sleep 2006; 29: 244-262.  5. Dulce et al. Oral appliance treatment for MENDEZ: an update. J Clin Sleep Med 2014; 10(2): 215-227.  6. timothy Ribeiro al. Predictors of OSAH treatment outcome. J Dent Res 2007; 86: 8546-1007.      Weight Loss:    Weight loss is a long-term strategy that may improve sleep apnea in some patients.    Weight management is a personal decision and the decision should be based on your interest and the potential benefits.  If you are interested in exploring weight loss strategies, the following discussion covers the impact on weight loss on sleep apnea and the approaches that may be successful.    Being overweight does not necessarily mean you will have health consequences.  Those who have BMI over 35 or over 27 with existing medical conditions carries greater risk.   Weight loss decreases severity of sleep apnea in most people with obesity. For those with mild obesity who have developed snoring with weight gain, even 15-30 pound weight loss can improve and occasionally eliminate sleep apnea.  Structured and life-long dietary and health habits are necessary to lose weight and keep healthier weight levels.     Though there may be significant health benefits from weight loss, long-term weight loss is very difficult to achieve- studies show success with dietary management in less than 10% of people. In addition, substantial weight loss may require years of dietary control and may be difficult if patients have severe obesity. In these cases, surgical management may be considered.  Finally, older individuals who have tolerated obesity without health complications may be less likely to benefit from weight loss strategies.        Your BMI is Body mass index is 46.03 kg/(m^2).  Weight management is a personal decision.  If you are interested in exploring weight loss strategies, the following discussion covers the approaches that may be successful. Body mass  index (BMI) is one way to tell whether you are at a healthy weight, overweight, or obese. It measures your weight in relation to your height.  A BMI of 18.5 to 24.9 is in the healthy range. A person with a BMI of 25 to 29.9 is considered overweight, and someone with a BMI of 30 or greater is considered obese. More than two-thirds of American adults are considered overweight or obese.  Being overweight or obese increases the risk for further weight gain. Excess weight may lead to heart disease and diabetes.  Creating and following plans for healthy eating and physical activity may help you improve your health.  Weight control is part of healthy lifestyle and includes exercise, emotional health, and healthy eating habits. Careful eating habits lifelong are the mainstay of weight control. Though there are significant health benefits from weight loss, long-term weight loss with diet alone may be very difficult to achieve- studies show long-term success with dietary management in less than 10% of people. Attaining a healthy weight may be especially difficult to achieve in those with severe obesity. In some cases, medications, devices and surgical management might be considered.  What can you do?  If you are overweight or obese and are interested in methods for weight loss, you should discuss this with your provider.     Consider reducing daily calorie intake by 500 calories.     Keep a food journal.     Avoiding skipping meals, consider cutting portions instead.    Diet combined with exercise helps maintain muscle while optimizing fat loss. Strength training is particularly important for building and maintaining muscle mass. Exercise helps reduce stress, increase energy, and improves fitness. Increasing exercise without diet control, however, may not burn enough calories to loose weight.       Start walking three days a week 10-20 minutes at a time    Work towards walking thirty minutes five days a week     Eventually,  increase the speed of your walking for 1-2 minutes at time    In addition, we recommend that you review healthy lifestyles and methods for weight loss available through the National Institutes of Health patient information sites:  http://win.niddk.nih.gov/publications/index.htm    And look into health and wellness programs that may be available through your health insurance provider, employer, local community center, or angel club.    Weight management plan: Patient was referred to their PCP to discuss a diet and exercise plan.      Surgery:    Surgery for obstructive sleep apnea is considered generally only when other therapies fail to work. Surgery may be discussed with you if you are having a difficult time tolerating CPAP and or when there is an abnormal structure that requires surgical correction.  Nose and throat surgeries often enlarge the airway to prevent collapse.  Most of these surgeries create pain for 1-2 weeks and up to half of the most common surgeries are not effective throughout life.  You should carefully discuss the benefits and drawbacks to surgery with your sleep provider and surgeon to determine if it is the best solution for you.   More information  Surgery for MENDEZ is directed at areas that are responsible for narrowing or complete obstruction of the airway during sleep.  There are a wide range of procedures available to enlarge and/or stabilize the airway to prevent blockage of breathing in the three major areas where it can occur: the palate, tongue, and nasal regions.  Successful surgical treatment depends on the accurate identification of the factors responsible for obstructive sleep apnea in each person.  A personalized approach is required because there is no single treatment that works well for everyone.  Because of anatomic variation, consultation with an examination by a sleep surgeon is a critical first step in determining what surgical options are best for each patient.  In some  cases, examination during sedation may be recommended in order to guide the selection of procedures.  Patients will be counseled about risks and benefits as well as the typical recovery course after surgery. Surgery is typically not a cure for a person s MENDEZ.  However, surgery will often significantly improve one s MENDEZ severity (termed  success rate ).  Even in the absence of a cure, surgery will decrease the cardiovascular risk associated with OSA7; improve overall quality of life8 (sleepiness, functionality, sleep quality, etc).      Palate Procedures:  Patients with MENDEZ often have narrowing of their airway in the region of their tonsils and uvula.  The goals of palate procedures are to widen the airway in this region as well as to help the tissues resist collapse.  Modern palate procedure techniques focus on tissue conservation and soft tissue rearrangement, rather than tissue removal.  Often the uvula is preserved in this procedure. Residual sleep apnea is common in patient after pharyngoplasty with an average reduction in sleep apnea events of 33%2.      Tongue Procedures:  ExamWhile patients are awake, the muscles that surround the throat are active and keep this region open for breathing. These muscles relax during sleep, allowing the tongue and other structures to collapse and block breathing.  There are several different tongue procedures available.  Selection of a tongue base procedure depends on characteristics seen on physical exam.  Generally, procedures are aimed at removing bulky tissues in this area or preventing the back of the tongue from falling back during sleep.  Success rates for tongue surgery range from 50-62%3.    Hypoglossal Nerve Stimulation:  Hypoglossal nerve stimulation has recently received approval from the [place] Food and Drug Administration for the treatment of obstructive sleep apnea.  This is based on research showing that the system was safe and effective in treating sleep  apnea6.  Results showed that the median AHI score decreased 68%, from 29.3 to 9.0. This therapy uses an implant system that senses breathing patterns and delivers mild stimulation to airway muscles, which keeps the airway open during sleep.  The system consists of three fully implanted components: a small generator (similar in size to a pacemaker), a breathing sensor, and a stimulation lead.  Using a small handheld remote, a patient turns the therapy on before bed and off upon awakening.    Candidates for this device must be greater than 22 years of age, have moderate to severe MENDEZ (AHI between 20-65), BMI less than 32, have tried CPAP/oral appliance without success, and have appropriate upper airway anatomy (determined by a sleep endoscopy performed by Dr. Braxton).    Hypoglossal Nerve Stimulation Pathway:    The sleep surgeon s office will work with the patient through the insurance prior-authorization process (including communications and appeals).    Nasal Procedures:  Nasal obstruction can interfere with nasal breathing during the day and night.  Studies have shown that relief of nasal obstruction can improve the ability of some patients to tolerate positive airway pressure therapy for obstructive sleep apnea1.  Treatment options include medications such as nasal saline, topical corticosteroid and antihistamine sprays, and oral medications such as antihistamines or decongestants. Non-surgical treatments can include external nasal dilators for selected patients. If these are not successful by themselves, surgery can improve the nasal airway either alone or in combination with these other options.      Combination Procedures:  Combination of surgical procedures and other treatments may be recommended, particularly if patients have more than one area of narrowing or persistent positional disease.  The success rate of combination surgery ranges from 66-80%2,3.    References  1. Livan WEBSTER. The Role of the Nose in  Snoring and Obstructive Sleep Apnoea: An Update.  Eur Arch Otorhinolaryngol. 2011; 268: 1365-73.  2.  Dania SM; Neeraj JA; Josefina JR; Pallanch JF; Blayne MB; Vini SG; Miguel BOBBY. Surgical modifications of the upper airway for obstructive sleep apnea in adults: a systematic review and meta-analysis. SLEEP 2010;33(10):7060-8671. Carmella MCADAMS. Hypopharyngeal surgery in obstructive sleep apnea: an evidence-based medicine review.  Arch Otolaryngol Head Neck Surg. 2006 Feb;132(2):206-13.  3. Valeriano YH1, Anand Y, Chester WARREN. The efficacy of anatomically based multilevel surgery for obstructive sleep apnea. Otolaryngol Head Neck Surg. 2003 Oct;129(4):327-35.  4. Kezirian E, Goldberg A. Hypopharyngeal Surgery in Obstructive Sleep Apnea: An Evidence-Based Medicine Review. Arch Otolaryngol Head Neck Surg. 2006 Feb;132(2):206-13.  5. Renee AMARO et al. Upper-Airway Stimulation for Obstructive Sleep Apnea.  N Engl J Med. 2014 Jan 9;370(2):139-49.  6. Flora Y et al. Increased Incidence of Cardiovascular Disease in Middle-aged Men with Obstructive Sleep Apnea. Am J Respir Crit Care Med; 2002 166: 159-165  7. Mariscal EM et al. Studying Life Effects and Effectiveness of Palatopharyngoplasty (SLEEP) study: Subjective Outcomes of Isolated Uvulopalatopharyngoplasty. Otolaryngol Head Neck Surg. 2011; 144: 623-631.                Your BMI is Body mass index is 46.03 kg/(m^2).  Weight management is a personal decision.  If you are interested in exploring weight loss strategies, the following discussion covers the approaches that may be successful. Body mass index (BMI) is one way to tell whether you are at a healthy weight, overweight, or obese. It measures your weight in relation to your height.  A BMI of 18.5 to 24.9 is in the healthy range. A person with a BMI of 25 to 29.9 is considered overweight, and someone with a BMI of 30 or greater is considered obese. More than two-thirds of American adults are considered overweight or obese.  Being  overweight or obese increases the risk for further weight gain. Excess weight may lead to heart disease and diabetes.  Creating and following plans for healthy eating and physical activity may help you improve your health.  Weight control is part of healthy lifestyle and includes exercise, emotional health, and healthy eating habits. Careful eating habits lifelong are the mainstay of weight control. Though there are significant health benefits from weight loss, long-term weight loss with diet alone may be very difficult to achieve- studies show long-term success with dietary management in less than 10% of people. Attaining a healthy weight may be especially difficult to achieve in those with severe obesity. In some cases, medications, devices and surgical management might be considered.  What can you do?  If you are overweight or obese and are interested in methods for weight loss, you should discuss this with your provider.     Consider reducing daily calorie intake by 500 calories.     Keep a food journal.     Avoiding skipping meals, consider cutting portions instead.    Diet combined with exercise helps maintain muscle while optimizing fat loss. Strength training is particularly important for building and maintaining muscle mass. Exercise helps reduce stress, increase energy, and improves fitness. Increasing exercise without diet control, however, may not burn enough calories to loose weight.       Start walking three days a week 10-20 minutes at a time    Work towards walking thirty minutes five days a week     Eventually, increase the speed of your walking for 1-2 minutes at time    In addition, we recommend that you review healthy lifestyles and methods for weight loss available through the National Institutes of Health patient information sites:  http://win.niddk.nih.gov/publications/index.htm    And look into health and wellness programs that may be available through your health insurance provider,  employer, local community center, or angel club.    Weight management plan: Patient was referred to their PCP to discuss a diet and exercise plan.     Your blood pressure was checked while you were in clinic today.  Please read the guidelines below about what these numbers mean and what you should do about them.  Your systolic blood pressure is the top number.  This is the pressure when the heart is pumping.  Your diastolic blood pressure is the bottom number.  This is the pressure in between beats.  If your systolic blood pressure is less than 120 and your diastolic blood pressure is less than 80, then your blood pressure is normal. There is nothing more that you need to do about it  If your systolic blood pressure is 120-139 or your diastolic blood pressure is 80-89, your blood pressure may be higher than it should be.  You should have your blood pressure re-checked within a year by a primary care provider.  If your systolic blood pressure is 140 or greater or your diastolic blood pressure is 90 or greater, you may have high blood pressure.  High blood pressure is treatable, but if left untreated over time it can put you at risk for heart attack, stroke, or kidney failure.  You should have your blood pressure re-checked by a primary care provider within the next four weeks.

## 2018-03-20 ENCOUNTER — THERAPY VISIT (OUTPATIENT)
Dept: SLEEP MEDICINE | Facility: CLINIC | Age: 33
End: 2018-03-20
Payer: COMMERCIAL

## 2018-03-20 DIAGNOSIS — R09.02 HYPOXEMIA: ICD-10-CM

## 2018-03-20 DIAGNOSIS — G47.33 OSA (OBSTRUCTIVE SLEEP APNEA): ICD-10-CM

## 2018-03-20 PROCEDURE — 95810 POLYSOM 6/> YRS 4/> PARAM: CPT | Performed by: INTERNAL MEDICINE

## 2018-03-20 NOTE — MR AVS SNAPSHOT
After Visit Summary   3/20/2018    Abel Ward    MRN: [id number]           Patient Information     Date Of Birth          1985        Visit Information        Provider Department      3/20/2018 8:30 PM BED 3  SLEEP [facility]        Today's Diagnoses     MENDEZ (obstructive sleep apnea)        Hypoxemia           Follow-ups after your visit        Your next 10 appointments already scheduled     Apr 02, 2018  1:00 PM CDT   Evaluation with Jessa Zhou PA-C   [facility])    [address]  [place] [address]   [phone]            Apr 02, 2018  2:00 PM CDT   Evaluation with  Wl Diet 1, RD   [facility] ([facility])    [address]  [place] [address] [phone]            Apr 06, 2018  1:00 PM CDT   Return Sleep Patient with Virgil Nazario MD   [facility]    [address]  [place] [place] [facility] [id number] [phone]              Who to contact     If you have questions or need follow up information about today's clinic visit or your schedule please contact [facility] directly at [phone].  Normal or non-critical lab and imaging results will be communicated to you by MyChart, letter or phone within 4 business days after the clinic has received the results. If you do not hear from us within 7 days, please contact the clinic through MyChart or phone. If you have a critical or abnormal lab result, we will notify you by phone as soon as possible.  Submit refill requests through [employer] or call your pharmacy and they will forward the refill request to us. Please allow 3 business days for your refill to be completed.          Additional Information About Your Visit        MyChart  Information     [employer] gives you secure access to your electronic health record. If you see a primary care provider, you can also send messages to your care team and make appointments. If you have questions, please call your primary care clinic.  If you do not have a primary care provider, please call [phone] and they will assist you.        Care EveryWhere ID     This is your Care EveryWhere ID. This could be used by other organizations to access your [place] medical records  [id number]         Blood Pressure from Last 3 Encounters:   03/09/18 128/83   01/26/18 136/85   12/22/17 142/90    Weight from Last 3 Encounters:   03/09/18 (!) 149.7 kg (330 lb)   01/28/18 (!) 143.7 kg (316 lb 12.8 oz)   12/22/17 (!) 146.5 kg (323 lb)              We Performed the Following     Comprehensive Sleep Study        Primary Care Provider Office Phone # Fax #    David Denney [phone]       [facility] [facility] [address]        Equal Access to Services     [facility]: Hadii aad ku hadasho Soomaali, waaxda luqadaha, qaybta kaalmada adebijuyadustin, lauren baird . So [facility] [phone].    ATENCIÓN: Si habla español, tiene a bagley disposición servicios gratuitos de asistencia lingüística. LlThe [facility] [phone].    We comply with applicable federal civil rights laws and [place] laws. We do not discriminate on the basis of race, color, national origin, age, disability, sex, sexual orientation, or gender identity.            Thank you!     Thank you for choosing [facility]  for your care. Our goal is always to provide you with excellent care. Hearing back from our patients is one way we can continue to improve our services. Please take a few minutes to complete the written survey that you may receive in the mail after your visit with us. Thank you!             Your Updated Medication List - Protect others around you: Learn how to safely use, store and  throw away your medicines at www.disposemymeds.org.          This list is accurate as of 3/20/18 11:59 PM.  Always use your most recent med list.                   Brand Name Dispense Instructions for use Diagnosis    ALPRAZolam 1 MG tablet    XANAX     Take 0.5-1 mg by mouth 2 times daily as needed for anxiety        ARIPiprazole 10 MG tablet    ABILIFY    30 tablet    Take 1 tablet (10 mg) by mouth daily    Severe episode of recurrent major depressive disorder, without psychotic features (H)       gabapentin 100 MG capsule    NEURONTIN     Take 100-200 mg by mouth nightly as needed (restless leg syndrome)    Restless legs syndrome       LITHIUM CARBONATE PO           losartan 50 MG tablet    COZAAR    90 tablet    Take 1 tablet (50 mg) by mouth daily    Essential hypertension with goal blood pressure less than 140/90       omeprazole 20 MG CR capsule    priLOSEC    90 capsule    Take 1 capsule (20 mg) by mouth daily    Esophageal reflux       PRISTIQ 100 MG 24 hr tablet   Generic drug:  desvenlafaxine succinate      Take 100 mg by mouth daily        traZODone 50 MG tablet    DESYREL     Take  mg by mouth nightly as needed for sleep        trifluoperazine 1 MG tablet    STELAZINE     Take 1-2 mg by mouth 3 times daily as needed (anxiety)        Vitamin D3 58341 UNITS Tabs      Take 10,000 Units by mouth daily (patient purchases over-the-counter) this dose was NOT prescribed by a doctor.

## 2018-03-20 NOTE — Clinical Note
PRSICILA. I went ahead and read this one for you.  Let me know if you have questions about my interp. - Luis

## 2018-03-30 PROBLEM — G47.33 OSA (OBSTRUCTIVE SLEEP APNEA): Status: ACTIVE | Noted: 2018-03-30

## 2018-03-30 LAB — SLPCOMP: NORMAL

## 2018-03-30 NOTE — PROCEDURES
" SLEEP STUDY INTERPRETATION  DIAGNOSTIC POLYSOMNOGRAPHY REPORT      Patient: SHANE KELLER  YOB: 1985  Study Date: 3/20/2018  MRN: [id number]  Referring Provider: No Referring MD  Ordering Provider: MD Nazario Conrad    Indications for Polysomnography: The patient is a 33 y old Male who is 5' 11\" and weighs 330.0 lbs. His BMI is 46.2, [place] sleepiness scale 11.0 and neck circumference is 48.0 cm. Relevant medical history includes RLS, HTN, dperession, anxiety, and morbid obesity. A diagnostic polysomnogram was performed to evaluate for MENDEZ, hypoventilation and hypoxemia.    Polysomnogram Data: A full night polysomnogram recorded the standard physiologic parameters including EEG, EOG, EMG, ECG, nasal and oral airflow. Respiratory parameters of chest and abdominal movements were recorded with respiratory inductance plethysmography. Oxygen saturation was recorded by pulse oximetry. Hypopnea scoring rule used: 1B 4%.    Sleep Architecture: Increased sleep latency despite Trazodone, normal arousal index, and normal sleep efficiency.  The total recording time of the polysomnogram was 512.6 minutes. The total sleep time was 448.5 minutes. Sleep latency was increased at 44.5 minutes with the use of a sleep aid (Trazodone). REM latency was 153.5 minutes. Arousal index was normal at 12.4 arousals per hour. Sleep efficiency was normal at 87.5%. Wake after sleep onset was 9.0 minutes. The patient spent 3.3% of total sleep time in Stage N1, 68.8% in Stage N2, 11.5% in Stage N3, and 16.4% in REM. Time in REM supine was 70.5 minutes.    Respiration: Tachypnea during REM (20 - 22 bpm).  Mild obstructive sleep apnea, primarily during REM sleep.    Events ? The polysomnogram revealed a presence of 3 obstructive, 2 central, and - mixed apneas resulting in an apnea index of 0.7 events per hour. There were 51 obstructive hypopneas and - central hypopneas resulting in an obstructive hypopnea index of 6.8 events " per hour. The combined apnea/hypopnea index was 7.5 events per hour (central apnea/hypopnea index was 0.3 events per hour). The REM AHI was 29.4 events per hour. The supine AHI was 10.3 events per hour. The RERA index was 2.8 events per hour.  The RDI was 10.3 events per hour.    Snoring - was reported as moderate to loud.    Respiratory rate and pattern - was notable for tachypnea during REM (20 - 22 bpm).    Sustained Sleep Associated Hypoventilation - Transcutaneous carbon dioxide monitoring was used, however significant hypoventilation was not present with a maximum change from 34.5 to 43.0 mmHg.    Sleep Associated Hypoxemia - (Greater than 5 minutes O2 sat at or below 88%) was not present. Baseline oxygen saturation was 94.3%. Lowest oxygen saturation was 84.2%. Time spent less than or equal to 88% was 0.3 minutes.     Movement Activity: Few PLMs throughout the night without evidence of clinical significance (history of RLS).    Periodic Limb Activity - There were 40 PLMs during the entire study. The PLM index was 5.4 movements per hour. The PLM Arousal Index was 0.8 per hour.    REM EMG Activity - Excessive muscle activity was not present.    Nocturnal Behavior - Abnormal sleep related behaviors were not noted.    Bruxism - None apparent.    Cardiac Summary: No significant arrhythmias noted.  The average pulse rate was 79.4 bpm. The minimum pulse rate was 56.9 bpm while the maximum pulse rate was 198.0 bpm.      Assessment:     Increased sleep latency despite Trazodone, normal arousal index, and normal sleep efficiency.    Tachypnea during REM (20 - 22 bpm).      Mild obstructive sleep apnea, primarily during REM sleep.    Few PLMs throughout the night without evidence of clinical significance (history of RLS).    No significant arrhythmias noted.    Recommendations:    Consider repeat polysomnography with full night titration of positive airway pressure therapy for the control of sleep disordered  breathing.    Based on the presence of mild obstructive sleep apnea and excessive daytime sleepiness, treatment could be empirically initiated with Auto?titrating PAP therapy with a range of 5 to 15 cmH2O. Recommend clinical follow up with sleep management team.    Patient may be a candidate for dental appliance through referral to Sleep Dentistry for the treatment of obstructive sleep apnea and/or socially disruptive snoring.    If devices are not acceptable or effective, patient may benefit from evaluation of possible surgical options. If he is interested, would recommend referral to specialized ENT-Sleep provider.    Advice regarding the risks of drowsy driving.    Suggest optimizing sleep schedule and avoiding sleep deprivation.    Weight management (if BMI > 30).    Pharmacologic therapy should be used for management of restless legs syndrome only if present and clinically indicated and not based on the presence of periodic limb movements alone.    Diagnostic Codes: Obstructive Sleep Apnea G47.33   _____________________________________   Electronically Signed By: Luis Skelton MD 3/30/2018

## 2018-04-02 ENCOUNTER — OFFICE VISIT (OUTPATIENT)
Dept: SURGERY | Facility: CLINIC | Age: 33
End: 2018-04-02
Payer: COMMERCIAL

## 2018-04-02 VITALS — HEIGHT: 71 IN | BODY MASS INDEX: 44.1 KG/M2 | WEIGHT: 315 LBS

## 2018-04-02 DIAGNOSIS — G47.33 OSA (OBSTRUCTIVE SLEEP APNEA): Primary | ICD-10-CM

## 2018-04-02 PROCEDURE — 99207 ZZC NO CHARGE LOS: CPT | Performed by: PHYSICIAN ASSISTANT

## 2018-04-02 NOTE — Clinical Note
Hi Dr. Denney,  I saw Shakir in our bariatric clinic to evaluate his obesity. Unfortunately, at this point he is not a candidate for bariatric surgery at our program.  Due to limited psychological resources at our clinic, pts must be greater than 1 year post suicidal ideation to start the surgical process. When he is able to achieve mental stability for over a year and with an ok from therapist he is welcome to return for an initial evaluation.   Sincerely,   MATTY CrespoC

## 2018-04-02 NOTE — MR AVS SNAPSHOT
After Visit Summary   4/2/2018    Abel Ward    MRN: [id number]           Patient Information     Date Of Birth          1985        Visit Information        Provider Department      4/2/2018 1:00 PM Jessa Zhou PA-C [facility]      Today's Diagnoses     MENDEZ (obstructive sleep apnea)    -  1       Follow-ups after your visit        Your next 10 appointments already scheduled     Apr 06, 2018  1:00 PM CDT   Return Sleep Patient with Virgil Nazario MD   [facility] ([facility])    [address]  [place] [address] [phone]              Who to contact     If you have questions or need follow up information about today's clinic visit or your schedule please contact [facility] directly at [phone].  Normal or non-critical lab and imaging results will be communicated to you by MyChart, letter or phone within 4 business days after the clinic has received the results. If you do not hear from us within 7 days, please contact the clinic through 55socialhart or phone. If you have a critical or abnormal lab result, we will notify you by phone as soon as possible.  Submit refill requests through [employer] or call your pharmacy and they will forward the refill request to us. Please allow 3 business days for your refill to be completed.          Additional Information About Your Visit        55socialhart Information     Cloud[employer] gives you secure access to your electronic health record. If you see a primary care provider, you can also send messages to your care team and make appointments. If you have questions, please call your primary care clinic.  If you do not have a primary care provider, please call [phone] and they will assist you.        Care EveryWhere ID     This is your Care EveryWhere ID. This could  "be used by other organizations to access your [place] medical records  [id number]        Your Vitals Were     Height BMI (Body Mass Index)                5' 11\" (1.803 m) 46.92 kg/m2           Blood Pressure from Last 3 Encounters:   03/09/18 128/83   01/26/18 136/85   12/22/17 142/90    Weight from Last 3 Encounters:   04/02/18 (!) 336 lb 7 oz (152.6 kg)   03/09/18 (!) 330 lb (149.7 kg)   01/28/18 (!) 316 lb 12.8 oz (143.7 kg)              Today, you had the following     No orders found for display       Primary Care Provider Office Phone # Fax #    David Denney [phone] [address] [address] [address]        Equal Access to Services     [facility]: Hadii cate power hadasho Soomaali, waaxda luqadaha, qaybta kaalmada adeegyada, lauren dc hayaamin baird . So [facility] [phone].    ATENCIÓN: Si habla español, tiene a bagley disposición servicios gratuitos de asistencia lingüística. Llame al [phone].    We comply with applicable federal civil rights laws and [place] laws. We do not discriminate on the basis of race, color, national origin, age, disability, sex, sexual orientation, or gender identity.            Thank you!     Thank you for choosing [facility]  for your care. Our goal is always to provide you with excellent care. Hearing back from our patients is one way we can continue to improve our services. Please take a few minutes to complete the written survey that you may receive in the mail after your visit with us. Thank you!             Your Updated Medication List - Protect others around you: Learn how to safely use, store and throw away your medicines at www.disposemymeds.org.          This list is accurate as of 4/2/18  1:17 PM.  Always use your most recent med list.                   Brand Name Dispense Instructions for use Diagnosis    ALPRAZolam 1 MG tablet    XANAX     Take 0.5-1 mg by mouth 2 times daily as " needed for anxiety        ARIPiprazole 10 MG tablet    ABILIFY    30 tablet    Take 1 tablet (10 mg) by mouth daily    Severe episode of recurrent major depressive disorder, without psychotic features (H)       gabapentin 100 MG capsule    NEURONTIN     Take 100-200 mg by mouth nightly as needed (restless leg syndrome)    Restless legs syndrome       LITHIUM CARBONATE PO      Take by mouth daily        losartan 50 MG tablet    COZAAR    90 tablet    Take 1 tablet (50 mg) by mouth daily    Essential hypertension with goal blood pressure less than 140/90       omeprazole 20 MG CR capsule    priLOSEC    90 capsule    Take 1 capsule (20 mg) by mouth daily    Esophageal reflux       PRISTIQ 100 MG 24 hr tablet   Generic drug:  desvenlafaxine succinate      Take 100 mg by mouth daily        traZODone 50 MG tablet    DESYREL     Take  mg by mouth nightly as needed for sleep        trifluoperazine 1 MG tablet    STELAZINE     Take 1-2 mg by mouth as needed (anxiety)        Vitamin D3 09621 UNITS Tabs      Take 10,000 Units by mouth daily (patient purchases over-the-counter) this dose was NOT prescribed by a doctor.

## 2018-04-02 NOTE — PROGRESS NOTES
2018    New Bariatric Surgery Consultation Note    RE: Abel Ward  MR#: [id number]  : 1985    Dear David Denney MD (General),    I had the pleasure of seeing your patient, Abel Ward, to evaluate his obesity and consider him for possible weight loss surgery. As you know, Abel Ward is 33 year old.  Upon review, I see he has a signifigant history of Major Depressive Disorder and Anxiety. His depression has worsened in the last year and was coupled with suicidal ideation requiring psychiatric admission on 18 and 17.    At this point he is not a candidate for bariatric surgery at our program.  Due to limited psychological resources at our clinic, pts must be greater than 1 year post suicidal ideation to start the surgical process.  Bariatric surgery effects every aspect of their life and can be very stressful psychologically.  It is important for us to see mental health stability before embarking in surgery.         Sincerely,      Jessa Zhou PA-C

## 2018-04-06 ENCOUNTER — OFFICE VISIT (OUTPATIENT)
Dept: SLEEP MEDICINE | Facility: CLINIC | Age: 33
End: 2018-04-06
Payer: COMMERCIAL

## 2018-04-06 VITALS
DIASTOLIC BLOOD PRESSURE: 89 MMHG | RESPIRATION RATE: 16 BRPM | WEIGHT: 315 LBS | SYSTOLIC BLOOD PRESSURE: 152 MMHG | BODY MASS INDEX: 44.1 KG/M2 | HEIGHT: 71 IN | OXYGEN SATURATION: 96 % | HEART RATE: 106 BPM

## 2018-04-06 DIAGNOSIS — E61.1 IRON DEFICIENCY: Primary | ICD-10-CM

## 2018-04-06 DIAGNOSIS — G47.33 OSA (OBSTRUCTIVE SLEEP APNEA): ICD-10-CM

## 2018-04-06 PROCEDURE — 99214 OFFICE O/P EST MOD 30 MIN: CPT | Performed by: INTERNAL MEDICINE

## 2018-04-06 NOTE — NURSING NOTE
"Chief Complaint   Patient presents with     Sleep Problem     Follow up psg results       Initial /89  Pulse 106  Resp 16  Ht 1.803 m (5' 11\")  Wt (!) 152.4 kg (336 lb)  SpO2 96%  BMI 46.86 kg/m2 Estimated body mass index is 46.86 kg/(m^2) as calculated from the following:    Height as of this encounter: 1.803 m (5' 11\").    Weight as of this encounter: 152.4 kg (336 lb).  Medication Reconciliation: complete     ESS 10  Wilma Patel MA      "

## 2018-04-06 NOTE — PATIENT INSTRUCTIONS

## 2018-04-06 NOTE — PROGRESS NOTES
[facility]  Outpatient Sleep Medicine Consultation  April 6, 2018      Name: Abel Ward MRN# [id number]   Age: 33 year old YOB: 1985     Date of Consultation: April 6, 2018  Consultation is requested by: No referring provider defined for this encounter.  Primary care provider: David Denney               Assessment and Plan:       Mild obstructive sleep apnea with hypersomnolence    RLS without PLMD    Medication-induced hypersomnolence [hypersomnolence out of proportion to sleep apnea severity]    Alprazolam    Trazodone    Aripiprazole    Gabapentin      Summary Recommendations:    aCPAP 5-15 RTC 1 month  Mask desensitization at home  Recommendation to Dr. Vincent Byrd to consider moving aripiprazole to night, replacing alprazolam  Check ferritin; iron/vitamin C if ferritin < 50             History of Present Illness:     Abel Ward is a 33 year old male with mild sleep apnea and rather severe hypersomnolence.         Medications:     Current Outpatient Prescriptions   Medication Sig     LITHIUM CARBONATE PO Take by mouth daily      ARIPiprazole (ABILIFY) 10 MG tablet Take 1 tablet (10 mg) by mouth daily     Cholecalciferol (VITAMIN D3) 52331 UNITS TABS Take 10,000 Units by mouth daily (patient purchases over-the-counter) this dose was NOT prescribed by a doctor.     ALPRAZolam (XANAX) 1 MG tablet Take 0.5-1 mg by mouth 2 times daily as needed for anxiety     desvenlafaxine succinate (PRISTIQ) 100 MG 24 hr tablet Take 100 mg by mouth daily     traZODone (DESYREL) 50 MG tablet Take  mg by mouth nightly as needed for sleep     trifluoperazine (STELAZINE) 1 MG tablet Take 1-2 mg by mouth as needed (anxiety)      losartan (COZAAR) 50 MG tablet Take 1 tablet (50 mg) by mouth daily     gabapentin (NEURONTIN) 100 MG capsule Take 100-200 mg by mouth nightly as needed (restless leg syndrome)      omeprazole (PRILOSEC) 20 MG capsule Take 1 capsule (20  "mg) by mouth daily     No current facility-administered medications for this visit.         No Known Allergies         Past Medical History:     Does not need 02 supplement at night   Past Medical History:   Diagnosis Date     Attention deficit disorder with hyperactivity(314.01) 2001     Esophageal reflux     Dr Starks- had EGD ~2202     Generalized anxiety disorder      Hypertension      Infectious mononucleosis 12/03     Low testosterone      Major depressive disorder, single episode in full remission (H)      Major depressive disorder, single episode, mild (H)      MDD (major depressive disorder)      Mild intermittent asthma              Past Surgical History:    No h/o  upper airway surgery  Past Surgical History:   Procedure Laterality Date     OTHER SURGICAL HISTORY      wisdom teeth extraction                      Physical Examination:   /89  Pulse 106  Resp 16  Ht 1.803 m (5' 11\")  Wt (!) 152.4 kg (336 lb)  SpO2 96%  BMI 46.86 kg/m2             Copy to: David Denney MD 4/6/2018         Total time spent with patient: 25 min >50% counseling  "

## 2018-04-06 NOTE — MR AVS SNAPSHOT
After Visit Summary   4/6/2018    Abel Ward    MRN: [id number]           Patient Information     Date Of Birth          1985        Visit Information        Provider Department      4/6/2018 1:00 PM Virgil Nazario MD [facility]        Today's Diagnoses     Iron deficiency    -  1    MENDEZ (obstructive sleep apnea)          Care Instructions      Your BMI is Body mass index is 46.86 kg/(m^2).  Weight management is a personal decision.  If you are interested in exploring weight loss strategies, the following discussion covers the approaches that may be successful. Body mass index (BMI) is one way to tell whether you are at a healthy weight, overweight, or obese. It measures your weight in relation to your height.  A BMI of 18.5 to 24.9 is in the healthy range. A person with a BMI of 25 to 29.9 is considered overweight, and someone with a BMI of 30 or greater is considered obese. More than two-thirds of American adults are considered overweight or obese.  Being overweight or obese increases the risk for further weight gain. Excess weight may lead to heart disease and diabetes.  Creating and following plans for healthy eating and physical activity may help you improve your health.  Weight control is part of healthy lifestyle and includes exercise, emotional health, and healthy eating habits. Careful eating habits lifelong are the mainstay of weight control. Though there are significant health benefits from weight loss, long-term weight loss with diet alone may be very difficult to achieve- studies show long-term success with dietary management in less than 10% of people. Attaining a healthy weight may be especially difficult to achieve in those with severe obesity. In some cases, medications, devices and surgical management might be considered.  What can you do?  If you are overweight or obese and are interested in methods for weight loss, you should discuss this  with your provider.     Consider reducing daily calorie intake by 500 calories.     Keep a food journal.     Avoiding skipping meals, consider cutting portions instead.    Diet combined with exercise helps maintain muscle while optimizing fat loss. Strength training is particularly important for building and maintaining muscle mass. Exercise helps reduce stress, increase energy, and improves fitness. Increasing exercise without diet control, however, may not burn enough calories to loose weight.       Start walking three days a week 10-20 minutes at a time    Work towards walking thirty minutes five days a week     Eventually, increase the speed of your walking for 1-2 minutes at time    In addition, we recommend that you review healthy lifestyles and methods for weight loss available through the National Institutes of Health patient information sites:  http://win.niddk.nih.gov/publications/index.htm    And look into health and wellness programs that may be available through your health insurance provider, employer, local community center, or angel club.    Weight management plan: Patient was referred to their PCP to discuss a diet and exercise plan.              Follow-ups after your visit        Follow-up notes from your care team     Return in about 1 month (around 5/6/2018).      Your next 10 appointments already scheduled     May 04, 2018  3:30 PM CDT   Return Sleep Patient with Virgil Nazario MD   [facility] [place] [facility] [place] [place]    [address] [address] [address] [phone]              Future tests that were ordered for you today     Open Future Orders        Priority Expected Expires Ordered    Ferritin Routine  6/5/2018 4/6/2018            Who to contact     If you have questions or need follow up information about today's clinic visit or your schedule please contact [facility] directly at [phone].  Normal or  "non-critical lab and imaging results will be communicated to you by MyChart, letter or phone within 4 business days after the clinic has received the results. If you do not hear from us within 7 days, please contact the clinic through [employer] or phone. If you have a critical or abnormal lab result, we will notify you by phone as soon as possible.  Submit refill requests through [employer] or call your pharmacy and they will forward the refill request to us. Please allow 3 business days for your refill to be completed.          Additional Information About Your Visit        [employer] Information     [employer] gives you secure access to your electronic health record. If you see a primary care provider, you can also send messages to your care team and make appointments. If you have questions, please call your primary care clinic.  If you do not have a primary care provider, please call [phone] and they will assist you.        Care EveryWhere ID     This is your Care EveryWhere ID. This could be used by other organizations to access your [facility] medical records  [id number]        Your Vitals Were     Pulse Respirations Height Pulse Oximetry BMI (Body Mass Index)       106 16 1.803 m (5' 11\") 96% 46.86 kg/m2        Blood Pressure from Last 3 Encounters:   04/06/18 152/89   03/09/18 128/83   01/26/18 136/85    Weight from Last 3 Encounters:   04/06/18 (!) 152.4 kg (336 lb)   04/02/18 (!) 152.6 kg (336 lb 7 oz)   03/09/18 (!) 149.7 kg (330 lb)              We Performed the Following     Comprehensive DME        Primary Care Provider Office Phone # Fax #    David Denney [phone] [address] [facility] [address]        Equal Access to Services     [facility]: Hadii cate Hamm, wareshmada luqadaha, qaybta kaalmada michelle, lauren eugene. So [facility] [phone].    ATENCIÓN: Si habla español, tiene a bagley disposición servicios gratuitos de " asistencia lingüística. Anais al [phone].    We comply with applicable federal civil rights laws and [place] laws. We do not discriminate on the basis of race, color, national origin, age, disability, sex, sexual orientation, or gender identity.            Thank you!     Thank you for choosing [facility]  for your care. Our goal is always to provide you with excellent care. Hearing back from our patients is one way we can continue to improve our services. Please take a few minutes to complete the written survey that you may receive in the mail after your visit with us. Thank you!             Your Updated Medication List - Protect others around you: Learn how to safely use, store and throw away your medicines at www.disposemymeds.org.          This list is accurate as of 4/6/18  1:33 PM.  Always use your most recent med list.                   Brand Name Dispense Instructions for use Diagnosis    ALPRAZolam 1 MG tablet    XANAX     Take 0.5-1 mg by mouth 2 times daily as needed for anxiety        ARIPiprazole 10 MG tablet    ABILIFY    30 tablet    Take 1 tablet (10 mg) by mouth daily    Severe episode of recurrent major depressive disorder, without psychotic features (H)       gabapentin 100 MG capsule    NEURONTIN     Take 100-200 mg by mouth nightly as needed (restless leg syndrome)    Restless legs syndrome       LITHIUM CARBONATE PO      Take by mouth daily        losartan 50 MG tablet    COZAAR    90 tablet    Take 1 tablet (50 mg) by mouth daily    Essential hypertension with goal blood pressure less than 140/90       omeprazole 20 MG CR capsule    priLOSEC    90 capsule    Take 1 capsule (20 mg) by mouth daily    Esophageal reflux       PRISTIQ 100 MG 24 hr tablet   Generic drug:  desvenlafaxine succinate      Take 100 mg by mouth daily        traZODone 50 MG tablet    DESYREL     Take  mg by mouth nightly as needed for sleep        trifluoperazine 1 MG tablet     STELAZINE     Take 1-2 mg by mouth as needed (anxiety)        Vitamin D3 16546 UNITS Tabs      Take 10,000 Units by mouth daily (patient purchases over-the-counter) this dose was NOT prescribed by a doctor.

## 2018-04-20 ENCOUNTER — DOCUMENTATION ONLY (OUTPATIENT)
Dept: SLEEP MEDICINE | Facility: CLINIC | Age: 33
End: 2018-04-20

## 2018-04-20 NOTE — PROGRESS NOTES
Patient has decided that he is going to wait until his sleep study is covered by his insurance and see how much will be covered before he proceeds to get the CPAP machine.

## 2018-06-28 ENCOUNTER — HOSPITAL ENCOUNTER (INPATIENT)
Facility: CLINIC | Age: 33
LOS: 5 days | Discharge: HOME | DRG: 885 | End: 2018-07-03
Attending: FAMILY MEDICINE | Admitting: PSYCHIATRY & NEUROLOGY
Payer: COMMERCIAL

## 2018-06-28 DIAGNOSIS — R45.851 SUICIDAL IDEATIONS: ICD-10-CM

## 2018-06-28 DIAGNOSIS — F41.9 ANXIETY: Primary | ICD-10-CM

## 2018-06-28 DIAGNOSIS — F33.2 SEVERE EPISODE OF RECURRENT MAJOR DEPRESSIVE DISORDER, WITHOUT PSYCHOTIC FEATURES (H): ICD-10-CM

## 2018-06-28 PROBLEM — F32.A DEPRESSION: Status: ACTIVE | Noted: 2018-06-28

## 2018-06-28 PROCEDURE — 80307 DRUG TEST PRSMV CHEM ANLYZR: CPT | Performed by: FAMILY MEDICINE

## 2018-06-28 PROCEDURE — 99285 EMERGENCY DEPT VISIT HI MDM: CPT | Mod: Z6 | Performed by: FAMILY MEDICINE

## 2018-06-28 PROCEDURE — 99285 EMERGENCY DEPT VISIT HI MDM: CPT | Mod: 25 | Performed by: FAMILY MEDICINE

## 2018-06-28 PROCEDURE — 12800005 ZZH R&B CD/MH INTERMEDIATE ADOLESCENT

## 2018-06-28 PROCEDURE — 12400007 ZZH R&B MH INTERMEDIATE UMMC

## 2018-06-28 PROCEDURE — 80320 DRUG SCREEN QUANTALCOHOLS: CPT | Performed by: FAMILY MEDICINE

## 2018-06-28 PROCEDURE — 90791 PSYCH DIAGNOSTIC EVALUATION: CPT

## 2018-06-28 RX ORDER — OLANZAPINE 5 MG/1
10 TABLET ORAL 2 TIMES DAILY PRN
Status: DISCONTINUED | OUTPATIENT
Start: 2018-06-28 | End: 2018-07-03 | Stop reason: HOSPADM

## 2018-06-28 RX ORDER — LOSARTAN POTASSIUM 50 MG/1
50 TABLET ORAL DAILY
Status: DISCONTINUED | OUTPATIENT
Start: 2018-06-29 | End: 2018-07-03 | Stop reason: HOSPADM

## 2018-06-28 RX ORDER — TRAZODONE HYDROCHLORIDE 50 MG/1
50-150 TABLET, FILM COATED ORAL
Status: DISCONTINUED | OUTPATIENT
Start: 2018-06-28 | End: 2018-07-03 | Stop reason: HOSPADM

## 2018-06-28 RX ORDER — OLANZAPINE 10 MG/2ML
10 INJECTION, POWDER, FOR SOLUTION INTRAMUSCULAR 2 TIMES DAILY PRN
Status: DISCONTINUED | OUTPATIENT
Start: 2018-06-28 | End: 2018-07-03 | Stop reason: HOSPADM

## 2018-06-28 RX ORDER — DESVENLAFAXINE 50 MG/1
100 TABLET, FILM COATED, EXTENDED RELEASE ORAL DAILY
Status: DISCONTINUED | OUTPATIENT
Start: 2018-06-29 | End: 2018-07-03 | Stop reason: HOSPADM

## 2018-06-28 RX ORDER — LITHIUM CARBONATE 600 MG/1
600 CAPSULE ORAL DAILY
Status: DISCONTINUED | OUTPATIENT
Start: 2018-06-29 | End: 2018-07-03 | Stop reason: HOSPADM

## 2018-06-28 RX ORDER — LAMOTRIGINE 25 MG/1
25 TABLET ORAL DAILY
Status: DISCONTINUED | OUTPATIENT
Start: 2018-06-29 | End: 2018-06-29

## 2018-06-28 RX ORDER — GABAPENTIN 100 MG/1
100-200 CAPSULE ORAL
Status: DISCONTINUED | OUTPATIENT
Start: 2018-06-28 | End: 2018-07-03 | Stop reason: HOSPADM

## 2018-06-28 RX ORDER — HYDROXYZINE HYDROCHLORIDE 25 MG/1
25 TABLET, FILM COATED ORAL EVERY 4 HOURS PRN
Status: DISCONTINUED | OUTPATIENT
Start: 2018-06-28 | End: 2018-07-03 | Stop reason: HOSPADM

## 2018-06-28 RX ORDER — NALTREXONE HYDROCHLORIDE 50 MG/1
TABLET, FILM COATED ORAL
Status: ON HOLD | COMMUNITY
Start: 2018-04-05 | End: 2018-07-03

## 2018-06-28 RX ORDER — NALTREXONE HYDROCHLORIDE 50 MG/1
50 TABLET, FILM COATED ORAL DAILY
Status: DISCONTINUED | OUTPATIENT
Start: 2018-06-29 | End: 2018-06-29

## 2018-06-28 ASSESSMENT — ACTIVITIES OF DAILY LIVING (ADL)
TOILETING: 0-->INDEPENDENT
FALL_HISTORY_WITHIN_LAST_SIX_MONTHS: NO
BATHING: 0-->INDEPENDENT
ORAL_HYGIENE: INDEPENDENT
AMBULATION: 0-->INDEPENDENT
DRESS: INDEPENDENT
GROOMING: INDEPENDENT
DRESS: 0-->INDEPENDENT
SWALLOWING: 0-->SWALLOWS FOODS/LIQUIDS WITHOUT DIFFICULTY
TRANSFERRING: 0-->INDEPENDENT
RETIRED_EATING: 0-->INDEPENDENT
COGNITION: 0 - NO COGNITION ISSUES REPORTED
RETIRED_COMMUNICATION: 0-->UNDERSTANDS/COMMUNICATES WITHOUT DIFFICULTY

## 2018-06-28 ASSESSMENT — ENCOUNTER SYMPTOMS
FEVER: 0
NAUSEA: 0
COLOR CHANGE: 0
SLEEP DISTURBANCE: 1
NECK STIFFNESS: 0
DIFFICULTY URINATING: 0
ACTIVITY CHANGE: 0
CONFUSION: 0
APPETITE CHANGE: 1
DYSPHORIC MOOD: 1
ARTHRALGIAS: 0
HEADACHES: 0
VOMITING: 0
DIARRHEA: 0
EYE REDNESS: 0
ABDOMINAL PAIN: 0
SHORTNESS OF BREATH: 0

## 2018-06-28 NOTE — ED PROVIDER NOTES
"    [facility] EMERGENCY DEPARTMENT ([facility])    6/28/18       History     Chief Complaint   Patient presents with     Suicidal     reports suicidal thoughts with multiple plans     The history is provided by the patient, a parent and medical records.     Abel Ward is a 33 year old male with a medical history significant for major depression, FATOUMATA and hypertension who presents to the Emergency Department for evaluation of suicidal ideation.  Patient has had suicidal ideation on and off for the past few months and today had increased suicidal ideations and patient \"snapped at work today\" and left work.  He also states that he is \"unable to take anymore\".  Patient has a plan to either overdose on pills, cut himself or by a gun.  Patient does not currently own a gun.  Patient a month ago put a belt around his neck, but he did not feel that this would work and does not have this as a plan currently.  Patient is feeling overwhelmed at work and at home.  Patient used to be content at his job, but he now \"hates every second of it\".  Patient works in welding.  Patient was laid off of his job over the winter and is currently working at a new job, patient does not enjoy where he is currently working.  Patient states that he pretty much hates everything now except for \"zoning out in front of the TV and sleeping\".  Patient is sleeping more than usual.  Patient lives at home with his wife and elena, the patient and his wife have been  for over a year.  Patient denies any homicidal ideation and denies any hallucinations.  Patient reports that he uses marijuana once weekly with his last use on 6/26/2018 and he reports his last use of alcohol was a few weeks ago.  The patient's mother who is currently here with him added some additional history.  The mother reports that the patient has been more disconnected since last summer.  She states that some of the patient's grandparents have passed away as well " as 1 of the patient's best friends.    The patient has a history of depression and FATOUMATA and has been hospitalized a few times in the past.  Patient was most recently hospitalized in November as well as January.  Patient underwent an ECT in November and around his admission in January had a TMS performed.    I have reviewed the Medications, Allergies, Past Medical and Surgical History, and Social History in the [employer] system.    Past Medical History:   Diagnosis Date     Anxiety      Attention deficit disorder with hyperactivity(314.01) 2001     Esophageal reflux     Dr Starks- had EGD ~2202     Generalized anxiety disorder      Hypertension      Infectious mononucleosis 12/03     Low testosterone      Major depressive disorder, single episode in full remission (H)      Major depressive disorder, single episode, mild (H)      MDD (major depressive disorder)      Mild intermittent asthma        Past Surgical History:   Procedure Laterality Date     OTHER SURGICAL HISTORY      wisdom teeth extraction       Family History   Problem Relation Age of Onset     Hypertension Father      Depression Father      Lipids Father      Obesity Father      Thyroid Disease Father      Diabetes Paternal Grandfather      Hypertension Paternal Grandfather      HEART DISEASE Paternal Grandfather      Breast Cancer Paternal Grandmother      Hypertension Mother      Obesity Mother      Thyroid Disease Mother      Hypertension Maternal Grandfather        Social History   Substance Use Topics     Smoking status: Never Smoker     Smokeless tobacco: Never Used     Alcohol use No       No current facility-administered medications for this encounter.      Current Outpatient Prescriptions   Medication     ARIPiprazole (ABILIFY) 10 MG tablet     Cholecalciferol (VITAMIN D3) 65305 UNITS TABS     desvenlafaxine succinate (PRISTIQ) 100 MG 24 hr tablet     DiazePAM (VALIUM PO)     gabapentin (NEURONTIN) 100 MG capsule     LAMOTRIGINE PO     LITHIUM  CARBONATE PO     losartan (COZAAR) 50 MG tablet     naltrexone (DEPADE;REVIA) 50 MG tablet     omeprazole (PRILOSEC) 20 MG capsule     traZODone (DESYREL) 50 MG tablet     trifluoperazine (STELAZINE) 1 MG tablet      No Known Allergies      Review of Systems   Constitutional: Positive for appetite change. Negative for activity change and fever.   HENT: Negative for congestion.    Eyes: Negative for redness.   Respiratory: Negative for shortness of breath.    Cardiovascular: Negative for chest pain.   Gastrointestinal: Negative for abdominal pain, diarrhea, nausea and vomiting.        Patient does report history of some GERD but currently is taking omeprazole which seems to palliate his symptoms currently   Genitourinary: Negative for difficulty urinating.   Musculoskeletal: Negative for arthralgias and neck stiffness.   Skin: Negative for color change and rash.   Neurological: Negative for headaches.   Psychiatric/Behavioral: Positive for dysphoric mood, sleep disturbance and suicidal ideas. Negative for confusion and self-injury.   All other systems reviewed and are negative.      Physical Exam   BP: 126/81  Pulse: 85  Temp: 97.1  F (36.2  C)  Resp: 16  Weight: (!) 153.5 kg (338 lb 8 oz)  SpO2: 97 %      Physical Exam   Constitutional: He is oriented to person, place, and time. He appears well-developed and well-nourished. He appears distressed.   Patient here with his mother.  She does offer a lot of information.  He is cooperative does admit to being suicidal multiple plans has not acted upon this yet.  Agrees with admission.   HENT:   Head: Normocephalic and atraumatic.   Eyes: EOM are normal. Pupils are equal, round, and reactive to light. No scleral icterus.   Neck: Normal range of motion. Neck supple.   Cardiovascular: Regular rhythm.    Pulmonary/Chest: No stridor. No respiratory distress.   Abdominal: He exhibits distension. There is no tenderness.   Musculoskeletal: He exhibits no edema, tenderness or  deformity.   Neurological: He is alert and oriented to person, place, and time. He has normal reflexes. No cranial nerve deficit. Coordination normal.   Skin: Skin is warm and dry. No rash noted. He is not diaphoretic. No erythema. No pallor.   Psychiatric:   Patient with anhedonia flat affect noted suicidal ideations with different plans no homicidal ideations no delusional thought pattern noted at this point he is cooperative voluntary.   Nursing note and vitals reviewed.      ED Course     ED Course     Procedures        There patient evaluated.  He is cooperative here.  Drug screen was positive for benzodiazepine.  Patient seen by  also feel at this point admission to mental health unit for major depression with suicidal ideations is appropriate patient agrees with plan will be admitted.  Patient is voluntary.    Critical Care time:  none             Labs Ordered and Resulted from Time of ED Arrival Up to the Time of Departure from the ED   DRUG ABUSE SCREEN 6 CHEM DEP URINE ([facility]) - Abnormal; Notable for the following:        Result Value    Benzodiazepine Qual Urine Positive (*)     All other components within normal limits     Results for orders placed or performed during the hospital encounter of 06/28/18   Drug abuse screen 6 urine (chem dep) ([facility])   Result Value Ref Range    Amphetamine Qual Urine Negative NEG^Negative    Barbiturates Qual Urine Negative NEG^Negative    Benzodiazepine Qual Urine Positive (A) NEG^Negative    Cannabinoids Qual Urine Negative NEG^Negative    Cocaine Qual Urine Negative NEG^Negative    Ethanol Qual Urine Negative NEG^Negative    Opiates Qualitative Urine Negative NEG^Negative            Assessments & Plan (with Medical Decision Making)  33-year-old male history of major depression disorders had ECT in the past a couple times.  Patient now increasing anhedonia loss of interest in life now feeling suicidal and more agitated now is used marijuana denies any other drug  use at this point.  Patient now feeling suicidal with multiple plans denies homicidal or delusional thought pattern.  Patient presented ER vitally stable seen by our assessors also at this point patient is voluntary drug screen positive for benzodiazepine.  Patient will be admitted to mental health unit is voluntary.         I have reviewed the nursing notes.    I have reviewed the findings, diagnosis, plan and need for follow up with the patient.    New Prescriptions    No medications on file       Final diagnoses:   Suicidal ideations   Severe episode of recurrent major depressive disorder, without psychotic features (H)     I, Jaxson Park, am serving as a trained medical scribe to document services personally performed by José Miguel Mayo MD, based on the provider's statements to me.   I, José Miguel Mayo MD, was physically present and have reviewed and verified the accuracy of this note documented by Jaxson Park.    6/28/2018   [facility], [place], EMERGENCY DEPARTMENT      This note was created at least in part by the use of dragon voice dictation system. Inadvertent typographical errors may still exist.  José Miguel Mayo MD.         José Miguel Mayo MD  06/28/18 0694

## 2018-06-28 NOTE — IP AVS SNAPSHOT
[facility]    [address]    [place] [address]    Phone:  [phone]                                       After Visit Summary   6/28/2018    Abel Ward    MRN: [id number]           After Visit Summary Signature Page     I have received my discharge instructions, and my questions have been answered. I have discussed any challenges I see with this plan with the nurse or doctor.    ..........................................................................................................................................  Patient/Patient Representative Signature      ..........................................................................................................................................  Patient Representative Print Name and Relationship to Patient    ..................................................               ................................................  Date                                            Time    ..........................................................................................................................................  Reviewed by Signature/Title    ...................................................              ..............................................  Date                                                            Time

## 2018-06-29 PROCEDURE — 12800005 ZZH R&B CD/MH INTERMEDIATE ADOLESCENT

## 2018-06-29 PROCEDURE — 99223 1ST HOSP IP/OBS HIGH 75: CPT | Mod: AI | Performed by: PSYCHIATRY & NEUROLOGY

## 2018-06-29 PROCEDURE — 12400007 ZZH R&B MH INTERMEDIATE UMMC

## 2018-06-29 PROCEDURE — 25000132 ZZH RX MED GY IP 250 OP 250 PS 637: Performed by: FAMILY MEDICINE

## 2018-06-29 RX ADMIN — OMEPRAZOLE 20 MG: 20 CAPSULE, DELAYED RELEASE ORAL at 08:57

## 2018-06-29 RX ADMIN — NALTREXONE HYDROCHLORIDE 50 MG: 50 TABLET, FILM COATED ORAL at 08:57

## 2018-06-29 RX ADMIN — LAMOTRIGINE 25 MG: 25 TABLET ORAL at 08:57

## 2018-06-29 RX ADMIN — LOSARTAN POTASSIUM 50 MG: 50 TABLET ORAL at 08:57

## 2018-06-29 RX ADMIN — Medication 7.5 MG: at 08:57

## 2018-06-29 RX ADMIN — TRAZODONE HYDROCHLORIDE 100 MG: 50 TABLET ORAL at 20:10

## 2018-06-29 RX ADMIN — LITHIUM CARBONATE 600 MG: 600 CAPSULE, GELATIN COATED ORAL at 08:57

## 2018-06-29 RX ADMIN — DESVENLAFAXINE SUCCINATE 100 MG: 50 TABLET, EXTENDED RELEASE ORAL at 08:57

## 2018-06-29 RX ADMIN — OLANZAPINE 10 MG: 5 TABLET, FILM COATED ORAL at 12:16

## 2018-06-29 ASSESSMENT — ACTIVITIES OF DAILY LIVING (ADL)
DRESS: SCRUBS (BEHAVIORAL HEALTH)
LAUNDRY: WITH SUPERVISION
ORAL_HYGIENE: INDEPENDENT
ORAL_HYGIENE: INDEPENDENT
GROOMING: INDEPENDENT
GROOMING: INDEPENDENT

## 2018-06-29 NOTE — H&P
"[facility], [place]   Psychiatric History & Physical  Admission date: 6/28/2018  Abel Ward  [id number]  06/29/18    Time: 79 minutes on encounter, >50% of which was spent in counseling and/or coordination of care consisting of: communication and education with the patient, communication with family and or friends if documented in note, lab/image/study evaluation, support staff communication, and other sources pertinent to excellent patient care.            Chief Complaint:   \"I am here because would like to get ECT \"        HPI:   Abel Ward with a past medical history of asthma, depression, low testosterone, hypertension, GERD, ADHD, mild obstructive sleep apnea, restless leg syndrome, generalized anxiety, cannabis use was admitted 6/28/2018 for suicidal thoughts and worsening depressive symptoms.    Abel according to reports has had worsening suicidal thoughts and feeling overwhelmed at work and has lost family members over the past year that were highly important to him.  He is also titrating off of aripiprazole and onto lamotrigine but is seeking electroconvulsive therapy.    Upon meeting with him he reports losing his grandparents being a lot of work stress and previously having improvement in his depression with electroconvulsive therapy.  Things have been difficult since approximately November 2017 including worsening sadness sleeping too much feeling as though he is a failure negative thoughts and was laid off from employment job where he had friends and has no longer had contact with those individuals.  He has not felt as though the medication adjustments have been benefiting him though he denies any thoughts of ending his life currently and feels hopeful about the future but still has energy problems concentration issues and guilty feelings.  Denies any anhedonia or thoughts of harming others.  Denies any gambling addiction pornography addiction " sexual addiction or shopping addiction.  Denies any obsessive-compulsive disorder symptoms posttraumatic stress disorder symptoms or any generalized anxiety social anxiety or eating disorder symptoms.  Denies any previous manic episodes or hallucinations or paranoia.    Physically he feels overweight and restless.    Current medications include aripiprazole 7.5, desvenlafaxine 100 mg daily, lamotrigine 25 mg daily, lithium 600 mg daily, naltrexone 50 mg daily.  He is unsure if any of these medications are actually beneficial.  The naltrexone he believes is for suicidality and depression as are the other medications.        Past Psychiatric History:     Psychiatric history started as a child with depression and he has never attempted suicide has been hospitalized about 5 times denies any traumatic brain injury or seizures but did have electroconvulsive therapy and also TMS.  The TMS he has not had any response to by electroconvulsive therapy previously helped.  Goes to [facility].  Previous medication trials include alprazolam, aripiprazole, bupropion, buspirone, clonazepam, desvenlafaxine, duloxetine, fluoxetine, escitalopram, hydroxyzine, gabapentin, lithium, lamotrigine, naltrexone, olanzapine, quetiapine, risperidone, trazodone, try fluphenazine, venlafaxine, zolpidem, Adderall, Metadate.  He has never been committed as mentally ill no USP time or custodial time or violence history.          Substance Use and History:     Substance use started at the age of 22 with cannabis use last use 1 week ago alcohol in the past not currently drinking no chemical dependency treatment no DUIs.          Past Medical History:   PAST MEDICAL HISTORY:   Past Medical History:   Diagnosis Date     Anxiety      Attention deficit disorder with hyperactivity(314.01) 2001     Esophageal reflux     Dr Starks- had EGD ~2202     Generalized anxiety disorder      Hypertension      Infectious  mononucleosis 12/03     Low testosterone      Major depressive disorder, single episode in full remission (H)      Major depressive disorder, single episode, mild (H)      MDD (major depressive disorder)      Mild intermittent asthma        PAST SURGICAL HISTORY:   Past Surgical History:   Procedure Laterality Date     OTHER SURGICAL HISTORY      wisdom teeth extraction             Family History:   FAMILY HISTORY:   Family History   Problem Relation Age of Onset     Hypertension Father      Depression Father      Lipids Father      Obesity Father      Thyroid Disease Father      Diabetes Paternal Grandfather      Hypertension Paternal Grandfather      HEART DISEASE Paternal Grandfather      Breast Cancer Paternal Grandmother      Hypertension Mother      Obesity Mother      Thyroid Disease Mother      Depression Mother      Hypertension Maternal Grandfather            Social History:   SOCIAL HISTORY:   Social History     Social History     Marital status:      Spouse name: N/A     Number of children: 0     Years of education: N/A     Occupational History      /  [employer]     Social History Main Topics     Smoking status: Never Smoker     Smokeless tobacco: Never Used     Alcohol use No     Drug use: Yes     Special: Marijuana      Comment: once a month or less, > 1 month     Sexual activity: Yes     Partners: Female     Birth control/ protection: Injection     Other Topics Concern      Service No     Blood Transfusions No     Caffeine Concern Yes     occas     Exercise No     Seat Belt Yes     Parent/Sibling W/ Cabg, Mi Or Angioplasty Before 65f 55m? No     Social History Narrative    Born and raised in [place].  Has 2 siblings that he has contact with raised by both mother and father no abuse history or neglect.  He had trouble in school primarily related to ADHD but did finish it and is working towards history degree at the [employer].  He works as a   for the past 5 years.  He is  has 1 stepson but no other children no  history lives in a duplex with his wife and elena denies any guns or weapons enjoys music and television.            Physical ROS:   The patient endorsed the above issues. The remainder of 10-point review of systems was negative except as noted in HPI.         PTA Medications:     Prescriptions Prior to Admission   Medication Sig Dispense Refill Last Dose     ARIPiprazole (ABILIFY) 10 MG tablet Take 1 tablet (10 mg) by mouth daily (Patient taking differently: Take 7.5 mg by mouth daily ) 30 tablet 1 Past Week at Unknown time     Cholecalciferol (VITAMIN D3) 90576 UNITS TABS Take 10,000 Units by mouth daily (patient purchases over-the-counter) this dose was NOT prescribed by a doctor.   Past Week at Unknown time     desvenlafaxine succinate (PRISTIQ) 100 MG 24 hr tablet Take 100 mg by mouth daily   6/28/2018 at Unknown time     DiazePAM (VALIUM PO) Take 10 mg by mouth 2 times daily as needed for anxiety   6/28/2018 at Unknown time     gabapentin (NEURONTIN) 100 MG capsule Take 100-200 mg by mouth nightly as needed (restless leg syndrome)    6/27/2018 at Unknown time     LAMOTRIGINE PO Take 25 mg by mouth daily   6/28/2018 at Unknown time     LITHIUM CARBONATE PO Take 600 mg by mouth daily    6/27/2018 at Unknown time     losartan (COZAAR) 50 MG tablet Take 1 tablet (50 mg) by mouth daily 90 tablet 1 Past Week at Unknown time     naltrexone (DEPADE;REVIA) 50 MG tablet    6/27/2018 at Unknown time     omeprazole (PRILOSEC) 20 MG capsule Take 1 capsule (20 mg) by mouth daily 90 capsule 3 6/28/2018 at Unknown time     traZODone (DESYREL) 50 MG tablet Take  mg by mouth nightly as needed for sleep   6/27/2018 at Unknown time     trifluoperazine (STELAZINE) 1 MG tablet Take 1-2 mg by mouth as needed (anxiety)    6/28/2018 at Unknown time          Allergies:   No Known Allergies       Labs:     Recent Results (from the past 48  hour(s))   Drug abuse screen 6 urine (chem dep) ([facility])    Collection Time: 06/28/18 11:51 AM   Result Value Ref Range    Amphetamine Qual Urine Negative NEG^Negative    Barbiturates Qual Urine Negative NEG^Negative    Benzodiazepine Qual Urine Positive (A) NEG^Negative    Cannabinoids Qual Urine Negative NEG^Negative    Cocaine Qual Urine Negative NEG^Negative    Ethanol Qual Urine Negative NEG^Negative    Opiates Qualitative Urine Negative NEG^Negative          Physical and Psychiatric Examination:     /60  Pulse 68  Temp 96.8  F (36  C) (Oral)  Resp 16  Wt (!) 153.5 kg (338 lb 8 oz)  SpO2 98%  BMI 47.21 kg/m2  Weight is 338 lbs 8 oz  Body mass index is 47.21 kg/(m^2).                Sitting Orthostatic BP: 128/95      Sitting Orthostatic Pulse: 93 bpm      Standing Orthostatic BP: 122/62      Standing Orthostatic Pulse: 94 bpm     Last 4 weights:  Wt Readings from Last 4 Encounters:   06/28/18 (!) 153.5 kg (338 lb 8 oz)   04/06/18 (!) 152.4 kg (336 lb)   04/02/18 (!) 152.6 kg (336 lb 7 oz)   03/09/18 (!) 149.7 kg (330 lb)       Physical Exam:  I have reviewed the physical exam as documented by Gael on 6/28 and agree with findings and assessment and have no additional findings to add at this time.    Mental Status Exam:  Shakir is a 33-year-old male that is overweight wearing hospital scrubs and has a beard.  His speech is of an appropriate rate and tone in his language is intact.  His behavior is appropriate he does not have any abnormal movements.  His affect is subdued.  His mood he describes as sad and depressed.  His thought content consists of the above with negative thoughts no current thoughts of ending his life or harming others or delusional content.  Thought process is negative and ruminative without looseness of association.  He does not have any abnormal perceptions.  He is alert and aware of his current location and circumstances.  Attention and concentration appear adequate.  His  cognition and fund of knowledge appear normal.  His long-term/short-term/remote memory appear intact.  His insight and judgment are both limited.         Admission Diagnoses:   Major depressive disorder severe recurrent  History of ADHD  Cannabis use disorder  Mild obstructive sleep apnea  Restless leg syndrome         Assessment & Plan:     Assessment:  Shakir is unfortunately suffering with major depressive disorder and limited function.  He had previous benefit with electroconvulsive therapy and is seeking reinitiation of electroconvulsive therapy.  He is on current medications that potentially has not benefited and also might get in the way of electroconvulsive therapy specifically lamotrigine.  At this point in time we discussed discontinuing her omeprazole lamotrigine and naltrexone for them being unnecessary if we are pursuing electroconvulsive therapy for improvement of depressive symptoms.  Could potentially get a level of lithium make sure that is therapeutic and it is additionally a good idea with him to be on board for electroconvulsive therapy based on its ability to prolong improved results with electroconvulsive therapy.    Plan:  Initiating electroconvulsive therapy  Discontinuing unnecessary medications including aripiprazole, lamotrigine, naltrexone  Continue voluntary hospitalization               Santy Batista  [facility] Psychiat[facility]      The following document has been created with voice recognition software and may contain unintentional word substitutions.        Non clinically relevant CMS requirements:  Clinical Global Impressions  First:  Considering your total clinical experience with this particular patient population, how severe are the patient's symptoms at this time?: 6 (06/29/18 1459)  Compared to the patient's condition at the START of treatment, this patient's condition is:: 4 (06/29/18 1459)  Most recent:  Considering your total clinical experience with this  particular patient population, how severe are the patient's symptoms at this time?: 6 (06/29/18 8489)  Compared to the patient's condition at the START of treatment, this patient's condition is:: 4 (06/29/18 3344)    # Pain Assessment:  Current Pain Score 6/29/2018   Patient currently in pain? denies   Pain score (0-10) -   Pain location -       Any incidence of pain both chronic or acute reported will be documented in above documentation though further documentation can also be found in the internal medicine documentation or pain specialist documentation.
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
## 2018-06-29 NOTE — PLAN OF CARE
Problem: Patient Care Overview  Goal: Individualization & Mutuality  Personal Plan of Care    Reasons you are in the Hospital  1. Suicidal   2. Dissatisfied with life   3.  4.    Goals for Discharge   1. Talk with doctor   2. Get scheduled with ECT   3.

## 2018-06-29 NOTE — PROGRESS NOTES
06/28/18 2248   Patient Belongings   Did you bring any home meds/supplements to the hospital?  No   Patient Belongings clothing;shoes   Belongings Search Yes   Clothing Search Yes   Second Staff Otis ponce-Shorts, T-shirt, shoes     Security-nothing    A               Admission:  I am responsible for any personal items that are not sent to the safe or pharmacy.  [facility] is not responsible for loss, theft or damage of any property in my possession.    Signature:  _________________________________ Date: _______  Time: _____                                              Staff Signature:  ____________________________ Date: ________  Time: _____      2nd Staff person, if patient is unable/unwilling to sign:    Signature: ________________________________ Date: ________  Time: _____     Discharge:  [facility] has returned all of my personal belongings:    Signature: _________________________________ Date: ________  Time: _____                                          Staff Signature:  ____________________________ Date: ________  Time: _____

## 2018-06-29 NOTE — PLAN OF CARE
Problem: Patient Care Overview  Goal: Team Discussion  Team Plan:   BEHAVIORAL TEAM DISCUSSION    Continued Stay Criteria/Rationale: Patient admitted voluntarily due to suicidal ideation  Plan: Psychiatric Assessment. Medication Management. Therapeutic Mileu. Individual and group support.  Participants: Emilie tafoya RN, Zina PERRIN and Santy Alfaro MD  Summary/Recommendation: The plan is to assess the patient for mental health and medication needs.  The patient will be prescribed medications to treat the identified symptoms.  Upon discharge the patient will be referred to services as appropriate based on the assessment.  Medical/Physical: Per internal med consult  Progress: Initial assessment  Precautions:   Behavioral Orders   Procedures     Code 1 - Restrict to Unit     Routine Programming     As clinically indicated     Status 15     Every 15 minutes.     Suicide precautions     Patients on Suicide Precautions should have a Combination Diet ordered that includes a Diet selection(s) AND a Behavioral Tray selection for Safe Tray - with utensils, or Safe Tray - NO utensils

## 2018-06-29 NOTE — PROGRESS NOTES
Pt appears anxious, withdrawn and was napping a lot. Pt  Attended group and participated. Pt denies SI, SIB and pain. Pt rated depression 5/10 and anxiety 6/10. Pt said he feels a bit restless an have problems staying focused. Pt appetite is okay, sleep is minimal.      06/29/18 1230   Behavioral Health   Hallucinations denies / not responding to hallucinations   Thinking poor concentration;confused   Orientation person: oriented;place: oriented;date: oriented;time: oriented   Memory baseline memory   Insight poor;admits / accepts   Judgement impaired   Eye Contact at examiner   Affect tense;blunted, flat   Mood anxious;irritable   Physical Appearance/Attire attire appropriate to age and situation;appears stated age   Hygiene (groomed)   1. Wish to be Dead No   2. Non-Specific Active Suicidal Thoughts  No   Activity isolative;restless   Speech clear;coherent   Psychomotor / Gait balanced;steady   Activities of Daily Living   Hygiene/Grooming independent   Oral Hygiene independent   Room Organization independent

## 2018-06-29 NOTE — PLAN OF CARE
Problem: Depressive Symptoms  Goal: Depressive Symptoms  Signs and symptoms of listed problems will be absent or manageable.    06/28/18 9061   Depressive Symptoms   Depressive Symptoms Assessed all   Depressive Symptoms Present mood     Abel is a 33 year-old male who came to [facility] on a voluntary basis due increasing depression.  According to the ED notes, pt reports SI with thoughts to OD on pills, cut himself or buy a gun.  He reportedly put a belt around his neck last month to see if he would lose consciousness. Pt was IP in January 2018 and has had ECT in the past. Pt reports recent losses including grandparents and a friend. He reports excessive sleep and lack of motivation; sleeps more than 14 hours daily.  Pt reports using marijuana weekly and occasional alcohol use with last time being several weeks ago. However, denies any desire to act on his Suicidal thoughts in the hospital. Pt contracts for safety on the unit. During the admit interview, pt presented with depressed mood and blunted affect, but was cooperative and polite. Denies any physical issues.   Plan: Status 15s; Build trust with pt. Continue to build on strengths. Encourage healthy coping.

## 2018-06-29 NOTE — PROGRESS NOTES
Wife may be reached at . Email [email or web address]. This is the best way to reach wife as she is often not available through her work email. Employed by [employer]. And is not comfortable answering questions or info at work . Please contact her at any time through these methods.

## 2018-06-29 NOTE — ED NOTES
ED to Behavioral Floor Handoff    SITUATION  Abel Ward is a 33 year old male who speaks English and lives in a home with a spouse The patient arrived in the ED by private car from home with a complaint of Suicidal (reports suicidal thoughts with multiple plans)  .The patient's current symptoms started/worsened 2 month(s) ago and during this time the symptoms have increased.   In the ED, pt was diagnosed with   Final diagnoses:   Suicidal ideations   Severe episode of recurrent major depressive disorder, without psychotic features (H)        Initial vitals were: BP: 126/81  Pulse: 85  Temp: 97.1  F (36.2  C)  Resp: 16  Weight: (!) 153.5 kg (338 lb 8 oz)  SpO2: 97 %   --------  Is the patient diabetic? No   If yes, last blood glucose? --     If yes, was this treated in the ED? --  --------  Is the patient inebriated (ETOH) No or Impaired on other substances? No  MSSA done? No  Last MSSA score: --    Were withdrawal symptoms treated? N/A  Does the patient have a seizure history? No. If yes, date of most recent seizure--  --------  Is the patient patient experiencing suicidal ideation? reports occasional suicidal thoughts representing feeling that life is not worth feeling    Homicidal ideation? denies current or recent homicidal ideation or behaviors.    Self-injurious behavior/urges? reports current or recent self injurious behavior or ideation including ..  ------  Was pt aggressive in the ED No  Was a code called No  Is the pt now cooperative? Yes  -------  Meds given in ED: Medications - No data to display   Family present during ED course? No  Family currently present? No    BACKGROUND  Does the patient have a cognitive impairment or developmental disability? No  Allergies: No Known Allergies.   Social demographics are   Social History     Social History     Marital status:      Spouse name: N/A     Number of children: 0     Years of education: N/A     Occupational History      /   [employer]     Social History Main Topics     Smoking status: Never Smoker     Smokeless tobacco: Never Used     Alcohol use No     Drug use: Yes     Special: Marijuana      Comment: once a month or less, > 1 month     Sexual activity: Yes     Partners: Female     Birth control/ protection: Injection     Other Topics Concern      Service No     Blood Transfusions No     Caffeine Concern Yes     occas     Exercise No     Seat Belt Yes     Parent/Sibling W/ Cabg, Mi Or Angioplasty Before 65f 55m? No     Social History Narrative        ASSESSMENT  Labs results   Labs Ordered and Resulted from Time of ED Arrival Up to the Time of Departure from the ED   DRUG ABUSE SCREEN 6 CHEM DEP URINE ([facility]) - Abnormal; Notable for the following:        Result Value    Benzodiazepine Qual Urine Positive (*)     All other components within normal limits      Imaging Studies: No results found for this or any previous visit (from the past 24 hour(s)).   Most recent vital signs /81  Pulse 85  Temp 97.1  F (36.2  C) (Oral)  Resp 16  Wt (!) 153.5 kg (338 lb 8 oz)  SpO2 97%  BMI 47.21 kg/m2   Abnormal labs/tests/findings requiring intervention:---   Pain control: pt had none  Nausea control: pt had none    RECOMMENDATION  Are any infection precautions needed (MRSA, VRE, etc.)? No If yes, what infection? --  ---  Does the patient have mobility issues? independently. If yes, what device does the pt use? ---  ---  Is patient on 72 hour hold or commitment? No If on 72 hour hold, have hold and rights been given to patient? N/A  Are admitting orders written if after 10 p.m. ?N/A  Tasks needing to be completed:---     William Calix   [facility]-- [phone]   [phone] [facility] ED

## 2018-06-30 PROCEDURE — 12400007 ZZH R&B MH INTERMEDIATE UMMC

## 2018-06-30 PROCEDURE — 12800005 ZZH R&B CD/MH INTERMEDIATE ADOLESCENT

## 2018-06-30 PROCEDURE — 25000132 ZZH RX MED GY IP 250 OP 250 PS 637: Performed by: FAMILY MEDICINE

## 2018-06-30 RX ADMIN — LOSARTAN POTASSIUM 50 MG: 50 TABLET ORAL at 09:10

## 2018-06-30 RX ADMIN — DESVENLAFAXINE SUCCINATE 100 MG: 50 TABLET, EXTENDED RELEASE ORAL at 09:10

## 2018-06-30 RX ADMIN — HYDROXYZINE HYDROCHLORIDE 25 MG: 25 TABLET ORAL at 13:04

## 2018-06-30 RX ADMIN — OMEPRAZOLE 20 MG: 20 CAPSULE, DELAYED RELEASE ORAL at 09:10

## 2018-06-30 RX ADMIN — OLANZAPINE 10 MG: 5 TABLET, FILM COATED ORAL at 18:22

## 2018-06-30 RX ADMIN — TRAZODONE HYDROCHLORIDE 100 MG: 50 TABLET ORAL at 20:51

## 2018-06-30 RX ADMIN — LITHIUM CARBONATE 600 MG: 600 CAPSULE, GELATIN COATED ORAL at 09:10

## 2018-06-30 ASSESSMENT — ACTIVITIES OF DAILY LIVING (ADL)
DRESS: INDEPENDENT
ORAL_HYGIENE: INDEPENDENT
LAUNDRY: WITH SUPERVISION
ORAL_HYGIENE: INDEPENDENT
GROOMING: INDEPENDENT
GROOMING: INDEPENDENT
LAUNDRY: WITH SUPERVISION
DRESS: INDEPENDENT

## 2018-06-30 NOTE — PLAN OF CARE
"Problem: Depressive Symptoms  Goal: Depressive Symptoms  Signs and symptoms of listed problems will be absent or manageable.   Outcome: No Change  48 hour nursing assessment:  Pt evaluation continues. Assessed mood, anxiety, thoughts, and behavior. Is progressing towards goals. Encourage participation in groups and developing healthy coping skills. Pt denies auditory or visual  hallucinations. Refer to daily team meeting notes for individualized plan of care. Will continue to assess.    Pt presents with flat to blunted affect, mood appears calm yet pt reports feeling depressed at a 5 out of 10 (10 = worst) and anxious at a 6 out of 10 (same scale). Pt approached this writer shortly after check-in for PRN hydroxyzine for increasing anxiety. Pt gives short answers to questions and is difficult to assess. Pt denies thoughts of wanting to be dead, denies plans, thoughts or intent to harm self or anyone else. Pt spent most of the shift in his room, is isolative to self yet does cooperate with cares. Pt denies any physical concerns or medication SEs. Pt declined to watch ECT video, stating \"I've had ECT before, I don't have any questions\". No other concerns at this time. Nursing will continue to monitor and assess.       "

## 2018-07-01 PROCEDURE — 25000132 ZZH RX MED GY IP 250 OP 250 PS 637: Performed by: FAMILY MEDICINE

## 2018-07-01 PROCEDURE — 12800005 ZZH R&B CD/MH INTERMEDIATE ADOLESCENT

## 2018-07-01 PROCEDURE — 12400007 ZZH R&B MH INTERMEDIATE UMMC

## 2018-07-01 RX ADMIN — LOSARTAN POTASSIUM 50 MG: 50 TABLET ORAL at 08:43

## 2018-07-01 RX ADMIN — TRAZODONE HYDROCHLORIDE 100 MG: 50 TABLET ORAL at 20:04

## 2018-07-01 RX ADMIN — OMEPRAZOLE 20 MG: 20 CAPSULE, DELAYED RELEASE ORAL at 08:43

## 2018-07-01 RX ADMIN — OLANZAPINE 10 MG: 5 TABLET, FILM COATED ORAL at 12:12

## 2018-07-01 RX ADMIN — DESVENLAFAXINE SUCCINATE 100 MG: 50 TABLET, EXTENDED RELEASE ORAL at 08:43

## 2018-07-01 RX ADMIN — LITHIUM CARBONATE 600 MG: 600 CAPSULE, GELATIN COATED ORAL at 08:43

## 2018-07-01 ASSESSMENT — ACTIVITIES OF DAILY LIVING (ADL)
ORAL_HYGIENE: INDEPENDENT
DRESS: SCRUBS (BEHAVIORAL HEALTH);INDEPENDENT
DRESS: INDEPENDENT
LAUNDRY: WITH SUPERVISION
ORAL_HYGIENE: INDEPENDENT
GROOMING: HANDWASHING;SHOWER;INDEPENDENT
HYGIENE/GROOMING: INDEPENDENT

## 2018-07-01 NOTE — PLAN OF CARE
"Problem: Patient Care Overview  Goal: Plan of Care/Patient Progress Review    Pt has a rather flat affect; states his mood is \"normal.\" He said he would attend groups, because he has been encouraged to. Otherwise, he said he mainly keeps to himself. Pt denies si; rates his depression 5, anxiety 7. He also made his goal for the day \"to go to groups.\"      "

## 2018-07-01 NOTE — PROGRESS NOTES
"   06/30/18 1900   Behavioral Health   Hallucinations denies / not responding to hallucinations   Thinking poor concentration   Orientation person: oriented;place: oriented   Memory baseline memory   Insight poor   Judgement impaired   Eye Contact at examiner   Affect blunted, flat   Mood mood is calm   Physical Appearance/Attire neat   Hygiene well groomed   Suicidality other (see comments)  (denies)   1. Wish to be Dead No   2. Non-Specific Active Suicidal Thoughts  No   Self Injury other (see comment)  (denies )   Elopement (none reported or observed )   Activity other (see comment)  (visible in the milieu )   Speech clear;coherent   Activities of Daily Living   Hygiene/Grooming independent   Oral Hygiene independent   Dress independent   Laundry with supervision   Room Organization independent       Pt denied SI and SIB.  Pt reported feeling depressed (5), agitated (5), sad (3) and anxious (6) \" I might have a visit that's why I'm anxious.\"  Pt reported overall \" I feel fine.\"  Pt seems calm, ate supper and was visible in the milieu.  Pt daily goal \" I don't have one.\"  Pt goal after discharge \" just wait for ECT.\"  Pt reported no psychotic symptoms and no racing thoughts.  Pt reported feeling hopeful \" yeah about my treatment.\"  Pt reported appetite normal, sleep fine, no pain and not SE's.  Pt is independent with ADL's.  Pt wants visitors.    "

## 2018-07-02 PROCEDURE — 25000132 ZZH RX MED GY IP 250 OP 250 PS 637: Performed by: FAMILY MEDICINE

## 2018-07-02 PROCEDURE — G0177 OPPS/PHP; TRAIN & EDUC SERV: HCPCS

## 2018-07-02 PROCEDURE — 12400007 ZZH R&B MH INTERMEDIATE UMMC

## 2018-07-02 PROCEDURE — 12800005 ZZH R&B CD/MH INTERMEDIATE ADOLESCENT

## 2018-07-02 PROCEDURE — 99232 SBSQ HOSP IP/OBS MODERATE 35: CPT | Performed by: PSYCHIATRY & NEUROLOGY

## 2018-07-02 RX ADMIN — DESVENLAFAXINE SUCCINATE 100 MG: 50 TABLET, EXTENDED RELEASE ORAL at 08:35

## 2018-07-02 RX ADMIN — OMEPRAZOLE 20 MG: 20 CAPSULE, DELAYED RELEASE ORAL at 07:50

## 2018-07-02 RX ADMIN — LITHIUM CARBONATE 600 MG: 600 CAPSULE, GELATIN COATED ORAL at 08:35

## 2018-07-02 RX ADMIN — OLANZAPINE 10 MG: 5 TABLET, FILM COATED ORAL at 08:35

## 2018-07-02 RX ADMIN — TRAZODONE HYDROCHLORIDE 100 MG: 50 TABLET ORAL at 20:33

## 2018-07-02 RX ADMIN — LOSARTAN POTASSIUM 50 MG: 50 TABLET ORAL at 07:50

## 2018-07-02 ASSESSMENT — ACTIVITIES OF DAILY LIVING (ADL)
HYGIENE/GROOMING: INDEPENDENT
ORAL_HYGIENE: INDEPENDENT
HYGIENE/GROOMING: INDEPENDENT
LAUNDRY: WITH SUPERVISION
DRESS: INDEPENDENT
ORAL_HYGIENE: INDEPENDENT
DRESS: SCRUBS (BEHAVIORAL HEALTH)

## 2018-07-02 NOTE — PROGRESS NOTES
"Spoke with the patient's wife Lexy who works for [employer].  She asked about the ECT and was told that his BMI is too high to have ECT in the [facility] and he would need to have it administered in an OR.  The OR is closed now on Wednesday for the July 4th Holiday.  She also said that patient had the workup done for a Cpap through [employer] who has all the necessary testing paperwork.  She wondered if we could \"order\" him his new CPap and then he could use it here and take it home.  Nursing said this was not an option but he could use our equipment and then would have to order his CPap after discharge.  "

## 2018-07-02 NOTE — PROGRESS NOTES
INITIAL OT ASSESSMENT     07/02/18 1400   General Information   Date Initially Attended OT 07/02/18   Clinical Impression   Affect Flat   Orientation Oriented to person, place and time   Appearance and ADLs General cleanliness observed in most areas   Attention to Internal Stimuli No observed signs   Interaction Skills Interacts with prompts, minimal response;Guarded   Ability to Communicate Needs Does so with prompts   Verbal Content Appropriate to topic   Ability to Maintain Boundaries Maintains appropriate physical boundaries;Maintains appropriate verbal boundaries   Participation Participates with minimal encouragement   Concentration Concentrates 50 minutes   Ability to Concentrate With structure   Follows and Comprehends Directions Independently follows 2 step verbal directions   Memory Needs further assessment   Organization Independently organizes medium tasks   Decision Making Independent   Planning and Problem Solving Occasionally needs assist/feedback   Ability to Apply and Learn Concepts Comprehends concepts, but needs assist to apply   Frustrations / Stress Tolerance Needs further assessment   Level of Insight Some insight   Self Esteem Can identify positives;Accepts positive feedback   Social Supports Has knowledge of support systems

## 2018-07-02 NOTE — PLAN OF CARE
"Problem: Depressive Symptoms  Goal: Social and Therapeutic (Depression)  Signs and symptoms of listed problems will be absent or manageable.     INITIAL OT NOTE  Pt attended 1 out of 3 OT groups offered. Pt attended a structured OT group with a focus on self-awareness and socialization. Pt appeared guarded with minimal response to questions prompts at beginning of group, however was observed to become comfortable sharing positive personal information as group progressed. Pt was respectful in listening and responding to peers and his answers to question prompts were insightful and relevant to current context. Pt identified \"I am good at researching history and knowing about history\" as a positive personal strength. Pt was pleasant with a flat affect during group. Will continue to assess. Continue to encourage group participation.         "

## 2018-07-02 NOTE — PROGRESS NOTES
[facility], [place]   Psychiatric Progress Note  Abel Ward  [id number]  07/02/18    Chief Complaint: Continued medical care          Interim History:   The patient's care was discussed with the treatment team during the daily team meeting and/or staff's chart notes were reviewed.  Staff report patient continues to wait to get electroconvulsive therapy and has a visit with his family.  Unfortunately he cannot get electroconvulsive therapy because of his size until Friday and has to be taken to the OR.    Upon meeting with him he still has depressive symptoms and would like to start electroconvulsive therapy sooner rather than later.  He has been minimally participating in group activities and still has hopelessness and helplessness as well as energy problems denies any acute thoughts of harming himself or others but does have attention concentration problems.  He has not had any issue with us discontinuing medications and simplifying what he is taking does not have any paranoia or hallucinations or any other concerns.  He does request that he be discharged to potentially do outpatient electroconvulsive therapy and I told him I would like to make sure we can set that up properly prior to him leaving and he was accepting of that.         Medications:       desvenlafaxine succinate  100 mg Oral Daily     lithium capsule 600 mg  600 mg Oral Daily     losartan  50 mg Oral Daily     omeprazole  20 mg Oral Daily          Allergies:   No Known Allergies       Labs:   No results found for this or any previous visit (from the past 24 hour(s)).       Psychiatric Examination:     /60  Pulse 68  Temp 96.8  F (36  C) (Oral)  Resp 18  Wt (!) 153.5 kg (338 lb 8 oz)  SpO2 98%  BMI 47.21 kg/m2  Weight is 338 lbs 8 oz  Body mass index is 47.21 kg/(m^2).                Sitting Orthostatic BP: 135/72      Sitting Orthostatic Pulse: 73 bpm      Standing Orthostatic BP: 123/71       Standing Orthostatic Pulse: 92 bpm       Weight over time:  Vitals:    06/28/18 1107   Weight: (!) 153.5 kg (338 lb 8 oz)       Orthostatic Vitals       Most Recent      Sitting Orthostatic /72 07/02 0743    Sitting Orthostatic Pulse (bpm) 73 07/02 0743    Standing Orthostatic /71 07/02 0743    Standing Orthostatic Pulse (bpm) 92 07/02 0743            Shakir is a 33-year-old male that is overweight wearing hospital scrubs and has a beard.  His speech is of an appropriate rate and tone in his language is intact.  His behavior is appropriate he does not have any abnormal movements.  His affect is subdued.  His mood he describes as sad and depressed.  His thought content consists of the above with negative thoughts no current thoughts of ending his life or harming others or delusional content.  Thought process is negative and ruminative without looseness of association.  He does not have any abnormal perceptions.  He is alert and aware of his current location and circumstances.  Attention and concentration appear adequate.  His cognition and fund of knowledge appear normal.  His long-term/short-term/remote memory appear intact.  His insight and judgment are both limited.         Precautions:     Behavioral Orders   Procedures     Code 1 - Restrict to Unit     Electroconvulsive therapy     Needs to be treated in the OR due to his weight.  Call OR to schedule.  ECT every Monday, Wednesday, and Friday     Routine Programming     As clinically indicated     Status 15     Every 15 minutes.     Suicide precautions     Patients on Suicide Precautions should have a Combination Diet ordered that includes a Diet selection(s) AND a Behavioral Tray selection for Safe Tray - with utensils, or Safe Tray - NO utensils            DIagnoses:     Major depressive disorder severe recurrent  History of ADHD  Cannabis use disorder  Mild obstructive sleep apnea  Restless leg syndrome         Assessment & Plan:     Shakir  continues to have some depressive symptoms and lethargy and is interested in having electroconvulsive therapy but would like to do it as an outpatient.  Discussed we will need to make sure that that would be possible considering he needs the OR to do the procedure here.  He was accepting of that and we will discuss it further once we have more information.    Initiating electroconvulsive therapy  Continue voluntary hospitalization    The risks, benefits, alternatives and side effects have been discussed and are understood by the patient and other caregivers.      Santy Batista  [facility] Psychiatry      The following document has been created with voice recognition software and may contain unintentional word substitutions.    Non clinically relevant CMS requirements:  Clinical Global Impressions  First:  Considering your total clinical experience with this particular patient population, how severe are the patient's symptoms at this time?: 6 (06/29/18 1459)  Compared to the patient's condition at the START of treatment, this patient's condition is:: 4 (06/29/18 1459)  Most recent:  Considering your total clinical experience with this particular patient population, how severe are the patient's symptoms at this time?: 6 (06/29/18 1459)  Compared to the patient's condition at the START of treatment, this patient's condition is:: 4 (06/29/18 1459)    # Pain Assessment:  Current Pain Score 7/2/2018   Patient currently in pain? no   Pain score (0-10) -   Pain location -       Any incidence of pain both chronic or acute reported will be documented in above documentation though further documentation can also be found in the internal medicine documentation or pain specialist documentation.

## 2018-07-02 NOTE — PROGRESS NOTES
"   07/01/18 2044   Behavioral Health   Hallucinations denies / not responding to hallucinations   Thinking intact   Orientation person: oriented;place: oriented;date: oriented;time: oriented   Memory baseline memory   Insight poor   Judgement impaired   Affect blunted, flat   Mood mood is calm   Physical Appearance/Attire attire appropriate to age and situation;appears stated age   Hygiene well groomed   Suicidality other (see comments)  (pt denies; none observed)   1. Wish to be Dead No   2. Non-Specific Active Suicidal Thoughts  No   Self Injury other (see comment)  (pt denies; none observed)   Elopement (none stated or observed)   Activity isolative  (pt isolated in room)   Speech clear;coherent   Medication Sensitivity no stated side effects;no observed side effects;other (see comment)  (see note)   Psychomotor / Gait balanced;steady   Activities of Daily Living   Hygiene/Grooming independent   Oral Hygiene independent   Dress independent   Laundry with supervision   Room Organization independent       Pt denies SI, SIB, and hallucinations.   Pt stated depression and anxiety being \"average\" rated at 5 (on 1 out of 10 scale). Pt stated that reading is a coping skill if the numbers get higher.   Pt's mood was blunted and pt isolated to room for the majority of the shift.   Pt stated that their appetite is good, they are sleeping fine, no pain, and no stated or observed side effects. Pt did state that their sleeping was fine \"because medication regulates my sleep\"  Pt stated having good visits with coworkers, mom and wife.   Pt said their next step towards discharging is meeting with the ACT team and that they are hoping to discharge by the 4th of July.     Overall pt was pleasant with staff and peers and respectful of unit rules.     "

## 2018-07-02 NOTE — CONSULTS
Patient was seen 7/2/18 for ECT 2nd opinion consultation.   Note to be dictated.    Plan: Because of his BMI, he will have to have his whole series done in the OR.  The earliest he could start is Friday.  I've not been given word yet if the OR has availability to start this Friday 7/6/18 or not.  [facility] as they do all of their's in the [facility].      Froy Weber MD

## 2018-07-02 NOTE — PROGRESS NOTES
07/02/18 1300   Behavioral Health   Hallucinations denies / not responding to hallucinations   Thinking intact   Insight admits / accepts   Judgement impaired   Affect blunted, flat   Mood depressed;anxious  (Rates both dep and anx at 5/10)   1. Wish to be Dead No   2. Non-Specific Active Suicidal Thoughts  No   Activity isolative;withdrawn   Speech clear;coherent   Psychomotor / Gait balanced;steady   Sleep/Rest/Relaxation   Day/Evening Time Hours resting in bed   Number of hours resting in bed 6 hours   Activities of Daily Living   Hygiene/Grooming independent   Oral Hygiene independent   Dress scrubs (behavioral health)   Room Organization independent   Behavioral Health Interventions   Depression maintain safety precautions   Social and Therapeutic Interventions (Depression) encourage socialization with peers   Pt was isolative to his room for most of the shift, coming out only for meals. On approach his affect was very flat and he rated both his depression and anxiety at a 5/10. He denied other symptoms and SI.

## 2018-07-03 VITALS
TEMPERATURE: 97.3 F | SYSTOLIC BLOOD PRESSURE: 116 MMHG | OXYGEN SATURATION: 98 % | RESPIRATION RATE: 16 BRPM | DIASTOLIC BLOOD PRESSURE: 60 MMHG | HEART RATE: 68 BPM | BODY MASS INDEX: 46.61 KG/M2 | WEIGHT: 315 LBS

## 2018-07-03 DIAGNOSIS — I10 ESSENTIAL HYPERTENSION WITH GOAL BLOOD PRESSURE LESS THAN 140/90: ICD-10-CM

## 2018-07-03 PROCEDURE — 99239 HOSP IP/OBS DSCHRG MGMT >30: CPT | Performed by: PSYCHIATRY & NEUROLOGY

## 2018-07-03 PROCEDURE — G0177 OPPS/PHP; TRAIN & EDUC SERV: HCPCS

## 2018-07-03 PROCEDURE — 25000132 ZZH RX MED GY IP 250 OP 250 PS 637: Performed by: FAMILY MEDICINE

## 2018-07-03 RX ORDER — OLANZAPINE 10 MG/1
10 TABLET ORAL 2 TIMES DAILY PRN
Qty: 60 TABLET | Refills: 0 | Status: SHIPPED | OUTPATIENT
Start: 2018-07-03 | End: 2019-02-05

## 2018-07-03 RX ADMIN — LITHIUM CARBONATE 600 MG: 600 CAPSULE, GELATIN COATED ORAL at 08:40

## 2018-07-03 RX ADMIN — DESVENLAFAXINE SUCCINATE 100 MG: 50 TABLET, EXTENDED RELEASE ORAL at 08:40

## 2018-07-03 RX ADMIN — LOSARTAN POTASSIUM 50 MG: 50 TABLET ORAL at 08:41

## 2018-07-03 RX ADMIN — OLANZAPINE 10 MG: 5 TABLET, FILM COATED ORAL at 14:48

## 2018-07-03 RX ADMIN — OMEPRAZOLE 20 MG: 20 CAPSULE, DELAYED RELEASE ORAL at 08:40

## 2018-07-03 ASSESSMENT — ACTIVITIES OF DAILY LIVING (ADL)
DRESS: SCRUBS (BEHAVIORAL HEALTH)
GROOMING: INDEPENDENT
ORAL_HYGIENE: INDEPENDENT

## 2018-07-03 NOTE — PROGRESS NOTES
Pt has been in the milieu.  He has been attending groups and social with peers.  Pt reports that he is feeling better and will be discharged this afternoon.  Pt reports that he will be starting ECT on Friday and feels it has been helpful.  Pt denies SI or SIB thinking.     07/03/18 1227   Behavioral Health   Hallucinations denies / not responding to hallucinations   Thinking distractable;poor concentration   Orientation person: oriented;place: oriented;date: oriented   Insight (fair)   Judgement (fair)   Eye Contact at examiner   Affect blunted, flat   Mood depressed;anxious  (feeling better)   Physical Appearance/Attire attire appropriate to age and situation   Hygiene (adequate)   Suicidality (denies SI)   1. Wish to be Dead No   2. Non-Specific Active Suicidal Thoughts  No   Self Injury (denies SIB thinking)   Elopement (none observed)   Activity (in the milieu/social with peers/attending groups)   Speech clear;coherent   Psychomotor / Gait balanced;steady   Activities of Daily Living   Hygiene/Grooming independent   Oral Hygiene independent   Dress scrubs (behavioral health)   Room Organization independent

## 2018-07-03 NOTE — PROGRESS NOTES
Pt was discharged back home and will do out pt ect. Pt's wife picked him up.  Pt verbalized readiness for discharge and verbalized understanding of discharge plan including discharge medications and out pt ECT.  Pt denies SI and SIB.  PT ate supper before leaving. Pt left with personal belongings. Writer asked wife if she had any questions and she said no and only wanted her parking validated.

## 2018-07-03 NOTE — DISCHARGE INSTRUCTIONS
Behavioral Discharge Planning and Instructions      Summary:  You were admitted on 6/28/2018  due to Depression.  You were treated by Dr. Santy Jacobs MD and discharged on ***/***/*** from Station 10N/6AE to Home      Principal Diagnosis: Major Depressive Disorder; Recurrent; Severe      Health Care Follow-up Appointments:   GIN Kaur Erick - Next Appointment Friday July 6th (arrive 6:45am) for 7am  Therapist Marcell London - Appointment Thursday, July 5th at 6:45pm for 7pm    [facility]   [address] [place] [address]  [phone]  [phone]      Attend all scheduled appointments with your outpatient providers. Call at least 24 hours in advance if you need to reschedule an appointment to ensure continued access to your outpatient providers.   Major Treatments, Procedures and Findings:  You were provided with: a psychiatric assessment, assessed for medical stability, medication evaluation and/or management, group therapy and milieu management    Symptoms to Report: thoughts of suicide    Early warning signs can include: increased depression or anxiety sleep disturbances increased thoughts or behaviors of suicide or self-harm     Safety and Wellness:  Take all medicines as directed.  Make no changes unless your doctor suggests them.      Follow treatment recommendations.  Refrain from alcohol and non-prescribed drugs.  If there is a concern for safety, call 911.    Resources:   Crisis Intervention: 611.800.5224 or [phone] (TTY: 119.454.6425).  Call anytime for help.  National Prim on Mental Illness (www.[place].anayeli.org): 290.381.7087 or 028-455-8406.    The treatment team has appreciated the opportunity to work with you.     If you have any questions or concerns our unit number is [phone].

## 2018-07-03 NOTE — PROGRESS NOTES
"Pt is to dc as per avs, around 5pm; wife to transport. He denies si and HI; states his mood is \"neutral.\" He rates depression 5, anxiety 6. He verbalizes understanding of dc instructions; will take dc med and all belongings on discharge. His wife requested a phone call from pt's -which Dr Batista was going to do. He plans to continue ECT tx as outpt. Pt is a/o, no somatic c/o's, vss. Se dc qing and avs also.  "

## 2018-07-03 NOTE — DISCHARGE SUMMARY
[facility], [place]   Psychiatric Discharge Summary  Time: 38 minutes on encounter.    Abel Ward MRN# [id number]   Age: 33 year old YOB: 1985     Date of Admission:  6/28/2018  Date of Discharge:  07/03/18  Admitting Physician:  Santy Jacobs  Discharge Physician:  Santy Jacobs         Event Leading to Hospitalization and Hospital Course:   Abel Ward was admitted for suicidal thoughts and depressive symptoms.       See Admission note by Santy Jacobs   on 6/29 for additional details.     Abel Ward was hospitalized voluntarily for worsening depressive symptoms he was getting TMS and found it not to be beneficial and wanted to reinitiate electroconvulsive therapy which she had previous benefit with.  He was additionally on some unnecessary medications which we discontinued as below including aripiprazole, lamotrigine, diazepam, naltrexone, trifluoperazine.  He did have benefit with as needed anxiety with the olanzapine and requested this at discharge and wanted to receive electroconvulsive therapy in the outpatient setting.  We did not have any acute reason to keep him against his will and in the hospital and his wife was informed of current circumstances.  We did discuss safety planning in detail and on day of discharge she denied any thoughts of harming himself or any hopelessness or helplessness hallucinations or paranoia was future oriented still had sleep problems and attention concentration issues but felt as though his energy was better and he was participating in group activities.           DIagnoses:   Major depressive disorder severe recurrent  History of ADHD  Cannabis use disorder  Mild obstructive sleep apnea  Restless leg syndrome  Borderline personality disorder         Labs:   No results found for this or any previous visit (from the past 24 hour(s)).         Consults:   Electroconvulsive therapy            Discharge Medications:        Review of your medicines      START taking       Dose / Directions    OLANZapine 10 MG tablet   Commonly known as:  zyPREXA   Used for:  Severe episode of recurrent major depressive disorder, without psychotic features (H), Anxiety        Dose:  10 mg   Take 1 tablet (10 mg) by mouth 2 times daily as needed (anxiety)   Quantity:  60 tablet   Refills:  0         CONTINUE these medicines which have NOT CHANGED       Dose / Directions    gabapentin 100 MG capsule   Commonly known as:  NEURONTIN   Used for:  Restless legs syndrome        Dose:  100-200 mg   Take 100-200 mg by mouth nightly as needed (restless leg syndrome)   Refills:  0       LITHIUM CARBONATE PO        Dose:  600 mg   Take 600 mg by mouth daily   Refills:  0       losartan 50 MG tablet   Commonly known as:  COZAAR   Used for:  Essential hypertension with goal blood pressure less than 140/90        Dose:  50 mg   Take 1 tablet (50 mg) by mouth daily   Quantity:  90 tablet   Refills:  1       omeprazole 20 MG CR capsule   Commonly known as:  priLOSEC   Used for:  Esophageal reflux        Dose:  20 mg   Take 1 capsule (20 mg) by mouth daily   Quantity:  90 capsule   Refills:  3       PRISTIQ 100 MG 24 hr tablet   Generic drug:  desvenlafaxine succinate        Dose:  100 mg   Take 100 mg by mouth daily   Refills:  0       traZODone 50 MG tablet   Commonly known as:  DESYREL        Dose:   mg   Take  mg by mouth nightly as needed for sleep   Refills:  0       Vitamin D3 50759 units Tabs        Dose:  11783 Units   Take 10,000 Units by mouth daily (patient purchases over-the-counter) this dose was NOT prescribed by a doctor.   Refills:  0         STOP taking          ARIPiprazole 10 MG tablet   Commonly known as:  ABILIFY           LAMOTRIGINE PO           naltrexone 50 MG tablet   Commonly known as:  DEPADE;REVIA           trifluoperazine 1 MG tablet   Commonly known as:  STELAZINE           VALIUM PO                 Where to get your medicines      These medications were sent to [facility] [place], [place] - [address]  [address], [facility] [address]     Phone:  [phone]      OLANZapine 10 MG tablet                  Mental Status Examination:   Shakir is a 33-year-old male that is overweight wearing hospital scrubs and has a beard.  His speech is of an appropriate rate and tone in his language is intact.  His behavior is appropriate he does not have any abnormal movements.  His affect is subdued.  His mood he describes as ok.  His thought content consists of the above with negative thoughts no current thoughts of ending his life or harming others or delusional content.  Thought process is negative and ruminative without looseness of association.  He does not have any abnormal perceptions.  He is alert and aware of his current location and circumstances.  Attention and concentration appear adequate.  His cognition and fund of knowledge appear normal.  His long-term/short-term/remote memory appear intact.  His insight and judgment are both limited.         Discharge Plan:     ELECTROCONVULSIVE THERAPY,1 SEIZ     Medication Therapy Management Referral     Reason for your hospital stay   depression     Activity   Your activity upon discharge: activity as tolerated     Discharge Instructions   1. Please do not harm yourself or others.  2. Please continue to take your medications.  3. Please follow up with your outpatient care team.  4. Please do not take drugs or alcohol as this will worsen your condition.  5. Please do not take more than the prescribed doses of medications as this may make them dangerous.   6. Please follow your safety plan of action.  7. Please call crisis if having trouble.  8. If having thoughts of harming self or others please come in to the emergency department as soon as possible.     Full Code     Diet   Follow this diet upon discharge: Orders Placed This  Encounter     Regular Diet Adult             Further instructions from your care team        Behavioral Discharge Planning and Instructions      Summary:        Principal Diagnosis: Major Depressive Disorder; Recurrent; Severe      Health Care Follow-up Appointments:   GIN Suarez - Next Appointment Friday July 6th (arrive 6:45am) for 7am  Therapist Marcell London - Appointment Thursday, July 5th at 6:45pm for 7pm    [facility]   [address] [place] [address]  [phone]  [phone]      Attend all scheduled appointments with your outpatient providers. Call at least 24 hours in advance if you need to reschedule an appointment to ensure continued access to your outpatient providers.   Major Treatments, Procedures and Findings:  You were provided with: a psychiatric assessment, assessed for medical stability, medication evaluation and/or management, group therapy and milieu management    Symptoms to Report: thoughts of suicide    Early warning signs can include: increased depression or anxiety sleep disturbances increased thoughts or behaviors of suicide or self-harm     Safety and Wellness:  Take all medicines as directed.  Make no changes unless your doctor suggests them.      Follow treatment recommendations.  Refrain from alcohol and non-prescribed drugs.  If there is a concern for safety, call 911.    Resources:   Crisis Intervention: 260.429.8377 or 216-842-7298 (TTY: 306.488.4552).  Call anytime for help.  National [place] on Mental Illness (www.[place].anayeli.org): 375.695.9302 or 906-007-4057.    The treatment team has appreciated the opportunity to work with you.     If you have any questions or concerns our unit number is [phone].            Please send copy to Vincent Suarez    During hospitalizations patients have perpetuating, complicating, situational, acute, and chronic conditions that lead to risk for suicidality or homicidality. During  hospitalizations we mitigate any modifiable risk factors that may have been identified in the history and physical examination and throughout the hospitalization. Chronic non-modifiable risk factors are not able to be changed with acute hospitalization. As a patient that is discharging these risk factors have been modified as much as possible and a supportive network and safety plan has been put in place. Further modifications and assistance will have to be obtained in the outpatient setting and the inpatient hospitalization team is no longer responsible for outcomes.    Santy Batista  [facility]      The following document has been created with voice recognition software and may contain unintentional word substitutions.      Non clinically relevant CMS requirements:  Clinical Global Impressions  First:  Considering your total clinical experience with this particular patient population, how severe are the patient's symptoms at this time?: 6 (06/29/18 1451)  Compared to the patient's condition at the START of treatment, this patient's condition is:: 4 (06/29/18 1459)  Most recent:  Considering your total clinical experience with this particular patient population, how severe are the patient's symptoms at this time?: 6 (07/03/18 9767)  Compared to the patient's condition at the START of treatment, this patient's condition is:: 4 (07/03/18 1507)    # Pain Assessment:  Current Pain Score 7/3/2018   Patient currently in pain? denies   Pain score (0-10) -   Pain location -       Any incidence of pain both chronic or acute reported will be documented in above documentation though further documentation can also be found in the internal medicine documentation or pain specialist documentation.

## 2018-07-03 NOTE — PROGRESS NOTES
Patient being discharged today.  He is voluntary and does not want to do ECT as an inpatient. His wife called and is upset about the discharge but understands we cannot keep him here.  Outpatient appts in place. Patient and his wife plan to call Dr. Weber for scheduling outpatient ECT since he will need an OR each time due to BMI being over the limit to have it done in the [facility].

## 2018-07-03 NOTE — PROGRESS NOTES
Pt spent majority of the evening shift in his room sleeping, only coming out for meals and medication. Pt reports feeling upset that he will be here for awhile since he has to do ECT. Pt rates depression as 6/10 and anxiety 6/10. Pt denies psychotic symptoms. Pt denies SI and SIB. Pt denies changes in appetite and sleep. PT reports a worsening in his concentration but attributes this to his current situation. PT had an uneventful shift.      07/02/18 2200   Behavioral Health   Hallucinations denies / not responding to hallucinations   Thinking poor concentration   Orientation place: oriented;person: oriented;date: oriented;time: oriented   Memory baseline memory   Insight admits / accepts   Judgement impaired   Eye Contact at examiner   Affect blunted, flat   Mood anxious;depressed   Physical Appearance/Attire disheveled   Hygiene neglected grooming - unclean body, hair, teeth   Suicidality (Denies SI)   1. Wish to be Dead No   2. Non-Specific Active Suicidal Thoughts  No   Self Injury (Denies SIB)   Elopement (No elopment concerns)   Activity isolative;withdrawn  (sleeping)   Speech clear;coherent   Medication Sensitivity no stated side effects;no observed side effects   Psychomotor / Gait balanced;steady   Activities of Daily Living   Hygiene/Grooming independent   Oral Hygiene independent   Dress independent   Laundry with supervision   Room Organization independent
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
## 2018-07-05 ENCOUNTER — TELEPHONE (OUTPATIENT)
Dept: PHARMACY | Facility: OTHER | Age: 33
End: 2018-07-05

## 2018-07-05 ENCOUNTER — TELEPHONE (OUTPATIENT)
Dept: FAMILY MEDICINE | Facility: CLINIC | Age: 33
End: 2018-07-05

## 2018-07-05 RX ORDER — LOSARTAN POTASSIUM 50 MG/1
TABLET ORAL
Qty: 90 TABLET | Refills: 1 | Status: SHIPPED | OUTPATIENT
Start: 2018-07-05 | End: 2018-12-28

## 2018-07-05 NOTE — TELEPHONE ENCOUNTER
MTM referral from: Transitions of Care (recent hospital discharge or ED visit)    MTM referral outreach attempt #1 on July 5, 2018 at 1:02 PM      Outcome: Patient is not interested at this time because he feels well versed in his medications, will route to MTM Pharmacist/Provider as an FYI. Thank you for the referral.     Ellis Clement, MTM Coordinator

## 2018-07-05 NOTE — TELEPHONE ENCOUNTER
"Called patient @ [phone] (home)     ED/Discharge Protocol    \"Hi, my name is Estelle Friend, a registered nurse, and I am calling on behalf of Dr. Denney's office at [facility].  I am calling to follow up and see how things are going for you after your recent visit.\"    \"I see that you were in the (ER/UC/IP) on [place] on 7/3/2018 after being treated for Suicidal Ideations, Anxiety.    How are you doing now that you are home?\" Doing fine  DENIES: suicidal thoughts, ideation/ plan to harm self or others    Is patient experiencing symptoms that may require a hospital visit?  No    Discharge Instructions    \"Let's review your discharge instructions.  What is/are the follow-up recommendations?  Pt. Response: Nothing needed    \"Were you instructed to make a follow-up appointment?\"  Pt. Response: No.       \"When you see the provider, I would recommend that you bring your discharge instructions with you.    Medications    \"How many new medications are you on since your hospitalization/ED visit?\"    2 or more - Epic MTM referral needed  \"How many of your current medicines changed (dose, timing, name, etc.) while you were in the hospital/ED visit?\"   2 or more - Epic MTM referral needed  \"Do you have questions about your medications?\"   No  \"Were you newly diagnosed with heart failure, COPD, diabetes or did you have a heart attack?\"   No  For patients on insulin: \"Did you start on insulin in the hospital or did you have your insulin dose changed?\"   No    Medication reconciliation completed? Yes    Was MTM referral placed (*Make sure to put transitions as reason for referral)?   No    Call Summary    \"Do you have any questions or concerns about your condition or care plan at the moment?\"    No  Triage nurse advice given: Advised patient that if new or worsening symptoms appear (reviewed new & worsening symptoms) to call the clinic or be seen in the the ER  Patient stated an understanding and agreed with " "plan.    Patient was in ER 3 in the past year (assess appropriateness of ER visits.)      \"If you have questions or things don't continue to improve, we encourage you contact us through the main clinic number,  [phone].  Even if the clinic is not open, triage nurses are available 24/7 to help you.     We would like you to know that our clinic has extended hours (provide information).  We also have urgent care (provide details on closest location and hours/contact info)\"      \"Thank you for your time and take care!\"        Estelle Friend RN  [place] [facility]    "

## 2018-07-05 NOTE — TELEPHONE ENCOUNTER
Pt was DC'd from [place] on 7/3/2018 after being treated for Suicidal Ideations, Anxiety.  Next scheduled appt with PCP is not scheduled.  Please call patient.  Thank you!  Bessy Burt

## 2018-07-05 NOTE — TELEPHONE ENCOUNTER
"  Requested Prescriptions   Pending Prescriptions Disp Refills     losartan (COZAAR) 50 MG tablet [Pharmacy Med Name: LOSARTAN POTASSIUM 50 MG TAB]  Last Written Prescription Date:  12/22/17  Last Fill Quantity: 90 tablet,  # refills: 1   Last Office Visit: 12/22/2017   Future Office Visit:      90 tablet 1     Sig: TAKE 1 TABLET (50 MG) BY MOUTH DAILY    Angiotensin-II Receptors Passed    7/3/2018  6:31 PM       Passed - Blood pressure under 140/90 in past 12 months    BP Readings from Last 3 Encounters:   06/28/18 116/60   04/06/18 152/89   03/09/18 128/83          Passed - Recent (12 mo) or future (30 days) visit within the authorizing provider's specialty    Patient had office visit in the last 12 months or has a visit in the next 30 days with authorizing provider or within the authorizing provider's specialty.  See \"Patient Info\" tab in inbasket, or \"Choose Columns\" in Meds & Orders section of the refill encounter.           Passed - Patient is age 18 or older       Passed - Normal serum creatinine on file in past 12 months    Recent Labs   Lab Test  01/27/18   0720   CR  0.78            Passed - Normal serum potassium on file in past 12 months    Recent Labs   Lab Test  01/27/18   0720   POTASSIUM  4.2              "

## 2018-07-09 ENCOUNTER — DOCUMENTATION ONLY (OUTPATIENT)
Dept: SLEEP MEDICINE | Facility: CLINIC | Age: 33
End: 2018-07-09

## 2018-07-09 DIAGNOSIS — G47.33 OSA (OBSTRUCTIVE SLEEP APNEA): Primary | ICD-10-CM

## 2018-07-09 NOTE — PROGRESS NOTES
Patient was offered choice of vendor and chose [facility].  Patient Abel Ward was set up at [facility] on July 9, 2018. Patient received a Resmed AirSense 10 Auto. Pressures were set at 5-15 cm H2O.   Patient s ramp is 5 cm H2O for Auto and FLEX/EPR is 2.  Patient received a Resmed Mask name: AIRFIT F20  Full Face mask Size Medium, heated tubing and heated humidifier.  Patient is enrolled in the STM Program and does not need to meet compliance. Patient has a follow up on 9/21/18 with Dr. Nazario.    Margarita Azar

## 2018-07-12 ENCOUNTER — DOCUMENTATION ONLY (OUTPATIENT)
Dept: SLEEP MEDICINE | Facility: CLINIC | Age: 33
End: 2018-07-12

## 2018-07-12 DIAGNOSIS — G47.33 OSA (OBSTRUCTIVE SLEEP APNEA): ICD-10-CM

## 2018-07-12 NOTE — PROGRESS NOTES
3 DAY STM VISIT    Diagnostic AHI: 7.5     Patient contacted for 3 day STM visit  Subjective measures:  Pt states things are going well and has no issues or complaints.  Pt is benefiting from therapy.      Device type: Auto-CPAP  PAP settings from order::  CPAP min 5 cm  H20       CPAP max 15 cm  H20  Mask type:    Full Face Mask     Device settings from machine      Min CPAP 5.0            Max CPAP 15.0      Assessment: Nightly usage over four hours.  Action plan: Pt to have f/u 14 day STM visit.  Patient has a follow up visit scheduled:   yes within 31-90 days of set up.

## 2018-07-13 ENCOUNTER — HOSPITAL ENCOUNTER (OUTPATIENT)
Dept: SURGERY | Facility: CLINIC | Age: 33
End: 2018-07-13
Attending: PSYCHIATRY & NEUROLOGY
Payer: COMMERCIAL

## 2018-07-13 ENCOUNTER — RADIANT APPOINTMENT (OUTPATIENT)
Dept: GENERAL RADIOLOGY | Facility: CLINIC | Age: 33
End: 2018-07-13
Attending: PHYSICIAN ASSISTANT
Payer: COMMERCIAL

## 2018-07-13 ENCOUNTER — OFFICE VISIT (OUTPATIENT)
Dept: INTERNAL MEDICINE | Facility: CLINIC | Age: 33
End: 2018-07-13
Payer: COMMERCIAL

## 2018-07-13 VITALS
DIASTOLIC BLOOD PRESSURE: 78 MMHG | RESPIRATION RATE: 18 BRPM | HEART RATE: 88 BPM | BODY MASS INDEX: 47.96 KG/M2 | SYSTOLIC BLOOD PRESSURE: 126 MMHG | WEIGHT: 315 LBS

## 2018-07-13 DIAGNOSIS — Z01.818 PREOP GENERAL PHYSICAL EXAM: ICD-10-CM

## 2018-07-13 DIAGNOSIS — R05.9 COUGH: ICD-10-CM

## 2018-07-13 DIAGNOSIS — Z01.818 PREOPERATIVE EXAMINATION: ICD-10-CM

## 2018-07-13 DIAGNOSIS — Z11.4 SCREENING FOR HIV (HUMAN IMMUNODEFICIENCY VIRUS): Primary | ICD-10-CM

## 2018-07-13 LAB
ALBUMIN SERPL-MCNC: 3.8 G/DL (ref 3.4–5)
ALP SERPL-CCNC: 86 U/L (ref 40–150)
ALT SERPL W P-5'-P-CCNC: 144 U/L (ref 0–70)
ANION GAP SERPL CALCULATED.3IONS-SCNC: 4 MMOL/L (ref 3–14)
AST SERPL W P-5'-P-CCNC: 52 U/L (ref 0–45)
BASOPHILS # BLD AUTO: 0.1 10E9/L (ref 0–0.2)
BASOPHILS NFR BLD AUTO: 0.5 %
BILIRUB SERPL-MCNC: 0.5 MG/DL (ref 0.2–1.3)
BUN SERPL-MCNC: 9 MG/DL (ref 7–30)
CALCIUM SERPL-MCNC: 9.1 MG/DL (ref 8.5–10.1)
CHLORIDE SERPL-SCNC: 107 MMOL/L (ref 94–109)
CO2 SERPL-SCNC: 30 MMOL/L (ref 20–32)
CREAT SERPL-MCNC: 0.85 MG/DL (ref 0.66–1.25)
DIFFERENTIAL METHOD BLD: NORMAL
EOSINOPHIL # BLD AUTO: 0.4 10E9/L (ref 0–0.7)
EOSINOPHIL NFR BLD AUTO: 4.3 %
ERYTHROCYTE [DISTWIDTH] IN BLOOD BY AUTOMATED COUNT: 13 % (ref 10–15)
GFR SERPL CREATININE-BSD FRML MDRD: >90 ML/MIN/1.7M2
GLUCOSE SERPL-MCNC: 95 MG/DL (ref 70–99)
HCT VFR BLD AUTO: 42.8 % (ref 40–53)
HGB BLD-MCNC: 14.1 G/DL (ref 13.3–17.7)
LYMPHOCYTES # BLD AUTO: 1.6 10E9/L (ref 0.8–5.3)
LYMPHOCYTES NFR BLD AUTO: 16.2 %
MCH RBC QN AUTO: 28.5 PG (ref 26.5–33)
MCHC RBC AUTO-ENTMCNC: 32.9 G/DL (ref 31.5–36.5)
MCV RBC AUTO: 87 FL (ref 78–100)
MONOCYTES # BLD AUTO: 1 10E9/L (ref 0–1.3)
MONOCYTES NFR BLD AUTO: 9.7 %
NEUTROPHILS # BLD AUTO: 6.9 10E9/L (ref 1.6–8.3)
NEUTROPHILS NFR BLD AUTO: 69.3 %
PLATELET # BLD AUTO: 242 10E9/L (ref 150–450)
POTASSIUM SERPL-SCNC: 3.9 MMOL/L (ref 3.4–5.3)
PROT SERPL-MCNC: 7.6 G/DL (ref 6.8–8.8)
RBC # BLD AUTO: 4.95 10E12/L (ref 4.4–5.9)
SODIUM SERPL-SCNC: 141 MMOL/L (ref 133–144)
WBC # BLD AUTO: 10 10E9/L (ref 4–11)

## 2018-07-13 PROCEDURE — 85025 COMPLETE CBC W/AUTO DIFF WBC: CPT | Performed by: PHYSICIAN ASSISTANT

## 2018-07-13 PROCEDURE — 93000 ELECTROCARDIOGRAM COMPLETE: CPT | Performed by: PHYSICIAN ASSISTANT

## 2018-07-13 PROCEDURE — 71046 X-RAY EXAM CHEST 2 VIEWS: CPT | Mod: FY

## 2018-07-13 PROCEDURE — 99214 OFFICE O/P EST MOD 30 MIN: CPT | Performed by: PHYSICIAN ASSISTANT

## 2018-07-13 PROCEDURE — 80053 COMPREHEN METABOLIC PANEL: CPT | Performed by: PHYSICIAN ASSISTANT

## 2018-07-13 PROCEDURE — 40000879 ZZH CANCELLED SURGERY UP TO 16-30 MINS

## 2018-07-13 PROCEDURE — 36415 COLL VENOUS BLD VENIPUNCTURE: CPT | Performed by: PHYSICIAN ASSISTANT

## 2018-07-13 ASSESSMENT — PATIENT HEALTH QUESTIONNAIRE - PHQ9
10. IF YOU CHECKED OFF ANY PROBLEMS, HOW DIFFICULT HAVE THESE PROBLEMS MADE IT FOR YOU TO DO YOUR WORK, TAKE CARE OF THINGS AT HOME, OR GET ALONG WITH OTHER PEOPLE: EXTREMELY DIFFICULT
SUM OF ALL RESPONSES TO PHQ QUESTIONS 1-9: 24
SUM OF ALL RESPONSES TO PHQ QUESTIONS 1-9: 24

## 2018-07-13 NOTE — MR AVS SNAPSHOT
After Visit Summary   7/13/2018    Abel Ward    MRN: [id number]           Patient Information     Date Of Birth          1985        Visit Information        Provider Department      7/13/2018 11:00 AM Rhona Koch PA-C [facility]        Today's Diagnoses     Screening for HIV (human immunodeficiency virus)    -  1    Preoperative examination        Preop general physical exam        Cough          Care Instructions    Hold Losartan morning of surgery     Preventive Health Recommendations  Male Ages 26 - 39    Yearly exam:             See your health care provider every year in order to  o   Review health changes.   o   Discuss preventive care.    o   Review your medicines if your doctor has prescribed any.    You should be tested each year for STDs (sexually transmitted diseases), if you re at risk.     After age 35, talk to your provider about cholesterol testing. If you are at risk for heart disease, have your cholesterol tested at least every 5 years.     If you are at risk for diabetes, you should have a diabetes test (fasting glucose).  Shots: Get a flu shot each year. Get a tetanus shot every 10 years.     Nutrition:    Eat at least 5 servings of fruits and vegetables daily.     Eat whole-grain bread, whole-wheat pasta and brown rice instead of white grains and rice.     Get adequate Calcium and Vitamin D.     Lifestyle    Exercise for at least 150 minutes a week (30 minutes a day, 5 days a week). This will help you control your weight and prevent disease.     Limit alcohol to one drink per day.     No smoking.     Wear sunscreen to prevent skin cancer.     See your dentist every six months for an exam and cleaning.       Before Your Surgery      Call your surgeon if there is any change in your health. This includes signs of a cold or flu (such as a sore throat, runny nose, cough, rash or fever).    Do not smoke, drink alcohol or take over the  counter medicine (unless your surgeon or primary care doctor tells you to) for the 24 hours before and after surgery.    If you take prescribed drugs: Follow your doctor s orders about which medicines to take and which to stop until after surgery.    Eating and drinking prior to surgery: follow the instructions from your surgeon    Take a shower or bath the night before surgery. Use the soap your surgeon gave you to gently clean your skin. If you do not have soap from your surgeon, use your regular soap. Do not shave or scrub the surgery site.  Wear clean pajamas and have clean sheets on your bed.           Follow-ups after your visit        Your next 10 appointments already scheduled     Jul 16, 2018  7:30 AM CDT   Electroconvulsive Therapy with  PACU/PROC ROOM   [facility] PACU ([facility])    [address] [place] [address]   [phone]            Jul 18, 2018  7:15 AM CDT   Electroconvulsive Therapy with  PACU/PROC ROOM   [facility] PACU ([facility])    [address] [place] [address]   [phone]            Jul 20, 2018  7:30 AM CDT   Electroconvulsive Therapy with  PACU/PROC ROOM   [facility] PACU ([facility])    [address] [place] [address]   [phone]            Jul 23, 2018  6:45 AM CDT   Electroconvulsive Therapy with  PACU/PROC ROOM   [facility] PACU ([facility])    [address] [place] [address]   [phone]            Jul 25, 2018  6:30 AM CDT   Electroconvulsive Therapy with  PACU/PROC ROOM   [facility] PACU ([facility])    [address] [place] [address]   [phone]            Sep 21, 2018  4:00 PM CDT   Return Sleep Patient with Virgil Nazario MD   [facility]    [address] [facility] [address] [place] [id number] [phone]              Who to contact      If you have questions or need follow up information about today's clinic visit or your schedule please contact [facility] directly at [phone].  Normal or non-critical lab and imaging results will be communicated to you by MyChart, letter or phone within 4 business days after the clinic has received the results. If you do not hear from us within 7 days, please contact the clinic through [employer] or phone. If you have a critical or abnormal lab result, we will notify you by phone as soon as possible.  Submit refill requests through [employer] or call your pharmacy and they will forward the refill request to us. Please allow 3 business days for your refill to be completed.          Additional Information About Your Visit        [employer] Information     [employer] gives you secure access to your electronic health record. If you see a primary care provider, you can also send messages to your care team and make appointments. If you have questions, please call your primary care clinic.  If you do not have a primary care provider, please call [phone] and they will assist you.        Care EveryWhere ID     This is your Care EveryWhere ID. This could be used by other organizations to access your [facility] medical records  [id number]        Your Vitals Were     Respirations BMI (Body Mass Index)                18 47.96 kg/m2           Blood Pressure from Last 3 Encounters:   07/13/18 126/78   06/28/18 116/60   04/06/18 152/89    Weight from Last 3 Encounters:   07/13/18 (!) 343 lb 14.4 oz (156 kg)   07/03/18 (!) 334 lb 3.2 oz (151.6 kg)   04/06/18 (!) 336 lb (152.4 kg)              We Performed the Following     CBC with platelets and differential     Comprehensive metabolic panel     EKG 12-lead complete w/read - Clinics     XR Chest 2 Views        Primary Care Provider Office Phone # Fax #    David Denney [phone] [address] [facility] [address]         Equal Access to Services     [facility] : Hadii cate power keelynixon Rommelali, waaxda luqadaha, qaybta kaalmadustin martinez, lauren eugene. So [facility] [phone].    ATENCIÓN: Si habla español, tiene a bagley disposición servicios gratuitos de asistencia lingüística. Gabrielleame al [phone].    We comply with applicable federal civil rights laws and [place] laws. We do not discriminate on the basis of race, color, national origin, age, disability, sex, sexual orientation, or gender identity.            Thank you!     Thank you for choosing [facility]  for your care. Our goal is always to provide you with excellent care. Hearing back from our patients is one way we can continue to improve our services. Please take a few minutes to complete the written survey that you may receive in the mail after your visit with us. Thank you!             Your Updated Medication List - Protect others around you: Learn how to safely use, store and throw away your medicines at www.disposemymeds.org.          This list is accurate as of 7/13/18 12:10 PM.  Always use your most recent med list.                   Brand Name Dispense Instructions for use Diagnosis    gabapentin 100 MG capsule    NEURONTIN     Take 100-200 mg by mouth nightly as needed (restless leg syndrome)    Restless legs syndrome       LITHIUM CARBONATE PO      Take 600 mg by mouth daily        losartan 50 MG tablet    COZAAR    90 tablet    TAKE 1 TABLET (50 MG) BY MOUTH DAILY    Essential hypertension with goal blood pressure less than 140/90       OLANZapine 10 MG tablet    zyPREXA    60 tablet    Take 1 tablet (10 mg) by mouth 2 times daily as needed (anxiety)    Severe episode of recurrent major depressive disorder, without psychotic features (H), Anxiety       omeprazole 20 MG CR capsule    priLOSEC    90 capsule    Take 1 capsule (20 mg) by mouth daily    Esophageal reflux       PRISTIQ 100 MG 24 hr tablet   Generic  drug:  desvenlafaxine succinate      Take 100 mg by mouth daily        traZODone 50 MG tablet    DESYREL     Take  mg by mouth nightly as needed for sleep        Vitamin D3 49860 units Tabs      Take 10,000 Units by mouth daily (patient purchases over-the-counter) this dose was NOT prescribed by a doctor.

## 2018-07-13 NOTE — PROGRESS NOTES
.    [address] [address]  [phone]  Dept: [phone]    PRE-OP EVALUATION:  Today's date: 2018    Abel Wrad (: 1985) presents for pre-operative evaluation assessment as requested by .  He requires evaluation and anesthesia risk assessment prior to undergoing surgery/procedure for treatment of Electric Shock Therapy .    Proposed Surgery/ Procedure: 18  Date of Surgery/ Procedure: 18  Time of Surgery/ Procedure: 6:00 a.m  Hospital/Surgical Facility: [facility] [place]    Primary Physician: David Denney  Type of Anesthesia Anticipated: to be determined    Patient has a Health Care Directive or Living Will:  NO    1. NO - Do you have a history of heart attack, stroke, stent, bypass or surgery on an artery in the head, neck, heart or legs?  2. NO - Do you ever have any pain or discomfort in your chest?  3. NO - Do you have a history of  Heart Failure?  4. NO - Are you troubled by shortness of breath when: walking on the level, up a slight hill or at night?  5. YES - Do you currently have a cold, bronchitis or other respiratory infection? COLD  6. YES - Do you have a cough, shortness of breath or wheezing? COUGH  7.NO - Do you sometimes get pains in the calves of your legs when you walk?   8. YES - Do you or anyone in your family have previous history of blood clots? MATERIAL GRANDMA, unsure what kind   9. NO - Do you or does anyone in your family have a serious bleeding problem such as prolonged bleeding following surgeries or cuts?  10. NO - Have you ever had problems with anemia or been told to take iron pills?  11. NO - Have you had any abnormal blood loss such as black, tarry or bloody stools, or abnormal vaginal bleeding?  12. NO - Have you ever had a blood transfusion?  13. NO - Have you or any of your relatives ever had problems with anesthesia?  14. YES - Do you have sleep apnea,  excessive snoring or daytime drowsiness?SLEEP APNEA  15. NO - Do you have any prosthetic heart valves?  16. NO - Do you have prosthetic joints?  17. NO - Is there any chance that you may be pregnant?      HPI:     HPI related to upcoming procedure:     ECT for the depression. Pt has had this in the past, tolerated procedure okay at that time.   - no current thoughts of being better of dead   - no plan to harm himself or others  - pt feels safe     ASTHMA - Patient reports childhood asthma with no recent problems.     DEPRESSION - Patient has a long history of Depression requiring medication for control with recent symptoms being uncontrolled.                                                                                                                                                                                 .  HYPERTENSION - Patient has longstanding history of HTN on losartan.                                                                                                                                                                                    .  SLEEP PROBLEM - Patient has a longstanding history of sleep apnea on CPAP.     Pt has a cold currently and has had it for a few days. Pt reports cough and stuffy nose. Cough is non-productive. Pt denies chest pain and SOB. Pt denies fever, body ache, ear pain, and sore throat. Pt has noted some sinus pain for a few days.                                                                                                                            .    MEDICAL HISTORY:     Patient Active Problem List    Diagnosis Date Noted     Depression 06/28/2018     Priority: Medium     MENDEZ (obstructive sleep apnea) 03/30/2018     Priority: Medium     3/20/2018 [facility] Sleep Study (330.0 lbs) - AHI 7.5, RDI 10.3, Supine AHI 10.3, REM AHI 29.4, Low O2 84.2%, Time Spent ?88% 0.3 minutes / Time Spent ?89% 0.6 minutes.       Severe episode of recurrent major  depressive disorder, without psychotic features (H) 11/07/2017     Priority: Medium     Suicidal ideation 11/06/2017     Priority: Medium     Essential hypertension with goal blood pressure less than 140/90 09/05/2013     Priority: Medium     Hypotestosteronism 07/19/2011     Priority: Medium     Major depressive disorder, recurrent episode, moderate (H) 02/08/2011     Priority: Medium     Marcus score is 14 on 7-19-11       Anxiety 02/08/2011     Priority: Medium     CARDIOVASCULAR SCREENING; LDL GOAL LESS THAN 160 10/31/2010     Priority: Medium     Vitamin D deficiency 05/20/2009     Priority: Medium     Morbid obesity (H) 08/24/2007     Priority: Medium     Esophageal reflux      Priority: Medium     Dr Starks- had EGD ~2002       Attention deficit hyperactivity disorder (ADHD) 02/02/2005     Priority: Medium      Past Medical History:   Diagnosis Date     Anxiety      Attention deficit disorder with hyperactivity(314.01) 2001     Esophageal reflux     Dr Starks- had EGD ~2202     Generalized anxiety disorder      Hypertension      Infectious mononucleosis 12/03     Low testosterone      Major depressive disorder, single episode in full remission (H)      Major depressive disorder, single episode, mild (H)      MDD (major depressive disorder)      Mild intermittent asthma      Past Surgical History:   Procedure Laterality Date     OTHER SURGICAL HISTORY      wisdom teeth extraction     Current Outpatient Prescriptions   Medication Sig Dispense Refill     Cholecalciferol (VITAMIN D3) 28280 UNITS TABS Take 10,000 Units by mouth daily (patient purchases over-the-counter) this dose was NOT prescribed by a doctor.       desvenlafaxine succinate (PRISTIQ) 100 MG 24 hr tablet Take 100 mg by mouth daily       gabapentin (NEURONTIN) 100 MG capsule Take 100-200 mg by mouth nightly as needed (restless leg syndrome)        LITHIUM CARBONATE PO Take 600 mg by mouth daily        losartan (COZAAR) 50 MG tablet TAKE 1 TABLET  (50 MG) BY MOUTH DAILY 90 tablet 1     OLANZapine (ZYPREXA) 10 MG tablet Take 1 tablet (10 mg) by mouth 2 times daily as needed (anxiety) 60 tablet 0     omeprazole (PRILOSEC) 20 MG capsule Take 1 capsule (20 mg) by mouth daily 90 capsule 3     traZODone (DESYREL) 50 MG tablet Take  mg by mouth nightly as needed for sleep       OTC products: None, except as noted above    No Known Allergies   Latex Allergy: NO    Social History   Substance Use Topics     Smoking status: Never Smoker     Smokeless tobacco: Never Used     Alcohol use No     History   Drug Use     Yes     Special: Marijuana     Comment: once a month or less, > 1 month       REVIEW OF SYSTEMS:   CONSTITUTIONAL: NEGATIVE for fever, chills, change in weight  + gaining weight   INTEGUMENTARY/SKIN: NEGATIVE for worrisome rashes, moles or lesions  EYES: NEGATIVE for vision changes or irritation  ENT/MOUTH: NEGATIVE for ear, mouth and throat problems  RESP: NEGATIVE for SOB  + cough   CV: NEGATIVE for chest pain, palpitations or peripheral edema  GI: NEGATIVE for nausea, abdominal pain, heartburn, or change in bowel habits  : NEGATIVE for frequency, dysuria, or hematuria  MUSCULOSKELETAL: NEGATIVE for significant arthralgias or myalgia  NEURO: NEGATIVE for weakness, dizziness or paresthesias  ENDOCRINE: NEGATIVE for temperature intolerance, skin/hair changes  HEME: NEGATIVE for bleeding problems  PSYCHIATRIC:POSITIVE for depression     EXAM:   /78  Pulse (P) 103  Temp (P) 98.3  F (36.8  C) (Oral)  Resp 18  Wt (!) 343 lb 14.4 oz (156 kg)  SpO2 (P) 95%  BMI 47.96 kg/m2    GENERAL APPEARANCE: healthy, alert and no distress     EYES: EOMI,  PERRL     HENT: ear canals and TM's normal and nose and mouth without ulcers or lesions, no sinus tenderness     NECK: no adenopathy, no asymmetry, masses, or scars and thyroid normal to palpation     RESP: lungs clear to auscultation - no rales, rhonchi or wheezes     CV: regular rates and rhythm, normal  S1 S2, no S3 or S4 and no murmur, click or rub     ABDOMEN:  soft, nontender, no HSM or masses and bowel sounds normal     MS: extremities normal- no gross deformities noted, no evidence of inflammation in joints, FROM in all extremities.     SKIN: no suspicious lesions or rashes     NEURO: Normal strength and tone, sensory exam grossly normal, mentation intact and speech normal     PSYCH: mentation appears normal. and affect normal/bright     LYMPHATICS: No cervical adenopathy    DIAGNOSTICS:     EKG: appears normal, NSR, normal axis, normal intervals, no acute ST/T changes c/w ischemia, no LVH by voltage criteria, unchanged from previous tracings  Labs Resulted Today:   Results for orders placed or performed in visit on 07/13/18   XR Chest 2 Views    Narrative    XR CHEST 2 VW 7/13/2018 3:25 PM    HISTORY: cough; Cough; Preoperative examination      Impression    IMPRESSION: Negative.    POPEYE GILLETTE MD   CBC with platelets and differential   Result Value Ref Range    WBC 10.0 4.0 - 11.0 10e9/L    RBC Count 4.95 4.4 - 5.9 10e12/L    Hemoglobin 14.1 13.3 - 17.7 g/dL    Hematocrit 42.8 40.0 - 53.0 %    MCV 87 78 - 100 fl    MCH 28.5 26.5 - 33.0 pg    MCHC 32.9 31.5 - 36.5 g/dL    RDW 13.0 10.0 - 15.0 %    Platelet Count 242 150 - 450 10e9/L    Diff Method Automated Method     % Neutrophils 69.3 %    % Lymphocytes 16.2 %    % Monocytes 9.7 %    % Eosinophils 4.3 %    % Basophils 0.5 %    Absolute Neutrophil 6.9 1.6 - 8.3 10e9/L    Absolute Lymphocytes 1.6 0.8 - 5.3 10e9/L    Absolute Monocytes 1.0 0.0 - 1.3 10e9/L    Absolute Eosinophils 0.4 0.0 - 0.7 10e9/L    Absolute Basophils 0.1 0.0 - 0.2 10e9/L   Comprehensive metabolic panel   Result Value Ref Range    Sodium 141 133 - 144 mmol/L    Potassium 3.9 3.4 - 5.3 mmol/L    Chloride 107 94 - 109 mmol/L    Carbon Dioxide 30 20 - 32 mmol/L    Anion Gap 4 3 - 14 mmol/L    Glucose 95 70 - 99 mg/dL    Urea Nitrogen 9 7 - 30 mg/dL    Creatinine 0.85 0.66 - 1.25 mg/dL     GFR Estimate >90 >60 mL/min/1.7m2    GFR Estimate If Black >90 >60 mL/min/1.7m2    Calcium 9.1 8.5 - 10.1 mg/dL    Bilirubin Total 0.5 0.2 - 1.3 mg/dL    Albumin 3.8 3.4 - 5.0 g/dL    Protein Total 7.6 6.8 - 8.8 g/dL    Alkaline Phosphatase 86 40 - 150 U/L     (H) 0 - 70 U/L    AST 52 (H) 0 - 45 U/L       Recent Labs   Lab Test  01/27/18   0720  11/07/17   0800   07/08/15   1213   HGB  15.1  14.7   < >  15.3   PLT  246  236   < >  238   INR   --    --    --   1.01   NA  140  139   < >  138   POTASSIUM  4.2  4.5   < >  4.1   CR  0.78  0.91   < >  0.86   A1C   --    --    --   5.3    < > = values in this interval not displayed.        IMPRESSION:   Reason for surgery/procedure: depression   Diagnosis/reason for consult: pre-operative exam     The proposed surgical procedure is considered LOW risk.    REVISED CARDIAC RISK INDEX  The patient has the following serious cardiovascular risks for perioperative complications such as (MI, PE, VFib and 3  AV Block):  No serious cardiac risks  INTERPRETATION: 0 risks: Class I (very low risk - 0.4% complication rate)    The patient has the following additional risks for perioperative complications:  No identified additional risks      ICD-10-CM    1. Screening for HIV (human immunodeficiency virus) Z11.4    2. Preoperative examination Z01.818 EKG 12-lead complete w/read - Clinics   3. Preop general physical exam Z01.818        RECOMMENDATIONS:     --Consult hospital rounder / IM to assist post-op medical management    Cardiovascular Risk  Performs 4 METs exercise without symptoms (Climb a flight of stairs) .     Elevated liver enzymes on lab work, advise pt to follow up with pcp following procedure for further evaluation and treatment.     Pt has an ongoing suspected viral illness with normal CBC and negative xray, no indication to refrain from surgery as long he continues to improve over the weekend. I have advised pt that if his symptoms worsen or new symptoms arise from  now until surgery that he needs to be seen.     --Patient is to take all scheduled medications on the day of surgery EXCEPT for modifications listed below.  - HOLD LOSARTAN morning of surgery       APPROVAL GIVEN to proceed with proposed procedure, without further diagnostic evaluation       Signed Electronically by: Rhona Koch PA-C    Copy of this evaluation report is provided to requesting physician.    [place] Preop Guidelines    Revised Cardiac Risk Index

## 2018-07-13 NOTE — PATIENT INSTRUCTIONS
Hold Losartan morning of surgery     Preventive Health Recommendations  Male Ages 26 - 39    Yearly exam:             See your health care provider every year in order to  o   Review health changes.   o   Discuss preventive care.    o   Review your medicines if your doctor has prescribed any.    You should be tested each year for STDs (sexually transmitted diseases), if you re at risk.     After age 35, talk to your provider about cholesterol testing. If you are at risk for heart disease, have your cholesterol tested at least every 5 years.     If you are at risk for diabetes, you should have a diabetes test (fasting glucose).  Shots: Get a flu shot each year. Get a tetanus shot every 10 years.     Nutrition:    Eat at least 5 servings of fruits and vegetables daily.     Eat whole-grain bread, whole-wheat pasta and brown rice instead of white grains and rice.     Get adequate Calcium and Vitamin D.     Lifestyle    Exercise for at least 150 minutes a week (30 minutes a day, 5 days a week). This will help you control your weight and prevent disease.     Limit alcohol to one drink per day.     No smoking.     Wear sunscreen to prevent skin cancer.     See your dentist every six months for an exam and cleaning.       Before Your Surgery      Call your surgeon if there is any change in your health. This includes signs of a cold or flu (such as a sore throat, runny nose, cough, rash or fever).    Do not smoke, drink alcohol or take over the counter medicine (unless your surgeon or primary care doctor tells you to) for the 24 hours before and after surgery.    If you take prescribed drugs: Follow your doctor s orders about which medicines to take and which to stop until after surgery.    Eating and drinking prior to surgery: follow the instructions from your surgeon    Take a shower or bath the night before surgery. Use the soap your surgeon gave you to gently clean your skin. If you do not have soap from your surgeon,  use your regular soap. Do not shave or scrub the surgery site.  Wear clean pajamas and have clean sheets on your bed.

## 2018-07-13 NOTE — PROGRESS NOTES
ECT cancelled.  Pt did not have recent H&P.  Family updated and patient will return on Monday with current H&P

## 2018-07-13 NOTE — PROGRESS NOTES
"SUBJECTIVE:   CC: Abel Ward is an 33 year old male who presents for preventative health visit.     Physical   Annual:     Getting at least 3 servings of Calcium per day:  Yes    Bi-annual eye exam:  NO    Dental care twice a year:  NO    Sleep apnea or symptoms of sleep apnea:  Sleep apnea    Diet:  Regular (no restrictions)    Taking medications regularly:  Yes    Medication side effects:  None        {additional problems to add (Optional):570708}    Today's PHQ-2 Score:   PHQ-2 ( 1999 [employer]) 7/13/2018   Q1: Little interest or pleasure in doing things 3   Q2: Feeling down, depressed or hopeless 3   PHQ-2 Score 6   Q1: Little interest or pleasure in doing things Nearly every day   Q2: Feeling down, depressed or hopeless Nearly every day   PHQ-2 Score 6       Abuse: Current or Past(Physical, Sexual or Emotional)- No  Do you feel safe in your environment - Yes    Social History   Substance Use Topics     Smoking status: Never Smoker     Smokeless tobacco: Never Used     Alcohol use No     Alcohol Use 7/13/2018   If you drink alcohol do you typically have greater than 3 drinks per day OR greater than 7 drinks per week? No   No flowsheet data found.    Last PSA:   PSA   Date Value Ref Range Status   01/15/2013 0.28 0 - 4 ug/L Final       Reviewed orders with patient. Reviewed health maintenance and updated orders accordingly - Yes  {Chronicprobdata (Optional):234972}    Reviewed and updated as needed this visit by clinical staff  Allergies  Meds         Reviewed and updated as needed this visit by Provider        {HISTORY OPTIONS (Optional):760984}    Review of Systems  {  OBJECTIVE:   There were no vitals taken for this visit.    Physical Exam  {Exam Choices (Optional):609136}    {Diagnostic Test Results (Optional):407578::\"Diagnostic Test Results:\",\"none \"}    ASSESSMENT/PLAN:   {Diag Picklist:726605}    COUNSELING:     "

## 2018-07-14 ASSESSMENT — PATIENT HEALTH QUESTIONNAIRE - PHQ9: SUM OF ALL RESPONSES TO PHQ QUESTIONS 1-9: 24

## 2018-07-15 ENCOUNTER — ANESTHESIA EVENT (OUTPATIENT)
Dept: SURGERY | Facility: CLINIC | Age: 33
End: 2018-07-15

## 2018-07-16 ENCOUNTER — HOSPITAL ENCOUNTER (OUTPATIENT)
Dept: SURGERY | Facility: CLINIC | Age: 33
Discharge: HOME | End: 2018-07-16
Attending: PSYCHIATRY & NEUROLOGY | Admitting: PSYCHIATRY & NEUROLOGY
Payer: COMMERCIAL

## 2018-07-16 ENCOUNTER — ANESTHESIA (OUTPATIENT)
Dept: SURGERY | Facility: CLINIC | Age: 33
End: 2018-07-16

## 2018-07-16 VITALS
SYSTOLIC BLOOD PRESSURE: 134 MMHG | HEART RATE: 82 BPM | RESPIRATION RATE: 12 BRPM | TEMPERATURE: 98 F | HEIGHT: 70 IN | DIASTOLIC BLOOD PRESSURE: 76 MMHG | OXYGEN SATURATION: 95 %

## 2018-07-16 DIAGNOSIS — F33.2 SEVERE EPISODE OF RECURRENT MAJOR DEPRESSIVE DISORDER, WITHOUT PSYCHOTIC FEATURES (H): ICD-10-CM

## 2018-07-16 PROCEDURE — 25000128 H RX IP 250 OP 636: Performed by: NURSE ANESTHETIST, CERTIFIED REGISTERED

## 2018-07-16 PROCEDURE — 25000125 ZZHC RX 250: Performed by: NURSE ANESTHETIST, CERTIFIED REGISTERED

## 2018-07-16 PROCEDURE — 25000128 H RX IP 250 OP 636: Performed by: PSYCHIATRY & NEUROLOGY

## 2018-07-16 PROCEDURE — 37000008 ZZH ANESTHESIA TECHNICAL FEE, 1ST 30 MIN

## 2018-07-16 PROCEDURE — 25000125 ZZHC RX 250: Performed by: ANESTHESIOLOGY

## 2018-07-16 PROCEDURE — 25000128 H RX IP 250 OP 636: Performed by: ANESTHESIOLOGY

## 2018-07-16 PROCEDURE — 90870 ELECTROCONVULSIVE THERAPY: CPT

## 2018-07-16 PROCEDURE — 40000010 ZZH STATISTIC ANES STAT CODE-CRNA PER MINUTE

## 2018-07-16 RX ORDER — ALBUTEROL SULFATE 0.83 MG/ML
2.5 SOLUTION RESPIRATORY (INHALATION) ONCE
Status: COMPLETED | OUTPATIENT
Start: 2018-07-16 | End: 2018-07-16

## 2018-07-16 RX ORDER — ONDANSETRON 2 MG/ML
4 INJECTION INTRAMUSCULAR; INTRAVENOUS
Status: COMPLETED | OUTPATIENT
Start: 2018-07-16 | End: 2018-07-16

## 2018-07-16 RX ORDER — KETOROLAC TROMETHAMINE 30 MG/ML
30 INJECTION, SOLUTION INTRAMUSCULAR; INTRAVENOUS
Status: CANCELLED
Start: 2018-07-16 | End: 2018-07-16

## 2018-07-16 RX ORDER — CAFFEINE AND SODIUM BENZOATE 125 MG/ML
250 INJECTION, SOLUTION INTRAMUSCULAR; INTRAVENOUS
Status: CANCELLED
Start: 2018-07-16 | End: 2018-07-16

## 2018-07-16 RX ORDER — KETOROLAC TROMETHAMINE 30 MG/ML
30 INJECTION, SOLUTION INTRAMUSCULAR; INTRAVENOUS
Status: COMPLETED | OUTPATIENT
Start: 2018-07-16 | End: 2018-07-16

## 2018-07-16 RX ORDER — SODIUM CHLORIDE, SODIUM LACTATE, POTASSIUM CHLORIDE, CALCIUM CHLORIDE 600; 310; 30; 20 MG/100ML; MG/100ML; MG/100ML; MG/100ML
500 INJECTION, SOLUTION INTRAVENOUS CONTINUOUS
Status: DISCONTINUED | OUTPATIENT
Start: 2018-07-16 | End: 2018-07-17 | Stop reason: HOSPADM

## 2018-07-16 RX ORDER — CAFFEINE AND SODIUM BENZOATE 125 MG/ML
250 INJECTION, SOLUTION INTRAMUSCULAR; INTRAVENOUS
Status: ACTIVE | OUTPATIENT
Start: 2018-07-16 | End: 2018-07-16

## 2018-07-16 RX ORDER — ONDANSETRON 2 MG/ML
4 INJECTION INTRAMUSCULAR; INTRAVENOUS
Status: CANCELLED
Start: 2018-07-16 | End: 2018-07-16

## 2018-07-16 RX ADMIN — METHOHEXITAL SODIUM 200 MG: 500 INJECTION, POWDER, LYOPHILIZED, FOR SOLUTION INTRAMUSCULAR; INTRAVENOUS; RECTAL at 06:44

## 2018-07-16 RX ADMIN — ONDANSETRON 4 MG: 2 INJECTION INTRAMUSCULAR; INTRAVENOUS at 06:40

## 2018-07-16 RX ADMIN — SUCCINYLCHOLINE CHLORIDE 160 MG: 20 INJECTION, SOLUTION INTRAMUSCULAR; INTRAVENOUS; PARENTERAL at 06:44

## 2018-07-16 RX ADMIN — ALBUTEROL SULFATE 2.5 MG: 2.5 SOLUTION RESPIRATORY (INHALATION) at 07:02

## 2018-07-16 RX ADMIN — KETOROLAC TROMETHAMINE 30 MG: 30 INJECTION, SOLUTION INTRAMUSCULAR at 06:40

## 2018-07-16 RX ADMIN — SODIUM CHLORIDE, POTASSIUM CHLORIDE, SODIUM LACTATE AND CALCIUM CHLORIDE 500 ML: 600; 310; 30; 20 INJECTION, SOLUTION INTRAVENOUS at 06:30

## 2018-07-16 ASSESSMENT — ENCOUNTER SYMPTOMS: DYSRHYTHMIAS: 0

## 2018-07-16 NOTE — ANESTHESIA CARE TRANSFER NOTE
Patient: Abel Ward    * No procedures listed *    Diagnosis: * No pre-op diagnosis entered *  Diagnosis Additional Information: No value filed.    Anesthesia Type:   General     Note:  Airway :Nasal Cannula  Patient transferred to:PACU  Comments: Native airway general anesthetic.  Patient hyperventilated with 100% oxygen via mask prior to treatment.   Anesthesia induced using patent peripheral IV.    Bite block placed between molars to protect teeth and tongue.     After induction of seizure patient mask ventilated with 100% oxygen until spontaneous respirations returned.     At time of handoff to PACU, patient exhibited spontaneous respirations, adequate tidal volumes, airway patent. Oxygen via nasal cannula at 4 liters per minute to PACU in cart with siderails up, connected to wall O2 in PACU. All monitors and alarms on and functioning. Report given to PACU RN and questions answered. Handoff Report: Identifed the Patient, Identified the Reponsible Provider, Reviewed the pertinent medical history, Discussed the surgical course, Reviewed Intra-OP anesthesia mangement and issues during anesthesia, Set expectations for post-procedure period and Allowed opportunity for questions and acknowledgement of understanding      Vitals: (Last set prior to Anesthesia Care Transfer)    CRNA VITALS  7/16/2018 0626 - 7/16/2018 0656      7/16/2018             Resp Rate (set): 10                Electronically Signed By: THEO Sosa CRNA  July 16, 2018  6:56 AM

## 2018-07-16 NOTE — ANESTHESIA PREPROCEDURE EVALUATION
Procedure: ECT  Preop diagnosis: Depression    No Known Allergies  Past Medical History:   Diagnosis Date     Anxiety      Attention deficit disorder with hyperactivity(314.01) 2001     Esophageal reflux     Dr Starks- had EGD ~2202     Generalized anxiety disorder      Hypertension      Infectious mononucleosis 12/03     Low testosterone      Major depressive disorder, single episode in full remission (H)      Major depressive disorder, single episode, mild (H)      MDD (major depressive disorder)      Mild intermittent asthma      Past Surgical History:   Procedure Laterality Date     OTHER SURGICAL HISTORY      wisdom teeth extraction     Social History   Substance Use Topics     Smoking status: Never Smoker     Smokeless tobacco: Never Used     Alcohol use No     Prior to Admission medications    Medication Sig Start Date End Date Taking? Authorizing Provider   Cholecalciferol (VITAMIN D3) 02099 UNITS TABS Take 10,000 Units by mouth daily (patient purchases over-the-counter) this dose was NOT prescribed by a doctor.    Unknown, Entered By History   desvenlafaxine succinate (PRISTIQ) 100 MG 24 hr tablet Take 100 mg by mouth daily    Unknown, Entered By History   gabapentin (NEURONTIN) 100 MG capsule Take 100-200 mg by mouth nightly as needed (restless leg syndrome)  9/9/16   David Denney MD   LITHIUM CARBONATE PO Take 600 mg by mouth daily     Reported, Patient   losartan (COZAAR) 50 MG tablet TAKE 1 TABLET (50 MG) BY MOUTH DAILY 7/5/18   David Denney MD   OLANZapine (ZYPREXA) 10 MG tablet Take 1 tablet (10 mg) by mouth 2 times daily as needed (anxiety) 7/3/18   Santy Jacobs MD   omeprazole (PRILOSEC) 20 MG capsule Take 1 capsule (20 mg) by mouth daily 12/2/14   David Denney MD   traZODone (DESYREL) 50 MG tablet Take  mg by mouth nightly as needed for sleep    Unknown, Entered By History     Current Outpatient Prescriptions Ordered in Epic   Medication     Cholecalciferol  (VITAMIN D3) 83808 UNITS TABS     desvenlafaxine succinate (PRISTIQ) 100 MG 24 hr tablet     gabapentin (NEURONTIN) 100 MG capsule     LITHIUM CARBONATE PO     losartan (COZAAR) 50 MG tablet     OLANZapine (ZYPREXA) 10 MG tablet     omeprazole (PRILOSEC) 20 MG capsule     traZODone (DESYREL) 50 MG tablet     Current Facility-Administered Medications Ordered in Epic   Medication Dose Route Frequency Last Rate Last Dose     caffeine-sodium benzoate injection 250 mg  250 mg Intravenous Once in ECT         ketorolac (TORADOL) injection 30 mg  30 mg Intravenous Once in ECT         lactated ringers infusion  500 mL Intravenous Continuous 25 mL/hr at 07/16/18 0630 500 mL at 07/16/18 0630     ondansetron (ZOFRAN) injection 4 mg  4 mg Intravenous Once in ECT           lactated ringers 500 mL (07/16/18 0630)     Wt Readings from Last 1 Encounters:   07/13/18 (!) 156 kg (343 lb 14.4 oz)     Temp Readings from Last 1 Encounters:   07/16/18 36.5  C (97.7  F)     BP Readings from Last 6 Encounters:   07/16/18 128/68   07/13/18 126/78   06/28/18 116/60   04/06/18 152/89   03/09/18 128/83   01/26/18 136/85     Pulse Readings from Last 4 Encounters:   07/16/18 82   07/13/18 88   06/28/18 68   04/06/18 106     Resp Readings from Last 1 Encounters:   07/16/18 16     SpO2 Readings from Last 1 Encounters:   07/16/18 98%     Recent Labs   Lab Test  07/13/18   1224  01/27/18   0720   NA  141  140   POTASSIUM  3.9  4.2   CHLORIDE  107  105   CO2  30  28   ANIONGAP  4  7   GLC  95  98   BUN  9  14   CR  0.85  0.78   INEZ  9.1  8.9     Recent Labs   Lab Test  07/13/18   1224  01/27/18   0720   AST  52*  30   ALT  144*  66   ALKPHOS  86  82   BILITOTAL  0.5  0.7     Recent Labs   Lab Test  07/13/18   1224  01/27/18   0720   WBC  10.0  5.7   HGB  14.1  15.1   PLT  242  246     Recent Labs   Lab Test  10/30/15   1235  07/08/15   1213   INR   --   1.01   PTT  27   --       Recent Results (from the past 744 hour(s))   XR Chest 2 Views    Narrative     XR CHEST 2 VW 7/13/2018 3:25 PM    HISTORY: cough; Cough; Preoperative examination      Impression    IMPRESSION: Negative.    POPEYE GILLETTE MD       RECENT LABS:     ECG: NSR, no significant abnormalities    Anesthesia Evaluation     . Pt has had prior anesthetic. Type: General    No history of anesthetic complications          ROS/MED HX    ENT/Pulmonary:     (+)sleep apnea, asthma uses CPAP , recent URI unresolved only mild residual cough remains: . .    Neurologic:      (-) CVA   Cardiovascular:     (+) hypertension----. : . . . :. .      (-) angina, CAD, syncope, arrhythmias, irregular heartbeat/palpitations and angina   METS/Exercise Tolerance:     Hematologic:         Musculoskeletal:         GI/Hepatic:     (+) GERD Asymptomatic on medication,      (-) liver disease   Renal/Genitourinary:      (-) renal disease   Endo:     (+) Obesity, .   (-) Type II DM   Psychiatric:     (+) psychiatric history depression and anxiety (ADHD)      Infectious Disease:         Malignancy:         Other:                     Physical Exam  Normal systems: dental    Airway   Mallampati: III  TM distance: >3 FB  Neck ROM: full    Dental     Cardiovascular   Rhythm and rate: regular and normal  (-) no murmur    Pulmonary    breath sounds clear to auscultation(-) no wheezes                    Anesthesia Plan      History & Physical Review  History and physical reviewed and following examination; no interval change.    ASA Status:  3 .    NPO Status:  > 8 hours    Plan for General and Other with Other (Methohexital) induction.   PONV prophylaxis:  Ondansetron (or other 5HT-3)  Zofran, Toradol pre-procedure  Pt with hx of awareness with previous procedure   Initial dose Methohexital 200 and Succinylcholine 160 --  Would recommend increasing to 200mg of Sux next procedure   Also, pt with brief desaturation into the 60's during seizure       Postoperative Care      Consents  Anesthetic plan, risks, benefits and alternatives  discussed with:  Patient..

## 2018-07-16 NOTE — ANESTHESIA POSTPROCEDURE EVALUATION
Patient: Abel Ward    * No procedures listed *    Diagnosis:* No pre-op diagnosis entered *  Diagnosis Additional Information: No value filed.    Anesthesia Type:  General    Note:  Anesthesia Post Evaluation    Patient location during evaluation: PACU  Patient participation: Able to fully participate in evaluation  Level of consciousness: awake and alert  Pain management: adequate  Airway patency: patent  Cardiovascular status: acceptable and hemodynamically stable  Respiratory status: nonlabored ventilation, unassisted and acceptable  Hydration status: acceptable  PONV: none             Last vitals:  Vitals:    07/16/18 0715 07/16/18 0720 07/16/18 0725   BP: (!) 135/96 141/80 134/76   Pulse:      Resp: 22 13 12   Temp:   36.7  C (98  F)   SpO2: 96% 96% 95%         Electronically Signed By: Ashish Holt MD  July 16, 2018  7:55 AM

## 2018-07-16 NOTE — PROCEDURES
Procedure/Surgery Information   [facility] ECT Procedure Note    Date of Service (when I performed the procedure): 07/16/2018    Abel Ward  [id number]  33 year old    1985    Patient Status: Outpatient    No Known Allergies.    Weight:     Diagnosis:  1. Severe episode of recurrent major depressive disorder, without psychotic features (H)      Indications for ECT:   History    Past Medical History:   Diagnosis Date     Anxiety      Attention deficit disorder with hyperactivity(314.01) 2001     Esophageal reflux     Dr Starks- had EGD ~2202     Generalized anxiety disorder      Hypertension      Infectious mononucleosis 12/03     Low testosterone      Major depressive disorder, single episode in full remission (H)      Major depressive disorder, single episode, mild (H)      MDD (major depressive disorder)      Mild intermittent asthma      Temp: 97.7  F (36.5  C)   BP: 128/68 Pulse: 82   Resp: 16 SpO2: 98 % O2 Device: None (Room air)      Procedures       Brevitol 200 mg  Succinyl 160 mg  Bilateral  percent  GTC 49 seconds  Complications: pt had desaturization and went down to an O2 of 60. It was immediately addressed and only persisted for a few seconds. Went back up to 98%. See anesth note. Continue course of 6 total outpatient ECT bilateral. Next ECT scheduled for Wednesday, 7/18/2018.         Maik Matias

## 2018-07-16 NOTE — PROGRESS NOTES
[facility] ECT Procedure Note     Abel Ward [id number]   33 year old 1985     Patient Status: Outpatient    No Known Allergies    Weight:  0 lbs 0 oz    Patient Preparations:  (NA)     Diagnosis:     Major Depression, recurrent, severe, without psychotic features.      Indications for ECT:     Medications ineffective; History of good ECT response in one or more previous episodes of illness.      Clinical Narrative:     ECT administered by thymatron machine, consent signed, side effects, risks, benefits reviewed. Pt has been medically cleared for procedure, consent signed.      Pause for the Cause:     Right patient YES   Right procedure/laterality settings: YES   Right diagnosis YES      Intra-Procedure Documentation:     Date:  7/16/2018  Time:  6:50 AM    ECT # 1   Treatment number this series: 1   Total treatment number: 1     Type of ECT: Bilateral    ECT Medications:      Brevitol 200 mg  Succinyl Choline 160 mg    ECT Strip Summary:   Energy Level: 100 percent  Motor Seizure Duration: 49 seconds  EEG Seizure Duration: 49 seconds    Complications: Pt had desaturization and went down to an O2 of 60. It was immediately addressed and only persisted for a few seconds. O2 went back up to 98%. See anesthesiology note.     Plan: Continue course of 6 total outpatient ECT bilateral treatments. Next ECT scheduled for Wednesday, 7/18/2018.

## 2018-07-18 ENCOUNTER — ANESTHESIA (OUTPATIENT)
Dept: SURGERY | Facility: CLINIC | Age: 33
End: 2018-07-18

## 2018-07-18 ENCOUNTER — HOSPITAL ENCOUNTER (OUTPATIENT)
Dept: SURGERY | Facility: CLINIC | Age: 33
Discharge: HOME | End: 2018-07-18
Attending: PSYCHIATRY & NEUROLOGY | Admitting: PSYCHIATRY & NEUROLOGY
Payer: COMMERCIAL

## 2018-07-18 ENCOUNTER — ANESTHESIA EVENT (OUTPATIENT)
Dept: SURGERY | Facility: CLINIC | Age: 33
End: 2018-07-18

## 2018-07-18 VITALS
TEMPERATURE: 98.8 F | SYSTOLIC BLOOD PRESSURE: 147 MMHG | OXYGEN SATURATION: 95 % | RESPIRATION RATE: 20 BRPM | DIASTOLIC BLOOD PRESSURE: 87 MMHG

## 2018-07-18 DIAGNOSIS — F33.2 SEVERE EPISODE OF RECURRENT MAJOR DEPRESSIVE DISORDER, WITHOUT PSYCHOTIC FEATURES (H): ICD-10-CM

## 2018-07-18 PROCEDURE — 90870 ELECTROCONVULSIVE THERAPY: CPT

## 2018-07-18 PROCEDURE — 25000128 H RX IP 250 OP 636: Performed by: PSYCHIATRY & NEUROLOGY

## 2018-07-18 PROCEDURE — 40000010 ZZH STATISTIC ANES STAT CODE-CRNA PER MINUTE

## 2018-07-18 PROCEDURE — 37000008 ZZH ANESTHESIA TECHNICAL FEE, 1ST 30 MIN

## 2018-07-18 PROCEDURE — 25000125 ZZHC RX 250: Performed by: NURSE ANESTHETIST, CERTIFIED REGISTERED

## 2018-07-18 PROCEDURE — 25000128 H RX IP 250 OP 636: Performed by: NURSE ANESTHETIST, CERTIFIED REGISTERED

## 2018-07-18 PROCEDURE — 25000128 H RX IP 250 OP 636: Performed by: ANESTHESIOLOGY

## 2018-07-18 RX ORDER — CAFFEINE AND SODIUM BENZOATE 125 MG/ML
250 INJECTION, SOLUTION INTRAMUSCULAR; INTRAVENOUS
Status: CANCELLED
Start: 2018-07-18 | End: 2018-07-18

## 2018-07-18 RX ORDER — SODIUM CHLORIDE, SODIUM LACTATE, POTASSIUM CHLORIDE, CALCIUM CHLORIDE 600; 310; 30; 20 MG/100ML; MG/100ML; MG/100ML; MG/100ML
INJECTION, SOLUTION INTRAVENOUS ONCE
Status: COMPLETED | OUTPATIENT
Start: 2018-07-18 | End: 2018-07-18

## 2018-07-18 RX ORDER — ONDANSETRON 2 MG/ML
4 INJECTION INTRAMUSCULAR; INTRAVENOUS
Status: CANCELLED
Start: 2018-07-18 | End: 2018-07-18

## 2018-07-18 RX ORDER — KETOROLAC TROMETHAMINE 30 MG/ML
30 INJECTION, SOLUTION INTRAMUSCULAR; INTRAVENOUS
Status: COMPLETED | OUTPATIENT
Start: 2018-07-18 | End: 2018-07-18

## 2018-07-18 RX ORDER — KETOROLAC TROMETHAMINE 30 MG/ML
30 INJECTION, SOLUTION INTRAMUSCULAR; INTRAVENOUS
Status: CANCELLED
Start: 2018-07-18 | End: 2018-07-18

## 2018-07-18 RX ORDER — ONDANSETRON 2 MG/ML
4 INJECTION INTRAMUSCULAR; INTRAVENOUS
Status: COMPLETED | OUTPATIENT
Start: 2018-07-18 | End: 2018-07-18

## 2018-07-18 RX ADMIN — SUCCINYLCHOLINE CHLORIDE 140 MG: 20 INJECTION, SOLUTION INTRAMUSCULAR; INTRAVENOUS; PARENTERAL at 06:44

## 2018-07-18 RX ADMIN — METHOHEXITAL SODIUM 180 MG: 500 INJECTION, POWDER, LYOPHILIZED, FOR SOLUTION INTRAMUSCULAR; INTRAVENOUS; RECTAL at 06:44

## 2018-07-18 RX ADMIN — ONDANSETRON 4 MG: 2 INJECTION INTRAMUSCULAR; INTRAVENOUS at 06:37

## 2018-07-18 RX ADMIN — KETOROLAC TROMETHAMINE 30 MG: 30 INJECTION, SOLUTION INTRAMUSCULAR at 06:37

## 2018-07-18 RX ADMIN — SODIUM CHLORIDE, POTASSIUM CHLORIDE, SODIUM LACTATE AND CALCIUM CHLORIDE: 600; 310; 30; 20 INJECTION, SOLUTION INTRAVENOUS at 06:36

## 2018-07-18 ASSESSMENT — ENCOUNTER SYMPTOMS: DYSRHYTHMIAS: 0

## 2018-07-18 NOTE — ANESTHESIA PREPROCEDURE EVALUATION
Procedure: ECT  Preop diagnosis: Depression    No Known Allergies  Past Medical History:   Diagnosis Date     Anxiety      Attention deficit disorder with hyperactivity(314.01) 2001     Esophageal reflux     Dr Starks- had EGD ~2202     Generalized anxiety disorder      Hypertension      Infectious mononucleosis 12/03     Low testosterone      Major depressive disorder, single episode in full remission (H)      Major depressive disorder, single episode, mild (H)      MDD (major depressive disorder)      Mild intermittent asthma      Past Surgical History:   Procedure Laterality Date     OTHER SURGICAL HISTORY      wisdom teeth extraction     Social History   Substance Use Topics     Smoking status: Never Smoker     Smokeless tobacco: Never Used     Alcohol use No     Prior to Admission medications    Medication Sig Start Date End Date Taking? Authorizing Provider   Cholecalciferol (VITAMIN D3) 93275 UNITS TABS Take 10,000 Units by mouth daily (patient purchases over-the-counter) this dose was NOT prescribed by a doctor.    Unknown, Entered By History   desvenlafaxine succinate (PRISTIQ) 100 MG 24 hr tablet Take 100 mg by mouth daily    Unknown, Entered By History   gabapentin (NEURONTIN) 100 MG capsule Take 100-200 mg by mouth nightly as needed (restless leg syndrome)  9/9/16   David Denney MD   LITHIUM CARBONATE PO Take 600 mg by mouth daily     Reported, Patient   losartan (COZAAR) 50 MG tablet TAKE 1 TABLET (50 MG) BY MOUTH DAILY 7/5/18   David Denney MD   OLANZapine (ZYPREXA) 10 MG tablet Take 1 tablet (10 mg) by mouth 2 times daily as needed (anxiety) 7/3/18   Santy Jacobs MD   omeprazole (PRILOSEC) 20 MG capsule Take 1 capsule (20 mg) by mouth daily 12/2/14   David Denney MD   traZODone (DESYREL) 50 MG tablet Take  mg by mouth nightly as needed for sleep    Unknown, Entered By History     Current Outpatient Prescriptions Ordered in Epic   Medication     Cholecalciferol  (VITAMIN D3) 63335 UNITS TABS     desvenlafaxine succinate (PRISTIQ) 100 MG 24 hr tablet     gabapentin (NEURONTIN) 100 MG capsule     LITHIUM CARBONATE PO     losartan (COZAAR) 50 MG tablet     OLANZapine (ZYPREXA) 10 MG tablet     omeprazole (PRILOSEC) 20 MG capsule     traZODone (DESYREL) 50 MG tablet     Current Facility-Administered Medications Ordered in Epic   Medication Dose Route Frequency Last Rate Last Dose     lidocaine 1 % 0.5 mg  0.5 mg Subcutaneous Once         ondansetron (ZOFRAN) injection 4 mg  4 mg Intravenous Once in ECT   4 mg at 07/18/18 0637       Wt Readings from Last 1 Encounters:   07/13/18 (!) 156 kg (343 lb 14.4 oz)     Temp Readings from Last 1 Encounters:   07/18/18 36.9  C (98.4  F) (Bladder)     BP Readings from Last 6 Encounters:   07/18/18 145/86   07/16/18 134/76   07/13/18 126/78   06/28/18 116/60   04/06/18 152/89   03/09/18 128/83     Pulse Readings from Last 4 Encounters:   07/16/18 82   07/13/18 88   06/28/18 68   04/06/18 106     Resp Readings from Last 1 Encounters:   07/18/18 14     SpO2 Readings from Last 1 Encounters:   07/18/18 97%     Recent Labs   Lab Test  07/13/18   1224  01/27/18   0720   NA  141  140   POTASSIUM  3.9  4.2   CHLORIDE  107  105   CO2  30  28   ANIONGAP  4  7   GLC  95  98   BUN  9  14   CR  0.85  0.78   INEZ  9.1  8.9     Recent Labs   Lab Test  07/13/18   1224  01/27/18   0720   AST  52*  30   ALT  144*  66   ALKPHOS  86  82   BILITOTAL  0.5  0.7     Recent Labs   Lab Test  07/13/18   1224  01/27/18   0720   WBC  10.0  5.7   HGB  14.1  15.1   PLT  242  246     Recent Labs   Lab Test  10/30/15   1235  07/08/15   1213   INR   --   1.01   PTT  27   --       Recent Results (from the past 744 hour(s))   XR Chest 2 Views    Narrative    XR CHEST 2 VW 7/13/2018 3:25 PM    HISTORY: cough; Cough; Preoperative examination      Impression    IMPRESSION: Negative.    POPEYE GILLETTE MD       RECENT LABS:     ECG: NSR, no significant abnormalities    Anesthesia  Evaluation     . Pt has had prior anesthetic. Type: General    No history of anesthetic complications          ROS/MED HX    ENT/Pulmonary:     (+)sleep apnea, asthma uses CPAP , recent URI unresolved only mild residual cough remains: . .    Neurologic:      (-) CVA   Cardiovascular:     (+) hypertension----. : . . . :. .      (-) angina, CAD, syncope, arrhythmias, irregular heartbeat/palpitations and angina   METS/Exercise Tolerance:     Hematologic:         Musculoskeletal:         GI/Hepatic:     (+) GERD Asymptomatic on medication,      (-) liver disease   Renal/Genitourinary:      (-) renal disease   Endo:     (+) Obesity, .   (-) Type II DM   Psychiatric:     (+) psychiatric history depression and anxiety (ADHD)      Infectious Disease:         Malignancy:         Other:                     Physical Exam  Normal systems: dental    Airway   Mallampati: III  TM distance: >3 FB  Neck ROM: full    Dental     Cardiovascular   Rhythm and rate: regular and normal  (-) no murmur    Pulmonary    breath sounds clear to auscultation(-) no wheezes                        Anesthesia Plan      History & Physical Review  History and physical reviewed and following examination; no interval change.    ASA Status:  3 .    NPO Status:  > 8 hours    Plan for General and Other with Other (Methohexital) induction.   PONV prophylaxis:  Ondansetron (or other 5HT-3)  Zofran, Toradol pre-procedure  Pt with hx of awareness with previous procedure   Initial dose Methohexital 200 and Succinylcholine 160 --  Would recommend increasing to 200mg of Sux next procedure   Also, pt with brief desaturation into the 60's during seizure       Postoperative Care      Consents  Anesthetic plan, risks, benefits and alternatives discussed with:  Patient..

## 2018-07-18 NOTE — PROCEDURES
[facility] ECT Procedure Note     Abel Ward [id number]   33 year old 1985     Patient Status: Outpatient    No Known Allergies    Weight:  0 lbs 0 oz    Patient Preparations:  (NA)     Diagnosis:     Major Depression, recurrent, severe, without psychotic features.      Indications for ECT:     Medications ineffective; History of good ECT response in one or more previous episodes of illness.      Clinical Narrative:     ECT administered by thymatron machine, consent signed, side effects, risks, benefits reviewed. Pt has been medically cleared for procedure, consent signed.      Pause for the Cause:     Right patient YES   Right procedure/laterality settings: YES   Right diagnosis YES      Intra-Procedure Documentation:     Date:  7/18/2018  Time:  6:45 AM    ECT # 2   Treatment number this series: 2   Total treatment number: 2     Type of ECT: Bilateral    ECT Medications:      Brevitol 180 mg  Succinyl Choline 140 mg    ECT Strip Summary:   Energy Level: 50 percent  Motor Seizure Duration: 49 seconds  EEG Seizure Duration: 49 seconds    Complications: None.    Plan: Continue course of 6 total outpatient ECT bilateral treatments. Next ECT scheduled for Friday, 7/20/2018.

## 2018-07-18 NOTE — ANESTHESIA CARE TRANSFER NOTE
Patient: Abel Ward    * No procedures listed *    Diagnosis: * No pre-op diagnosis entered *  Diagnosis Additional Information: No value filed.    Anesthesia Type:   General     Note:  Airway :Face Mask  Patient transferred to:PACU  Comments: Native airway general anesthetic.  Patient hyperventilated with 100% oxygen via mask prior to treatment.   Anesthesia induced using patent peripheral IV.    Bite block placed between molars to protect teeth and tongue.     After induction of seizure patient mask ventilated with 100% oxygen until spontaneous respirations returned.     At time of handoff to PACU, patient exhibited spontaneous respirations, adequate tidal volumes, airway patent. Oxygen via facemask at 10 liters per minute to PACU in cart with siderails up, connected to wall O2 in PACU. All monitors and alarms on and functioning. Report given to PACU RN and questions answered. Handoff Report: Identifed the Patient, Identified the Reponsible Provider, Reviewed the pertinent medical history, Discussed the surgical course, Reviewed Intra-OP anesthesia mangement and issues during anesthesia, Set expectations for post-procedure period and Allowed opportunity for questions and acknowledgement of understanding      Vitals: (Last set prior to Anesthesia Care Transfer)    CRNA VITALS  7/18/2018 0623 - 7/18/2018 0653      7/18/2018             Pulse: 104    SpO2: (!)  89 %    Resp Rate (set): 10                Electronically Signed By: THEO Pereira CRNA  July 18, 2018  6:53 AM

## 2018-07-20 ENCOUNTER — ANESTHESIA EVENT (OUTPATIENT)
Dept: SURGERY | Facility: CLINIC | Age: 33
End: 2018-07-20

## 2018-07-20 ENCOUNTER — HOSPITAL ENCOUNTER (OUTPATIENT)
Dept: SURGERY | Facility: CLINIC | Age: 33
Discharge: HOME | End: 2018-07-20
Attending: PSYCHIATRY & NEUROLOGY | Admitting: PSYCHIATRY & NEUROLOGY
Payer: COMMERCIAL

## 2018-07-20 ENCOUNTER — ANESTHESIA (OUTPATIENT)
Dept: SURGERY | Facility: CLINIC | Age: 33
End: 2018-07-20

## 2018-07-20 VITALS
OXYGEN SATURATION: 94 % | SYSTOLIC BLOOD PRESSURE: 142 MMHG | DIASTOLIC BLOOD PRESSURE: 82 MMHG | RESPIRATION RATE: 16 BRPM

## 2018-07-20 DIAGNOSIS — F33.2 SEVERE EPISODE OF RECURRENT MAJOR DEPRESSIVE DISORDER, WITHOUT PSYCHOTIC FEATURES (H): ICD-10-CM

## 2018-07-20 PROCEDURE — 25000125 ZZHC RX 250: Performed by: NURSE ANESTHETIST, CERTIFIED REGISTERED

## 2018-07-20 PROCEDURE — 25000128 H RX IP 250 OP 636: Performed by: ANESTHESIOLOGY

## 2018-07-20 PROCEDURE — 90870 ELECTROCONVULSIVE THERAPY: CPT

## 2018-07-20 PROCEDURE — 25000128 H RX IP 250 OP 636: Performed by: PSYCHIATRY & NEUROLOGY

## 2018-07-20 PROCEDURE — 37000008 ZZH ANESTHESIA TECHNICAL FEE, 1ST 30 MIN

## 2018-07-20 PROCEDURE — 25000128 H RX IP 250 OP 636: Performed by: NURSE ANESTHETIST, CERTIFIED REGISTERED

## 2018-07-20 PROCEDURE — 25000125 ZZHC RX 250: Performed by: PSYCHIATRY & NEUROLOGY

## 2018-07-20 PROCEDURE — 40000010 ZZH STATISTIC ANES STAT CODE-CRNA PER MINUTE

## 2018-07-20 RX ORDER — KETOROLAC TROMETHAMINE 30 MG/ML
30 INJECTION, SOLUTION INTRAMUSCULAR; INTRAVENOUS
Status: CANCELLED
Start: 2018-07-20 | End: 2018-07-20

## 2018-07-20 RX ORDER — SODIUM CHLORIDE, SODIUM LACTATE, POTASSIUM CHLORIDE, CALCIUM CHLORIDE 600; 310; 30; 20 MG/100ML; MG/100ML; MG/100ML; MG/100ML
500 INJECTION, SOLUTION INTRAVENOUS CONTINUOUS
Status: DISCONTINUED | OUTPATIENT
Start: 2018-07-20 | End: 2018-07-21 | Stop reason: HOSPADM

## 2018-07-20 RX ORDER — MEPERIDINE HYDROCHLORIDE 25 MG/ML
12.5 INJECTION INTRAMUSCULAR; INTRAVENOUS; SUBCUTANEOUS
Status: DISCONTINUED | OUTPATIENT
Start: 2018-07-20 | End: 2018-07-21 | Stop reason: HOSPADM

## 2018-07-20 RX ORDER — ONDANSETRON 2 MG/ML
4 INJECTION INTRAMUSCULAR; INTRAVENOUS
Status: COMPLETED | OUTPATIENT
Start: 2018-07-20 | End: 2018-07-20

## 2018-07-20 RX ORDER — SODIUM CHLORIDE, SODIUM LACTATE, POTASSIUM CHLORIDE, CALCIUM CHLORIDE 600; 310; 30; 20 MG/100ML; MG/100ML; MG/100ML; MG/100ML
INJECTION, SOLUTION INTRAVENOUS CONTINUOUS
Status: DISCONTINUED | OUTPATIENT
Start: 2018-07-20 | End: 2018-07-21 | Stop reason: HOSPADM

## 2018-07-20 RX ORDER — NALOXONE HYDROCHLORIDE 0.4 MG/ML
.1-.4 INJECTION, SOLUTION INTRAMUSCULAR; INTRAVENOUS; SUBCUTANEOUS
Status: DISCONTINUED | OUTPATIENT
Start: 2018-07-20 | End: 2018-07-21 | Stop reason: HOSPADM

## 2018-07-20 RX ORDER — ONDANSETRON 4 MG/1
4 TABLET, ORALLY DISINTEGRATING ORAL EVERY 30 MIN PRN
Status: DISCONTINUED | OUTPATIENT
Start: 2018-07-20 | End: 2018-07-21 | Stop reason: HOSPADM

## 2018-07-20 RX ORDER — CAFFEINE AND SODIUM BENZOATE 125 MG/ML
250 INJECTION, SOLUTION INTRAMUSCULAR; INTRAVENOUS
Status: COMPLETED | OUTPATIENT
Start: 2018-07-20 | End: 2018-07-20

## 2018-07-20 RX ORDER — ONDANSETRON 2 MG/ML
4 INJECTION INTRAMUSCULAR; INTRAVENOUS
Status: CANCELLED
Start: 2018-07-20 | End: 2018-07-20

## 2018-07-20 RX ORDER — CAFFEINE AND SODIUM BENZOATE 125 MG/ML
250 INJECTION, SOLUTION INTRAMUSCULAR; INTRAVENOUS
Status: CANCELLED
Start: 2018-07-20 | End: 2018-07-20

## 2018-07-20 RX ORDER — ONDANSETRON 2 MG/ML
4 INJECTION INTRAMUSCULAR; INTRAVENOUS EVERY 30 MIN PRN
Status: DISCONTINUED | OUTPATIENT
Start: 2018-07-20 | End: 2018-07-21 | Stop reason: HOSPADM

## 2018-07-20 RX ORDER — KETOROLAC TROMETHAMINE 30 MG/ML
30 INJECTION, SOLUTION INTRAMUSCULAR; INTRAVENOUS
Status: COMPLETED | OUTPATIENT
Start: 2018-07-20 | End: 2018-07-20

## 2018-07-20 RX ADMIN — CAFFEINE AND SODIUM BENZOATE 250 MG: 125 INJECTION, SOLUTION INTRAMUSCULAR; INTRAVENOUS at 06:34

## 2018-07-20 RX ADMIN — SODIUM CHLORIDE, POTASSIUM CHLORIDE, SODIUM LACTATE AND CALCIUM CHLORIDE 500 ML: 600; 310; 30; 20 INJECTION, SOLUTION INTRAVENOUS at 06:25

## 2018-07-20 RX ADMIN — ONDANSETRON 4 MG: 2 INJECTION INTRAMUSCULAR; INTRAVENOUS at 06:29

## 2018-07-20 RX ADMIN — SUCCINYLCHOLINE CHLORIDE 180 MG: 20 INJECTION, SOLUTION INTRAMUSCULAR; INTRAVENOUS; PARENTERAL at 06:46

## 2018-07-20 RX ADMIN — METHOHEXITAL SODIUM 200 MG: 500 INJECTION, POWDER, LYOPHILIZED, FOR SOLUTION INTRAMUSCULAR; INTRAVENOUS; RECTAL at 06:46

## 2018-07-20 RX ADMIN — SUCCINYLCHOLINE CHLORIDE 20 MG: 20 INJECTION, SOLUTION INTRAMUSCULAR; INTRAVENOUS; PARENTERAL at 06:49

## 2018-07-20 RX ADMIN — KETOROLAC TROMETHAMINE 30 MG: 30 INJECTION, SOLUTION INTRAMUSCULAR at 06:29

## 2018-07-20 RX ADMIN — METHOHEXITAL SODIUM 20 MG: 500 INJECTION, POWDER, LYOPHILIZED, FOR SOLUTION INTRAMUSCULAR; INTRAVENOUS; RECTAL at 06:49

## 2018-07-20 ASSESSMENT — ENCOUNTER SYMPTOMS: DYSRHYTHMIAS: 0

## 2018-07-20 NOTE — ANESTHESIA POSTPROCEDURE EVALUATION
Patient: Abel Ward    * No procedures listed *    Diagnosis:* No pre-op diagnosis entered *  Diagnosis Additional Information: No value filed.    Anesthesia Type:  General, Other    Note:  Anesthesia Post Evaluation    Patient location during evaluation: PACU  Patient participation: Able to fully participate in evaluation  Level of consciousness: sleepy but conscious and responsive to verbal stimuli  Pain management: adequate  Airway patency: patent  Cardiovascular status: acceptable and hemodynamically stable  Respiratory status: acceptable and unassisted  Hydration status: acceptable  PONV: none     Anesthetic complications: None          Last vitals:  Vitals:    07/20/18 0705 07/20/18 0710 07/20/18 0715   BP: 134/85 (!) 140/95 141/85   Resp: 18 18 17   SpO2: 96% 96% 99%         Electronically Signed By: Lee Washburn MD  July 20, 2018  7:19 AM

## 2018-07-20 NOTE — ANESTHESIA PREPROCEDURE EVALUATION
Procedure: * No procedures listed *  Preop diagnosis: * No pre-op diagnosis entered *    No Known Allergies  Past Medical History:   Diagnosis Date     Anxiety      Attention deficit disorder with hyperactivity(314.01) 2001     Esophageal reflux     Dr Starks- had EGD ~2202     Generalized anxiety disorder      Hypertension      Infectious mononucleosis 12/03     Low testosterone      Major depressive disorder, single episode in full remission (H)      Major depressive disorder, single episode, mild (H)      MDD (major depressive disorder)      Mild intermittent asthma      Past Surgical History:   Procedure Laterality Date     OTHER SURGICAL HISTORY      wisdom teeth extraction     Social History   Substance Use Topics     Smoking status: Never Smoker     Smokeless tobacco: Never Used     Alcohol use No     Prior to Admission medications    Medication Sig Start Date End Date Taking? Authorizing Provider   Cholecalciferol (VITAMIN D3) 93740 UNITS TABS Take 10,000 Units by mouth daily (patient purchases over-the-counter) this dose was NOT prescribed by a doctor.    Unknown, Entered By History   desvenlafaxine succinate (PRISTIQ) 100 MG 24 hr tablet Take 100 mg by mouth daily    Unknown, Entered By History   gabapentin (NEURONTIN) 100 MG capsule Take 100-200 mg by mouth nightly as needed (restless leg syndrome)  9/9/16   David Denney MD   LITHIUM CARBONATE PO Take 600 mg by mouth daily     Reported, Patient   losartan (COZAAR) 50 MG tablet TAKE 1 TABLET (50 MG) BY MOUTH DAILY 7/5/18   David Denney MD   OLANZapine (ZYPREXA) 10 MG tablet Take 1 tablet (10 mg) by mouth 2 times daily as needed (anxiety) 7/3/18   Santy Jacobs MD   omeprazole (PRILOSEC) 20 MG capsule Take 1 capsule (20 mg) by mouth daily 12/2/14   David Denney MD   traZODone (DESYREL) 50 MG tablet Take  mg by mouth nightly as needed for sleep    Unknown, Entered By History     Current Outpatient Prescriptions Ordered in  Epic   Medication     Cholecalciferol (VITAMIN D3) 15907 UNITS TABS     desvenlafaxine succinate (PRISTIQ) 100 MG 24 hr tablet     gabapentin (NEURONTIN) 100 MG capsule     LITHIUM CARBONATE PO     losartan (COZAAR) 50 MG tablet     OLANZapine (ZYPREXA) 10 MG tablet     omeprazole (PRILOSEC) 20 MG capsule     traZODone (DESYREL) 50 MG tablet     Current Facility-Administered Medications Ordered in Epic   Medication Dose Route Frequency Last Rate Last Dose     lactated ringers infusion  500 mL Intravenous Continuous 25 mL/hr at 07/20/18 0625 500 mL at 07/20/18 0625     lidocaine 1 % 1 mL  1 mL Other Q1H PRN         sodium chloride (PF) 0.9% PF flush 3 mL  3 mL Intracatheter Q1H PRN         sodium chloride (PF) 0.9% PF flush 3 mL  3 mL Intracatheter Q8H           lactated ringers 500 mL (07/20/18 0625)     Wt Readings from Last 1 Encounters:   07/13/18 (!) 156 kg (343 lb 14.4 oz)     Temp Readings from Last 1 Encounters:   07/18/18 37.1  C (98.8  F) (Temporal)     BP Readings from Last 6 Encounters:   07/18/18 147/87   07/16/18 134/76   07/13/18 126/78   06/28/18 116/60   04/06/18 152/89   03/09/18 128/83     Pulse Readings from Last 4 Encounters:   07/16/18 82   07/13/18 88   06/28/18 68   04/06/18 106     Resp Readings from Last 1 Encounters:   07/18/18 20     SpO2 Readings from Last 1 Encounters:   07/18/18 95%     Recent Labs   Lab Test  07/13/18   1224  01/27/18   0720   NA  141  140   POTASSIUM  3.9  4.2   CHLORIDE  107  105   CO2  30  28   ANIONGAP  4  7   GLC  95  98   BUN  9  14   CR  0.85  0.78   INEZ  9.1  8.9     Recent Labs   Lab Test  07/13/18   1224  01/27/18   0720   AST  52*  30   ALT  144*  66   ALKPHOS  86  82   BILITOTAL  0.5  0.7     Recent Labs   Lab Test  07/13/18   1224  01/27/18   0720   WBC  10.0  5.7   HGB  14.1  15.1   PLT  242  246     No results for input(s): ABO, RH in the last 98545 hours.  Recent Labs   Lab Test  10/30/15   1235  07/08/15   1213   INR   --   1.01   PTT  27   --       No  results for input(s): TROPI in the last 85647 hours.  No results for input(s): PH, PCO2, PO2, HCO3 in the last 66661 hours.  No results for input(s): HCG in the last 69060 hours.  Recent Results (from the past 744 hour(s))   XR Chest 2 Views    Narrative    XR CHEST 2 VW 7/13/2018 3:25 PM    HISTORY: cough; Cough; Preoperative examination      Impression    IMPRESSION: Negative.    POPEYE GILLETTE MD       RECENT LABS:   ECG:   ECHO:       RECENT LABS:     ECG: NSR, no significant abnormalities    Anesthesia Evaluation     . Pt has had prior anesthetic. Type: General    No history of anesthetic complications          ROS/MED HX    ENT/Pulmonary:     (+)sleep apnea, asthma uses CPAP , recent URI unresolved only mild residual cough remains: . .    Neurologic:      (-) CVA   Cardiovascular:     (+) hypertension----. : . . . :. .      (-) angina, CAD, syncope, arrhythmias, irregular heartbeat/palpitations and angina   METS/Exercise Tolerance:     Hematologic:         Musculoskeletal:         GI/Hepatic:     (+) GERD Asymptomatic on medication,      (-) liver disease   Renal/Genitourinary:      (-) renal disease   Endo:     (+) Obesity, .   (-) Type II DM   Psychiatric:     (+) psychiatric history depression and anxiety (ADHD)      Infectious Disease:         Malignancy:         Other:                     Physical Exam  Normal systems: dental    Airway   Mallampati: III  TM distance: >3 FB  Neck ROM: full    Dental     Cardiovascular   Rhythm and rate: regular and normal  (-) no murmur    Pulmonary    breath sounds clear to auscultation(-) no wheezes                        Anesthesia Plan      History & Physical Review  History and physical reviewed and following examination; no interval change.    ASA Status:  3 .    NPO Status:  > 8 hours    Plan for General and Other with Other (Methohexital) induction.   PONV prophylaxis:  Ondansetron (or other 5HT-3)  Zofran, Toradol pre-procedure  Pt with hx of awareness with  previous procedure   Initial dose Methohexital 200 and Succinylcholine 160 --  Would recommend increasing to 200mg of Sux next procedure   Also, pt with brief desaturation into the 60's during seizure       Postoperative Care      Consents  Anesthetic plan, risks, benefits and alternatives discussed with:  Patient..

## 2018-07-20 NOTE — PROCEDURES
[facility] ECT Procedure Note     Abel Ward [id number]   33 year old 1985     Patient Status: Outpatient    No Known Allergies    Weight:  0 lbs 0 oz    Patient Preparations:  (NA)     Diagnosis:     Major Depression, recurrent, severe, without psychotic features.      Indications for ECT:     Medications ineffective; History of good ECT response in one or more previous episodes of illness.      Clinical Narrative:     ECT administered by thymatron machine, consent signed, side effects, risks, benefits reviewed. Pt has been medically cleared for procedure, consent signed.      Pause for the Cause:     Right patient YES   Right procedure/laterality settings: YES   Right diagnosis YES      Intra-Procedure Documentation:     Date:  7/20/2018  Time:  6:40 AM    ECT # 3   Treatment number this series: 3   Total treatment number: 3     Type of ECT: Bilateral    ECT Medications:      Brevitol 220 mg  Succinyl Choline 200 mg    ECT Strip Summary:   Energy Level: 50 percent  Motor Seizure Duration: 71 seconds  EEG Seizure Duration: 71 seconds    Complications: None.    Plan: Continue course of 6 total outpatient ECT bilateral treatments. Next ECT scheduled for Monday, 7/23/2018.

## 2018-07-20 NOTE — ANESTHESIA CARE TRANSFER NOTE
Patient: Abel Ward    * No procedures listed *    Diagnosis: * No pre-op diagnosis entered *  Diagnosis Additional Information: No value filed.    Anesthesia Type:   General, Other     Note:  Airway :Face Mask  Patient transferred to:PACU  Handoff Report: Identifed the Patient, Identified the Reponsible Provider, Reviewed the pertinent medical history, Discussed the surgical course, Reviewed Intra-OP anesthesia mangement and issues during anesthesia, Set expectations for post-procedure period and Allowed opportunity for questions and acknowledgement of understanding        Electronically Signed By: THEO Borrego CRNA  July 20, 2018  6:58 AM

## 2018-07-20 NOTE — PROGRESS NOTES
0735hr - DC/Follow up instructions completed w/pts mother.  0738hr Pt awake, up independently w/SBA. Denied pain/concerns.  Pt transferred to family car in w/chair for dc home.

## 2018-07-23 ENCOUNTER — ANESTHESIA (OUTPATIENT)
Dept: SURGERY | Facility: CLINIC | Age: 33
End: 2018-07-23

## 2018-07-23 ENCOUNTER — HOSPITAL ENCOUNTER (OUTPATIENT)
Dept: SURGERY | Facility: CLINIC | Age: 33
Discharge: HOME | End: 2018-07-23
Attending: PSYCHIATRY & NEUROLOGY | Admitting: PSYCHIATRY & NEUROLOGY
Payer: COMMERCIAL

## 2018-07-23 ENCOUNTER — ANESTHESIA EVENT (OUTPATIENT)
Dept: SURGERY | Facility: CLINIC | Age: 33
End: 2018-07-23

## 2018-07-23 VITALS
DIASTOLIC BLOOD PRESSURE: 92 MMHG | TEMPERATURE: 98.6 F | OXYGEN SATURATION: 94 % | RESPIRATION RATE: 11 BRPM | SYSTOLIC BLOOD PRESSURE: 144 MMHG

## 2018-07-23 DIAGNOSIS — F33.2 SEVERE EPISODE OF RECURRENT MAJOR DEPRESSIVE DISORDER, WITHOUT PSYCHOTIC FEATURES (H): ICD-10-CM

## 2018-07-23 PROCEDURE — 25000128 H RX IP 250 OP 636: Performed by: NURSE ANESTHETIST, CERTIFIED REGISTERED

## 2018-07-23 PROCEDURE — 90870 ELECTROCONVULSIVE THERAPY: CPT

## 2018-07-23 PROCEDURE — 37000008 ZZH ANESTHESIA TECHNICAL FEE, 1ST 30 MIN

## 2018-07-23 PROCEDURE — 40000010 ZZH STATISTIC ANES STAT CODE-CRNA PER MINUTE

## 2018-07-23 PROCEDURE — 25000125 ZZHC RX 250: Performed by: NURSE ANESTHETIST, CERTIFIED REGISTERED

## 2018-07-23 PROCEDURE — 25000125 ZZHC RX 250: Performed by: PSYCHIATRY & NEUROLOGY

## 2018-07-23 PROCEDURE — 25000128 H RX IP 250 OP 636: Performed by: ANESTHESIOLOGY

## 2018-07-23 PROCEDURE — 25000131 ZZH RX MED GY IP 250 OP 636 PS 637: Performed by: REGISTERED NURSE

## 2018-07-23 PROCEDURE — 25000125 ZZHC RX 250: Performed by: ANESTHESIOLOGY

## 2018-07-23 PROCEDURE — 25000128 H RX IP 250 OP 636: Performed by: PSYCHIATRY & NEUROLOGY

## 2018-07-23 RX ORDER — SODIUM CHLORIDE, SODIUM LACTATE, POTASSIUM CHLORIDE, CALCIUM CHLORIDE 600; 310; 30; 20 MG/100ML; MG/100ML; MG/100ML; MG/100ML
INJECTION, SOLUTION INTRAVENOUS CONTINUOUS
Status: DISCONTINUED | OUTPATIENT
Start: 2018-07-23 | End: 2018-07-24 | Stop reason: HOSPADM

## 2018-07-23 RX ORDER — ONDANSETRON 2 MG/ML
4 INJECTION INTRAMUSCULAR; INTRAVENOUS
Status: CANCELLED
Start: 2018-07-23 | End: 2018-07-23

## 2018-07-23 RX ORDER — ONDANSETRON 2 MG/ML
4 INJECTION INTRAMUSCULAR; INTRAVENOUS
Status: COMPLETED | OUTPATIENT
Start: 2018-07-23 | End: 2018-07-23

## 2018-07-23 RX ORDER — KETOROLAC TROMETHAMINE 30 MG/ML
30 INJECTION, SOLUTION INTRAMUSCULAR; INTRAVENOUS
Status: CANCELLED
Start: 2018-07-23 | End: 2018-07-23

## 2018-07-23 RX ORDER — CAFFEINE AND SODIUM BENZOATE 125 MG/ML
250 INJECTION, SOLUTION INTRAMUSCULAR; INTRAVENOUS
Status: COMPLETED | OUTPATIENT
Start: 2018-07-23 | End: 2018-07-23

## 2018-07-23 RX ORDER — LABETALOL HYDROCHLORIDE 5 MG/ML
INJECTION, SOLUTION INTRAVENOUS PRN
Status: DISCONTINUED | OUTPATIENT
Start: 2018-07-23 | End: 2018-07-23

## 2018-07-23 RX ORDER — CAFFEINE AND SODIUM BENZOATE 125 MG/ML
250 INJECTION, SOLUTION INTRAMUSCULAR; INTRAVENOUS
Status: CANCELLED
Start: 2018-07-23 | End: 2018-07-23

## 2018-07-23 RX ORDER — KETOROLAC TROMETHAMINE 30 MG/ML
30 INJECTION, SOLUTION INTRAMUSCULAR; INTRAVENOUS
Status: COMPLETED | OUTPATIENT
Start: 2018-07-23 | End: 2018-07-23

## 2018-07-23 RX ADMIN — KETOROLAC TROMETHAMINE 30 MG: 30 INJECTION, SOLUTION INTRAMUSCULAR at 06:12

## 2018-07-23 RX ADMIN — LIDOCAINE HYDROCHLORIDE 1 ML: 10 INJECTION, SOLUTION EPIDURAL; INFILTRATION; INTRACAUDAL; PERINEURAL at 06:12

## 2018-07-23 RX ADMIN — CAFFEINE AND SODIUM BENZOATE 250 MG: 125 INJECTION, SOLUTION INTRAMUSCULAR; INTRAVENOUS at 06:12

## 2018-07-23 RX ADMIN — METHOHEXITAL SODIUM 200 MG: 500 INJECTION, POWDER, LYOPHILIZED, FOR SOLUTION INTRAMUSCULAR; INTRAVENOUS; RECTAL at 06:39

## 2018-07-23 RX ADMIN — ONDANSETRON 4 MG: 2 INJECTION INTRAMUSCULAR; INTRAVENOUS at 06:12

## 2018-07-23 RX ADMIN — SODIUM CHLORIDE, POTASSIUM CHLORIDE, SODIUM LACTATE AND CALCIUM CHLORIDE: 600; 310; 30; 20 INJECTION, SOLUTION INTRAVENOUS at 06:12

## 2018-07-23 RX ADMIN — LABETALOL HYDROCHLORIDE 10 MG: 5 INJECTION INTRAVENOUS at 06:48

## 2018-07-23 RX ADMIN — SUCCINYLCHOLINE CHLORIDE 200 MG: 20 INJECTION, SOLUTION INTRAMUSCULAR; INTRAVENOUS; PARENTERAL at 06:39

## 2018-07-23 ASSESSMENT — ENCOUNTER SYMPTOMS: DYSRHYTHMIAS: 0

## 2018-07-23 NOTE — PROCEDURES
[facility] ECT Procedure Note     Abel Ward [id number]   33 year old 1985     Patient Status: Outpatient    No Known Allergies    Weight:  0 lbs 0 oz    Patient Preparations:  (NA)     Diagnosis:     Major Depression, recurrent, severe, without psychotic features.      Indications for ECT:     Medications ineffective; History of good ECT response in one or more previous episodes of illness.      Clinical Narrative:     ECT administered by thymatron machine, consent signed, side effects, risks, benefits reviewed. Pt has been medically cleared for procedure, consent signed.      Pause for the Cause:     Right patient YES   Right procedure/laterality settings: YES   Right diagnosis YES      Intra-Procedure Documentation:     Date:  7/23/2018  Time:  6:40 AM    ECT #    Treatment number this series: 4   Total treatment number: 4     Type of ECT: Bilateral    ECT Medications:      Brevitol 220 mg  Succinyl Choline 200 mg    ECT Strip Summary:   Energy Level: 60 percent  Motor Seizure Duration: 71 seconds  EEG Seizure Duration: 71 seconds    Complications: None.    Plan: Continue course of 6 total outpatient ECT bilateral treatments. Next ECT scheduled for Tuesday, 7/25/2018.

## 2018-07-23 NOTE — ANESTHESIA POSTPROCEDURE EVALUATION
Patient: Abel Ward    * No procedures listed *    Diagnosis:* No pre-op diagnosis entered *  Diagnosis Additional Information: No value filed.    Anesthesia Type:  General    Note:  Anesthesia Post Evaluation    Patient location during evaluation: bedside  Patient participation: Able to fully participate in evaluation  Level of consciousness: awake  Pain management: adequate  Airway patency: patent  Cardiovascular status: acceptable  Respiratory status: acceptable  Hydration status: acceptable  PONV: none     Anesthetic complications: None    Comments: No anesthetic complications noted.         Last vitals:  Vitals:    07/23/18 0715 07/23/18 0720 07/23/18 0725   BP: 143/89  (!) 144/92   Resp: 24 (!) 43 11   Temp:      SpO2: 93% 94% 94%         Electronically Signed By: David Metcalf DO, DO  July 23, 2018  2:09 PM

## 2018-07-23 NOTE — ANESTHESIA CARE TRANSFER NOTE
Patient: Abel Ward    * No procedures listed *    Diagnosis: * No pre-op diagnosis entered *  Diagnosis Additional Information: No value filed.    Anesthesia Type:   General     Note:  Airway :Nasal Cannula  Patient transferred to:PACU  Comments: Transferred to PACU, spontaneous respirations, 4L oxygen via NC.  All monitors and alarms on and functioning, VSS.  Patient awake, comfortable.  Report to PACU RN.Handoff Report: Identifed the Patient, Identified the Reponsible Provider, Reviewed the pertinent medical history, Discussed the surgical course, Reviewed Intra-OP anesthesia mangement and issues during anesthesia, Set expectations for post-procedure period and Allowed opportunity for questions and acknowledgement of understanding      Vitals: (Last set prior to Anesthesia Care Transfer)    CRNA VITALS  7/23/2018 0620 - 7/23/2018 0650      7/23/2018             Pulse: 93    SpO2: 96 %    Resp Rate (set): 10                Electronically Signed By: THEO Williamson CRNA  July 23, 2018  6:50 AM

## 2018-07-23 NOTE — ANESTHESIA PREPROCEDURE EVALUATION
Procedure: * No procedures listed *  Preop diagnosis: * No pre-op diagnosis entered *  No Known Allergies  Patient Active Problem List   Diagnosis     Attention deficit hyperactivity disorder (ADHD)     Esophageal reflux     Morbid obesity (H)     Vitamin D deficiency     CARDIOVASCULAR SCREENING; LDL GOAL LESS THAN 160     Major depressive disorder, recurrent episode, moderate (H)     Anxiety     Hypotestosteronism     Essential hypertension with goal blood pressure less than 140/90     Suicidal ideation     Severe episode of recurrent major depressive disorder, without psychotic features (H)     MENDEZ (obstructive sleep apnea)     Depression     Past Medical History:   Diagnosis Date     Anxiety      Attention deficit disorder with hyperactivity(314.01) 2001     Esophageal reflux     Dr Starks- had EGD ~2202     Generalized anxiety disorder      Hypertension      Infectious mononucleosis 12/03     Low testosterone      Major depressive disorder, single episode in full remission (H)      Major depressive disorder, single episode, mild (H)      MDD (major depressive disorder)      Mild intermittent asthma      Past Surgical History:   Procedure Laterality Date     OTHER SURGICAL HISTORY      wisdom teeth extraction       Current Outpatient Prescriptions on File Prior to Encounter:  Cholecalciferol (VITAMIN D3) 76870 UNITS TABS Take 10,000 Units by mouth daily (patient purchases over-the-counter) this dose was NOT prescribed by a doctor.   desvenlafaxine succinate (PRISTIQ) 100 MG 24 hr tablet Take 100 mg by mouth daily   gabapentin (NEURONTIN) 100 MG capsule Take 100-200 mg by mouth nightly as needed (restless leg syndrome)    LITHIUM CARBONATE PO Take 600 mg by mouth daily    losartan (COZAAR) 50 MG tablet TAKE 1 TABLET (50 MG) BY MOUTH DAILY   OLANZapine (ZYPREXA) 10 MG tablet Take 1 tablet (10 mg) by mouth 2 times daily as needed (anxiety)   omeprazole (PRILOSEC) 20 MG capsule Take 1 capsule (20 mg) by mouth  daily   traZODone (DESYREL) 50 MG tablet Take  mg by mouth nightly as needed for sleep     No current facility-administered medications on file prior to encounter.   BP (!) 148/93  Temp 37  C (98.6  F) (Temporal)  Resp 14  SpO2 95%    Lab Results   Component Value Date    WBC 10.0 07/13/2018     Lab Results   Component Value Date    RBC 4.95 07/13/2018     Lab Results   Component Value Date    HGB 14.1 07/13/2018     Lab Results   Component Value Date    HCT 42.8 07/13/2018     Lab Results   Component Value Date    MCV 87 07/13/2018     Lab Results   Component Value Date    MCH 28.5 07/13/2018     Lab Results   Component Value Date    MCHC 32.9 07/13/2018     Lab Results   Component Value Date    RDW 13.0 07/13/2018     Lab Results   Component Value Date     07/13/2018     Lab Results   Component Value Date    INR 1.01 07/08/2015       Last Basic Metabolic Panel:  Lab Results   Component Value Date     07/13/2018      Lab Results   Component Value Date    POTASSIUM 3.9 07/13/2018     Lab Results   Component Value Date    CHLORIDE 107 07/13/2018     Lab Results   Component Value Date    INEZ 9.1 07/13/2018     Lab Results   Component Value Date    CO2 30 07/13/2018     Lab Results   Component Value Date    BUN 9 07/13/2018     Lab Results   Component Value Date    CR 0.85 07/13/2018     Lab Results   Component Value Date    GLC 95 07/13/2018     Anesthesia Evaluation     . Pt has had prior anesthetic. Type: General    No history of anesthetic complications          ROS/MED HX    ENT/Pulmonary:     (+)sleep apnea, asthma uses CPAP , recent URI unresolved only mild residual cough remains: . .    Neurologic:      (-) CVA   Cardiovascular:     (+) hypertension----. : . . . :. .      (-) angina, CAD, syncope, arrhythmias, irregular heartbeat/palpitations and angina   METS/Exercise Tolerance:     Hematologic:         Musculoskeletal:         GI/Hepatic:     (+) GERD Asymptomatic on medication,      (-)  liver disease   Renal/Genitourinary:      (-) renal disease   Endo:     (+) Obesity, .   (-) Type II DM   Psychiatric:     (+) psychiatric history depression and anxiety (ADHD)      Infectious Disease:         Malignancy:         Other:                     Physical Exam  Normal systems: cardiovascular, pulmonary and dental    Airway   Mallampati: II  TM distance: >3 FB  Neck ROM: full    Dental     Cardiovascular   Rhythm and rate: regular and normal      Pulmonary    breath sounds clear to auscultation                    Anesthesia Plan      History & Physical Review  History and physical reviewed and following examination; no interval change.    ASA Status:  3 .    NPO Status:  > 8 hours    Plan for General with Other induction.     Last treatment 7/20/2018 - brevital 220, sux 200 zofran 4, toradol 30 mg, caffeine 250       Postoperative Care  Postoperative pain management:  IV analgesics.      Consents  Anesthetic plan, risks, benefits and alternatives discussed with:  Patient..                          .

## 2018-07-24 ENCOUNTER — DOCUMENTATION ONLY (OUTPATIENT)
Dept: SLEEP MEDICINE | Facility: CLINIC | Age: 33
End: 2018-07-24

## 2018-07-24 DIAGNOSIS — G47.33 OSA (OBSTRUCTIVE SLEEP APNEA): ICD-10-CM

## 2018-07-24 NOTE — PROGRESS NOTES
14 DAY STM VISIT    Diagnostic AHI: 7.5       Message left for patient to return call     Assessment: Pt not meeting objective benchmarks for leak.  Action plan: waiting for patient to return call.  and pt to have 30 day STM visit.    Device type: Auto-CPAP  PAP settings: CPAP min 5.0 cm  H20     CPAP max 15.0 cm  H20    95th% pressure 10 cm   Mask type:  Full Face Mask  Objective measures: 14 day rolling measures      Compliance  84 %      Leak  40.8 lpm  last  upload      AHI 1.94   last  upload      Average number of minutes 449     Average hours of usage 7.5          Objective measure goal  Compliance   Goal >70%  Leak   Goal < 24 lpm  AHI  Goal < 5  Usage  Goal >240

## 2018-07-25 ENCOUNTER — HOSPITAL ENCOUNTER (OUTPATIENT)
Dept: SURGERY | Facility: CLINIC | Age: 33
Discharge: HOME | End: 2018-07-25
Attending: PSYCHIATRY & NEUROLOGY | Admitting: PSYCHIATRY & NEUROLOGY
Payer: COMMERCIAL

## 2018-07-25 ENCOUNTER — ANESTHESIA EVENT (OUTPATIENT)
Dept: SURGERY | Facility: CLINIC | Age: 33
End: 2018-07-25

## 2018-07-25 ENCOUNTER — ANESTHESIA (OUTPATIENT)
Dept: SURGERY | Facility: CLINIC | Age: 33
End: 2018-07-25

## 2018-07-25 VITALS
OXYGEN SATURATION: 93 % | SYSTOLIC BLOOD PRESSURE: 158 MMHG | DIASTOLIC BLOOD PRESSURE: 100 MMHG | RESPIRATION RATE: 15 BRPM

## 2018-07-25 DIAGNOSIS — F33.2 SEVERE EPISODE OF RECURRENT MAJOR DEPRESSIVE DISORDER, WITHOUT PSYCHOTIC FEATURES (H): ICD-10-CM

## 2018-07-25 PROCEDURE — 40000010 ZZH STATISTIC ANES STAT CODE-CRNA PER MINUTE

## 2018-07-25 PROCEDURE — 25000128 H RX IP 250 OP 636: Performed by: PSYCHIATRY & NEUROLOGY

## 2018-07-25 PROCEDURE — 25000128 H RX IP 250 OP 636: Performed by: ANESTHESIOLOGY

## 2018-07-25 PROCEDURE — 25000128 H RX IP 250 OP 636: Performed by: NURSE ANESTHETIST, CERTIFIED REGISTERED

## 2018-07-25 PROCEDURE — 25000125 ZZHC RX 250: Performed by: NURSE ANESTHETIST, CERTIFIED REGISTERED

## 2018-07-25 PROCEDURE — 37000008 ZZH ANESTHESIA TECHNICAL FEE, 1ST 30 MIN

## 2018-07-25 PROCEDURE — 25000125 ZZHC RX 250: Performed by: PSYCHIATRY & NEUROLOGY

## 2018-07-25 PROCEDURE — 90870 ELECTROCONVULSIVE THERAPY: CPT

## 2018-07-25 RX ORDER — KETOROLAC TROMETHAMINE 30 MG/ML
30 INJECTION, SOLUTION INTRAMUSCULAR; INTRAVENOUS
Status: COMPLETED | OUTPATIENT
Start: 2018-07-25 | End: 2018-07-25

## 2018-07-25 RX ORDER — KETOROLAC TROMETHAMINE 30 MG/ML
30 INJECTION, SOLUTION INTRAMUSCULAR; INTRAVENOUS
Status: CANCELLED
Start: 2018-07-25 | End: 2018-07-25

## 2018-07-25 RX ORDER — ONDANSETRON 2 MG/ML
4 INJECTION INTRAMUSCULAR; INTRAVENOUS
Status: COMPLETED | OUTPATIENT
Start: 2018-07-25 | End: 2018-07-25

## 2018-07-25 RX ORDER — SODIUM CHLORIDE, SODIUM LACTATE, POTASSIUM CHLORIDE, CALCIUM CHLORIDE 600; 310; 30; 20 MG/100ML; MG/100ML; MG/100ML; MG/100ML
500 INJECTION, SOLUTION INTRAVENOUS CONTINUOUS
Status: DISCONTINUED | OUTPATIENT
Start: 2018-07-25 | End: 2018-07-26 | Stop reason: HOSPADM

## 2018-07-25 RX ORDER — CAFFEINE AND SODIUM BENZOATE 125 MG/ML
250 INJECTION, SOLUTION INTRAMUSCULAR; INTRAVENOUS
Status: CANCELLED
Start: 2018-07-25 | End: 2018-07-25

## 2018-07-25 RX ORDER — ONDANSETRON 2 MG/ML
4 INJECTION INTRAMUSCULAR; INTRAVENOUS
Status: CANCELLED
Start: 2018-07-25 | End: 2018-07-25

## 2018-07-25 RX ORDER — CAFFEINE AND SODIUM BENZOATE 125 MG/ML
250 INJECTION, SOLUTION INTRAMUSCULAR; INTRAVENOUS
Status: COMPLETED | OUTPATIENT
Start: 2018-07-25 | End: 2018-07-25

## 2018-07-25 RX ADMIN — METHOHEXITAL SODIUM 160 MG: 500 INJECTION, POWDER, LYOPHILIZED, FOR SOLUTION INTRAMUSCULAR; INTRAVENOUS; RECTAL at 06:49

## 2018-07-25 RX ADMIN — SUCCINYLCHOLINE CHLORIDE 190 MG: 20 INJECTION, SOLUTION INTRAMUSCULAR; INTRAVENOUS; PARENTERAL at 06:49

## 2018-07-25 RX ADMIN — ONDANSETRON 4 MG: 2 INJECTION INTRAMUSCULAR; INTRAVENOUS at 06:08

## 2018-07-25 RX ADMIN — SODIUM CHLORIDE, POTASSIUM CHLORIDE, SODIUM LACTATE AND CALCIUM CHLORIDE 500 ML: 600; 310; 30; 20 INJECTION, SOLUTION INTRAVENOUS at 06:13

## 2018-07-25 RX ADMIN — KETOROLAC TROMETHAMINE 30 MG: 30 INJECTION, SOLUTION INTRAMUSCULAR at 06:08

## 2018-07-25 RX ADMIN — CAFFEINE AND SODIUM BENZOATE 250 MG: 125 INJECTION, SOLUTION INTRAMUSCULAR; INTRAVENOUS at 06:08

## 2018-07-25 ASSESSMENT — ENCOUNTER SYMPTOMS: DYSRHYTHMIAS: 0

## 2018-07-25 NOTE — ANESTHESIA PREPROCEDURE EVALUATION
Procedure: * No procedures listed *  Preop diagnosis: * No pre-op diagnosis entered *  No Known Allergies  Patient Active Problem List   Diagnosis     Attention deficit hyperactivity disorder (ADHD)     Esophageal reflux     Morbid obesity (H)     Vitamin D deficiency     CARDIOVASCULAR SCREENING; LDL GOAL LESS THAN 160     Major depressive disorder, recurrent episode, moderate (H)     Anxiety     Hypotestosteronism     Essential hypertension with goal blood pressure less than 140/90     Suicidal ideation     Severe episode of recurrent major depressive disorder, without psychotic features (H)     MENDEZ (obstructive sleep apnea)     Depression     Past Medical History:   Diagnosis Date     Anxiety      Attention deficit disorder with hyperactivity(314.01) 2001     Esophageal reflux     Dr Starks- had EGD ~2202     Generalized anxiety disorder      Hypertension      Infectious mononucleosis 12/03     Low testosterone      Major depressive disorder, single episode in full remission (H)      Major depressive disorder, single episode, mild (H)      MDD (major depressive disorder)      Mild intermittent asthma      Past Surgical History:   Procedure Laterality Date     OTHER SURGICAL HISTORY      wisdom teeth extraction       Current Outpatient Prescriptions on File Prior to Encounter:  Cholecalciferol (VITAMIN D3) 33249 UNITS TABS Take 10,000 Units by mouth daily (patient purchases over-the-counter) this dose was NOT prescribed by a doctor.   desvenlafaxine succinate (PRISTIQ) 100 MG 24 hr tablet Take 100 mg by mouth daily   gabapentin (NEURONTIN) 100 MG capsule Take 100-200 mg by mouth nightly as needed (restless leg syndrome)    LITHIUM CARBONATE PO Take 600 mg by mouth daily    losartan (COZAAR) 50 MG tablet TAKE 1 TABLET (50 MG) BY MOUTH DAILY   OLANZapine (ZYPREXA) 10 MG tablet Take 1 tablet (10 mg) by mouth 2 times daily as needed (anxiety)   omeprazole (PRILOSEC) 20 MG capsule Take 1 capsule (20 mg) by mouth  daily   traZODone (DESYREL) 50 MG tablet Take  mg by mouth nightly as needed for sleep     No current facility-administered medications on file prior to encounter.   BP (!) 144/97  Resp 22  SpO2 96%    Lab Results   Component Value Date    WBC 10.0 07/13/2018     Lab Results   Component Value Date    RBC 4.95 07/13/2018     Lab Results   Component Value Date    HGB 14.1 07/13/2018     Lab Results   Component Value Date    HCT 42.8 07/13/2018     Lab Results   Component Value Date    MCV 87 07/13/2018     Lab Results   Component Value Date    MCH 28.5 07/13/2018     Lab Results   Component Value Date    MCHC 32.9 07/13/2018     Lab Results   Component Value Date    RDW 13.0 07/13/2018     Lab Results   Component Value Date     07/13/2018     Lab Results   Component Value Date    INR 1.01 07/08/2015       Last Basic Metabolic Panel:  Lab Results   Component Value Date     07/13/2018      Lab Results   Component Value Date    POTASSIUM 3.9 07/13/2018     Lab Results   Component Value Date    CHLORIDE 107 07/13/2018     Lab Results   Component Value Date    INEZ 9.1 07/13/2018     Lab Results   Component Value Date    CO2 30 07/13/2018     Lab Results   Component Value Date    BUN 9 07/13/2018     Lab Results   Component Value Date    CR 0.85 07/13/2018     Lab Results   Component Value Date    GLC 95 07/13/2018     Anesthesia Evaluation     . Pt has had prior anesthetic. Type: General    No history of anesthetic complications          ROS/MED HX    ENT/Pulmonary:     (+)sleep apnea, asthma uses CPAP , recent URI unresolved only mild residual cough remains: . .    Neurologic:      (-) CVA   Cardiovascular:     (+) hypertension----. : . . . :. .      (-) angina, CAD, syncope, arrhythmias, irregular heartbeat/palpitations and angina   METS/Exercise Tolerance:     Hematologic:         Musculoskeletal:         GI/Hepatic:     (+) GERD Asymptomatic on medication,      (-) liver disease    Renal/Genitourinary:      (-) renal disease   Endo:     (+) Obesity, .   (-) Type II DM   Psychiatric:     (+) psychiatric history depression and anxiety (ADHD)      Infectious Disease:         Malignancy:         Other:                     Physical Exam  Normal systems: cardiovascular, pulmonary and dental    Airway   Mallampati: II  TM distance: >3 FB  Neck ROM: full    Dental     Cardiovascular   Rhythm and rate: regular and normal      Pulmonary    breath sounds clear to auscultation                        Anesthesia Plan      History & Physical Review  History and physical reviewed and following examination; no interval change.    ASA Status:  3 .    NPO Status:  > 8 hours    Plan for General with Other induction.          Postoperative Care  Postoperative pain management:  IV analgesics.      Consents  Anesthetic plan, risks, benefits and alternatives discussed with:  Patient..                          .

## 2018-07-25 NOTE — ANESTHESIA CARE TRANSFER NOTE
Patient: Abel Ward    * No procedures listed *    Diagnosis: * No pre-op diagnosis entered *  Diagnosis Additional Information: No value filed.    Anesthesia Type:   General     Note:  Airway :Nasal Cannula  Patient transferred to:PACU  Comments: Native airway general anesthetic.  Patient hyperventilated with 100% oxygen via mask prior to treatment.   Anesthesia induced using patent peripheral IV.    Bite block placed between molars to protect teeth and tongue.     After induction of seizure patient mask ventilated with 100% oxygen until spontaneous respirations returned.     At time of handoff to PACU, patient exhibited spontaneous respirations, adequate tidal volumes, airway patent. Oxygen via nasal cannula at 3 liters per minute to PACU in cart with siderails up, connected to wall O2 in PACU. All monitors and alarms on and functioning. Report given to PACU RN and questions answered. Handoff Report: Identifed the Patient, Identified the Reponsible Provider, Reviewed the pertinent medical history, Discussed the surgical course, Reviewed Intra-OP anesthesia mangement and issues during anesthesia, Set expectations for post-procedure period and Allowed opportunity for questions and acknowledgement of understanding      Vitals: (Last set prior to Anesthesia Care Transfer)    CRNA VITALS  7/25/2018 0629 - 7/25/2018 0659      7/25/2018             Resp Rate (set): 10                Electronically Signed By: THEO Fields CRNA  July 25, 2018  6:59 AM

## 2018-07-25 NOTE — ANESTHESIA POSTPROCEDURE EVALUATION
Patient: Abel Ward    * No procedures listed *    Diagnosis:* No pre-op diagnosis entered *  Diagnosis Additional Information: No value filed.    Anesthesia Type:  General    Note:  Anesthesia Post Evaluation    Patient location during evaluation: PACU  Patient participation: Able to fully participate in evaluation  Level of consciousness: awake and alert  Pain management: adequate  Airway patency: patent  Cardiovascular status: acceptable  Respiratory status: acceptable and unassisted  Hydration status: acceptable  PONV: none             Last vitals:  Vitals:    07/25/18 0725 07/25/18 0730 07/25/18 0735   BP: (!) 147/104 (!) 163/105 (!) 158/100   Resp: 21 28 15   SpO2: 93% 93% 93%         Electronically Signed By: Mary Ambrocio MD  July 25, 2018  8:30 AM

## 2018-07-27 ENCOUNTER — HOSPITAL ENCOUNTER (OUTPATIENT)
Dept: SURGERY | Facility: CLINIC | Age: 33
Discharge: HOME | End: 2018-07-27
Attending: PSYCHIATRY & NEUROLOGY | Admitting: PSYCHIATRY & NEUROLOGY
Payer: COMMERCIAL

## 2018-07-27 ENCOUNTER — ANESTHESIA (OUTPATIENT)
Dept: SURGERY | Facility: CLINIC | Age: 33
End: 2018-07-27

## 2018-07-27 ENCOUNTER — ANESTHESIA EVENT (OUTPATIENT)
Dept: SURGERY | Facility: CLINIC | Age: 33
End: 2018-07-27

## 2018-07-27 VITALS
TEMPERATURE: 98.1 F | DIASTOLIC BLOOD PRESSURE: 89 MMHG | HEART RATE: 86 BPM | RESPIRATION RATE: 18 BRPM | SYSTOLIC BLOOD PRESSURE: 169 MMHG | OXYGEN SATURATION: 93 %

## 2018-07-27 DIAGNOSIS — F33.2 SEVERE EPISODE OF RECURRENT MAJOR DEPRESSIVE DISORDER, WITHOUT PSYCHOTIC FEATURES (H): ICD-10-CM

## 2018-07-27 PROCEDURE — 25000125 ZZHC RX 250: Performed by: PSYCHIATRY & NEUROLOGY

## 2018-07-27 PROCEDURE — 37000008 ZZH ANESTHESIA TECHNICAL FEE, 1ST 30 MIN

## 2018-07-27 PROCEDURE — 25000128 H RX IP 250 OP 636: Performed by: PSYCHIATRY & NEUROLOGY

## 2018-07-27 PROCEDURE — 25000125 ZZHC RX 250: Performed by: ANESTHESIOLOGY

## 2018-07-27 PROCEDURE — 90870 ELECTROCONVULSIVE THERAPY: CPT

## 2018-07-27 PROCEDURE — 25000128 H RX IP 250 OP 636: Performed by: NURSE ANESTHETIST, CERTIFIED REGISTERED

## 2018-07-27 PROCEDURE — 40000010 ZZH STATISTIC ANES STAT CODE-CRNA PER MINUTE

## 2018-07-27 PROCEDURE — 25000125 ZZHC RX 250: Performed by: NURSE ANESTHETIST, CERTIFIED REGISTERED

## 2018-07-27 PROCEDURE — 25000128 H RX IP 250 OP 636: Performed by: ANESTHESIOLOGY

## 2018-07-27 PROCEDURE — 25000131 ZZH RX MED GY IP 250 OP 636 PS 637: Performed by: NURSE ANESTHETIST, CERTIFIED REGISTERED

## 2018-07-27 RX ORDER — KETOROLAC TROMETHAMINE 30 MG/ML
30 INJECTION, SOLUTION INTRAMUSCULAR; INTRAVENOUS
Status: COMPLETED | OUTPATIENT
Start: 2018-07-27 | End: 2018-07-27

## 2018-07-27 RX ORDER — KETOROLAC TROMETHAMINE 30 MG/ML
30 INJECTION, SOLUTION INTRAMUSCULAR; INTRAVENOUS
Start: 2018-07-27 | End: 2018-07-27

## 2018-07-27 RX ORDER — ONDANSETRON 2 MG/ML
4 INJECTION INTRAMUSCULAR; INTRAVENOUS
Start: 2018-07-27 | End: 2018-07-27

## 2018-07-27 RX ORDER — LABETALOL HYDROCHLORIDE 5 MG/ML
INJECTION, SOLUTION INTRAVENOUS PRN
Status: DISCONTINUED | OUTPATIENT
Start: 2018-07-27 | End: 2018-07-27

## 2018-07-27 RX ORDER — CAFFEINE AND SODIUM BENZOATE 125 MG/ML
250 INJECTION, SOLUTION INTRAMUSCULAR; INTRAVENOUS
Status: COMPLETED | OUTPATIENT
Start: 2018-07-27 | End: 2018-07-27

## 2018-07-27 RX ORDER — SODIUM CHLORIDE, SODIUM LACTATE, POTASSIUM CHLORIDE, CALCIUM CHLORIDE 600; 310; 30; 20 MG/100ML; MG/100ML; MG/100ML; MG/100ML
500 INJECTION, SOLUTION INTRAVENOUS CONTINUOUS
Status: DISCONTINUED | OUTPATIENT
Start: 2018-07-27 | End: 2018-07-28 | Stop reason: HOSPADM

## 2018-07-27 RX ORDER — ONDANSETRON 2 MG/ML
4 INJECTION INTRAMUSCULAR; INTRAVENOUS
Status: COMPLETED | OUTPATIENT
Start: 2018-07-27 | End: 2018-07-27

## 2018-07-27 RX ORDER — CAFFEINE AND SODIUM BENZOATE 125 MG/ML
250 INJECTION, SOLUTION INTRAMUSCULAR; INTRAVENOUS
Start: 2018-07-27 | End: 2018-07-27

## 2018-07-27 RX ADMIN — METHOHEXITAL SODIUM 200 MG: 500 INJECTION, POWDER, LYOPHILIZED, FOR SOLUTION INTRAMUSCULAR; INTRAVENOUS; RECTAL at 06:36

## 2018-07-27 RX ADMIN — LIDOCAINE HYDROCHLORIDE 0.2 ML: 10 INJECTION, SOLUTION EPIDURAL; INFILTRATION; INTRACAUDAL; PERINEURAL at 06:23

## 2018-07-27 RX ADMIN — LABETALOL HYDROCHLORIDE 10 MG: 5 INJECTION INTRAVENOUS at 06:44

## 2018-07-27 RX ADMIN — KETOROLAC TROMETHAMINE 30 MG: 30 INJECTION, SOLUTION INTRAMUSCULAR at 06:18

## 2018-07-27 RX ADMIN — ONDANSETRON 4 MG: 2 INJECTION INTRAMUSCULAR; INTRAVENOUS at 06:18

## 2018-07-27 RX ADMIN — CAFFEINE AND SODIUM BENZOATE 250 MG: 125 INJECTION, SOLUTION INTRAMUSCULAR; INTRAVENOUS at 06:18

## 2018-07-27 RX ADMIN — SUCCINYLCHOLINE CHLORIDE 200 MG: 20 INJECTION, SOLUTION INTRAMUSCULAR; INTRAVENOUS; PARENTERAL at 06:36

## 2018-07-27 RX ADMIN — SODIUM CHLORIDE, POTASSIUM CHLORIDE, SODIUM LACTATE AND CALCIUM CHLORIDE 500 ML: 600; 310; 30; 20 INJECTION, SOLUTION INTRAVENOUS at 06:20

## 2018-07-27 ASSESSMENT — ENCOUNTER SYMPTOMS: DYSRHYTHMIAS: 0

## 2018-07-27 NOTE — PROCEDURES
[facility] ECT Procedure Note     Abel Ward [id number]   33 year old 1985     Patient Status: Outpatient    No Known Allergies    Weight:  0 lbs 0 oz    Patient Preparations:  (NA)     Diagnosis:     Major Depression, recurrent, severe, without psychotic features.      Indications for ECT:     Medications ineffective; History of good ECT response in one or more previous episodes of illness.      Clinical Narrative:     ECT administered by thymatron machine, consent signed, side effects, risks, benefits reviewed. Pt has been medically cleared for procedure, consent signed.      Pause for the Cause:     Right patient YES   Right procedure/laterality settings: YES   Right diagnosis YES      Intra-Procedure Documentation:     Date:  7/27/2018  Time:  6:40 AM    ECT #    Treatment number this series: 6   Total treatment number: 6     Type of ECT: Bilateral    ECT Medications:      Brevitol 220 mg  Succinyl Choline 200 mg    ECT Strip Summary:   Energy Level: 100 percent  Motor Seizure Duration: 71 seconds  EEG Seizure Duration: 71 seconds    Complications: None.    Plan: done

## 2018-07-27 NOTE — ANESTHESIA POSTPROCEDURE EVALUATION
Patient: Abel Ward    * No procedures listed *    Diagnosis:* No pre-op diagnosis entered *  Diagnosis Additional Information: No value filed.    Anesthesia Type:  General    Note:  Anesthesia Post Evaluation    Patient location during evaluation: PACU  Patient participation: Able to fully participate in evaluation  Level of consciousness: awake  Pain management: adequate  Airway patency: patent  Cardiovascular status: acceptable  Respiratory status: acceptable  Hydration status: acceptable  PONV: none     Anesthetic complications: None          Last vitals:  Vitals:    07/27/18 0702 07/27/18 0705 07/27/18 0710   BP: 135/86 164/89 129/88   Pulse:      Resp: 20 22 24   Temp: 36.6  C (97.8  F) 36.5  C (97.7  F) 36.7  C (98  F)   SpO2: 95% 93% 93%         Electronically Signed By: JOSE OVERTON MD  July 27, 2018  7:21 AM

## 2018-07-27 NOTE — ANESTHESIA PREPROCEDURE EVALUATION
Procedure: * No procedures listed *  Preop diagnosis: * No pre-op diagnosis entered *  No Known Allergies  Patient Active Problem List   Diagnosis     Attention deficit hyperactivity disorder (ADHD)     Esophageal reflux     Morbid obesity (H)     Vitamin D deficiency     CARDIOVASCULAR SCREENING; LDL GOAL LESS THAN 160     Major depressive disorder, recurrent episode, moderate (H)     Anxiety     Hypotestosteronism     Essential hypertension with goal blood pressure less than 140/90     Suicidal ideation     Severe episode of recurrent major depressive disorder, without psychotic features (H)     MENDEZ (obstructive sleep apnea)     Depression     Past Medical History:   Diagnosis Date     Anxiety      Attention deficit disorder with hyperactivity(314.01) 2001     Esophageal reflux     Dr Starks- had EGD ~2202     Generalized anxiety disorder      Hypertension      Infectious mononucleosis 12/03     Low testosterone      Major depressive disorder, single episode in full remission (H)      Major depressive disorder, single episode, mild (H)      MDD (major depressive disorder)      Mild intermittent asthma      Past Surgical History:   Procedure Laterality Date     OTHER SURGICAL HISTORY      wisdom teeth extraction       Current Outpatient Prescriptions on File Prior to Encounter:  Cholecalciferol (VITAMIN D3) 17811 UNITS TABS Take 10,000 Units by mouth daily (patient purchases over-the-counter) this dose was NOT prescribed by a doctor.   desvenlafaxine succinate (PRISTIQ) 100 MG 24 hr tablet Take 100 mg by mouth daily   gabapentin (NEURONTIN) 100 MG capsule Take 100-200 mg by mouth nightly as needed (restless leg syndrome)    LITHIUM CARBONATE PO Take 600 mg by mouth daily    losartan (COZAAR) 50 MG tablet TAKE 1 TABLET (50 MG) BY MOUTH DAILY   OLANZapine (ZYPREXA) 10 MG tablet Take 1 tablet (10 mg) by mouth 2 times daily as needed (anxiety)   omeprazole (PRILOSEC) 20 MG capsule Take 1 capsule (20 mg) by mouth  daily   traZODone (DESYREL) 50 MG tablet Take  mg by mouth nightly as needed for sleep     Current Facility-Administered Medications on File Prior to Encounter:  [DISCONTINUED] lactated ringers infusion     There were no vitals taken for this visit.    Lab Results   Component Value Date    WBC 10.0 07/13/2018     Lab Results   Component Value Date    RBC 4.95 07/13/2018     Lab Results   Component Value Date    HGB 14.1 07/13/2018     Lab Results   Component Value Date    HCT 42.8 07/13/2018     Lab Results   Component Value Date    MCV 87 07/13/2018     Lab Results   Component Value Date    MCH 28.5 07/13/2018     Lab Results   Component Value Date    MCHC 32.9 07/13/2018     Lab Results   Component Value Date    RDW 13.0 07/13/2018     Lab Results   Component Value Date     07/13/2018     Lab Results   Component Value Date    INR 1.01 07/08/2015       Last Basic Metabolic Panel:  Lab Results   Component Value Date     07/13/2018      Lab Results   Component Value Date    POTASSIUM 3.9 07/13/2018     Lab Results   Component Value Date    CHLORIDE 107 07/13/2018     Lab Results   Component Value Date    INEZ 9.1 07/13/2018     Lab Results   Component Value Date    CO2 30 07/13/2018     Lab Results   Component Value Date    BUN 9 07/13/2018     Lab Results   Component Value Date    CR 0.85 07/13/2018     Lab Results   Component Value Date    GLC 95 07/13/2018     Anesthesia Evaluation     . Pt has had prior anesthetic. Type: General    No history of anesthetic complications          ROS/MED HX    ENT/Pulmonary:     (+)sleep apnea, asthma uses CPAP , recent URI unresolved only mild residual cough remains: . .    Neurologic:      (-) CVA   Cardiovascular:     (+) hypertension----. : . . . :. .      (-) angina, CAD, syncope, arrhythmias, irregular heartbeat/palpitations and angina   METS/Exercise Tolerance:     Hematologic:         Musculoskeletal:         GI/Hepatic:     (+) GERD Asymptomatic on  medication,      (-) liver disease   Renal/Genitourinary:      (-) renal disease   Endo:     (+) Obesity, .   (-) Type II DM   Psychiatric:     (+) psychiatric history depression and anxiety (ADHD)      Infectious Disease:         Malignancy:         Other:                     Physical Exam  Normal systems: cardiovascular    Airway     Dental     Cardiovascular   Rhythm and rate: normal      Pulmonary                         Anesthesia Plan      History & Physical Review  History and physical reviewed and following examination; no interval change.    ASA Status:  3 .    NPO Status:  > 8 hours    Plan for General with Other induction.     160 mg methohexital, 190 mg succinylcholine on 7/25      Postoperative Care  Postoperative pain management:  IV analgesics.      Consents  Anesthetic plan, risks, benefits and alternatives discussed with:  Patient..                          .

## 2018-07-31 NOTE — ANESTHESIA POSTPROCEDURE EVALUATION
Patient: Abel Ward    * No procedures listed *    Diagnosis:* No pre-op diagnosis entered *  Diagnosis Additional Information: No value filed.    Anesthesia Type:  General    Note:  Anesthesia Post Evaluation    Patient location during evaluation: PACU  Patient participation: Able to fully participate in evaluation  Level of consciousness: awake, awake and alert and responsive to verbal stimuli  Pain management: adequate  Airway patency: patent  Cardiovascular status: acceptable  Respiratory status: acceptable  Hydration status: acceptable  PONV: none     Anesthetic complications: None          Last vitals:  Vitals:    07/18/18 0715 07/18/18 0720 07/18/18 0725   BP: 155/85 141/62 147/87   Resp: 22 17 20   Temp:   37.1  C (98.8  F)   SpO2: 94% 95% 95%         Electronically Signed By: Leanne Polanco  July 31, 2018  1:35 PM

## 2018-08-09 ENCOUNTER — DOCUMENTATION ONLY (OUTPATIENT)
Dept: SLEEP MEDICINE | Facility: CLINIC | Age: 33
End: 2018-08-09

## 2018-08-09 DIAGNOSIS — G47.33 OSA (OBSTRUCTIVE SLEEP APNEA): ICD-10-CM

## 2018-08-09 NOTE — PROGRESS NOTES
30 DAY [facility] VISIT    Diagnostic AHI: 7.5   PSG    Message left for patient to return call     Assessment: Pt not meeting objective benchmarks for leak    Action plan: waiting for patient to return call.   Patient has a follow up visit with Dr. Nazario on 9/21/2018.   Device type: Auto-CPAP  PAP settings: CPAP min 5.0 cm  H20     CPAP max 15.0 cm  H20         95th% pressure 8.9 cm  H20   Mask type:  Full Face Mask  Objective measures: 14 day rolling measures      Compliance  85 %      Leak  60.65 lpm  last  upload      AHI 1.54   last  upload      Average number of minutes 447      Objective measure goal  Compliance   Goal >70%  Leak   Goal < 24 lpm  AHI  Goal < 5  Usage  Goal >240

## 2018-09-21 ENCOUNTER — DOCUMENTATION ONLY (OUTPATIENT)
Dept: SLEEP MEDICINE | Facility: CLINIC | Age: 33
End: 2018-09-21

## 2018-09-21 ENCOUNTER — OFFICE VISIT (OUTPATIENT)
Dept: SLEEP MEDICINE | Facility: CLINIC | Age: 33
End: 2018-09-21
Payer: COMMERCIAL

## 2018-09-21 VITALS
HEART RATE: 73 BPM | OXYGEN SATURATION: 98 % | DIASTOLIC BLOOD PRESSURE: 82 MMHG | BODY MASS INDEX: 44.1 KG/M2 | SYSTOLIC BLOOD PRESSURE: 130 MMHG | RESPIRATION RATE: 18 BRPM | WEIGHT: 315 LBS | HEIGHT: 71 IN

## 2018-09-21 DIAGNOSIS — G47.33 OSA (OBSTRUCTIVE SLEEP APNEA): ICD-10-CM

## 2018-09-21 PROCEDURE — 99214 OFFICE O/P EST MOD 30 MIN: CPT | Performed by: INTERNAL MEDICINE

## 2018-09-21 RX ORDER — DEXTROAMPHETAMINE SACCHARATE, AMPHETAMINE ASPARTATE MONOHYDRATE, DEXTROAMPHETAMINE SULFATE AND AMPHETAMINE SULFATE 7.5; 7.5; 7.5; 7.5 MG/1; MG/1; MG/1; MG/1
30 CAPSULE, EXTENDED RELEASE ORAL ONCE
COMMUNITY
Start: 2018-08-24 | End: 2021-03-29 | Stop reason: DRUGHIGH

## 2018-09-21 RX ORDER — LITHIUM CARBONATE 300 MG/1
300 TABLET, FILM COATED, EXTENDED RELEASE ORAL AT BEDTIME
Refills: 1 | COMMUNITY
Start: 2018-05-19 | End: 2019-02-05

## 2018-09-21 RX ORDER — VORTIOXETINE 10 MG/1
10 TABLET, FILM COATED ORAL ONCE
COMMUNITY
Start: 2018-09-05 | End: 2020-10-30

## 2018-09-21 NOTE — MR AVS SNAPSHOT
After Visit Summary   9/21/2018    Abel Ward    MRN: [id number]           Patient Information     Date Of Birth          1985        Visit Information        Provider Department      9/21/2018 4:00 PM Virgil Nazario MD [facility]        Today's Diagnoses     MENDEZ (obstructive sleep apnea)          Care Instructions          Summary Recommendations:    Take medications at 9 pm with bedtime at 10 pm    Discuss with Vincent Suarez possible tapering off of trazadone    Keep sleep diary until next visit    No screen time before bedtime      Your BMI is Body mass index is 45.33 kg/(m^2).  Weight management is a personal decision.  If you are interested in exploring weight loss strategies, the following discussion covers the approaches that may be successful. Body mass index (BMI) is one way to tell whether you are at a healthy weight, overweight, or obese. It measures your weight in relation to your height.  A BMI of 18.5 to 24.9 is in the healthy range. A person with a BMI of 25 to 29.9 is considered overweight, and someone with a BMI of 30 or greater is considered obese. More than two-thirds of American adults are considered overweight or obese.  Being overweight or obese increases the risk for further weight gain. Excess weight may lead to heart disease and diabetes.  Creating and following plans for healthy eating and physical activity may help you improve your health.  Weight control is part of healthy lifestyle and includes exercise, emotional health, and healthy eating habits. Careful eating habits lifelong are the mainstay of weight control. Though there are significant health benefits from weight loss, long-term weight loss with diet alone may be very difficult to achieve- studies show long-term success with dietary management in less than 10% of people. Attaining a healthy weight may be especially difficult to achieve in those with severe obesity. In some  cases, medications, devices and surgical management might be considered.  What can you do?  If you are overweight or obese and are interested in methods for weight loss, you should discuss this with your provider.     Consider reducing daily calorie intake by 500 calories.     Keep a food journal.     Avoiding skipping meals, consider cutting portions instead.    Diet combined with exercise helps maintain muscle while optimizing fat loss. Strength training is particularly important for building and maintaining muscle mass. Exercise helps reduce stress, increase energy, and improves fitness. Increasing exercise without diet control, however, may not burn enough calories to loose weight.       Start walking three days a week 10-20 minutes at a time    Work towards walking thirty minutes five days a week     Eventually, increase the speed of your walking for 1-2 minutes at time    In addition, we recommend that you review healthy lifestyles and methods for weight loss available through the National Institutes of Health patient information sites:  http://win.niddk.nih.gov/publications/index.htm    And look into health and wellness programs that may be available through your health insurance provider, employer, local community center, or angel club.    Weight management plan: Patient was referred to their PCP to discuss a diet and exercise plan.     Your blood pressure was checked while you were in clinic today.  Please read the guidelines below about what these numbers mean and what you should do about them.  Your systolic blood pressure is the top number.  This is the pressure when the heart is pumping.  Your diastolic blood pressure is the bottom number.  This is the pressure in between beats.  If your systolic blood pressure is less than 120 and your diastolic blood pressure is less than 80, then your blood pressure is normal. There is nothing more that you need to do about it  If your systolic blood pressure is  120-139 or your diastolic blood pressure is 80-89, your blood pressure may be higher than it should be.  You should have your blood pressure re-checked within a year by a primary care provider.  If your systolic blood pressure is 140 or greater or your diastolic blood pressure is 90 or greater, you may have high blood pressure.  High blood pressure is treatable, but if left untreated over time it can put you at risk for heart attack, stroke, or kidney failure.  You should have your blood pressure re-checked by a primary care provider within the next four weeks.                Follow-ups after your visit        Follow-up notes from your care team     Return in about 3 months (around 12/21/2018).      Who to contact     If you have questions or need follow up information about today's clinic visit or your schedule please contact [facility] directly at [phone].  Normal or non-critical lab and imaging results will be communicated to you by MyChart, letter or phone within 4 business days after the clinic has received the results. If you do not hear from us within 7 days, please contact the clinic through [facility] or phone. If you have a critical or abnormal lab result, we will notify you by phone as soon as possible.  Submit refill requests through [employer] or call your pharmacy and they will forward the refill request to us. Please allow 3 business days for your refill to be completed.          Additional Information About Your Visit        MyChart Information     [employer] gives you secure access to your electronic health record. If you see a primary care provider, you can also send messages to your care team and make appointments. If you have questions, please call your primary care clinic.  If you do not have a primary care provider, please call [phone] and they will assist you.        Care EveryWhere ID     This is your Care EveryWhere ID. This could be used by other organizations to  "access your [place] medical records  [id number]        Your Vitals Were     Pulse Respirations Height Pulse Oximetry BMI (Body Mass Index)       73 18 1.803 m (5' 11\") 98% 45.33 kg/m2        Blood Pressure from Last 3 Encounters:   09/21/18 130/82   07/27/18 169/89   07/25/18 (!) 158/100    Weight from Last 3 Encounters:   09/21/18 147.4 kg (325 lb)   07/13/18 (!) 156 kg (343 lb 14.4 oz)   07/03/18 (!) 151.6 kg (334 lb 3.2 oz)              Today, you had the following     No orders found for display       Primary Care Provider Office Phone # Fax #    David Denney [phone]       [address] [place] [address]        Equal Access to Services     [facility]: Hadii aad ku hadasho Soomaali, waaxda luqadaha, qaybta kaalmada adeegyada, waxay idiin hayameena baird . So [facility] [phone].    ATENCIÓN: Si habla español, tiene a bagley disposición servicios gratuitos de asistencia lingüística. Llame al [phone].    We comply with applicable federal civil rights laws and [place] laws. We do not discriminate on the basis of race, color, national origin, age, disability, sex, sexual orientation, or gender identity.            Thank you!     Thank you for choosing [facility]  for your care. Our goal is always to provide you with excellent care. Hearing back from our patients is one way we can continue to improve our services. Please take a few minutes to complete the written survey that you may receive in the mail after your visit with us. Thank you!             Your Updated Medication List - Protect others around you: Learn how to safely use, store and throw away your medicines at www.disposemymeds.org.          This list is accurate as of 9/21/18  4:59 PM.  Always use your most recent med list.                   Brand Name Dispense Instructions for use Diagnosis    amphetamine-dextroamphetamine 30 MG per 24 hr capsule    ADDERALL XR     Take 30 mg " by mouth once        gabapentin 100 MG capsule    NEURONTIN     Take 100-200 mg by mouth nightly as needed (restless leg syndrome)    Restless legs syndrome       * LITHIUM CARBONATE PO      Take 600 mg by mouth daily        * lithium 300 MG CR tablet    ESKALITH/LITHOBID     Take 300 mg by mouth At Bedtime        losartan 50 MG tablet    COZAAR    90 tablet    TAKE 1 TABLET (50 MG) BY MOUTH DAILY    Essential hypertension with goal blood pressure less than 140/90       OLANZapine 10 MG tablet    zyPREXA    60 tablet    Take 1 tablet (10 mg) by mouth 2 times daily as needed (anxiety)    Severe episode of recurrent major depressive disorder, without psychotic features (H), Anxiety       omeprazole 20 MG CR capsule    priLOSEC    90 capsule    Take 1 capsule (20 mg) by mouth daily    Esophageal reflux       PRISTIQ 100 MG 24 hr tablet   Generic drug:  desvenlafaxine succinate      Take 100 mg by mouth daily        traZODone 50 MG tablet    DESYREL     Take  mg by mouth nightly as needed for sleep        TRINTELLIX 10 MG tablet   Generic drug:  vortioxetine      Take 10 mg by mouth once        Vitamin D3 05992 units Tabs      Take 10,000 Units by mouth daily (patient purchases over-the-counter) this dose was NOT prescribed by a doctor.        * Notice:  This list has 2 medication(s) that are the same as other medications prescribed for you. Read the directions carefully, and ask your doctor or other care provider to review them with you.

## 2018-09-21 NOTE — NURSING NOTE
"Chief Complaint   Patient presents with     RECHECK     f/u cpap       Initial /82  Pulse 73  Resp 18  Ht 1.803 m (5' 11\")  Wt 147.4 kg (325 lb)  SpO2 98%  BMI 45.33 kg/m2 Estimated body mass index is 45.33 kg/(m^2) as calculated from the following:    Height as of this encounter: 1.803 m (5' 11\").    Weight as of this encounter: 147.4 kg (325 lb).    Medication Reconciliation: complete    Geraldine Johnson LPN/VISHAL    DME: Y  "

## 2018-09-21 NOTE — PROGRESS NOTES
This nurse sent copy of Dr Virgil Nazario notes for patient sleep visit from 9/21/18 to Vincent Suarez CMP on 9/24/18.        Geraldine Johnson LPN/VISHAL

## 2018-09-21 NOTE — PATIENT INSTRUCTIONS
Summary Recommendations:    Take medications at 9 pm with bedtime at 10 pm    Discuss with Vincent Suarez possible tapering off of trazadone    Keep sleep diary until next visit    No screen time before bedtime      Your BMI is Body mass index is 45.33 kg/(m^2).  Weight management is a personal decision.  If you are interested in exploring weight loss strategies, the following discussion covers the approaches that may be successful. Body mass index (BMI) is one way to tell whether you are at a healthy weight, overweight, or obese. It measures your weight in relation to your height.  A BMI of 18.5 to 24.9 is in the healthy range. A person with a BMI of 25 to 29.9 is considered overweight, and someone with a BMI of 30 or greater is considered obese. More than two-thirds of American adults are considered overweight or obese.  Being overweight or obese increases the risk for further weight gain. Excess weight may lead to heart disease and diabetes.  Creating and following plans for healthy eating and physical activity may help you improve your health.  Weight control is part of healthy lifestyle and includes exercise, emotional health, and healthy eating habits. Careful eating habits lifelong are the mainstay of weight control. Though there are significant health benefits from weight loss, long-term weight loss with diet alone may be very difficult to achieve- studies show long-term success with dietary management in less than 10% of people. Attaining a healthy weight may be especially difficult to achieve in those with severe obesity. In some cases, medications, devices and surgical management might be considered.  What can you do?  If you are overweight or obese and are interested in methods for weight loss, you should discuss this with your provider.     Consider reducing daily calorie intake by 500 calories.     Keep a food journal.     Avoiding skipping meals, consider cutting portions instead.    Diet  combined with exercise helps maintain muscle while optimizing fat loss. Strength training is particularly important for building and maintaining muscle mass. Exercise helps reduce stress, increase energy, and improves fitness. Increasing exercise without diet control, however, may not burn enough calories to loose weight.       Start walking three days a week 10-20 minutes at a time    Work towards walking thirty minutes five days a week     Eventually, increase the speed of your walking for 1-2 minutes at time    In addition, we recommend that you review healthy lifestyles and methods for weight loss available through the National Institutes of Health patient information sites:  http://win.niddk.nih.gov/publications/index.htm    And look into health and wellness programs that may be available through your health insurance provider, employer, local community center, or angel club.    Weight management plan: Patient was referred to their PCP to discuss a diet and exercise plan.     Your blood pressure was checked while you were in clinic today.  Please read the guidelines below about what these numbers mean and what you should do about them.  Your systolic blood pressure is the top number.  This is the pressure when the heart is pumping.  Your diastolic blood pressure is the bottom number.  This is the pressure in between beats.  If your systolic blood pressure is less than 120 and your diastolic blood pressure is less than 80, then your blood pressure is normal. There is nothing more that you need to do about it  If your systolic blood pressure is 120-139 or your diastolic blood pressure is 80-89, your blood pressure may be higher than it should be.  You should have your blood pressure re-checked within a year by a primary care provider.  If your systolic blood pressure is 140 or greater or your diastolic blood pressure is 90 or greater, you may have high blood pressure.  High blood pressure is treatable, but if  left untreated over time it can put you at risk for heart attack, stroke, or kidney failure.  You should have your blood pressure re-checked by a primary care provider within the next four weeks.

## 2018-09-21 NOTE — PROGRESS NOTES
[facility]  Outpatient Sleep Medicine Consultation  September 21, 2018      Name: Abel Ward MRN# [id number]   Age: 33 year old YOB: 1985     Date of Consultation: September 21, 2018  Consultation is requested by: No referring provider defined for this encounter.  Primary care provider: David Denney           Assessment and Plan:       Mild obstructive sleep apnea without hypoxemia-->this patient has had modest improvement in sleep disruption with use of CPAP but this is had no effect on his symptoms of daytime fatigue    Psychophysiological insomnia with excessive time in bed 10-11 hours and non-restorative sleep    ADHD on amphetamines and 3 caffeine/day     Depression    Hypertension    Morbid obesity    Esophageal reflux    Remote history of restless legs syndrome        Summary Recommendations:    Take medications at 9 pm with bedtime at 10 pm    Discuss with Vincent Suarez possible tapering off of trazadone    Keep sleep diary until next visit    No screen time before bedtime    Consideration for withdrawing amphetamines and reducing caffeine intake if there is continued complaint of difficulty initiating sleep    Summary Counseling:           History of Present Illness:     Abel Ward is a 33 year old male with a prior history of delayed sleep phase in his 20s who recently underwent sleep testing showing mild sleep apnea without hypoxemia.  This gentleman has occasional features of restless leg syndrome for which he takes gabapentin although he acknowledges that this is not currently a problem and he is not taking the medication.  He does endorse inappropriate time in bed going to bed between 6 and 7 PM taking his 3 sedating medicines at bedtime and setting his alarm for 6 AM. This results in 10-11 hours spent in bed on a nightly basis despite the fact that he is only using his CPAP for 7 hours.  He complains substantially of fatigue throughout  the day however does not have inappropriate napping has an Lewis Sleepiness Scale of 1 and cannot fall asleep if he intends to during the day.  This occurs in the setting of regular dosing of amphetamines for his attention deficit disorder.           Medications:     Current Outpatient Prescriptions   Medication Sig     Cholecalciferol (VITAMIN D3) 96344 UNITS TABS Take 10,000 Units by mouth daily (patient purchases over-the-counter) this dose was NOT prescribed by a doctor.     desvenlafaxine succinate (PRISTIQ) 100 MG 24 hr tablet Take 100 mg by mouth daily     gabapentin (NEURONTIN) 100 MG capsule Take 100-200 mg by mouth nightly as needed (restless leg syndrome)      LITHIUM CARBONATE PO Take 600 mg by mouth daily      losartan (COZAAR) 50 MG tablet TAKE 1 TABLET (50 MG) BY MOUTH DAILY     OLANZapine (ZYPREXA) 10 MG tablet Take 1 tablet (10 mg) by mouth 2 times daily as needed (anxiety)     omeprazole (PRILOSEC) 20 MG capsule Take 1 capsule (20 mg) by mouth daily     traZODone (DESYREL) 50 MG tablet Take  mg by mouth nightly as needed for sleep     No current facility-administered medications for this visit.         No Known Allergies         Past Medical History:     Does not need 02 supplement at night   Past Medical History:   Diagnosis Date     Anxiety      Attention deficit disorder with hyperactivity(314.01) 2001     Esophageal reflux     Dr Starks- had EGD ~2202     Generalized anxiety disorder      Hypertension      Infectious mononucleosis 12/03     Low testosterone      Major depressive disorder, single episode in full remission (H)      Major depressive disorder, single episode, mild (H)      MDD (major depressive disorder)      Mild intermittent asthma              Past Surgical History:    No h/o  upper airway surgery  Past Surgical History:   Procedure Laterality Date     OTHER SURGICAL HISTORY      wisdom teeth extraction                      Physical Examination:   /82  Pulse 73  " Resp 18  Ht 1.803 m (5' 11\")  Wt 147.4 kg (325 lb)  SpO2 98%  BMI 45.33 kg/m2             Copy to: David Denney MD 9/21/2018     [facility]   Floor [address], [address]   ?[address] [place] [address]   Appointments: [phone]    [facility] [place] [facility]  3rd Floor  [address], [place] [address]     Total time spent with patient: 25 min >50% counseling    "

## 2018-12-15 DIAGNOSIS — E29.1 HYPOGONADISM MALE: Primary | ICD-10-CM

## 2018-12-15 LAB
BASOPHILS # BLD AUTO: 0.1 10E9/L (ref 0–0.2)
BASOPHILS NFR BLD AUTO: 0.9 %
DIFFERENTIAL METHOD BLD: NORMAL
EOSINOPHIL # BLD AUTO: 0.4 10E9/L (ref 0–0.7)
EOSINOPHIL NFR BLD AUTO: 5.5 %
ERYTHROCYTE [DISTWIDTH] IN BLOOD BY AUTOMATED COUNT: 12.1 % (ref 10–15)
HCT VFR BLD AUTO: 45.2 % (ref 40–53)
HGB BLD-MCNC: 15.1 G/DL (ref 13.3–17.7)
LYMPHOCYTES # BLD AUTO: 1.5 10E9/L (ref 0.8–5.3)
LYMPHOCYTES NFR BLD AUTO: 22.3 %
MCH RBC QN AUTO: 28.3 PG (ref 26.5–33)
MCHC RBC AUTO-ENTMCNC: 33.4 G/DL (ref 31.5–36.5)
MCV RBC AUTO: 85 FL (ref 78–100)
MONOCYTES # BLD AUTO: 0.6 10E9/L (ref 0–1.3)
MONOCYTES NFR BLD AUTO: 8.7 %
NEUTROPHILS # BLD AUTO: 4.2 10E9/L (ref 1.6–8.3)
NEUTROPHILS NFR BLD AUTO: 62.6 %
PLATELET # BLD AUTO: 296 10E9/L (ref 150–450)
RBC # BLD AUTO: 5.33 10E12/L (ref 4.4–5.9)
WBC # BLD AUTO: 6.7 10E9/L (ref 4–11)

## 2018-12-15 PROCEDURE — 84439 ASSAY OF FREE THYROXINE: CPT

## 2018-12-15 PROCEDURE — 84443 ASSAY THYROID STIM HORMONE: CPT

## 2018-12-15 PROCEDURE — 80053 COMPREHEN METABOLIC PANEL: CPT

## 2018-12-15 PROCEDURE — 80178 ASSAY OF LITHIUM: CPT

## 2018-12-15 PROCEDURE — 36415 COLL VENOUS BLD VENIPUNCTURE: CPT

## 2018-12-15 PROCEDURE — 85025 COMPLETE CBC W/AUTO DIFF WBC: CPT

## 2018-12-15 PROCEDURE — 82746 ASSAY OF FOLIC ACID SERUM: CPT

## 2018-12-15 PROCEDURE — 84403 ASSAY OF TOTAL TESTOSTERONE: CPT

## 2018-12-17 LAB
ALBUMIN SERPL-MCNC: 3.9 G/DL (ref 3.4–5)
ALP SERPL-CCNC: 75 U/L (ref 40–150)
ALT SERPL W P-5'-P-CCNC: 54 U/L (ref 0–70)
ANION GAP SERPL CALCULATED.3IONS-SCNC: 9 MMOL/L (ref 3–14)
AST SERPL W P-5'-P-CCNC: 27 U/L (ref 0–45)
BILIRUB SERPL-MCNC: 0.3 MG/DL (ref 0.2–1.3)
BUN SERPL-MCNC: 14 MG/DL (ref 7–30)
CALCIUM SERPL-MCNC: 9.4 MG/DL (ref 8.5–10.1)
CHLORIDE SERPL-SCNC: 107 MMOL/L (ref 94–109)
CO2 SERPL-SCNC: 22 MMOL/L (ref 20–32)
CREAT SERPL-MCNC: 0.83 MG/DL (ref 0.66–1.25)
FOLATE SERPL-MCNC: 12.9 NG/ML
GFR SERPL CREATININE-BSD FRML MDRD: >90 ML/MIN/1.7M2
GLUCOSE SERPL-MCNC: 108 MG/DL (ref 70–99)
LITHIUM SERPL-SCNC: 0.36 MMOL/L (ref 0.6–1.2)
POTASSIUM SERPL-SCNC: 4.1 MMOL/L (ref 3.4–5.3)
PROT SERPL-MCNC: 7.4 G/DL (ref 6.8–8.8)
SODIUM SERPL-SCNC: 138 MMOL/L (ref 133–144)
T4 FREE SERPL-MCNC: 0.95 NG/DL (ref 0.76–1.46)
TSH SERPL DL<=0.005 MIU/L-ACNC: 0.86 MU/L (ref 0.4–4)

## 2018-12-19 LAB — TESTOST SERPL-MCNC: 407 NG/DL (ref 240–950)

## 2018-12-28 DIAGNOSIS — I10 ESSENTIAL HYPERTENSION WITH GOAL BLOOD PRESSURE LESS THAN 140/90: ICD-10-CM

## 2018-12-28 RX ORDER — LOSARTAN POTASSIUM 50 MG/1
TABLET ORAL
Qty: 30 TABLET | Refills: 0 | Status: SHIPPED | OUTPATIENT
Start: 2018-12-28 | End: 2019-01-25

## 2018-12-28 NOTE — TELEPHONE ENCOUNTER
"Requested Prescriptions   Pending Prescriptions Disp Refills     losartan (COZAAR) 50 MG tablet [Pharmacy Med Name: LOSARTAN POTASSIUM 50 MG TAB] 90 tablet 1      Last Written Prescription Date:  7.5.18  Last Fill Quantity: 90,  # refills: 1   Last Office Visit: 12/22/2017   Future Office Visit:      Sig: TAKE 1 TABLET (50 MG) BY MOUTH DAILY    Angiotensin-II Receptors Failed - 12/28/2018  1:11 AM       Failed - Recent (12 mo) or future (30 days) visit within the authorizing provider's specialty    Patient had office visit in the last 12 months or has a visit in the next 30 days with authorizing provider or within the authorizing provider's specialty.  See \"Patient Info\" tab in inbasket, or \"Choose Columns\" in Meds & Orders section of the refill encounter.             Passed - Blood pressure under 140/90 in past 12 months    BP Readings from Last 3 Encounters:   09/21/18 130/82   07/27/18 169/89   07/25/18 (!) 158/100                Passed - Patient is age 18 or older       Passed - Normal serum creatinine on file in past 12 months    Recent Labs   Lab Test 12/15/18  1057   CR 0.83            Passed - Normal serum potassium on file in past 12 months    Recent Labs   Lab Test 12/15/18  1057   POTASSIUM 4.1                      "

## 2018-12-28 NOTE — TELEPHONE ENCOUNTER
Medication is being filled for 1 time refill only due to:  Patient needs to be seen because it has been more than one year since last visit.   Meghan Parr RN  [facility]

## 2019-01-08 ENCOUNTER — DOCUMENTATION ONLY (OUTPATIENT)
Dept: SLEEP MEDICINE | Facility: CLINIC | Age: 34
End: 2019-01-08

## 2019-01-08 DIAGNOSIS — G47.33 OSA (OBSTRUCTIVE SLEEP APNEA): ICD-10-CM

## 2019-01-08 NOTE — PROGRESS NOTES
6 month [facility]    STM Recheck Visit     Diagnostic AHI:   PSG AHI: 7.5            Data only recheck     Device type:   PAP Device: Auto-CPAP ()       Assessment: Pt meeting objective benchmarks.       Action plan:   pt to follow up per provider request           Objective measure goal  Compliance   Goal >70%  Leak   Goal < 24 lpm/ 10% of night in large leak  AHI  Goal < 5  Usage  Goal >240

## 2019-01-11 ENCOUNTER — MYC MEDICAL ADVICE (OUTPATIENT)
Dept: FAMILY MEDICINE | Facility: CLINIC | Age: 34
End: 2019-01-11

## 2019-01-11 DIAGNOSIS — R05.9 COUGH: Primary | ICD-10-CM

## 2019-01-11 RX ORDER — AZITHROMYCIN 250 MG/1
TABLET, FILM COATED ORAL
Qty: 6 TABLET | Refills: 0 | Status: SHIPPED | OUTPATIENT
Start: 2019-01-11 | End: 2019-02-05

## 2019-01-11 NOTE — TELEPHONE ENCOUNTER
Routing to PCP for further review/recommendations/orders.    Estelle Friend RN  [facility]

## 2019-01-25 DIAGNOSIS — I10 ESSENTIAL HYPERTENSION WITH GOAL BLOOD PRESSURE LESS THAN 140/90: ICD-10-CM

## 2019-01-25 RX ORDER — LOSARTAN POTASSIUM 50 MG/1
TABLET ORAL
Qty: 15 TABLET | Refills: 0 | Status: SHIPPED | OUTPATIENT
Start: 2019-01-25 | End: 2019-02-05

## 2019-01-25 NOTE — TELEPHONE ENCOUNTER
Refill(s) for 15 days only.  Please call the patient and schedule a followup appointment within the next month.

## 2019-01-25 NOTE — TELEPHONE ENCOUNTER
"Routing refill request to provider for review/approval because:  Meme given x1 and patient did not follow up, please advise  Patient needs to be seen because it has been more than 1 year since last office visit.      Requested Prescriptions   Pending Prescriptions Disp Refills     losartan (COZAAR) 50 MG tablet [Pharmacy Med Name: LOSARTAN POTASSIUM 50 MG TAB] 30 tablet 0    Last Written Prescription Date:  12/28/2018  Last Fill Quantity: 30,  # refills: 0   Last office visit: 12/22/2017 with prescribing provider:  Олег  Future Office Visit:     Sig: TAKE 1 TABLET BY MOUTH EVERY DAY    Angiotensin-II Receptors Failed - 1/25/2019  1:42 AM       Failed - Recent (12 mo) or future (30 days) visit within the authorizing provider's specialty    Patient had office visit in the last 12 months or has a visit in the next 30 days with authorizing provider or within the authorizing provider's specialty.  See \"Patient Info\" tab in inbasket, or \"Choose Columns\" in Meds & Orders section of the refill encounter.             Passed - Blood pressure under 140/90 in past 12 months    BP Readings from Last 3 Encounters:   09/21/18 130/82   07/27/18 169/89   07/25/18 (!) 158/100                Passed - Medication is active on med list       Passed - Patient is age 18 or older       Passed - Normal serum creatinine on file in past 12 months    Recent Labs   Lab Test 12/15/18  1057   CR 0.83            Passed - Normal serum potassium on file in past 12 months    Recent Labs   Lab Test 12/15/18  1057   POTASSIUM 4.1                    IMAN MooreN, RN  [facility]          "

## 2019-01-25 NOTE — TELEPHONE ENCOUNTER
Called [phone] and left non detailed message for pt to call back.  See below.  Needs to be seen for px or medication follow up.  Bessy Burt

## 2019-02-05 ENCOUNTER — OFFICE VISIT (OUTPATIENT)
Dept: FAMILY MEDICINE | Facility: CLINIC | Age: 34
End: 2019-02-05
Payer: COMMERCIAL

## 2019-02-05 VITALS
SYSTOLIC BLOOD PRESSURE: 137 MMHG | OXYGEN SATURATION: 98 % | TEMPERATURE: 99 F | DIASTOLIC BLOOD PRESSURE: 80 MMHG | HEIGHT: 71 IN | BODY MASS INDEX: 42.98 KG/M2 | HEART RATE: 92 BPM | WEIGHT: 307 LBS

## 2019-02-05 DIAGNOSIS — E66.01 MORBID OBESITY (H): ICD-10-CM

## 2019-02-05 DIAGNOSIS — Z23 ENCOUNTER FOR IMMUNIZATION: ICD-10-CM

## 2019-02-05 DIAGNOSIS — I10 HYPERTENSION GOAL BP (BLOOD PRESSURE) < 130/80: Primary | ICD-10-CM

## 2019-02-05 DIAGNOSIS — F33.1 MAJOR DEPRESSIVE DISORDER, RECURRENT EPISODE, MODERATE (H): ICD-10-CM

## 2019-02-05 LAB
CREAT UR-MCNC: 112 MG/DL
MICROALBUMIN UR-MCNC: 6 MG/L
MICROALBUMIN/CREAT UR: 5.4 MG/G CR (ref 0–17)

## 2019-02-05 PROCEDURE — 90471 IMMUNIZATION ADMIN: CPT | Performed by: FAMILY MEDICINE

## 2019-02-05 PROCEDURE — 99214 OFFICE O/P EST MOD 30 MIN: CPT | Mod: 25 | Performed by: FAMILY MEDICINE

## 2019-02-05 PROCEDURE — 82043 UR ALBUMIN QUANTITATIVE: CPT | Performed by: FAMILY MEDICINE

## 2019-02-05 PROCEDURE — 90686 IIV4 VACC NO PRSV 0.5 ML IM: CPT | Performed by: FAMILY MEDICINE

## 2019-02-05 RX ORDER — LOSARTAN POTASSIUM 50 MG/1
50 TABLET ORAL DAILY
Qty: 90 TABLET | Refills: 3 | Status: SHIPPED | OUTPATIENT
Start: 2019-02-05 | End: 2020-02-11

## 2019-02-05 RX ORDER — ONDANSETRON 4 MG/1
4-8 TABLET, FILM COATED ORAL DAILY
Refills: 1 | COMMUNITY
Start: 2018-11-01 | End: 2019-11-05

## 2019-02-05 RX ORDER — LORAZEPAM 1 MG/1
1 TABLET ORAL PRN
Refills: 0 | COMMUNITY
Start: 2019-01-15

## 2019-02-05 ASSESSMENT — ANXIETY QUESTIONNAIRES
GAD7 TOTAL SCORE: 17
6. BECOMING EASILY ANNOYED OR IRRITABLE: SEVERAL DAYS
1. FEELING NERVOUS, ANXIOUS, OR ON EDGE: NEARLY EVERY DAY
7. FEELING AFRAID AS IF SOMETHING AWFUL MIGHT HAPPEN: MORE THAN HALF THE DAYS
IF YOU CHECKED OFF ANY PROBLEMS ON THIS QUESTIONNAIRE, HOW DIFFICULT HAVE THESE PROBLEMS MADE IT FOR YOU TO DO YOUR WORK, TAKE CARE OF THINGS AT HOME, OR GET ALONG WITH OTHER PEOPLE: VERY DIFFICULT
2. NOT BEING ABLE TO STOP OR CONTROL WORRYING: MORE THAN HALF THE DAYS
3. WORRYING TOO MUCH ABOUT DIFFERENT THINGS: NEARLY EVERY DAY
5. BEING SO RESTLESS THAT IT IS HARD TO SIT STILL: NEARLY EVERY DAY

## 2019-02-05 ASSESSMENT — PATIENT HEALTH QUESTIONNAIRE - PHQ9
SUM OF ALL RESPONSES TO PHQ QUESTIONS 1-9: 14
5. POOR APPETITE OR OVEREATING: NEARLY EVERY DAY

## 2019-02-05 ASSESSMENT — MIFFLIN-ST. JEOR: SCORE: 2364.67

## 2019-02-05 NOTE — RESULT ENCOUNTER NOTE
Dear Shakir,    Here is a summary of your recent test results:  -Microalbumin (urine protein) test is normal.  ADVISE: rechecking this annually.    For additional lab test information, labtestsonline.org is an excellent reference.             Thank you very much for trusting me and [facility].     Healthy regards,  Jovany Denney MD

## 2019-02-05 NOTE — PROGRESS NOTES
"  SUBJECTIVE:   Abel Ward is a 34 year old male who presents to clinic today for the following health issues:    Hypertension Follow-up    Outpatient blood pressures are not being checked.    Low Salt Diet: not monitoring salt    Amount of exercise or physical activity: None    Problems taking medications regularly: No    Medication side effects: none    Diet: regular (no restrictions)    Medication: Losartan    Shakir notes that he did not take his medication these last two days as he was helping his mother do some moving (forgetting to take his medication with him) and forgot today.     Anxiety  Shakir report his that his mood is otherwise stable with on and off days -- he's quite more aware of his symptoms at work with different perspectives with his co-workers.        Problem list and histories reviewed & adjusted, as indicated.  Additional history: as documented    Labs reviewed in EPIC    Reviewed and updated as needed this visit by clinical staff  Tobacco  Allergies  Meds  Problems  Med Hx  Surg Hx  Fam Hx  Soc Hx        Reviewed and updated as needed this visit by Provider  Tobacco  Allergies  Meds  Problems  Med Hx  Surg Hx  Fam Hx         ROS:  Constitutional, HEENT, cardiovascular, pulmonary, GI, , musculoskeletal, neuro, skin, endocrine and psych systems are negative, except as otherwise noted.  This document serves as a record of the services and decisions personally performed and made by David Denney MD. It was created on his behalf by Shon Ge, a trained medical scribe. The creation of this document is based the provider's statements to the medical scribe.  Scribe Shon Ge 9:04 AM, February 5, 2019    OBJECTIVE:   /80   Pulse 92   Temp 99  F (37.2  C) (Oral)   Ht 1.803 m (5' 11\")   Wt 139.3 kg (307 lb)   SpO2 98%   BMI 42.82 kg/m    Body mass index is 42.82 kg/m .  GENERAL: healthy, alert and no distress  EYES: Eyes grossly normal to inspection, PERRL and " conjunctivae and sclerae normal  HENT: ear canals and TM's normal, nose and mouth without ulcers or lesions  NECK: no adenopathy, no asymmetry, masses, or scars and thyroid normal to palpation  RESP: lungs clear to auscultation - no rales, rhonchi or wheezes  CV: regular rate and rhythm, normal S1 S2, no S3 or S4, no murmur, click or rub, no peripheral edema and peripheral pulses strong  ABDOMEN: soft, nontender, no hepatosplenomegaly, no masses and bowel sounds normal  MS: no gross musculoskeletal defects noted, no edema  SKIN: no suspicious lesions or rashes  NEURO: Normal strength and tone, mentation intact and speech normal  PSYCH: mentation appears normal, affect normal/bright    ASSESSMENT/PLAN:   Diagnoses and all orders for this visit:    Hypertension goal BP (blood pressure) < 130/80 - Controlled, continue medication  -     losartan (COZAAR) 50 MG tablet; Take 1 tablet (50 mg) by mouth daily  -     Albumin Random Urine Quantitative with Creat Ratio    Morbid obesity (H) - Ongoing condition, recommended to increase activity and adjust diet for weight loss. Notable that he is on a downwards trend in weight.     Major depressive disorder, recurrent episode, moderate (H) - Stable, continue medication    Encounter for immunization  -     VACCINE ADMINISTRATION, INITIAL  -     HC FLU VAC PRESRV FREE QUAD SPLIT VIR 3+YRS IM    The information in this document, created by the medical scribe for me, accurately reflects the services I personally performed and the decisions made by me. I have reviewed and approved this document for accuracy prior to leaving the patient care area.  9:18 AM, 02/05/19    David Denney MD  [facility]

## 2019-02-06 ASSESSMENT — ANXIETY QUESTIONNAIRES: GAD7 TOTAL SCORE: 17

## 2019-07-24 ENCOUNTER — TELEPHONE (OUTPATIENT)
Dept: FAMILY MEDICINE | Facility: CLINIC | Age: 34
End: 2019-07-24

## 2019-07-24 NOTE — TELEPHONE ENCOUNTER
Pt is due now to update PHQ9.  PATHSENSORShart message sent to pt. Follow up end date 9/11/19.   PHQ-9 SCORE 7/13/2018 7/13/2018 2/5/2019   PHQ-9 Total Score - - -   PHQ-9 Total Score MyChart - 24 (Severe depression) -   PHQ-9 Total Score 24 24 14     Ryan VILCHIS, CMA

## 2019-07-24 NOTE — LETTER
[facility]  [address] [place] [id number]  [phone]  August 6, 2019    Abel Ward  [id number] [facility] [id number]    Dear Shakir,    I care about your health and have reviewed your health plan. I have reviewed your medical conditions, medication list, and lab results and am making recommendations based on this review, to better manage your health.    You are in particular need of attention regarding:  -Depression    I am recommending that you: update PHQ-9 and FATOUMATA-7, which are questionnaires regarding your mood over the last 2 weeks.     Please fill them out and send it back to me.      Here is a list of Health Maintenance topics that are due now or due soon:  Health Maintenance Due   Topic Date Due     Preventive Care Visit  12/16/2015     Depression Assessment  08/05/2019       Please call us at [phone] (or use [employer]) to address the above recommendations.     Thank you for trusting [facility] and we appreciate the opportunity to serve you.  We look forward to supporting your healthcare needs in the future.    Healthy Regards,            Jovany Denney M.D.

## 2019-08-06 NOTE — TELEPHONE ENCOUNTER
Attempt # 2 and 3     Called #   Telephone Information:   Mobile [phone]         Left a non detailed VM     Letter sent with questions and self addressed, stamped envelope     Carmen Campos RN, BSN  [place] [facility]

## 2019-10-14 DIAGNOSIS — F33.2 SEVERE RECURRENT MAJOR DEPRESSION WITHOUT PSYCHOTIC FEATURES (H): Primary | ICD-10-CM

## 2019-10-14 LAB
BASOPHILS # BLD AUTO: 0.1 10E9/L (ref 0–0.2)
BASOPHILS NFR BLD AUTO: 0.7 %
DIFFERENTIAL METHOD BLD: NORMAL
EOSINOPHIL # BLD AUTO: 0.3 10E9/L (ref 0–0.7)
EOSINOPHIL NFR BLD AUTO: 2.8 %
ERYTHROCYTE [DISTWIDTH] IN BLOOD BY AUTOMATED COUNT: 12.8 % (ref 10–15)
HCT VFR BLD AUTO: 42 % (ref 40–53)
HGB BLD-MCNC: 14.4 G/DL (ref 13.3–17.7)
LYMPHOCYTES # BLD AUTO: 2.6 10E9/L (ref 0.8–5.3)
LYMPHOCYTES NFR BLD AUTO: 24.7 %
MCH RBC QN AUTO: 29 PG (ref 26.5–33)
MCHC RBC AUTO-ENTMCNC: 34.3 G/DL (ref 31.5–36.5)
MCV RBC AUTO: 85 FL (ref 78–100)
MONOCYTES # BLD AUTO: 0.7 10E9/L (ref 0–1.3)
MONOCYTES NFR BLD AUTO: 6.6 %
NEUTROPHILS # BLD AUTO: 6.9 10E9/L (ref 1.6–8.3)
NEUTROPHILS NFR BLD AUTO: 65.2 %
PLATELET # BLD AUTO: 310 10E9/L (ref 150–450)
RBC # BLD AUTO: 4.97 10E12/L (ref 4.4–5.9)
WBC # BLD AUTO: 10.6 10E9/L (ref 4–11)

## 2019-10-14 PROCEDURE — 84443 ASSAY THYROID STIM HORMONE: CPT | Performed by: NURSE PRACTITIONER

## 2019-10-14 PROCEDURE — 80178 ASSAY OF LITHIUM: CPT | Performed by: NURSE PRACTITIONER

## 2019-10-14 PROCEDURE — 36415 COLL VENOUS BLD VENIPUNCTURE: CPT | Performed by: NURSE PRACTITIONER

## 2019-10-14 PROCEDURE — 84439 ASSAY OF FREE THYROXINE: CPT | Performed by: NURSE PRACTITIONER

## 2019-10-14 PROCEDURE — 80053 COMPREHEN METABOLIC PANEL: CPT | Performed by: NURSE PRACTITIONER

## 2019-10-14 PROCEDURE — 85025 COMPLETE CBC W/AUTO DIFF WBC: CPT | Performed by: NURSE PRACTITIONER

## 2019-10-15 LAB
ALBUMIN SERPL-MCNC: 4.2 G/DL (ref 3.4–5)
ALP SERPL-CCNC: 66 U/L (ref 40–150)
ALT SERPL W P-5'-P-CCNC: 34 U/L (ref 0–70)
ANION GAP SERPL CALCULATED.3IONS-SCNC: 4 MMOL/L (ref 3–14)
AST SERPL W P-5'-P-CCNC: 18 U/L (ref 0–45)
BILIRUB SERPL-MCNC: 0.5 MG/DL (ref 0.2–1.3)
BUN SERPL-MCNC: 16 MG/DL (ref 7–30)
CALCIUM SERPL-MCNC: 9.8 MG/DL (ref 8.5–10.1)
CHLORIDE SERPL-SCNC: 103 MMOL/L (ref 94–109)
CO2 SERPL-SCNC: 28 MMOL/L (ref 20–32)
CREAT SERPL-MCNC: 0.89 MG/DL (ref 0.66–1.25)
GFR SERPL CREATININE-BSD FRML MDRD: >90 ML/MIN/{1.73_M2}
GLUCOSE SERPL-MCNC: 74 MG/DL (ref 70–99)
LITHIUM SERPL-SCNC: 0.35 MMOL/L (ref 0.6–1.2)
POTASSIUM SERPL-SCNC: 4 MMOL/L (ref 3.4–5.3)
PROT SERPL-MCNC: 7.3 G/DL (ref 6.8–8.8)
SODIUM SERPL-SCNC: 135 MMOL/L (ref 133–144)
T4 FREE SERPL-MCNC: 0.91 NG/DL (ref 0.76–1.46)
TSH SERPL DL<=0.005 MIU/L-ACNC: 1.58 MU/L (ref 0.4–4)

## 2019-11-05 DIAGNOSIS — R11.0 NAUSEA: Primary | ICD-10-CM

## 2019-11-05 NOTE — TELEPHONE ENCOUNTER
"Requested Prescriptions   Pending Prescriptions Disp Refills     ondansetron (ZOFRAN) 4 MG tablet      Last Written Prescription Date:  na  Last Fill Quantity: na,  # refills: na   Last office visit: 2/5/2019 with prescribing provider:  David Denney MD           Future Office Visit:    .  1     Sig: Take 1-2 tablets (4-8 mg) by mouth daily        Antivertigo/Antiemetic Agents Passed - 11/5/2019  2:41 PM        Passed - Recent (12 mo) or future (30 days) visit within the authorizing provider's specialty     Patient has had an office visit with the authorizing provider or a provider within the authorizing providers department within the previous 12 mos or has a future within next 30 days. See \"Patient Info\" tab in inbasket, or \"Choose Columns\" in Meds & Orders section of the refill encounter.              Passed - Medication is active on med list        Passed - Patient is 18 years of age or older        "

## 2019-11-06 RX ORDER — ONDANSETRON 4 MG/1
4-8 TABLET, FILM COATED ORAL DAILY
Qty: 12 TABLET | Refills: 1 | Status: SHIPPED | OUTPATIENT
Start: 2019-11-06 | End: 2019-11-25

## 2019-11-06 NOTE — TELEPHONE ENCOUNTER
Pt has never had this medication as OP, noted to have been given for nausea/vomiting while IP 07/20/2018.    Routing refill request to provider for review/approval because:  Medication is reported/historical    Haim Gambino RN   [place] [facility]

## 2019-11-25 ENCOUNTER — MYC REFILL (OUTPATIENT)
Dept: FAMILY MEDICINE | Facility: CLINIC | Age: 34
End: 2019-11-25

## 2019-11-25 DIAGNOSIS — R11.0 NAUSEA: ICD-10-CM

## 2019-11-25 NOTE — TELEPHONE ENCOUNTER
"Requested Prescriptions   Pending Prescriptions Disp Refills     ondansetron (ZOFRAN) 4 MG tablet            Last Written Prescription Date:  11.6.19  Last Fill Quantity: 12 tablet,  # refills: 1   Last office visit: 2/5/2019 with prescribing provider:  David Denney MD             Future Office Visit:       12 tablet 1     Sig: Take 1-2 tablets (4-8 mg) by mouth daily        Antivertigo/Antiemetic Agents Passed - 11/25/2019 10:11 AM        Passed - Recent (12 mo) or future (30 days) visit within the authorizing provider's specialty     Patient has had an office visit with the authorizing provider or a provider within the authorizing providers department within the previous 12 mos or has a future within next 30 days. See \"Patient Info\" tab in inbasket, or \"Choose Columns\" in Meds & Orders section of the refill encounter.              Passed - Medication is active on med list        Passed - Patient is 18 years of age or older        "

## 2019-11-26 ENCOUNTER — MYC MEDICAL ADVICE (OUTPATIENT)
Dept: FAMILY MEDICINE | Facility: CLINIC | Age: 34
End: 2019-11-26

## 2019-11-26 DIAGNOSIS — R11.0 NAUSEA: ICD-10-CM

## 2019-11-26 RX ORDER — ONDANSETRON 4 MG/1
4-8 TABLET, FILM COATED ORAL DAILY
Qty: 12 TABLET | Refills: 1 | Status: SHIPPED | OUTPATIENT
Start: 2019-11-26 | End: 2019-11-27

## 2019-11-26 NOTE — TELEPHONE ENCOUNTER
Please see my chart message below     Please review and advise     Thank you     Carmen Campos RN, BSN  [place] [facility]

## 2019-11-27 RX ORDER — ONDANSETRON 4 MG/1
4-8 TABLET, FILM COATED ORAL DAILY
Qty: 30 TABLET | Refills: 5 | Status: SHIPPED | OUTPATIENT
Start: 2019-11-27 | End: 2020-06-17

## 2019-12-10 ENCOUNTER — OFFICE VISIT (OUTPATIENT)
Dept: FAMILY MEDICINE | Facility: CLINIC | Age: 34
End: 2019-12-10
Payer: COMMERCIAL

## 2019-12-10 VITALS
BODY MASS INDEX: 43.68 KG/M2 | HEIGHT: 71 IN | WEIGHT: 312 LBS | HEART RATE: 82 BPM | OXYGEN SATURATION: 97 % | DIASTOLIC BLOOD PRESSURE: 86 MMHG | TEMPERATURE: 95 F | SYSTOLIC BLOOD PRESSURE: 126 MMHG

## 2019-12-10 DIAGNOSIS — J01.00 ACUTE NON-RECURRENT MAXILLARY SINUSITIS: Primary | ICD-10-CM

## 2019-12-10 PROCEDURE — 99213 OFFICE O/P EST LOW 20 MIN: CPT | Performed by: NURSE PRACTITIONER

## 2019-12-10 ASSESSMENT — ANXIETY QUESTIONNAIRES
IF YOU CHECKED OFF ANY PROBLEMS ON THIS QUESTIONNAIRE, HOW DIFFICULT HAVE THESE PROBLEMS MADE IT FOR YOU TO DO YOUR WORK, TAKE CARE OF THINGS AT HOME, OR GET ALONG WITH OTHER PEOPLE: VERY DIFFICULT
1. FEELING NERVOUS, ANXIOUS, OR ON EDGE: MORE THAN HALF THE DAYS
2. NOT BEING ABLE TO STOP OR CONTROL WORRYING: MORE THAN HALF THE DAYS
7. FEELING AFRAID AS IF SOMETHING AWFUL MIGHT HAPPEN: NEARLY EVERY DAY
6. BECOMING EASILY ANNOYED OR IRRITABLE: MORE THAN HALF THE DAYS
5. BEING SO RESTLESS THAT IT IS HARD TO SIT STILL: NEARLY EVERY DAY
3. WORRYING TOO MUCH ABOUT DIFFERENT THINGS: MORE THAN HALF THE DAYS
GAD7 TOTAL SCORE: 17

## 2019-12-10 ASSESSMENT — PATIENT HEALTH QUESTIONNAIRE - PHQ9
5. POOR APPETITE OR OVEREATING: NEARLY EVERY DAY
SUM OF ALL RESPONSES TO PHQ QUESTIONS 1-9: 22

## 2019-12-10 ASSESSMENT — MIFFLIN-ST. JEOR: SCORE: 2387.35

## 2019-12-10 NOTE — PATIENT INSTRUCTIONS
--Saline nasal spray or a valdemar pot can be helpful in clearing out the sinuses and making them feel better. Also, sleep propped up on an extra pillow to help with drainage.  --Using a humidifier at night will also add moisture to the air and help with symptoms.  --Guaifenesen(Mucinex) can be used to help loosen and thin mucus. Take as directed.   of other over the counter medications, ideally we want mucus to drain to prevent further infection from developing.  --Ibuprofen or Tylenol as directed is ok for headache or sinus pressure.

## 2019-12-10 NOTE — PROGRESS NOTES
"Subjective   Abel Ward is a 34 year old male who presents to clinic today for the following health issues:    HPI   Dizziness  Onset: x 1 week     Description:   Do you feel faint:  YES  Does it feel like the surroundings (bed, room) are moving: YES- mild   Unsteady/off balance: YES  Have you passed out or fallen: no     Intensity: moderate to severe. Constant x 1 day but intermittent x 1 week     Progression of Symptoms:  worsening, constant and intermittent    Accompanying Signs & Symptoms:  Heart palpitations: no   Nausea, vomiting: YES- nausea   Weakness in arms or legs: no   Fatigue: YES  Vision or speech changes: YES- light sensitivity   Ringing in ears (Tinnitus): YES- mild, after hearing high frequency music   Hearing Loss: no     History:   Head trauma/concussion hx: no   Previous similar symptoms: YES- past headaches behind the eyes.   Recent bleeding history: no     Precipitating factors:   Worse with activity or head movement: YES- head movement   Any new medications (BP?): no   Alcohol/drug abuse/withdrawal: no     Alleviating factors:   Does staying in a fixed position give relief:  YES- mild     Therapies Tried and outcome:   Zofran for nausea     Wife has similar symptoms wondering if flu like or sinus symptoms. He does feel sinus and ear pressure for the past 4 days. No other neurologic symptoms or changes. No changes to medications except forgot vitamin d this week. Sees psychiatry. No changes there. Tried dramamine without any positive effect.     Reviewed and updated as needed this visit by provider:  Tobacco  Allergies  Meds  Problems  Med Hx  Surg Hx  Fam Hx         Review of Systems   Constitutional, HEENT, cardiovascular, pulmonary, GI, , musculoskeletal, neuro, skin, endocrine and psych systems are negative, except as otherwise noted per HPI.      Objective   /86   Pulse 82   Temp 95  F (35  C) (Tympanic)   Ht 1.803 m (5' 11\")   Wt 141.5 kg (312 lb)   SpO2 " 97%   BMI 43.52 kg/m   Body mass index is 43.52 kg/m .  Physical Exam   GENERAL: healthy, alert, well nourished, well hydrated, no distress  HENT: ear canals- normal; TMs- normal; Nose- turbinates erythematous and swollen; sinus pressure maxillary area bilaterally, triggers dizzy symptoms with pressure. Mouth- no ulcers, no lesions  NECK: no tenderness, no adenopathy, no asymmetry, no masses, no stiffness; thyroid- normal to palpation  RESP: lungs clear to auscultation - no rales, no rhonchi, no wheezes  CV: regular rates and rhythm, normal S1 S2, no S3 or S4 and no murmur, no click or rub -  MS: extremities- no gross deformities noted, no edema  NEURO: strength and tone- normal, sensory exam- grossly normal, mentation- intact, speech- normal, reflexes- symmetric          Assessment & Plan   Abel was seen today for dizziness.    Diagnoses and all orders for this visit:    Acute non-recurrent maxillary sinusitis  -     amoxicillin-clavulanate (AUGMENTIN) 875-125 MG tablet; Take 1 tablet by mouth 2 times daily for 10 days      Treat with antibiotics as above. If dizziness is persisting in 2-3 days, by Friday, let us know and would consider further workup with labs or alternate plan for dizziness. Discussed use of meclizine and epley maneuvers as well if symtpoms persist.     See Patient Instructions    No follow-ups on file.            PARUL Edgar     [address] [address]  [email or web address]   Office: [phone]

## 2019-12-11 ASSESSMENT — ANXIETY QUESTIONNAIRES: GAD7 TOTAL SCORE: 17

## 2019-12-16 ENCOUNTER — HEALTH MAINTENANCE LETTER (OUTPATIENT)
Age: 34
End: 2019-12-16

## 2020-01-01 ENCOUNTER — MYC MEDICAL ADVICE (OUTPATIENT)
Dept: FAMILY MEDICINE | Facility: CLINIC | Age: 35
End: 2020-01-01

## 2020-01-01 DIAGNOSIS — Z20.828 EXPOSURE TO INFLUENZA: Primary | ICD-10-CM

## 2020-01-02 RX ORDER — OSELTAMIVIR PHOSPHATE 75 MG/1
75 CAPSULE ORAL 2 TIMES DAILY
Qty: 10 CAPSULE | Refills: 0 | Status: SHIPPED | OUTPATIENT
Start: 2020-01-02 | End: 2020-01-07

## 2020-01-02 NOTE — TELEPHONE ENCOUNTER
Pt requesting Tamiflu (Per Mychart message), Positive exposure. Pending medication for review and advise.    Routing to PCP     Haim Gambino RN   [facility]

## 2020-01-03 ENCOUNTER — OFFICE VISIT (OUTPATIENT)
Dept: FAMILY MEDICINE | Facility: CLINIC | Age: 35
End: 2020-01-03
Payer: COMMERCIAL

## 2020-01-03 VITALS
HEART RATE: 105 BPM | HEIGHT: 71 IN | SYSTOLIC BLOOD PRESSURE: 120 MMHG | OXYGEN SATURATION: 97 % | BODY MASS INDEX: 44.1 KG/M2 | DIASTOLIC BLOOD PRESSURE: 68 MMHG | TEMPERATURE: 97.8 F | WEIGHT: 315 LBS

## 2020-01-03 DIAGNOSIS — J06.9 VIRAL URI WITH COUGH: Primary | ICD-10-CM

## 2020-01-03 DIAGNOSIS — R05.9 COUGH: ICD-10-CM

## 2020-01-03 LAB
FLUAV+FLUBV AG SPEC QL: NEGATIVE
FLUAV+FLUBV AG SPEC QL: NEGATIVE
SPECIMEN SOURCE: NORMAL

## 2020-01-03 PROCEDURE — 87804 INFLUENZA ASSAY W/OPTIC: CPT | Performed by: NURSE PRACTITIONER

## 2020-01-03 PROCEDURE — 99213 OFFICE O/P EST LOW 20 MIN: CPT | Performed by: NURSE PRACTITIONER

## 2020-01-03 RX ORDER — CODEINE PHOSPHATE/GUAIFENESIN 10-100MG/5
5 LIQUID (ML) ORAL EVERY 4 HOURS PRN
Qty: 118 ML | Refills: 0 | Status: SHIPPED | OUTPATIENT
Start: 2020-01-03 | End: 2020-10-30

## 2020-01-03 ASSESSMENT — MIFFLIN-ST. JEOR: SCORE: 2419.1

## 2020-01-03 NOTE — PATIENT INSTRUCTIONS
Although flu test negative still suspect Flu given exposure and early presentation.  Started Tamiflu last night. Continue for full course.  Lung exam reassuring. Can use inhaler at home and given cough syrup to use at night. Can use Mucinex or Mucinex DM as needed.  After symptoms clear be aware of worsening fever again or SOB.

## 2020-01-03 NOTE — PROGRESS NOTES
"Subjective   Abel Ward is a 34 year old male who presents to clinic today for the following health issues:    HPI   Acute Illness   Acute illness concerns: Flu-like illness   Onset: 3 days     Fever: no    Chills/Sweats: YES    Headache (location?): YES bilateral temples     Sinus Pressure:YES    Conjunctivitis:  no    Ear Pain: YES: left    Rhinorrhea: YES    Congestion: YES    Sore Throat: YES     Cough: YES-non-productive    Wheeze: no    Decreased Appetite: YES    Nausea: no    Vomiting: no    Diarrhea:  no    Dysuria/Freq.: no    Fatigue/Achiness: YES    Sick/Strep Exposure: YES- Flu exposure from wife. Diagnosed 12/29/2019     Therapies Tried and outcome: started tamiflu- not effective yet, but just recently started.       Reviewed and updated as needed this visit by provider:  Tobacco  Allergies  Meds  Problems  Med Hx  Surg Hx  Fam Hx         Review of Systems   Constitutional, HEENT, cardiovascular, pulmonary, GI, , musculoskeletal, neuro, skin, endocrine and psych systems are negative, except as otherwise noted per HPI.      Objective   /68 (BP Location: Left arm, Cuff Size: Adult Large)   Pulse 105   Temp 97.8  F (36.6  C) (Oral)   Ht 1.803 m (5' 11\")   Wt 144.7 kg (319 lb)   SpO2 97%   BMI 44.49 kg/m   Body mass index is 44.49 kg/m .  Physical Exam   GENERAL: healthy, alert, well nourished, well hydrated, no distress  Head: Normocephalic, atraumatic.  Eyes: Conjunctiva clear, non icteric. PERRLA.  Ears: External ears normal, bilateral TM erythematous slightly injected  Nose: Septum midline, nasal mucosa erythematous, inflamed.  Sinus pressure bilaterally  Mouth / Throat: Normal dentition.  No oral lesions. Pharynx erythematous, no exudates, tonsils + 1 bilaterally.  Neck: Supple, anterior cervical lymphadenopathy present, posterior cervical lymphadenopathy present, trachea midline.  RESP: lungs clear to auscultation - no rales, no rhonchi, no wheezes  CV: regular rates " "and rhythm, normal S1 S2, no S3 or S4 and no murmur, no click or rub -  MS: extremities- no gross deformities noted, no edema    Diagnostic Test Results  Flu:      Assessment & Plan   Abel was seen today for flu.    Diagnoses and all orders for this visit:    Viral URI with cough    Cough  -     Influenza A/B antigen  -     guaiFENesin-codeine (GUAIFENESIN AC) 100-10 MG/5ML syrup; Take 5 mLs by mouth every 4 hours as needed for congestion    Negative flu but still suspect since early testing. Started Tamiflu last night. Continue for full course.  Lung exam reassuring. Can use inhaler at home and given cough syrup to use at night. Can use Mucinex or Mucinex DM as needed.  After symptoms clear be aware of worsening fever again or SOB.        BMI:   Estimated body mass index is 43.52 kg/m  as calculated from the following:    Height as of 12/10/19: 1.803 m (5' 11\").    Weight as of 12/10/19: 141.5 kg (312 lb).   Weight management plan: Discussed healthy diet and exercise guidelines        See Patient Instructions    Return in about 6 months (around 7/3/2020) for Physical Exam.            PARUL Edgar     [address] [address]  [email or web address]   Office: [phone]           "

## 2020-03-08 DIAGNOSIS — Z79.899 ENCOUNTER FOR LONG-TERM (CURRENT) USE OF OTHER MEDICATIONS: ICD-10-CM

## 2020-03-08 DIAGNOSIS — F33.2 SEVERE RECURRENT MAJOR DEPRESSION WITHOUT PSYCHOTIC FEATURES (H): Primary | ICD-10-CM

## 2020-03-08 LAB
ALBUMIN SERPL-MCNC: 3.9 G/DL (ref 3.4–5)
ALP SERPL-CCNC: 67 U/L (ref 40–150)
ALT SERPL W P-5'-P-CCNC: 33 U/L (ref 0–70)
ANION GAP SERPL CALCULATED.3IONS-SCNC: <1 MMOL/L (ref 3–14)
AST SERPL W P-5'-P-CCNC: 10 U/L (ref 0–45)
BILIRUB SERPL-MCNC: 0.3 MG/DL (ref 0.2–1.3)
BUN SERPL-MCNC: 17 MG/DL (ref 7–30)
CALCIUM SERPL-MCNC: 9.4 MG/DL (ref 8.5–10.1)
CHLORIDE SERPL-SCNC: 108 MMOL/L (ref 94–109)
CO2 SERPL-SCNC: 27 MMOL/L (ref 20–32)
CREAT SERPL-MCNC: 0.82 MG/DL (ref 0.66–1.25)
ERYTHROCYTE [DISTWIDTH] IN BLOOD BY AUTOMATED COUNT: 12.4 % (ref 10–15)
GFR SERPL CREATININE-BSD FRML MDRD: >90 ML/MIN/{1.73_M2}
GLUCOSE SERPL-MCNC: 101 MG/DL (ref 70–99)
HCT VFR BLD AUTO: 43.3 % (ref 40–53)
HGB BLD-MCNC: 14.5 G/DL (ref 13.3–17.7)
LITHIUM SERPL-SCNC: 0.78 MMOL/L (ref 0.6–1.2)
MCH RBC QN AUTO: 28.7 PG (ref 26.5–33)
MCHC RBC AUTO-ENTMCNC: 33.5 G/DL (ref 31.5–36.5)
MCV RBC AUTO: 86 FL (ref 78–100)
PLATELET # BLD AUTO: 279 10E9/L (ref 150–450)
POTASSIUM SERPL-SCNC: 4.5 MMOL/L (ref 3.4–5.3)
PROT SERPL-MCNC: 7.8 G/DL (ref 6.8–8.8)
RBC # BLD AUTO: 5.06 10E12/L (ref 4.4–5.9)
SODIUM SERPL-SCNC: 135 MMOL/L (ref 133–144)
TSH SERPL DL<=0.005 MIU/L-ACNC: 1.69 MU/L (ref 0.4–4)
WBC # BLD AUTO: 7 10E9/L (ref 4–11)

## 2020-03-08 PROCEDURE — 80178 ASSAY OF LITHIUM: CPT | Performed by: NURSE PRACTITIONER

## 2020-03-08 PROCEDURE — 36415 COLL VENOUS BLD VENIPUNCTURE: CPT | Performed by: NURSE PRACTITIONER

## 2020-03-08 PROCEDURE — 85027 COMPLETE CBC AUTOMATED: CPT | Performed by: NURSE PRACTITIONER

## 2020-03-08 PROCEDURE — 84443 ASSAY THYROID STIM HORMONE: CPT | Performed by: NURSE PRACTITIONER

## 2020-03-08 PROCEDURE — 80053 COMPREHEN METABOLIC PANEL: CPT | Performed by: NURSE PRACTITIONER

## 2020-06-17 ENCOUNTER — MYC MEDICAL ADVICE (OUTPATIENT)
Dept: FAMILY MEDICINE | Facility: CLINIC | Age: 35
End: 2020-06-17

## 2020-06-17 ENCOUNTER — MYC REFILL (OUTPATIENT)
Dept: FAMILY MEDICINE | Facility: CLINIC | Age: 35
End: 2020-06-17

## 2020-06-17 DIAGNOSIS — R11.0 NAUSEA: ICD-10-CM

## 2020-06-17 RX ORDER — ONDANSETRON 4 MG/1
4-8 TABLET, FILM COATED ORAL DAILY
Qty: 30 TABLET | Refills: 3 | Status: SHIPPED | OUTPATIENT
Start: 2020-06-17 | End: 2020-11-04

## 2020-06-17 RX ORDER — ONDANSETRON 4 MG/1
4-8 TABLET, FILM COATED ORAL DAILY
Qty: 30 TABLET | Refills: 5 | Status: CANCELLED | OUTPATIENT
Start: 2020-06-17

## 2020-07-02 ENCOUNTER — MYC MEDICAL ADVICE (OUTPATIENT)
Dept: FAMILY MEDICINE | Facility: CLINIC | Age: 35
End: 2020-07-02

## 2020-07-02 DIAGNOSIS — G47.33 OSA (OBSTRUCTIVE SLEEP APNEA): Primary | ICD-10-CM

## 2020-07-03 NOTE — TELEPHONE ENCOUNTER
[facility] message received for request of C-Pap Supplies.    Sleep DME ordered; Last Sleep study 1/8/2019    DME order given to Dr. Denney to review and given written signature, will be faxed once completed.    Haim Gambino RN   [facility]

## 2020-07-20 ENCOUNTER — VIRTUAL VISIT (OUTPATIENT)
Dept: FAMILY MEDICINE | Facility: OTHER | Age: 35
End: 2020-07-20

## 2020-07-20 NOTE — PROGRESS NOTES
"Date: 2020 15:19:12  Clinician: Ashish Jaimes  Clinician NPI: [id number]  Patient: Abel Ward  Patient : 1985  Patient Address: [address] [place] [id number]  Patient Phone: [phone]  Visit Protocol: URI  Patient Summary:  Abel is a 35 year old ( : 1985 ) male who initiated a Visit for COVID-19 (Coronavirus) evaluation and screening. When asked the question \"Please sign me up to receive news, health information and promotions. \", Abel responded \"No\".    Abel states his symptoms started 1-2 days ago.   His symptoms consist of nausea, diarrhea, malaise, a headache, chills, enlarged lymph nodes, myalgia, and a cough. He is experiencing mild difficulty breathing with activities but can speak normally in full sentences. Abel also feels feverish but was unable to measure his temperature.   Symptom details     Cough: Abel coughs every 5-10 minutes and his cough is more bothersome at night. Phlegm does not come into his throat when he coughs. He does not believe his cough is caused by post-nasal drip.     Headache: He states the headache is mild (1-3 on a 10 point pain scale).      Abel denies having wheezing, teeth pain, ageusia, vomiting, rhinitis, ear pain, sore throat, anosmia, facial pain or pressure, and nasal congestion. He also denies having recent facial or sinus surgery in the past 60 days, taking antibiotic medication in the past month, and having a sinus infection within the past year.   Precipitating events  He has not recently been exposed to someone with influenza. Abel has not been in close contact with any high risk individuals.   Pertinent COVID-19 (Coronavirus) information  In the past 14 days, Abel has not worked in a congregate living setting.   He does not work or volunteer as healthcare worker or a  and does not work or volunteer in a healthcare facility.   Abel also " has not lived in a congregate living setting in the past 14 days. He lives with a healthcare worker.   Abel has had a close contact with a laboratory-confirmed COVID-19 patient within 14 days of symptom onset. Additional information about contact with COVID-19 (Coronavirus) patient as reported by the patient (free text): Renzo at work left last Monday with symptoms, test came back Friday positive.   Pertinent medical history  Abel does not need a return to work/school note.   Weight: 310 lbs   Abel does not smoke or use smokeless tobacco.   Additional information as reported by the patient (free text): I spent the weekend in [place], left before learning of the positive test.   Weight: 310 lbs    MEDICATIONS: buspirone oral, Adderall oral, losartan oral, ondansetron HCl oral, clonidine HCl oral, magnesium oxide oral, risperidone oral, omeprazole oral, Trintellix oral, lithium carbonate (bulk), ALLERGIES: NKDA  Clinician Response:  Dear Abel,   Your symptoms show that you may have coronavirus (COVID-19). This illness can cause fever, cough and trouble breathing. Many people get a mild case and get better on their own. Some people can get very sick.  What should I do?  We would like to test you for this virus.   1. Please call [phone] to schedule your visit. Explain that you were referred by [facility] to have a COVID-19 test. Be ready to share your [facility] visit ID number.  The following will serve as your written order for this COVID Test, ordered by me, for the indication of suspected COVID [Z20.828]: The test will be ordered in [employer], our electronic health record, after you are scheduled. It will show as ordered and authorized by Orestes Briggs MD.  Order: COVID-19 (Coronavirus) PCR for SYMPTOMATIC testing from [facility].      2. When it's time for your COVID test:  Stay at least 6 feet away from others. (If someone will drive you to your test, stay in the backseat, as far away from the   "as you can.)   Cover your mouth and nose with a mask, tissue or washcloth.  Go straight to the testing site. Don't make any stops on the way there or back.      3.Starting now: Stay home and away from others (self-isolate) until:   You've had no fever---and no medicine that reduces fever---for 3 full days (72 hours). And...   Your other symptoms have gotten better. For example, your cough or breathing has improved. And...   At least 10 days have passed since your symptoms started.       During this time, don't leave the house except for testing or medical care.   Stay in your own room, even for meals. Use your own bathroom if you can.   Stay away from others in your home. No hugging, kissing or shaking hands. No visitors.  Don't go to work, school or anywhere else.    Clean \"high touch\" surfaces often (doorknobs, counters, handles, etc.). Use a household cleaning spray or wipes. You'll find a full list of  on the EPA website: www.epa.gov/pesticide-registration/list-n-disinfectants-use-against-sars-cov-2.   Cover your mouth and nose with a mask, tissue or washcloth to avoid spreading germs.  Wash your hands and face often. Use soap and water.  Caregivers in these groups are at risk for severe illness due to COVID-19:  o People 65 years and older  o People who live in a nursing home or long-term care facility  o People with chronic disease (lung, heart, cancer, diabetes, kidney, liver, immunologic)  o People who have a weakened immune system, including those who:   Are in cancer treatment  Take medicine that weakens the immune system, such as corticosteroids  Had a bone marrow or organ transplant  Have an immune deficiency  Have poorly controlled HIV or AIDS  Are obese (body mass index of 40 or higher)  Smoke regularly   o Caregivers should wear gloves while washing dishes, handling laundry and cleaning bedrooms and bathrooms.  o Use caution when washing and drying laundry: Don't shake dirty laundry, and use " the warmest water setting that you can.  o For more tips, go to www.cdc.gov/coronavirus/2019-ncov/downloads/10Things.pdf.    4.Sign up for GetWell #waywire. We know it's scary to hear that you might have COVID-19. We want to track your symptoms to make sure you're okay over the next 2 weeks. Please look for an email from [employer]---this is a free, online program that we'll use to keep in touch. To sign up, follow the link in the email. Learn more at [email or web address]  How can I take care of myself?   Get lots of rest. Drink extra fluids (unless a doctor has told you not to).   Take Tylenol (acetaminophen) for fever or pain. If you have liver or kidney problems, ask your family doctor if it's okay to take Tylenol.   Adults can take either:    650 mg (two 325 mg pills) every 4 to 6 hours, or...   1,000 mg (two 500 mg pills) every 8 hours as needed.    Note: Don't take more than 3,000 mg in one day. Acetaminophen is found in many medicines (both prescribed and over-the-counter medicines). Read all labels to be sure you don't take too much.   For children, check the Tylenol bottle for the right dose. The dose is based on the child's age or weight.    If you have other health problems (like cancer, heart failure, an organ transplant or severe kidney disease): Call your specialty clinic if you don't feel better in the next 2 days.       Know when to call 911. Emergency warning signs include:    Trouble breathing or shortness of breath Pain or pressure in the chest that doesn't go away Feeling confused like you haven't felt before, or not being able to wake up Bluish-colored lips or face.  Where can I get more information?    [facility] -- About COVID-19: [email or web address]   CDC -- What to Do If You're Sick: www.cdc.gov/coronavirus/2019-ncov/about/steps-when-sick.html   CDC -- Ending Home Isolation: www.cdc.gov/coronavirus/2019-ncov/hcp/disposition-in-home-patients.html   CDC -- Caring  for Someone: www.cdc.gov/coronavirus/2019-ncov/if-you-are-sick/care-for-someone.html   [facility] -- Interim Guidance for Hospital Discharge to Home: www.health.[facility].[place]/diseases/coronavirus/hcp/hospdischarge.pdf   [facility] clinical trials (COVID-19 research studies): clinicalaffairs.[facility]-clinical-trials    Below are the COVID-19 hotlines at the [place] Department of Health ([facility]). Interpreters are available.    For health questions: Call 617-656-1347 or 1-609.580.5933 (7 a.m. to 7 p.m.) For questions about schools and childcare: Call 516-626-5379 or 1-500.604.9155 (7 a.m. to 7 p.m.)    Diagnosis: Other malaise  Diagnosis ICD: R53.81

## 2020-07-21 DIAGNOSIS — Z20.822 COVID-19 RULED OUT: Primary | ICD-10-CM

## 2020-07-21 PROCEDURE — U0003 INFECTIOUS AGENT DETECTION BY NUCLEIC ACID (DNA OR RNA); SEVERE ACUTE RESPIRATORY SYNDROME CORONAVIRUS 2 (SARS-COV-2) (CORONAVIRUS DISEASE [COVID-19]), AMPLIFIED PROBE TECHNIQUE, MAKING USE OF HIGH THROUGHPUT TECHNOLOGIES AS DESCRIBED BY CMS-2020-01-R: HCPCS | Performed by: FAMILY MEDICINE

## 2020-07-21 NOTE — LETTER
July 24, 2020 07/21/2020    Abel Duttonboone  [address] [address]    COVID-19 Virus PCR to [facility] - Result   Date Value Ref Range Status   07/21/2020 Not Detected  Final     Comment:     Collection of multiple specimens from the same patient may be necessary to   detect the virus. The possibility of a false negative should be considered if   the patient's recent exposure or clinical presentation suggests 2019 nCOV   infection and diagnostic tests for other causes of illness are negative.   Repeat testing may be considered in this setting.  Viral RNA was extracted via a validated method and subsequently underwent   single step reverse transcriptase-real time polymerase chain reaction using   primers to the CDC specified N1,N2 gene targets of CoV2 and human RNP as an   internal control.  A negative result does not rule out the presence of real-time PCR inhibitors   in the specimen or COVID-19 RNA in concentrations below the limit of detection   of the assay. The possibility of a false negative should be considered if the   patients recent exposure or clinical presentation suggests COVID-19.   Additional testing or repeat testing requires consultation with the   laboratory.  Nasopharyngeal specimen is the preferred choice for swab-based SARS CoV2   testing. When collection of a nasopharyngeal swab is not possible the   following are acceptable alternatives:  an oropharyngeal (OP) specimen collected by a healthcare professional, or a   nasal mid-turbinate (NMT) swab collected by a healthcare professional or by   onsite self-collection (using a flocked tapered swab), or an anterior nares   specimen collected by a healthcare professional or by onsite self-collection   (using a round foam swab). (Centers for Disease Control)  Testing performed by [facility], Room 1[address]210, [address] [place] [address], [place] [address]. This test was developed and its    performance characteristics determined by the [facility]. It has not been cleared or approved by the FDA.  The laboratory is regulated under the Clinical Laboratory Improvement   Amendments of 1988 (CLIA-88) as qualified to perform high-complexity testing.   This test is used for clinical purposes. It should not be regarded as   investigational or for research.         No results found for: SARSCOVRES    This letter provides a written record that you were tested for COVID-19.      Your result was negative. This means that we didn t find the virus that causes COVID-19 in your sample. A test may show negative when you do actually have the virus. This can happen when the virus is in the early stages of infection, before you feel illness symptoms.    If you have symptoms   Stay home and away from others (self-isolate) until you meet ALL of the guidelines below:    You ve had no fever--and no medicine that reduces fever--for 3 full days (72 hours). And      Your other symptoms have gotten better. For example, your cough or breathing has improved. And     At least 10 days have passed since your symptoms started.    During this time:    Stay home. Don t go to work, school or anywhere else.     Stay in your own room, including for meals. Use your own bathroom if you can.    Stay away from others in your home. No hugging, kissing or shaking hands. No visitors.    Clean  high touch  surfaces often (doorknobs, counters, handles, etc.). Use a household cleaning spray or wipes. You can find a full list on the EPA website at www.epa.gov/pesticide-registration/list-n-disinfectants-use-against-sars-cov-2.    Cover your mouth and nose with a mask, tissue or washcloth to avoid spreading germs.    Wash your hands and face often with soap and water.    Going back to work  Check with your employer for any guidelines to follow for going back to work.    Employers: This document serves as formal notice  that your employee tested negative for COVID-19, as of the testing date shown above.

## 2020-07-22 LAB
SARS-COV-2 RNA SPEC QL NAA+PROBE: NOT DETECTED
SPECIMEN SOURCE: NORMAL

## 2020-08-19 DIAGNOSIS — I10 HYPERTENSION GOAL BP (BLOOD PRESSURE) < 130/80: ICD-10-CM

## 2020-08-19 RX ORDER — LOSARTAN POTASSIUM 50 MG/1
50 TABLET ORAL DAILY
Qty: 90 TABLET | Refills: 1 | Status: SHIPPED | OUTPATIENT
Start: 2020-08-19 | End: 2021-04-28

## 2020-08-19 NOTE — TELEPHONE ENCOUNTER
Pending Prescriptions:                       Disp   Refills    losartan (COZAAR) 50 MG tablet            90 tab*1            Sig: Take 1 tablet (50 mg) by mouth daily    Last filled 5-18-20 90 for 90 day supply    Thank you,  Mamie Metcalf [facility]  [phone]

## 2020-08-23 DIAGNOSIS — Z79.899 ENCOUNTER FOR LONG-TERM (CURRENT) USE OF OTHER MEDICATIONS: ICD-10-CM

## 2020-08-23 DIAGNOSIS — F33.2 SEVERE RECURRENT MAJOR DEPRESSION WITHOUT PSYCHOTIC FEATURES (H): Primary | ICD-10-CM

## 2020-08-23 LAB
ALBUMIN SERPL-MCNC: 3.8 G/DL (ref 3.4–5)
ALP SERPL-CCNC: 66 U/L (ref 40–150)
ALT SERPL W P-5'-P-CCNC: 24 U/L (ref 0–70)
ANION GAP SERPL CALCULATED.3IONS-SCNC: 5 MMOL/L (ref 3–14)
AST SERPL W P-5'-P-CCNC: 10 U/L (ref 0–45)
BASOPHILS # BLD AUTO: 0 10E9/L (ref 0–0.2)
BASOPHILS NFR BLD AUTO: 0.5 %
BILIRUB SERPL-MCNC: 0.2 MG/DL (ref 0.2–1.3)
BUN SERPL-MCNC: 16 MG/DL (ref 7–30)
CALCIUM SERPL-MCNC: 9 MG/DL (ref 8.5–10.1)
CHLORIDE SERPL-SCNC: 108 MMOL/L (ref 94–109)
CO2 SERPL-SCNC: 24 MMOL/L (ref 20–32)
CREAT SERPL-MCNC: 0.93 MG/DL (ref 0.66–1.25)
DIFFERENTIAL METHOD BLD: NORMAL
EOSINOPHIL # BLD AUTO: 0.4 10E9/L (ref 0–0.7)
EOSINOPHIL NFR BLD AUTO: 6.1 %
ERYTHROCYTE [DISTWIDTH] IN BLOOD BY AUTOMATED COUNT: 12.8 % (ref 10–15)
GFR SERPL CREATININE-BSD FRML MDRD: >90 ML/MIN/{1.73_M2}
GLUCOSE SERPL-MCNC: 90 MG/DL (ref 70–99)
HCT VFR BLD AUTO: 42.8 % (ref 40–53)
HGB BLD-MCNC: 14.3 G/DL (ref 13.3–17.7)
LITHIUM SERPL-SCNC: 0.88 MMOL/L (ref 0.6–1.2)
LYMPHOCYTES # BLD AUTO: 1.4 10E9/L (ref 0.8–5.3)
LYMPHOCYTES NFR BLD AUTO: 21.6 %
MCH RBC QN AUTO: 28.8 PG (ref 26.5–33)
MCHC RBC AUTO-ENTMCNC: 33.4 G/DL (ref 31.5–36.5)
MCV RBC AUTO: 86 FL (ref 78–100)
MONOCYTES # BLD AUTO: 0.4 10E9/L (ref 0–1.3)
MONOCYTES NFR BLD AUTO: 6 %
NEUTROPHILS # BLD AUTO: 4.2 10E9/L (ref 1.6–8.3)
NEUTROPHILS NFR BLD AUTO: 65.8 %
PLATELET # BLD AUTO: 288 10E9/L (ref 150–450)
POTASSIUM SERPL-SCNC: 4 MMOL/L (ref 3.4–5.3)
PROT SERPL-MCNC: 7.4 G/DL (ref 6.8–8.8)
RBC # BLD AUTO: 4.97 10E12/L (ref 4.4–5.9)
SODIUM SERPL-SCNC: 137 MMOL/L (ref 133–144)
TSH SERPL DL<=0.005 MIU/L-ACNC: 2.43 MU/L (ref 0.4–4)
WBC # BLD AUTO: 6.4 10E9/L (ref 4–11)

## 2020-08-23 PROCEDURE — 80050 GENERAL HEALTH PANEL: CPT | Performed by: NURSE PRACTITIONER

## 2020-08-23 PROCEDURE — 36415 COLL VENOUS BLD VENIPUNCTURE: CPT | Performed by: NURSE PRACTITIONER

## 2020-08-23 PROCEDURE — 80178 ASSAY OF LITHIUM: CPT | Performed by: NURSE PRACTITIONER

## 2020-10-26 NOTE — PATIENT INSTRUCTIONS
Plan:   Please refer to your task list created today at your appointment.  Make appointment to return to clinic in 1 month to see the dietician.  _____________________________________________________________________  In general before being submitted for insurance approval, you will need the following:  -Clearance by the Psychologist  -Clearance by the dietitian  -Structured weight loss visits if mandated by your insurance carrier  -Surgeon consult  -Use CPAP for at least one month before surgery if you have sleep apnea. Make sure to bring your CPAP or BiPAP to the hospital at the time of surgery.  -You will need to be tobacco free for 3 months before surgery and remain a non-smoker thereafter. If you are currently smoking or have recently quit, your urine will be evaluated for tobacco metabolites pre-operatively.  -If you have diabetes, you will need to have an A1C less than or equal to 8.0 within 3 months of surgery.  -You will need an exercise plan which includes MOVE, ie., walking and MUSCLE, ie.,calisthenics, bands, weight, machines, etc...  _____________________________________________________________________  Remember that after your bariatric surgery, vitamin supplementation is a lifelong need.  You will take:    B-12 1000mcg or higher sublingual (under the tongue) daily or by injection 1-2X /month  Vitamin D3 5000U daily   Multivitamin containing 18mg of iron twice a day  Calcium citrate 1 or 2 daily  Thiamine 100 mg weekly   Vitron C once daily if you are a menstruating female  To keep your weight off and your vitamin levels up, follow-up is important.  Your labs will be monitored every 3 months for the first year and yearly thereafter.  To avoid ulcers in your stomach avoid tobacco forever, alcohol in excess, caffeine in excess and anti-inflammatories (NSAIDS)  (Aspirin, Ibuprofen, Naproxen and similar medications). Tylenol is fine.  If you are told by your physician take Aspirin to protect your heart or  for another reason, make sure to take omeprazole or similar medication (protonix, nexium, prevacid) to protect your stomach.  Remember that alcohol affects you differently after bariatric surgery. If you have even ONE drink DO NOT DRIVE.

## 2020-10-26 NOTE — PROGRESS NOTES
"Abel Ward is a 35 year old male who is being evaluated via a billable video visit.      The patient has been notified of following:     \"This video visit will be conducted via a call between you and your physician/provider. We have found that certain health care needs can be provided without the need for an in-person physical exam.  This service lets us provide the care you need with a video conversation.  If a prescription is necessary we can send it directly to your pharmacy.  If lab work is needed we can place an order for that and you can then stop by our lab to have the test done at a later time.    Video visits are billed at different rates depending on your insurance coverage.  Please reach out to your insurance provider with any questions.    If during the course of the call the physician/provider feels a video visit is not appropriate, you will not be charged for this service.\"    Patient has given verbal consent for Video visit? Yes  How would you like to obtain your AVS? MyChart  If you are dropped from the video visit, the video invite should be resent to: Text to cell phone: [phone]  Will anyone else be joining your video visit? No        Video-Visit Details    Type of service:  Video Visit    Video Start Time: 10:31 AM  Video End Time: 11:06 AM    Originating Location (pt. Location): Home    Distant Location (provider location):  [facility] [place]     Platform used for Video Visit: Charles Zhou PA-C            New Bariatric Surgery Consultation Note    2020    RE: Abel Ward  MR#: [id number]  : 1985      Referring provider:       10/28/2020   Who referred you? Dr. David Denney       Chief Complaint/Reason for visit: evaluation for possible weight loss surgery    Dear David Denney MD (General),    I had the pleasure of seeing your patient, Abel Ward, to evaluate his obesity and consider " "him for possible weight loss surgery. As you know, Abel Ward is 35 year old.  He has a height of 5' 11\"[pt reported[, a weight of 320 lbs 0 oz, and calculated Body mass index is 44.63 kg/m .    HISTORY OF PRESENT ILLNESS:  Weight Loss History Reviewed with Patient 10/28/2020   How long have you been overweight? Since early childhood   What is the most that you have ever weighed? 330   What is the most weight you have lost? 40   I have tried the following methods to lose weight Watching portions or calories, Exercise, Weight Watchers, Atkins type diet (low carb/high protein)   I have tried the following weight loss medications? (Check all that apply) Naltrexone   Have you ever had weight loss surgery? No       CO-MORBIDITIES OF OBESITY INCLUDE:     10/28/2020   I have the following health issues associated with obesity: High Blood Pressure, Sleep Apnea, GERD (Reflux)       PAST MEDICAL HISTORY:  Past Medical History:   Diagnosis Date     Anxiety      Attention deficit disorder with hyperactivity(314.01) 2001     Esophageal reflux     Dr Starks- had EGD ~2202     Generalized anxiety disorder      Hypertension      Infectious mononucleosis 12/03     Low testosterone      Major depressive disorder, single episode in full remission (H)      Major depressive disorder, single episode, mild (H)      MDD (major depressive disorder)      Mild intermittent asthma      Viral URI with cough 1/3/2020       PAST SURGICAL HISTORY: No history of bleeding, clotting, malignant hyperthermia, or other anesthesia problems with surgery. Denies PONV.    Past Surgical History:   Procedure Laterality Date     OTHER SURGICAL HISTORY      wisdom teeth extraction       FAMILY HISTORY:   Family History   Problem Relation Age of Onset     Hypertension Father      Depression Father      Lipids Father      Obesity Father      Thyroid Disease Father      Diabetes Paternal Grandfather      Hypertension Paternal Grandfather      Heart " Disease Paternal Grandfather      Breast Cancer Paternal Grandmother      Hypertension Mother      Obesity Mother      Thyroid Disease Mother      Depression Mother      Hypertension Maternal Grandfather    Lung Cancer    SOCIAL HISTORY:   Social History Questions Reviewed With Patient 10/28/2020   Which best describes your employment status (select all that apply) I work full-time   If you work, 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 Yes, 10 year old   Who do you have in your support network that can be available to help you for the first 2 weeks after surgery? My wife, my sister, and my in-laws.   Who can you count on for support throughout your weight loss surgery journey? My Wife and my mother   Can you afford 3 meals a day?  Yes   Can you afford 50-60 dollars a month for vitamins? 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     HABITS:     10/28/2020   How often do you drink alcohol? Monthly or less   If you do drink alcohol, how many drinks might you have in a day? (one drink = 5 oz. wine, 1 can/bottle of beer, 1 shot liquor) 1 or 2   Do you currently use any of the following Nicotine products? -   Have you ever used any of the following nicotine products? E-cigarettes, stopped in early 2020.   If you previously used any of these products, what year did you quit? 2020   Have you or are you currently using street drugs or prescription strength medication for which you do not have a prescription for? No   Do you have a history of chemical dependency (alcohol or drug abuse)? No       PSYCHOLOGICAL HISTORY:   Psychological History Reviewed With Patient 10/28/2020   Have you ever attempted suicide? Never.   Have you had thoughts of suicide in the past year? No   Have you ever been hospitalized for mental illness or a suicide attempt? In the last 1 to 5 years.   Do you have a history of chronic pain? No   Have you ever been  diagnosed with fibromyalgia? No   Are you currently being treated for any of the following? (select all that apply) Depression, Anxiety   Are you currently seeing a therapist or counselor?  Yes   Are you currently seeing a psychiatrist? Yes   Psychiatrist BON Yanes:     10/28/2020   Skin:  None of the above   HEENT: None of these   Musculoskeletal: Joint Pain   Cardiovascular: Chest pain-reflux, Shortness of breath with activity   Pulmonary: Shortness of breath with activity, Snoring   Gastrointestinal: Heartburn, Reflux   Genitourinary: None of the above   Hematological: Family history of blood clots- MGM DVT   Neurological: None of the above       EATING BEHAVIORS:     10/28/2020   Have you or anyone else thought that you had an eating disorder? No   Do you currently binge eat (eat a large amount of food in a short time)? Yes   Are you an emotional eater? Yes   Do you get up to eat after falling asleep? No       EXERCISE:     10/28/2020   How often do you exercise? 1 to 2 times per week   What is the duration of your exercise (in minutes)? 60+ Minutes   What types of exercise do you do? walking   What keeps you from being more active?  Lack of Time, Too tired, Worried people will look at me       MEDICATIONS:  Current Outpatient Medications   Medication Sig Dispense Refill     amphetamine-dextroamphetamine (ADDERALL XR) 30 MG per 24 hr capsule Take 30 mg by mouth once       buPROPion (WELLBUTRIN XL) 150 MG 24 hr tablet        buPROPion (WELLBUTRIN XL) 300 MG 24 hr tablet        Cholecalciferol (VITAMIN D3) 05633 UNITS TABS Take 10,000 Units by mouth daily (patient purchases over-the-counter) this dose was NOT prescribed by a doctor.       cloNIDine (CATAPRES) 0.1 MG tablet        gabapentin (NEURONTIN) 100 MG capsule Take 100-200 mg by mouth nightly as needed (restless leg syndrome)        lithium (ESKALITH) 150 MG capsule        LORazepam (ATIVAN) 1 MG tablet TAKE 1 TABLET BY MOUTH UP TO 3 TIMES  DAILY AS NEEDED FOR ANXIETY FOR 14 DAYS **STOP BELSOMRA**  0     losartan (COZAAR) 50 MG tablet Take 1 tablet (50 mg) by mouth daily 90 tablet 1     magnesium oxide (MAG-OX) 400 (241.3 Mg) MG tablet Take 1 tablet by mouth daily 90 tablet 3     omeprazole (PRILOSEC) 20 MG capsule Take 1 capsule (20 mg) by mouth daily 90 capsule 3     ondansetron (ZOFRAN) 4 MG tablet Take 1-2 tablets (4-8 mg) by mouth daily 30 tablet 3     traZODone (DESYREL) 50 MG tablet Take  mg by mouth nightly as needed for sleep       VIIBRYD 20 MG TABS tablet        vilazodone (VIIBRYD) 10 MG TABS tablet          ALLERGIES:  No Known Allergies    LABS/IMAGING/MEDICAL RECORDS REVIEW:   Hemoglobin A1C   Date Value Ref Range Status   07/08/2015 5.3 4.3 - 6.0 % Final     Vitamin D Deficiency screening   Date Value Ref Range Status   01/27/2018 31 20 - 75 ug/L Final     Comment:     Season, race, dietary intake, and treatment affect the concentration of   25-hydroxy-Vitamin D. Values may decrease during winter months and increase   during summer months. Values 20-29 ug/L may indicate Vitamin D insufficiency   and values <20 ug/L may indicate Vitamin D deficiency.  Vitamin D determination is routinely performed by an immunoassay specific for   25 hydroxyvitamin D3.  If an individual is on vitamin D2 (ergocalciferol)   supplementation, please specify 25 OH vitamin D2 and D3 level determination by   LCMSMS test [id number].       TSH   Date Value Ref Range Status   08/23/2020 2.43 0.40 - 4.00 mU/L Final     Sodium   Date Value Ref Range Status   08/23/2020 137 133 - 144 mmol/L Final     Potassium   Date Value Ref Range Status   08/23/2020 4.0 3.4 - 5.3 mmol/L Final     Chloride   Date Value Ref Range Status   08/23/2020 108 94 - 109 mmol/L Final     Carbon Dioxide   Date Value Ref Range Status   08/23/2020 24 20 - 32 mmol/L Final     Anion Gap   Date Value Ref Range Status   08/23/2020 5 3 - 14 mmol/L Final     Glucose   Date Value Ref Range Status  "  08/23/2020 90 70 - 99 mg/dL Final     Urea Nitrogen   Date Value Ref Range Status   08/23/2020 16 7 - 30 mg/dL Final     Creatinine   Date Value Ref Range Status   08/23/2020 0.93 0.66 - 1.25 mg/dL Final     GFR Estimate   Date Value Ref Range Status   08/23/2020 >90 >60 mL/min/[1.73_m2] Final     Comment:     Non  GFR Calc  Starting 12/18/2018, serum creatinine based estimated GFR (eGFR) will be   calculated using the Chronic Kidney Disease Epidemiology Collaboration   (CKD-EPI) equation.       Calcium   Date Value Ref Range Status   08/23/2020 9.0 8.5 - 10.1 mg/dL Final     Bilirubin Total   Date Value Ref Range Status   08/23/2020 0.2 0.2 - 1.3 mg/dL Final     Alkaline Phosphatase   Date Value Ref Range Status   08/23/2020 66 40 - 150 U/L Final     ALT   Date Value Ref Range Status   08/23/2020 24 0 - 70 U/L Final     AST   Date Value Ref Range Status   08/23/2020 10 0 - 45 U/L Final     Cholesterol   Date Value Ref Range Status   11/07/2017 193 <200 mg/dL Final     HDL Cholesterol   Date Value Ref Range Status   11/07/2017 58 >39 mg/dL Final     LDL Cholesterol Calculated   Date Value Ref Range Status   11/07/2017 116 (H) <100 mg/dL Final     Comment:     Above desirable:  100-129 mg/dl  Borderline High:  130-159 mg/dL  High:             160-189 mg/dL  Very high:       >189 mg/dl       Triglycerides   Date Value Ref Range Status   11/07/2017 96 <150 mg/dL Final     WBC   Date Value Ref Range Status   08/23/2020 6.4 4.0 - 11.0 10e9/L Final     Hemoglobin   Date Value Ref Range Status   08/23/2020 14.3 13.3 - 17.7 g/dL Final     Hematocrit   Date Value Ref Range Status   08/23/2020 42.8 40.0 - 53.0 % Final     MCV   Date Value Ref Range Status   08/23/2020 86 78 - 100 fl Final     Platelet Count   Date Value Ref Range Status   08/23/2020 288 150 - 450 10e9/L Final        PHYSICAL EXAM:  Ht 5' 11\" (1.803 m)   Wt 320 lb (145.2 kg)   BMI 44.63 kg/m    GENERAL: Healthy, alert and no distress  EYES: " Eyes grossly normal to inspection.  No discharge or erythema, or obvious scleral/conjunctival abnormalities.  RESP: No audible wheeze, cough, or visible cyanosis.  No visible retractions or increased work of breathing.    SKIN: Visible skin clear. No significant rash, abnormal pigmentation or lesions.  NEURO: Cranial nerves grossly intact.  Mentation and speech appropriate for age.  PSYCH: Mentation appears normal, affect normal/bright, judgement and insight intact, normal speech and appearance well-groomed.      In summary, Abel Ward has Class III obesity with a body mass index of Body mass index is 44.63 kg/m . kg/m2 and the comorbidities stated above. He completed an informational seminar and is a candidate for the laparoscopic gastric bypass.  He will have to complete the following pre-requisites:    Lose 5 lbs prior to surgery.  Goal Weight: 315 lbs    Have preoperative laboratory tests drawn.     Psychological Evaluation with MMPI and clearance for weight loss surgery.    Achieve clearance from dietitian to see surgeon.    Letter of support from mental health provider- psychiatrist    Letter of support from mental health provider-therapist    Follow up with mother regarding clotting history    Today in the office we discussed gastric bypass surgery.  Preoperative, perioperative, and postoperative processes, management, and follow up were addressed.  Risks and benefits were outlined including the risk of death, staple line leak (1-2%), PE, DVT, ulcer, N/V, stricture, hernia, wound infection, weight regain, and vitamin deficiencies. I emphasized exercise and activity along with appropriate food choice as the main foundation for weight loss with surgery providing surgical reinforcement of this. All questions were answered.      Pt to see me in follow up next month to review bariatric education regarding the gastric bypass surgery.  Will do comprehensive physical examination and review task list.  Patient verbalizes understanding of the process to surgery and the post operative schedule.         Once the patient has completed the requirements in their task list and there are no further recommendations, the pt will be allowed to see the surgeon of her choice for consultation on the laparoscopic gastric bypass surgery. Patient verbalizes understanding of the process to surgery and expectations for the postoperative period including the need for lifelong lifestyle changes, vitamin supplementation, and laboratory monitoring.    Sincerely,       Jessa Zhou PA-C

## 2020-10-29 NOTE — PROGRESS NOTES
"Abel Ward is a 35 year old male who is being evaluated via a billable video visit.      The patient has been notified of following:     \"This video visit will be conducted via a call between you and your physician/provider. We have found that certain health care needs can be provided without the need for an in-person physical exam.  This service lets us provide the care you need with a video conversation.  If a prescription is necessary we can send it directly to your pharmacy.  If lab work is needed we can place an order for that and you can then stop by our lab to have the test done at a later time.    Video visits are billed at different rates depending on your insurance coverage.  Please reach out to your insurance provider with any questions.    If during the course of the call the physician/provider feels a video visit is not appropriate, you will not be charged for this service.\"    Patient has given verbal consent for Video visit? Yes  How would you like to obtain your AVS? MyChart  If you are dropped from the video visit, the video invite should be resent to: Text to cell phone: [phone]  Will anyone else be joining your video visit? No        Video-Visit Details    Type of service:  Video Visit    Video Start Time:1:08pm  Video End Time: 1:52pm  **5 minute lapse d/t connectivity issues - will bill for 39 minutes**    Originating Location (pt. Location): Home    Distant Location (provider location):  Provider Remote Setting     Platform used for Video Visit: [facility]      New Bariatric Nutrition Consultation Note    Reason For Visit: Nutrition Assessment    Abel Ward is a 35 year old presenting today for new bariatric nutrition consult.  Pt is interested in laparoscopic gastric bypass.  Patient is accompanied by self.    Support System Reviewed With Patient 10/28/2020   Who do you have in your support network that can be available to help you for the first 2 weeks after surgery? " "My wife, my sister, and my in-laws.   Who can you count on for support throughout your weight loss surgery journey? My Wife and my mother       ANTHROPOMETRICS:  Estimated body mass index is 44.63 kg/m  as calculated from the following:    Height as of this encounter: 1.803 m (5' 11\").    Weight as of this encounter: 145.2 kg (320 lb).    Required weight loss goal pre-op: 5 lbs from initial consult weight (goal weight 315 lbs or less before surgery)       10/28/2020   I have tried the following methods to lose weight Watching portions or calories, Exercise, Weight Watchers, Atkins type diet (low carb/high protein)       Weight Loss Questions Reviewed With Patient 10/28/2020   How long have you been overweight? Since early childhood       SUPPLEMENT INFORMATION:  Vitamin D: 10,000 International Units    NUTRITION HISTORY:  Recall Diet Questions Reviewed With Patient 10/28/2020   Describe what you typically consume for breakfast (typical or most recent): Protein bar, muffin, often nothing.   Describe what you typically consume for lunch (typical or most recent): Protein bar or nothing on work days.   Describe what you typically consume for supper (typical or most recent): Wife makes many things, we also have low effort stuff like pizza and corndogs for when it's just me and my son.   Describe what you typically consume as snacks (typical or most recent): Veggie chips/straws lately. Frozen lemonade bars. Jolly Ranchers to sate sweet cravings.   How many ounces of water, or other low calorie drinks, do you drink daily (8 oz=1 glass)? 64 oz or more   How many ounces of caffeine (coffee, tea, pop) do you drink daily (8 oz=1 glass)? 48 oz   How many ounces of carbonated (pop, beer, sparkling water) drinks do you drinky daily (8 oz=1 glass)? 64 oz or more   How many ounces of juice, pop, sweet tea, sports drinks, protein drinks, other sweetened drinks, do you drink daily (8 oz=1 glass)? 8 oz   How many ounces of milk do you " drink daily (8 oz=1 glass) 8 oz   Please indicate the type of milk: 1%   How often do you drink alcohol? Monthly or less   If you do drink alcohol, how many drinks might you have in a day? (one drink = 5 oz. wine, 1 can/bottle of beer, 1 shot liquor) 1 or 2       Eating Habits 10/28/2020   Do you have any dietary restrictions? No   Do you currently binge eat (eat a large amount of food in a short time)? Yes   Are you an emotional eater? Yes   Do you get up to eat after falling asleep? No   What foods do you crave? Cookies, candy, chips, fried foods, meat       ADDITIONAL INFORMATION:  Pt has been considering surgery for many years; feels like now is a good time for him to commit to making changes. Believes biggest challenge will be eliminating carbonation and sweets. Do not suspect BED - pt will overeat 1x/mo during bouts of depression and/or after missing breakfast and lunch. Feels confident in ability to control these behaviors however would recommend continue to prompt pt regarding this question.     Dining Out History Reviewed With Patient 10/28/2020   How often do you dine out? Rarely.   Where do you dine out? (select all that apply) sit-down restaurants, take out   What types of food do you order when you dine out? Mexican, Chinese, pizza, burgers       Physical Activity Reviewed With Patient 10/28/2020   How often do you exercise? 1 to 2 times per week   What is the duration of your exercise (in minutes)? 60+ Minutes   What types of exercise do you do? walking   What keeps you from being more active?  Lack of Time, Too tired, Worried people will look at me       NUTRITION DIAGNOSIS:  Obesity r/t long history of self-monitoring deficit and excessive energy intake aeb BMI >30 kg/m2.    INTERVENTION:  Intervention Provided/Education Provided on post-op diet guidelines, vitamins/minerals essential post-operatively, GI anatomy of bariatric surgeries, ways to help prepare for post-op diet guidelines  pre-operatively, portion/calorie-control, mindful eating.  Provided pt with list of goals RD contact information.      Questions Reviewed With Patient 10/28/2020   How ready are you to make changes regarding your weight? Number 1 = Not ready at all to make changes up to 10 = very ready. 8   How confident are you that you can change? 1 = Not confident that you will be successful making changes up to 10 = very confident. 8       Patient Understanding: fair-good  Expected Compliance: good    GOALS:  Begin taking multivitamin and calcium  Limit carbonated beverages to 4x/day  Be mindful of consumption of sweets    Follow-Up:   Recommend 2-3 follow up visits to assist with lifestyle changes or per insurance.      Time spent with patient: 39 minutes.      Kimberly Jamison RD, LD  Clinical Dietitian   [facility] CR[facility]T      Physical Exam

## 2020-10-30 ENCOUNTER — VIRTUAL VISIT (OUTPATIENT)
Dept: SURGERY | Facility: CLINIC | Age: 35
End: 2020-10-30
Payer: COMMERCIAL

## 2020-10-30 VITALS — WEIGHT: 315 LBS | HEIGHT: 71 IN | BODY MASS INDEX: 44.1 KG/M2

## 2020-10-30 VITALS — HEIGHT: 71 IN | WEIGHT: 315 LBS | BODY MASS INDEX: 44.1 KG/M2

## 2020-10-30 DIAGNOSIS — Z13.29 SCREENING FOR ENDOCRINE, NUTRITIONAL, METABOLIC AND IMMUNITY DISORDER: ICD-10-CM

## 2020-10-30 DIAGNOSIS — E66.01 MORBID OBESITY (H): ICD-10-CM

## 2020-10-30 DIAGNOSIS — Z13.21 SCREENING FOR ENDOCRINE, NUTRITIONAL, METABOLIC AND IMMUNITY DISORDER: ICD-10-CM

## 2020-10-30 DIAGNOSIS — Z13.228 SCREENING FOR ENDOCRINE, NUTRITIONAL, METABOLIC AND IMMUNITY DISORDER: ICD-10-CM

## 2020-10-30 DIAGNOSIS — Z13.0 SCREENING FOR ENDOCRINE, NUTRITIONAL, METABOLIC AND IMMUNITY DISORDER: ICD-10-CM

## 2020-10-30 DIAGNOSIS — K21.9 GASTROESOPHAGEAL REFLUX DISEASE WITHOUT ESOPHAGITIS: ICD-10-CM

## 2020-10-30 DIAGNOSIS — E55.9 VITAMIN D DEFICIENCY: ICD-10-CM

## 2020-10-30 DIAGNOSIS — F33.1 MAJOR DEPRESSIVE DISORDER, RECURRENT EPISODE, MODERATE (H): ICD-10-CM

## 2020-10-30 DIAGNOSIS — E66.01 MORBID OBESITY (H): Primary | ICD-10-CM

## 2020-10-30 DIAGNOSIS — G47.33 OSA (OBSTRUCTIVE SLEEP APNEA): ICD-10-CM

## 2020-10-30 DIAGNOSIS — Z13.0 SCREENING FOR IRON DEFICIENCY ANEMIA: ICD-10-CM

## 2020-10-30 DIAGNOSIS — I10 HYPERTENSION GOAL BP (BLOOD PRESSURE) < 130/80: ICD-10-CM

## 2020-10-30 PROCEDURE — 99215 OFFICE O/P EST HI 40 MIN: CPT | Mod: 95 | Performed by: PHYSICIAN ASSISTANT

## 2020-10-30 PROCEDURE — 97802 MEDICAL NUTRITION INDIV IN: CPT | Mod: GT | Performed by: DIETITIAN, REGISTERED

## 2020-10-30 RX ORDER — VILAZODONE HYDROCHLORIDE 20 MG/1
TABLET ORAL
COMMUNITY
Start: 2020-10-26 | End: 2021-05-10

## 2020-10-30 RX ORDER — LITHIUM CARBONATE 150 MG/1
150 CAPSULE ORAL AT BEDTIME
COMMUNITY
End: 2022-07-20

## 2020-10-30 RX ORDER — BUPROPION HYDROCHLORIDE 300 MG/1
TABLET ORAL
COMMUNITY
Start: 2020-10-26 | End: 2021-03-29 | Stop reason: DRUGHIGH

## 2020-10-30 RX ORDER — VILAZODONE HYDROCHLORIDE 10 MG/1
TABLET ORAL
COMMUNITY
End: 2021-05-10

## 2020-10-30 RX ORDER — BUPROPION HYDROCHLORIDE 150 MG/1
TABLET ORAL
COMMUNITY
Start: 2020-10-26 | End: 2021-03-29 | Stop reason: DRUGHIGH

## 2020-10-30 RX ORDER — CLONIDINE HYDROCHLORIDE 0.1 MG/1
0.1 TABLET ORAL DAILY
COMMUNITY
End: 2023-05-22

## 2020-10-30 ASSESSMENT — MIFFLIN-ST. JEOR
SCORE: 2418.64
SCORE: 2418.64

## 2020-10-30 NOTE — LETTER
[facility]           [address]  [address]  [place] [address]                                         Tel:  [phone]  Fax: [phone]  October 30, 2020  Abel Ward  Bariatric Task List      Required Weight loss:    Lose 320 lbs prior to surgery.  Goal Weight: 315 lbs      Tasks:    Have preoperative laboratory tests drawn.     Psychological Evaluation with MMPI and clearance for weight loss surgery.    Achieve clearance from dietitian to see surgeon.    Letter of support from mental health provider- psychiatrist    Letter of support from mental health provider-therapsit    Follow up with mother regarding clotting history    Next Steps:  1. As you complete the tasks above and we receive the reports, your chart will be reviewed.  If there are any recommendations from your evaluations for you to be better prepared for surgery, we will contact you. These recommendations must be completed before seeing a surgeon.   2.   If there are no further recommendations and your bariatric task list is complete, we will call to schedule an appointment with the surgeon.    3.   After your visit with the surgeon, we will process your information and submit your file to your insurance company for prior authorization. (It can take up to 4 weeks to receive approval)  4.  Once we receive confirmation of insurance approval, we will contact you to schedule your surgery date.

## 2020-10-30 NOTE — LETTER
[facility]           [address]  [address]  [place] [address]                                         Tel:  [phone]  Fax: [phone]  October 30, 2020  Abel Ward  Bariatric Task List      Required Weight loss:    Lose 5 lbs prior to surgery.  Goal Weight: 315 lbs      Tasks:    Have preoperative laboratory tests drawn.     Psychological Evaluation with MMPI and clearance for weight loss surgery.    Achieve clearance from dietitian to see surgeon.    Letter of support from mental health provider- psychiatrist    Letter of support from mental health provider-therapsit    Follow up with mother regarding clotting history    Next Steps:  1. As you complete the tasks above and we receive the reports, your chart will be reviewed.  If there are any recommendations from your evaluations for you to be better prepared for surgery, we will contact you. These recommendations must be completed before seeing a surgeon.   2.   If there are no further recommendations and your bariatric task list is complete, we will call to schedule an appointment with the surgeon.    3.   After your visit with the surgeon, we will process your information and submit your file to your insurance company for prior authorization. (It can take up to 4 weeks to receive approval)  4.  Once we receive confirmation of insurance approval, we will contact you to schedule your surgery date.

## 2020-10-30 NOTE — LETTER
[facility]           [address]  [address]  [place] [address]                                         Tel:  [phone]  Fax: [phone]    October 30, 2020    Abel Ward  [address] [address]   1985      Bariatric Letter of Support from Mental Health Provider    Dear Mental Health Provider:    Shakir is seeking bariatric surgery.  Although he will undergo a separate bariatric psychological evaluation, it is important to us that mental health providers of our surgical patients are involved at all stages of the process.     Please write a letter of support including the following information:    1. Is the patient under your care?  If so, for how long?  2. Comment on the relevant diagnosis and any current related prescribed medications.  3. From a mental health standpoint do you feel patient is stable?  4. It is important that the patient have regular follow up initially and for the first year following surgery.  Are you able to see the patient for this?    Sincerely,      Jessa Zhou PA-C  [facility]   Please fax letter to [phone] or route in Epic to Jessa Zhou PA-C

## 2020-11-04 DIAGNOSIS — R11.0 NAUSEA: ICD-10-CM

## 2020-11-04 RX ORDER — ONDANSETRON 4 MG/1
4-8 TABLET, FILM COATED ORAL DAILY
Qty: 30 TABLET | Refills: 3 | Status: SHIPPED | OUTPATIENT
Start: 2020-11-04

## 2020-11-15 ENCOUNTER — AMBULATORY - HEALTHEAST (OUTPATIENT)
Dept: FAMILY MEDICINE | Facility: CLINIC | Age: 35
End: 2020-11-15

## 2020-11-15 ENCOUNTER — VIRTUAL VISIT (OUTPATIENT)
Dept: FAMILY MEDICINE | Facility: OTHER | Age: 35
End: 2020-11-15

## 2020-11-15 DIAGNOSIS — Z20.822 SUSPECTED COVID-19 VIRUS INFECTION: ICD-10-CM

## 2020-11-15 NOTE — PROGRESS NOTES
"Date: 11/15/2020 10:53:03  Clinician: June Garay  Clinician NPI: [id number]  Patient: Abel Ward  Patient : 1985  Patient Address: [address] [place] [address]  Patient Phone: [phone]  Visit Protocol: URI  Patient Summary:  Abel is a 35 year old ( : 1985 ) male who initiated a [facility] Visit for COVID-19 (Coronavirus) evaluation and screening. When asked the question \"Please sign me up to receive news, health information and promotions. \", Abel responded \"No\".    Abel states his symptoms started 1-2 days ago.   His symptoms consist of myalgia, malaise, and a cough. He is experiencing mild difficulty breathing with activities but can speak normally in full sentences.   Symptom details   Cough: Abel coughs every 5-10 minutes and his cough is not more bothersome at night. Phlegm does not come into his throat when he coughs. He does not believe his cough is caused by post-nasal drip.    Abel denies having vomiting, rhinitis, facial pain or pressure, chills, sore throat, teeth pain, ageusia, diarrhea, ear pain, headache, wheezing, fever, nasal congestion, nausea, and anosmia. He also denies taking antibiotic medication in the past month and having recent facial or sinus surgery in the past 60 days.   Precipitating events  He has not recently been exposed to someone with influenza. Abel has not been in close contact with any high risk individuals.   Pertinent COVID-19 (Coronavirus) information  Abel does not work or volunteer as healthcare worker or a . In the past 14 days, Abel has not worked or volunteered at a healthcare facility or group living setting.   In the past 14 days, he also has not lived in a congregate living setting.   Abel has not had a close contact with a laboratory-confirmed COVID-19 patient within 14 days of symptom onset.    Since 2019, Abel has been tested " for COVID-19 and has not had upper respiratory infection or influenza-like illness.      Result of COVID-19 test: Negative     Date of his COVID-19 test: 07/20/2020      Pertinent medical history  Abel does not need a return to work/school note.   Weight: 310 lbs   Abel does not smoke or use smokeless tobacco.   Additional information as reported by the patient (free text): Wife (healthcare worker) developed terrible cough over the last couple of days, son was known to be exposed to COVID on the school bus but tested negative. I have a cough too, but it's not nearly as bad. More shortness of breath involved.   Weight: 310 lbs    MEDICATIONS: buspirone oral, losartan oral, Trintellix oral, lithium carbonate (bulk), clonidine HCl oral, Adderall oral, magnesium oxide oral, risperidone oral, ondansetron HCl oral, omeprazole oral, ALLERGIES: NKDA  Clinician Response:  Dear Abel,   Your symptoms show that you may have coronavirus (COVID-19). This illness can cause fever, cough and trouble breathing. Many people get a mild case and get better on their own. Some people can get very sick.  What should I do?  We would like to test you for this virus.   1. Please call [phone] to schedule your visit. Explain that you were referred by [facility] to have a COVID-19 test. Be ready to share your [facility] visit ID number.  * If you need to schedule in [facility] please call [phone] or for [employer] employees please call [phone].  * If you need to schedule in the [place] area please call [phone]. [place] employees call [phone].  The following will serve as your written order for this COVID Test, ordered by me, for the indication of suspected COVID [Z20.828]: The test will be ordered in [employer], our electronic health record, after you are scheduled. It will show as ordered and authorized by Orestes Briggs MD.  Order: COVID-19 (Coronavirus) PCR for SYMPTOMATIC testing from [facility].   2. When it's time  "for your COVID test:  Stay at least 6 feet away from others. (If someone will drive you to your test, stay in the backseat, as far away from the  as you can.)   Cover your mouth and nose with a mask, tissue or washcloth.  Go straight to the testing site. Don't make any stops on the way there or back.      3.Starting now: Stay home and away from others (self-isolate) until:   You've had no fever---and no medicine that reduces fever---for one full day (24 hours). And...   Your other symptoms have gotten better. For example, your cough or breathing has improved. And...   At least 10 days have passed since your symptoms started.       During this time, don't leave the house except for testing or medical care.   Stay in your own room, even for meals. Use your own bathroom if you can.   Stay away from others in your home. No hugging, kissing or shaking hands. No visitors.  Don't go to work, school or anywhere else.    Clean \"high touch\" surfaces often (doorknobs, counters, handles, etc.). Use a household cleaning spray or wipes. You'll find a full list of  on the EPA website: www.epa.gov/pesticide-registration/list-n-disinfectants-use-against-sars-cov-2.   Cover your mouth and nose with a mask, tissue or washcloth to avoid spreading germs.  Wash your hands and face often. Use soap and water.  Caregivers in these groups are at risk for severe illness due to COVID-19:  o People 65 years and older  o People who live in a nursing home or long-term care facility  o People with chronic disease (lung, heart, cancer, diabetes, kidney, liver, immunologic)  o People who have a weakened immune system, including those who:   Are in cancer treatment  Take medicine that weakens the immune system, such as corticosteroids  Had a bone marrow or organ transplant  Have an immune deficiency  Have poorly controlled HIV or AIDS  Are obese (body mass index of 40 or higher)  Smoke regularly   o Caregivers should wear gloves while " washing dishes, handling laundry and cleaning bedrooms and bathrooms.  o Use caution when washing and drying laundry: Don't shake dirty laundry, and use the warmest water setting that you can.  o For more tips, go to www.cdc.gov/coronavirus/2019-ncov/downloads/10Things.pdf.    4.Sign up for [employer]. We know it's scary to hear that you might have COVID-19. We want to track your symptoms to make sure you're okay over the next 2 weeks. Please look for an email from [employer]---this is a free, online program that we'll use to keep in touch. To sign up, follow the link in the email. Learn more at [email or web address]  How can I take care of myself?   Get lots of rest. Drink extra fluids (unless a doctor has told you not to).   Take Tylenol (acetaminophen) for fever or pain. If you have liver or kidney problems, ask your family doctor if it's okay to take Tylenol.   Adults can take either:    650 mg (two 325 mg pills) every 4 to 6 hours, or...   1,000 mg (two 500 mg pills) every 8 hours as needed.    Note: Don't take more than 3,000 mg in one day. Acetaminophen is found in many medicines (both prescribed and over-the-counter medicines). Read all labels to be sure you don't take too much.   For children, check the Tylenol bottle for the right dose. The dose is based on the child's age or weight.    If you have other health problems (like cancer, heart failure, an organ transplant or severe kidney disease): Call your specialty clinic if you don't feel better in the next 2 days.       Know when to call 911. Emergency warning signs include:    Trouble breathing or shortness of breath Pain or pressure in the chest that doesn't go away Feeling confused like you haven't felt before, or not being able to wake up Bluish-colored lips or face.  Where can I get more information?    [facility] -- About COVID-19: [email or web address]   CDC -- What to Do If You're Sick:  www.cdc.gov/coronavirus/2019-ncov/about/steps-when-sick.html   CDC -- Ending Home Isolation: www.cdc.gov/coronavirus/2019-ncov/hcp/disposition-in-home-patients.html   [facility] -- Caring for Someone: www.cdc.gov/coronavirus/2019-ncov/if-you-are-sick/care-for-someone.html   [facility] -- Interim Guidance for Hospital Discharge to Home: www.[facility].mn./diseases/coronavirus/hcp/hospdischarge.pdf   [facility] clinical trials (COVID-19 research studies): [email or web address].[facility]-clinical-trials    Below are the COVID-19 hotlines at the [place] Department of Health ([facility]). Interpreters are available.    For health questions: Call 166-355-1760 or 1-427.370.2506 (7 a.m. to 7 p.m.) For questions about schools and childcare: Call 386-223-9877 or 1-836.195.8086 (7 a.m. to 7 p.m.)    Diagnosis: Cough  Diagnosis ICD: R05

## 2020-11-17 ENCOUNTER — COMMUNICATION - HEALTHEAST (OUTPATIENT)
Dept: SCHEDULING | Facility: CLINIC | Age: 35
End: 2020-11-17

## 2020-11-25 ENCOUNTER — VIRTUAL VISIT (OUTPATIENT)
Dept: SURGERY | Facility: CLINIC | Age: 35
End: 2020-11-25
Payer: COMMERCIAL

## 2020-11-25 DIAGNOSIS — E66.01 MORBID OBESITY (H): ICD-10-CM

## 2020-11-25 PROCEDURE — 97803 MED NUTRITION INDIV SUBSEQ: CPT | Mod: GT | Performed by: DIETITIAN, REGISTERED

## 2020-11-25 NOTE — PROGRESS NOTES
"Abel Ward is a 35 year old male who is being evaluated via a billable video visit.      The patient has been notified of following:     \"This video visit will be conducted via a call between you and your physician/provider. We have found that certain health care needs can be provided without the need for an in-person physical exam.  This service lets us provide the care you need with a video conversation.  If a prescription is necessary we can send it directly to your pharmacy.  If lab work is needed we can place an order for that and you can then stop by our lab to have the test done at a later time.    Video visits are billed at different rates depending on your insurance coverage.  Please reach out to your insurance provider with any questions.    If during the course of the call the physician/provider feels a video visit is not appropriate, you will not be charged for this service.\"    Patient has given verbal consent for Video visit? Yes  How would you like to obtain your AVS? MyChart  If you are dropped from the video visit, the video invite should be resent to: Text to cell phone: [phone]  Will anyone else be joining your video visit? No        Video-Visit Details    Type of service:  Video Visit    Video Start Time: 3:29am  Video End Time: 3:47am    Originating Location (pt. Location): Home    Distant Location (provider location): Provider Remote Setting    Platform used for Video Visit: [facility]    PRE SURGICAL WEIGHT LOSS NUTRITION APPOINTMENT    Abel Ward  1985  male  [id number]  35 year old    ASSESSMENT    Desired Surgical Procedure: gastric bypass    REASON FOR VISIT:  Abel Ward is a 35 year old year old male presents today for a pre-surgical weight loss follow-up appointment. Patient accompanied by self.    DIAGNOSIS:  Weight Status Obesity Grade III BMI >40    ANTHROPOMETRICS:  Height: 5'11\"    Initial Weight: 320 lbs     Weight last visit: 320 lbs  " "  Current weight: 315 lbs 11.2 oz   BMI: Body mass index is 44.03 kg/m .      VITAMINS AND MINERALS:  1 Multivitamin with Minerals  500 mg Calcium with Vitamin D (takes inconsistently)  10,000 International units Vitamin D      NUTRITION HISTORY:  Breakfast: [4-5am] Protein drink or bar   Lunch: [11:30am] (same as breakfast)  Supper: [5-6pm] pot roast + vegetables  \"meat and vegetables\"  Snacks: occasional; cheese and crackers, protein bars    Fluids Consumed: water/enhanced water, carbonated water, energy water (using to wean off energy drinks)  Eating slower: No  Chewing foods thoroughly: No  Take 20-30 minutes to consume each meal: No  Fluids and meals separate by at least 30 minutes: No  Carbonation: reducing  Caffeine: reducing  Additional Information: Pt successful in significantly reducing caffeine and carbonation. Pt unsure how he will implement behavior changes with current work schedule. Discussed recommendations for exercise. Reviewed expectations for behavior change as part of pre-op process.     PHYSICAL ACTIVITY:  None    DIAGNOSIS:  Previous Nutrition Diagnosis: Obesity related to long history of self- monitoring deficit and excessive energy intake evidenced by BMI of 44.63 kg/m2  No change, modified below    Previous goals:   Begin taking multivitamin and calcium - partially met  Limit carbonated beverages to 4x/day - met  Be mindful of consumption of sweets - improving    Current Nutrition Diagnosis: Obesity related to long history of self-monitoring deficit and excessive energy intake as evidenced by BMI of >44.03 kg/m2.    INTERVENTION:  Nutrition Prescription: Recommended energy/nutrient modification.    GOALS:  Take calcium per guidelines (reviewed)  Separate fluids and meals by 30 minutes  Continue to work toward eliminating caffeine/carbonation  Take 15 minutes to eat dinner    Intervention:  - Discussed progress towards previous goals.  - Reinforced importance of making behavior changes in " preparation for bariatric surgery.   - Assessed learning needs and learning preferences       NUTRITION MONITORING AND EVALUATION:  Anticipated compliance: fair-good  Patient demonstrated fair-good understanding.     Follow up: Continue to monitor patient closely regarding weight loss and diet.  # of visits needed: 2-3  Cleared by RD: No     TIME SPENT WITH PATIENT: 18 minutes    Kimberly Jamison RD, LD  Clinical Dietitian       HPI      ROS      Physical Exam

## 2020-12-17 VITALS — BODY MASS INDEX: 44.1 KG/M2 | WEIGHT: 315 LBS | HEIGHT: 71 IN

## 2020-12-17 ASSESSMENT — MIFFLIN-ST. JEOR: SCORE: 2399.14

## 2020-12-18 ENCOUNTER — VIRTUAL VISIT (OUTPATIENT)
Dept: SURGERY | Facility: CLINIC | Age: 35
End: 2020-12-18
Payer: COMMERCIAL

## 2020-12-18 ENCOUNTER — MYC MEDICAL ADVICE (OUTPATIENT)
Dept: SURGERY | Facility: CLINIC | Age: 35
End: 2020-12-18

## 2020-12-18 VITALS — BODY MASS INDEX: 44.1 KG/M2 | HEIGHT: 71 IN | WEIGHT: 315 LBS

## 2020-12-18 VITALS — BODY MASS INDEX: 44.1 KG/M2 | WEIGHT: 315 LBS | HEIGHT: 71 IN

## 2020-12-18 DIAGNOSIS — Z83.2 FAMILY HISTORY OF POLYCYTHEMIA VERA: ICD-10-CM

## 2020-12-18 DIAGNOSIS — E66.01 MORBID OBESITY (H): Primary | ICD-10-CM

## 2020-12-18 DIAGNOSIS — E66.01 MORBID OBESITY (H): ICD-10-CM

## 2020-12-18 DIAGNOSIS — Z71.89 ENCOUNTER FOR PRE-BARIATRIC SURGERY COUNSELING AND EDUCATION: ICD-10-CM

## 2020-12-18 DIAGNOSIS — Z83.2 FAMILY HISTORY OF PROTEIN S DEFICIENCY: ICD-10-CM

## 2020-12-18 PROCEDURE — 99213 OFFICE O/P EST LOW 20 MIN: CPT | Mod: 95 | Performed by: PHYSICIAN ASSISTANT

## 2020-12-18 PROCEDURE — 97803 MED NUTRITION INDIV SUBSEQ: CPT | Mod: GT | Performed by: DIETITIAN, REGISTERED

## 2020-12-18 ASSESSMENT — MIFFLIN-ST. JEOR
SCORE: 2399.14
SCORE: 2399.14

## 2020-12-18 NOTE — PROGRESS NOTES
"Abel Ward is a 35 year old male who is being evaluated via a billable video visit.      The patient has been notified of following:     \"This video visit will be conducted via a call between you and your physician/provider. We have found that certain health care needs can be provided without the need for an in-person physical exam.  This service lets us provide the care you need with a video conversation.  If a prescription is necessary we can send it directly to your pharmacy.  If lab work is needed we can place an order for that and you can then stop by our lab to have the test done at a later time.    Video visits are billed at different rates depending on your insurance coverage.  Please reach out to your insurance provider with any questions.    If during the course of the call the physician/provider feels a video visit is not appropriate, you will not be charged for this service.\"    Patient has given verbal consent for Video visit? Yes  How would you like to obtain your AVS? MyChart  If you are dropped from the video visit, the video invite should be resent to: Text to cell phone: [phone]  Will anyone else be joining your video visit? No        Video-Visit Details    Type of service:  Video Visit    Video Start Time: 9:02 AM  Video End Time: 9:20 AM    Originating Location (pt. Location): Home    Distant Location (provider location):  [facility] [place]     Platform used for Video Visit: Charles Zhou PA-C      Bariatric Pre-Surgery Visit    CC: Obesity    HPI: I had the pleasure of seeing Shakir in the office today to go over bariatric education.  I saw the patient last month to evaluate obesity and consider him   for possible weight loss surgery.  Pt to see me in follow up next month in clinic to review bariatric education regarding the gastric bypass surgery.      Updated family history with pt.   Family History   Problem Relation Age of Onset "     Hypertension Father      Depression Father      Lipids Father      Obesity Father      Thyroid Disease Father      Polycythemia Father      Diabetes Paternal Grandfather      Hypertension Paternal Grandfather      Heart Disease Paternal Grandfather      Breast Cancer Paternal Grandmother      Hypertension Mother      Obesity Mother      Thyroid Disease Mother      Depression Mother      Protein S deficiency Mother      Hypertension Maternal Grandfather        Wt Readings from Last 4 Encounters:   12/17/20 315 lb 11.2 oz (143.2 kg)   10/30/20 320 lb (145.2 kg)   10/30/20 320 lb (145.2 kg)   01/03/20 319 lb (144.7 kg)      ROS:  Denies bleeding.   Others present with disease-related symptoms (eg, headache, dizziness, visual disturbances, pruritus, early satiety) or complications (eg, thrombosis, bleeding) (table 2).    Hemoglobin A1C   Date Value Ref Range Status   07/08/2015 5.3 4.3 - 6.0 % Final     Vitamin D Deficiency screening   Date Value Ref Range Status   01/27/2018 31 20 - 75 ug/L Final     Comment:     Season, race, dietary intake, and treatment affect the concentration of   25-hydroxy-Vitamin D. Values may decrease during winter months and increase   during summer months. Values 20-29 ug/L may indicate Vitamin D insufficiency   and values <20 ug/L may indicate Vitamin D deficiency.  Vitamin D determination is routinely performed by an immunoassay specific for   25 hydroxyvitamin D3.  If an individual is on vitamin D2 (ergocalciferol)   supplementation, please specify 25 OH vitamin D2 and D3 level determination by   LCMSMS test VITD23.       TSH   Date Value Ref Range Status   08/23/2020 2.43 0.40 - 4.00 mU/L Final     Sodium   Date Value Ref Range Status   08/23/2020 137 133 - 144 mmol/L Final     Potassium   Date Value Ref Range Status   08/23/2020 4.0 3.4 - 5.3 mmol/L Final     Chloride   Date Value Ref Range Status   08/23/2020 108 94 - 109 mmol/L Final     Carbon Dioxide   Date Value Ref Range  Status   08/23/2020 24 20 - 32 mmol/L Final     Anion Gap   Date Value Ref Range Status   08/23/2020 5 3 - 14 mmol/L Final     Glucose   Date Value Ref Range Status   08/23/2020 90 70 - 99 mg/dL Final     Urea Nitrogen   Date Value Ref Range Status   08/23/2020 16 7 - 30 mg/dL Final     Creatinine   Date Value Ref Range Status   08/23/2020 0.93 0.66 - 1.25 mg/dL Final     GFR Estimate   Date Value Ref Range Status   08/23/2020 >90 >60 mL/min/[1.73_m2] Final     Comment:     Non  GFR Calc  Starting 12/18/2018, serum creatinine based estimated GFR (eGFR) will be   calculated using the Chronic Kidney Disease Epidemiology Collaboration   (CKD-EPI) equation.       Calcium   Date Value Ref Range Status   08/23/2020 9.0 8.5 - 10.1 mg/dL Final     Bilirubin Total   Date Value Ref Range Status   08/23/2020 0.2 0.2 - 1.3 mg/dL Final     Alkaline Phosphatase   Date Value Ref Range Status   08/23/2020 66 40 - 150 U/L Final     ALT   Date Value Ref Range Status   08/23/2020 24 0 - 70 U/L Final     AST   Date Value Ref Range Status   08/23/2020 10 0 - 45 U/L Final     Cholesterol   Date Value Ref Range Status   11/07/2017 193 <200 mg/dL Final     HDL Cholesterol   Date Value Ref Range Status   11/07/2017 58 >39 mg/dL Final     LDL Cholesterol Calculated   Date Value Ref Range Status   11/07/2017 116 (H) <100 mg/dL Final     Comment:     Above desirable:  100-129 mg/dl  Borderline High:  130-159 mg/dL  High:             160-189 mg/dL  Very high:       >189 mg/dl       Triglycerides   Date Value Ref Range Status   11/07/2017 96 <150 mg/dL Final     WBC   Date Value Ref Range Status   08/23/2020 6.4 4.0 - 11.0 10e9/L Final     Hemoglobin   Date Value Ref Range Status   08/23/2020 14.3 13.3 - 17.7 g/dL Final     Hematocrit   Date Value Ref Range Status   08/23/2020 42.8 40.0 - 53.0 % Final     MCV   Date Value Ref Range Status   08/23/2020 86 78 - 100 fl Final     Platelet Count   Date Value Ref Range Status  "  08/23/2020 288 150 - 450 10e9/L Final      PE:  Ht 5' 11\" (1.803 m)   Wt 315 lb 11.2 oz (143.2 kg)   BMI 44.03 kg/m      GENERAL: Healthy, alert and no distress  EYES: Eyes grossly normal to inspection.  No discharge or erythema, or obvious scleral/conjunctival abnormalities.  RESP: No audible wheeze, cough, or visible cyanosis.  No visible retractions or increased work of breathing.    SKIN: Visible skin clear. No significant rash, abnormal pigmentation or lesions.  NEURO: Cranial nerves grossly intact.  Mentation and speech appropriate for age.  PSYCH: Mentation appears normal, affect normal/bright, judgement and insight intact, normal speech and appearance well-groomed.      Assessment:  Morbid Obesity  Bariatric Education    Plan:  Reviewed tasklist      Lose 5 lbs prior to surgery.  Goal Weight: 315 lbs- met.  Discussed working to maintain this over the holidays.     Have preoperative laboratory tests drawn. -pending     Psychological Evaluation with MMPI and clearance for weight loss surgery.-pending. Reviewed phone number to call and schedule.  Order placed in October.    Achieve clearance from dietitian to see surgeon.-pending    Letter of support from mental health provider- psychiatrist, pending, reminded pt where to find this in the letter tab on my chart    Letter of support from mental health provider-therapist, pending,  reminded pt where to find this in the letter tab on my chart    Follow up with mother regarding clotting history-PT found out mother has Protein S deficiency that the doctor does not feels is not genetic.  His father has polycythemia Vera which may be genetic.  Will send a message to hematology today to see if this would impact his surgery and if he needs to be seen by a specialist to formulate a pre-op/post op plan.        Today in the office we discussed gastric bypass surgery.  Preoperative, perioperative, and postoperative processes, management, and follow up were addressed.  "     Once the patient has completed the requirements in the above tasklist and there are no further recommendations, pt will see the surgeon for consultation for bariatric surgery.  Pt to see me in follow up next month in person to review bariatric education regarding the gastric bypass surgery.  Will also complete a comprehensive physical exam. Patient verbalizes understanding of the process to surgery and the post operative schedule.  All questions were answered.      Sincerely,       Jessa Zhou PA-C      FOLLOW-UP:  Return to clinic in person for pre-op visit with Jessa Zhou PA-C and diet in 1 month.  Will review bariatric education,  review task list, and complete a comprehensive physical exam.

## 2020-12-18 NOTE — PROGRESS NOTES
"Abel Ward is a 35 year old male who is being evaluated via a billable video visit.      The patient has been notified of following:     \"This video visit will be conducted via a call between you and your physician/provider. We have found that certain health care needs can be provided without the need for an in-person physical exam.  This service lets us provide the care you need with a video conversation.  If a prescription is necessary we can send it directly to your pharmacy.  If lab work is needed we can place an order for that and you can then stop by our lab to have the test done at a later time.    Video visits are billed at different rates depending on your insurance coverage.  Please reach out to your insurance provider with any questions.    If during the course of the call the physician/provider feels a video visit is not appropriate, you will not be charged for this service.\"    Patient has given verbal consent for Video visit? Yes  How would you like to obtain your AVS? MyChart  If you are dropped from the video visit, the video invite should be resent to: Text to cell phone: [phone]  Will anyone else be joining your video visit? No        Video-Visit Details    Type of service:  Video Visit    Video Start Time: 9:29am  Video End Time: 9:43am    Originating Location (pt. Location): Home    Distant Location (provider location):  Provider Remote Setting    Platform used for Video Visit: [facility]    PRE SURGICAL WEIGHT LOSS NUTRITION APPOINTMENT    Abel Ward  1985  male  [id number]  35 year old    ASSESSMENT    Desired Surgical Procedure: gastric bypass    REASON FOR VISIT:  Abel Ward is a 35 year old year old male presents today for a pre-surgical weight loss follow-up appointment. Patient accompanied by self.    DIAGNOSIS:  Weight Status Obesity Grade III BMI >40    ANTHROPOMETRICS:  Height: 5' 11\"   Initial Weight: 320 lbs     Weight last visit: 315.7 lbs  "   Current weight: 315 lbs 11.2 oz   BMI: Body mass index is 44.03 kg/m .      VITAMINS AND MINERALS:  1 Multivitamin with Minerals  500 mg Calcium with Vitamin D BID (inconsistent)  10,000 International units Vitamin D      NUTRITION HISTORY:  Breakfast: protein shake   Lunch: protein shake  Supper: meat + potatoes  pork loin + rice + vegetables  q2w will have take-out/restaurant food   Snacks: protein bar, fruit bar ([employer])  Fluids Consumed: Water/enhanced water (48-64oz), milk (occasional), juice (occasional), caffeinated enhanced waster  Eating slower: No  Chewing foods thoroughly: Yes/usually  Take 20-30 minutes to consume each meal: No (10-15 minutes)  Fluids and meals separate by at least 30 minutes: No  Carbonation: none  Caffeine: yes  Additional Information: Pt shares that it's been difficult to implement behavior changes this month d/t holidays and work schedule. Reviewed rationale for eating slowly and not drinking with meals. Reviewed calcium dosing. Pt successful in eliminating carbonation. Recommended slow but consistent reduction in caffeine. Pt trying to add more movement/activy to daily routine.     PHYSICAL ACTIVITY:  Type: climbing stairs at work/home, trying to walk more frequently and faster at work    Frequency: 7 (days per week)  Duration: (throughout day)    DIAGNOSIS:  Previous Nutrition Diagnosis: Obesity related to long history of self- monitoring deficit and excessive energy intake evidenced by BMI of 44.03 kg/m2  No change, modified below    Previous goals:   Take calcium per guidelines (reviewed) - not met  Separate fluids and meals by 30 minutes - not met  Continue to work toward eliminating caffeine/carbonation - improving  Take 15 minutes to eat dinner - improving    Current Nutrition Diagnosis: Obesity related to long history of self-monitoring deficit and excessive energy intake as evidenced by BMI of 44.03 kg/m2.    INTERVENTION:  Nutrition Prescription: Recommended  energy/nutrient modification.    GOALS:  Take calcium 500mg TID or 600mg BID  Separate fluids and meals by 30 minutes  Take 20 minutes to eat meals  Continue to work toward eliminating caffeine    Intervention:  - Discussed progress towards previous goals.  - Reinforced importance of making behavior changes in preparation for bariatric surgery.   - Assessed learning needs and learning preferences       NUTRITION MONITORING AND EVALUATION:  Anticipated compliance: fair-good  Patient demonstrated fair-good understanding.       Follow up: Continue to monitor patient closely regarding weight loss and diet.  # of visits needed: 1-2  Cleared by RD: No     TIME SPENT WITH PATIENT: 14 minutes      Kimberly Jamison RD, LD  Clinical Dietitian       CHANTE CROCKETT      Physical Exam

## 2020-12-18 NOTE — PATIENT INSTRUCTIONS
1. Calcium: Take 500mg 3x/day OR 600mg 2x/day. These doses still must be at least 2 hours apart from your multivitamin.    2. Separate your fluids and meals by 30 minutes    3. Take 20-30 minutes to eat meals.     4. Start slowly but consistently reducing caffeine in take.       You can schedule next month's visit via the call center at . Have a great holiday and a wonderful new year!      Kimberly Jamison RD, LD  Clinical Dietitian

## 2020-12-23 DIAGNOSIS — I10 HYPERTENSION GOAL BP (BLOOD PRESSURE) < 130/80: ICD-10-CM

## 2020-12-23 DIAGNOSIS — Z13.29 SCREENING FOR ENDOCRINE, NUTRITIONAL, METABOLIC AND IMMUNITY DISORDER: ICD-10-CM

## 2020-12-23 DIAGNOSIS — E66.01 MORBID OBESITY (H): ICD-10-CM

## 2020-12-23 DIAGNOSIS — Z13.228 SCREENING FOR ENDOCRINE, NUTRITIONAL, METABOLIC AND IMMUNITY DISORDER: ICD-10-CM

## 2020-12-23 DIAGNOSIS — Z13.21 SCREENING FOR ENDOCRINE, NUTRITIONAL, METABOLIC AND IMMUNITY DISORDER: ICD-10-CM

## 2020-12-23 DIAGNOSIS — E55.9 VITAMIN D DEFICIENCY: ICD-10-CM

## 2020-12-23 DIAGNOSIS — Z13.0 SCREENING FOR ENDOCRINE, NUTRITIONAL, METABOLIC AND IMMUNITY DISORDER: ICD-10-CM

## 2020-12-23 DIAGNOSIS — Z13.0 SCREENING FOR IRON DEFICIENCY ANEMIA: ICD-10-CM

## 2020-12-23 DIAGNOSIS — G47.33 OSA (OBSTRUCTIVE SLEEP APNEA): ICD-10-CM

## 2020-12-23 LAB
ERYTHROCYTE [DISTWIDTH] IN BLOOD BY AUTOMATED COUNT: 12.7 % (ref 10–15)
HBA1C MFR BLD: 5 % (ref 0–5.6)
HCT VFR BLD AUTO: 42.4 % (ref 40–53)
HGB BLD-MCNC: 14 G/DL (ref 13.3–17.7)
MCH RBC QN AUTO: 28.3 PG (ref 26.5–33)
MCHC RBC AUTO-ENTMCNC: 33 G/DL (ref 31.5–36.5)
MCV RBC AUTO: 86 FL (ref 78–100)
PLATELET # BLD AUTO: 307 10E9/L (ref 150–450)
RBC # BLD AUTO: 4.95 10E12/L (ref 4.4–5.9)
WBC # BLD AUTO: 8.5 10E9/L (ref 4–11)

## 2020-12-23 PROCEDURE — 83036 HEMOGLOBIN GLYCOSYLATED A1C: CPT | Performed by: PHYSICIAN ASSISTANT

## 2020-12-23 PROCEDURE — 80053 COMPREHEN METABOLIC PANEL: CPT | Performed by: PHYSICIAN ASSISTANT

## 2020-12-23 PROCEDURE — 85027 COMPLETE CBC AUTOMATED: CPT | Performed by: PHYSICIAN ASSISTANT

## 2020-12-23 PROCEDURE — 36415 COLL VENOUS BLD VENIPUNCTURE: CPT | Performed by: PHYSICIAN ASSISTANT

## 2020-12-23 PROCEDURE — 82306 VITAMIN D 25 HYDROXY: CPT | Performed by: PHYSICIAN ASSISTANT

## 2020-12-25 LAB
ALBUMIN SERPL-MCNC: 4.1 G/DL (ref 3.4–5)
ALP SERPL-CCNC: 88 U/L (ref 40–150)
ALT SERPL W P-5'-P-CCNC: 39 U/L (ref 0–70)
ANION GAP SERPL CALCULATED.3IONS-SCNC: 7 MMOL/L (ref 3–14)
AST SERPL W P-5'-P-CCNC: 18 U/L (ref 0–45)
BILIRUB SERPL-MCNC: 0.4 MG/DL (ref 0.2–1.3)
BUN SERPL-MCNC: 18 MG/DL (ref 7–30)
CALCIUM SERPL-MCNC: 9.8 MG/DL (ref 8.5–10.1)
CHLORIDE SERPL-SCNC: 105 MMOL/L (ref 94–109)
CO2 SERPL-SCNC: 25 MMOL/L (ref 20–32)
CREAT SERPL-MCNC: 0.98 MG/DL (ref 0.66–1.25)
GFR SERPL CREATININE-BSD FRML MDRD: >90 ML/MIN/{1.73_M2}
GLUCOSE SERPL-MCNC: 87 MG/DL (ref 70–99)
POTASSIUM SERPL-SCNC: 4.2 MMOL/L (ref 3.4–5.3)
PROT SERPL-MCNC: 7.5 G/DL (ref 6.8–8.8)
SODIUM SERPL-SCNC: 137 MMOL/L (ref 133–144)

## 2020-12-27 LAB — DEPRECATED CALCIDIOL+CALCIFEROL SERPL-MC: 57 UG/L (ref 20–75)

## 2020-12-30 NOTE — PROGRESS NOTES
January 4, 2021    Bariatric Pre-Surgery Visit    CC: Obesity    HPI: I had the pleasure of seeing Shakir in the office today to go over bariatric education regarding the gastric bypass surgery.  I saw the patient in October to evaluate obesity and consider him for possible weight loss surgery.      Updated family history with pt.   Family History   Problem Relation Age of Onset     Hypertension Father      Depression Father      Lipids Father      Obesity Father      Thyroid Disease Father      Polycythemia Father      Diabetes Paternal Grandfather      Hypertension Paternal Grandfather      Heart Disease Paternal Grandfather      Breast Cancer Paternal Grandmother      Hypertension Mother      Obesity Mother      Thyroid Disease Mother      Depression Mother      Protein S deficiency Mother      Hypertension Maternal Grandfather        Wt Readings from Last 4 Encounters:   01/04/21 328 lb 8 oz (149 kg)   12/18/20 315 lb 11.2 oz (143.2 kg)   12/18/20 315 lb 11.2 oz (143.2 kg)   12/17/20 315 lb 11.2 oz (143.2 kg)      ROS:  Denies bleeding.     LABS reviewed:     Hemoglobin A1C   Date Value Ref Range Status   12/23/2020 5.0 0 - 5.6 % Final     Comment:     Normal <5.7% Prediabetes 5.7-6.4%  Diabetes 6.5% or higher - adopted from ADA   consensus guidelines.       Vitamin D Deficiency screening   Date Value Ref Range Status   12/23/2020 57 20 - 75 ug/L Final     Comment:     Season, race, dietary intake, and treatment affect the concentration of   25-hydroxy-Vitamin D. Values may decrease during winter months and increase   during summer months. Values 20-29 ug/L may indicate Vitamin D insufficiency   and values <20 ug/L may indicate Vitamin D deficiency.  Vitamin D determination is routinely performed by an immunoassay specific for   25 hydroxyvitamin D3.  If an individual is on vitamin D2 (ergocalciferol)   supplementation, please specify 25 OH vitamin D2 and D3 level determination by   LCMSMS test [id number].       TSH    Date Value Ref Range Status   08/23/2020 2.43 0.40 - 4.00 mU/L Final     Sodium   Date Value Ref Range Status   12/23/2020 137 133 - 144 mmol/L Final     Potassium   Date Value Ref Range Status   12/23/2020 4.2 3.4 - 5.3 mmol/L Final     Chloride   Date Value Ref Range Status   12/23/2020 105 94 - 109 mmol/L Final     Carbon Dioxide   Date Value Ref Range Status   12/23/2020 25 20 - 32 mmol/L Final     Anion Gap   Date Value Ref Range Status   12/23/2020 7 3 - 14 mmol/L Final     Glucose   Date Value Ref Range Status   12/23/2020 87 70 - 99 mg/dL Final     Urea Nitrogen   Date Value Ref Range Status   12/23/2020 18 7 - 30 mg/dL Final     Creatinine   Date Value Ref Range Status   12/23/2020 0.98 0.66 - 1.25 mg/dL Final     GFR Estimate   Date Value Ref Range Status   12/23/2020 >90 >60 mL/min/[1.73_m2] Final     Comment:     Non  GFR Calc  Starting 12/18/2018, serum creatinine based estimated GFR (eGFR) will be   calculated using the Chronic Kidney Disease Epidemiology Collaboration   (CKD-EPI) equation.       Calcium   Date Value Ref Range Status   12/23/2020 9.8 8.5 - 10.1 mg/dL Final     Bilirubin Total   Date Value Ref Range Status   12/23/2020 0.4 0.2 - 1.3 mg/dL Final     Alkaline Phosphatase   Date Value Ref Range Status   12/23/2020 88 40 - 150 U/L Final     ALT   Date Value Ref Range Status   12/23/2020 39 0 - 70 U/L Final     AST   Date Value Ref Range Status   12/23/2020 18 0 - 45 U/L Final     Cholesterol   Date Value Ref Range Status   11/07/2017 193 <200 mg/dL Final     HDL Cholesterol   Date Value Ref Range Status   11/07/2017 58 >39 mg/dL Final     LDL Cholesterol Calculated   Date Value Ref Range Status   11/07/2017 116 (H) <100 mg/dL Final     Comment:     Above desirable:  100-129 mg/dl  Borderline High:  130-159 mg/dL  High:             160-189 mg/dL  Very high:       >189 mg/dl       Triglycerides   Date Value Ref Range Status   11/07/2017 96 <150 mg/dL Final     WBC   Date  "Value Ref Range Status   12/23/2020 8.5 4.0 - 11.0 10e9/L Final     Hemoglobin   Date Value Ref Range Status   12/23/2020 14.0 13.3 - 17.7 g/dL Final     Hematocrit   Date Value Ref Range Status   12/23/2020 42.4 40.0 - 53.0 % Final     MCV   Date Value Ref Range Status   12/23/2020 86 78 - 100 fl Final     Platelet Count   Date Value Ref Range Status   12/23/2020 307 150 - 450 10e9/L Final      PE:  /79 (BP Location: Left arm, Patient Position: Sitting, Cuff Size: Adult Large)   Pulse 98   Ht 5' 11\" (1.803 m)   Wt 328 lb 8 oz (149 kg)   SpO2 97%   BMI 45.82 kg/m    GENERAL:  Good development and normal affect in no acute distress. Patient is alert and orientated x 3 and answers all questions appropriately.  HEENT: Head is normocephalic, nontraumatic. Pupils equal and round without conjunctival injection. Neck is supple without lymphadenopathy, thyroidmegaly, or mass. Trachea midline. Dentition WNL.   CARDIOVASCULAR:  Regular rate and rhythm without murmurs, rubs, or gallops.  RESPIRATORY: Lungs are clear to auscultation bilaterally with good breath sounds.  GASTROINTESTINAL: Abdomen is obese, nondistended, soft, nontender, without organomegaly or masses. No bruits.  LOWER EXTREMITIES: No LE edema bilaterally, no cyanosis, ulceration, or chronic venous stasis noted.  MUSCULOSKELETAL:  Moves all 4 extremities equal and strong. Has a normal gait.   NEUROLOGIC: Cranial nerves II-XII grossly intact.  SKIN: No intertriginous irritation or rash.   Hemoglobin A1C   Date Value Ref Range Status   12/23/2020 5.0 0 - 5.6 % Final     Comment:     Normal <5.7% Prediabetes 5.7-6.4%  Diabetes 6.5% or higher - adopted from ADA   consensus guidelines.       Vitamin D Deficiency screening   Date Value Ref Range Status   12/23/2020 57 20 - 75 ug/L Final     Comment:     Season, race, dietary intake, and treatment affect the concentration of   25-hydroxy-Vitamin D. Values may decrease during winter months and increase "   during summer months. Values 20-29 ug/L may indicate Vitamin D insufficiency   and values <20 ug/L may indicate Vitamin D deficiency.  Vitamin D determination is routinely performed by an immunoassay specific for   25 hydroxyvitamin D3.  If an individual is on vitamin D2 (ergocalciferol)   supplementation, please specify 25 OH vitamin D2 and D3 level determination by   LCMSMS test [id number].       TSH   Date Value Ref Range Status   08/23/2020 2.43 0.40 - 4.00 mU/L Final     Sodium   Date Value Ref Range Status   12/23/2020 137 133 - 144 mmol/L Final     Potassium   Date Value Ref Range Status   12/23/2020 4.2 3.4 - 5.3 mmol/L Final     Chloride   Date Value Ref Range Status   12/23/2020 105 94 - 109 mmol/L Final     Carbon Dioxide   Date Value Ref Range Status   12/23/2020 25 20 - 32 mmol/L Final     Anion Gap   Date Value Ref Range Status   12/23/2020 7 3 - 14 mmol/L Final     Glucose   Date Value Ref Range Status   12/23/2020 87 70 - 99 mg/dL Final     Urea Nitrogen   Date Value Ref Range Status   12/23/2020 18 7 - 30 mg/dL Final     Creatinine   Date Value Ref Range Status   12/23/2020 0.98 0.66 - 1.25 mg/dL Final     GFR Estimate   Date Value Ref Range Status   12/23/2020 >90 >60 mL/min/[1.73_m2] Final     Comment:     Non  GFR Calc  Starting 12/18/2018, serum creatinine based estimated GFR (eGFR) will be   calculated using the Chronic Kidney Disease Epidemiology Collaboration   (CKD-EPI) equation.       Calcium   Date Value Ref Range Status   12/23/2020 9.8 8.5 - 10.1 mg/dL Final     Bilirubin Total   Date Value Ref Range Status   12/23/2020 0.4 0.2 - 1.3 mg/dL Final     Alkaline Phosphatase   Date Value Ref Range Status   12/23/2020 88 40 - 150 U/L Final     ALT   Date Value Ref Range Status   12/23/2020 39 0 - 70 U/L Final     AST   Date Value Ref Range Status   12/23/2020 18 0 - 45 U/L Final     Cholesterol   Date Value Ref Range Status   11/07/2017 193 <200 mg/dL Final     HDL Cholesterol    Date Value Ref Range Status   11/07/2017 58 >39 mg/dL Final     LDL Cholesterol Calculated   Date Value Ref Range Status   11/07/2017 116 (H) <100 mg/dL Final     Comment:     Above desirable:  100-129 mg/dl  Borderline High:  130-159 mg/dL  High:             160-189 mg/dL  Very high:       >189 mg/dl       Triglycerides   Date Value Ref Range Status   11/07/2017 96 <150 mg/dL Final     WBC   Date Value Ref Range Status   12/23/2020 8.5 4.0 - 11.0 10e9/L Final     Hemoglobin   Date Value Ref Range Status   12/23/2020 14.0 13.3 - 17.7 g/dL Final     Hematocrit   Date Value Ref Range Status   12/23/2020 42.4 40.0 - 53.0 % Final     MCV   Date Value Ref Range Status   12/23/2020 86 78 - 100 fl Final     Platelet Count   Date Value Ref Range Status   12/23/2020 307 150 - 450 10e9/L Final      Assessment:  Morbid Obesity  Bariatric Education    Plan:  Reviewed tasklist    Lose 5 lbs prior to surgery.  New weight loss goal set today on clinic scale. Goal Weight: 323 #  Discussed working to maintain this over the holidays.     Have preoperative laboratory tests drawn. -completed    Psychological Evaluation with MMPI and clearance for weight loss surgery.-pending. Reviewed phone number to call and schedule.  Order placed in October.-pending.  Need to get scheduled.  Will get outside [facility] because it is out till April.      Achieve clearance from dietitian to see surgeon.-pending    Letter of support from mental health provider- psychiatrist, pending, given to them by pt     Letter of support from mental health provider-therapist, pending,  given to them by pt     Follow up with mother regarding clotting history-Pt found out mother has Protein S deficiency that the doctor does not feels is not genetic.  His father has polycythemia Vera which may be genetic.  Sent message to hematology 12/30 (Dr. Wilson).  Per Dr. Wilson this should not impact his surgery. Since he has never had a DVT or PE, he can follow the normal DVT  Prophylaxis protocol.     Today in the office we discussed gastric bypass surgery. Preoperative, perioperative, and postoperative processes, management, and follow up were addressed.  Risks and benefits were outlined including the risk of death, PE, DVT, ulcer, N/V, stricture, hernia, wound infection, weight regain, and vitamin deficiencies. I emphasized exercise and activity along with appropriate food choice as the main foundation for weight loss with surgery providing surgical reinforcement of this. Patient contract was signed.    Once the patient has completed their task list and there are no further recommendations, they will see the surgeon for consultation for bariatric surgery.  All questions were answered. Patient verbalizes understanding of the process to surgery and expectations for the postoperative period including the need for lifelong lifestyle changes, vitamin supplementation, and laboratory monitoring.    Sincerely,       Jessa Zhou PA-C      35 minutes spent on the date of the encounter doing chart review, review of test results, interpretation of tests, patient visit, documentation and discussion with other provider(s)

## 2021-01-04 ENCOUNTER — OFFICE VISIT (OUTPATIENT)
Dept: SURGERY | Facility: CLINIC | Age: 36
End: 2021-01-04
Payer: COMMERCIAL

## 2021-01-04 VITALS
SYSTOLIC BLOOD PRESSURE: 129 MMHG | HEART RATE: 98 BPM | BODY MASS INDEX: 44.1 KG/M2 | WEIGHT: 315 LBS | OXYGEN SATURATION: 97 % | HEIGHT: 71 IN | DIASTOLIC BLOOD PRESSURE: 79 MMHG

## 2021-01-04 DIAGNOSIS — Z71.89 ENCOUNTER FOR PRE-BARIATRIC SURGERY COUNSELING AND EDUCATION: ICD-10-CM

## 2021-01-04 DIAGNOSIS — Z83.2 FAMILY HISTORY OF PROTEIN S DEFICIENCY: ICD-10-CM

## 2021-01-04 DIAGNOSIS — E66.01 MORBID OBESITY (H): Primary | ICD-10-CM

## 2021-01-04 DIAGNOSIS — Z83.2 FAMILY HISTORY OF POLYCYTHEMIA VERA: ICD-10-CM

## 2021-01-04 PROCEDURE — 99214 OFFICE O/P EST MOD 30 MIN: CPT | Performed by: PHYSICIAN ASSISTANT

## 2021-01-04 RX ORDER — MULTIPLE VITAMINS W/ MINERALS TAB 9MG-400MCG
1 TAB ORAL EVERY MORNING
COMMUNITY

## 2021-01-04 ASSESSMENT — MIFFLIN-ST. JEOR: SCORE: 2457.2

## 2021-01-05 ENCOUNTER — MEDICAL CORRESPONDENCE (OUTPATIENT)
Dept: HEALTH INFORMATION MANAGEMENT | Facility: CLINIC | Age: 36
End: 2021-01-05

## 2021-01-05 ENCOUNTER — TRANSFERRED RECORDS (OUTPATIENT)
Dept: HEALTH INFORMATION MANAGEMENT | Facility: CLINIC | Age: 36
End: 2021-01-05

## 2021-01-07 ENCOUNTER — TELEPHONE (OUTPATIENT)
Dept: SURGERY | Facility: CLINIC | Age: 36
End: 2021-01-07

## 2021-01-07 NOTE — TELEPHONE ENCOUNTER
Patient called and had to LM to call clinic.    When returns call please let him know what ALBERTA Germain would like him to do:  Call pt and have him work with psychiatrist about the following. Pt to switch to Adderal and WELLBUTRIN to regular Release 1 month before surgery.     Patient's task list was signed off for psychiatrist letter of support and task list updated to reflect above request.     Anju Dickinson, MS, RD, RN

## 2021-01-07 NOTE — TELEPHONE ENCOUNTER
----- Message from Jessa Zhou PA-C sent at 1/7/2021  4:04 PM CST -----  Yes, please sign off.  Call pt and have him work with psychiatrist about the following.  Put in notes on tasklist:  Pt to switch to Adderal and WELLBUTRIN to regular Release 1 month before surgery.     Thanks.    SHAKEEL    ----- Message -----  From: Anju Hagan RN  Sent: 1/6/2021  10:07 AM CST  To: Jessa Zhou PA-C    Just received fax from psychiatric nurse he sees and patient is borderline personality.   I don't think we knew this although no longer a contraindication to surgery.     I have stat faxed the report to [facility] and will put on your desk to sign off if okay with you.  It looks like all the boxes are checked and psychiatric nurse okay with surgery.  Just didn't want to make the decision to clear him without you knowing.  I can sign off if you give thumbs up.  Thx! DZ

## 2021-01-08 ENCOUNTER — MYC MEDICAL ADVICE (OUTPATIENT)
Dept: SURGERY | Facility: CLINIC | Age: 36
End: 2021-01-08

## 2021-01-08 NOTE — TELEPHONE ENCOUNTER
Consulted BLANCA Zhou PA-C. Plan per PA:  -pt may have MMPI via his therapist Viraj London LP and remainder of bariatric psychology evaluation at [facility]  -pt must provide MMPI results to [facility] so that psychology evaluation can be complete  -pt must notify [facility] of bipolar disorder diagnosis so it can be addressed in his evaluation    Will update via [employer].  Also updated episodes of care.  Darline Light RN on 1/8/2021 at 10:04 AM

## 2021-01-08 NOTE — TELEPHONE ENCOUNTER
Reviewed records with second nurse who is in the office today.  Noted that letter states pt has borderline personality disorder, not bipolar disorder.    Pt called back and discussed care plan with RN.      RN apologized for miscommunication from this clinic about his diagnosis.  Informed pt that his diagnosis from his psychiatry letter is borderline personality disorder.    Pt states he is no longer wanting to have divided psychology evaluation.  States he just wants to get in sooner.  RN suggested [facility] as an option that may have sooner availability.  Advised pt to notify the provider doing this evaluation that he has borderline personality disorder.    Pt states he was never notified by psychiatrist that he has borderline personality disorder.  Advised pt to check with his psychiatrist on this and send over amended letter if this is incorrect.  Pt agreeable to plan.    Also discussed with patient that he needs to work with his psychiatrist to switch Adderal and Wellbutrin to regular release 1 month before surgery.      Pt states he will get back to us on what he finds with psychiatrist.  Darline Light RN on 1/8/2021 at 12:08 PM

## 2021-01-08 NOTE — TELEPHONE ENCOUNTER
Called pt to discuss care plan.  No answer; left VM asking pt to call back.  Darline Light RN on 1/8/2021 at 11:09 AM

## 2021-01-12 NOTE — PROGRESS NOTES
"Shakir is a 35 year old who is being evaluated via a billable video visit.    The patient has been notified of following:      \"This video visit will be conducted via a call between you and your physician/provider. We have found that certain health care needs can be provided without the need for an in-person physical exam.  This service lets us provide the care you need with a video conversation.  If a prescription is necessary we can send it directly to your pharmacy.  If lab work is needed we can place an order for that and you can then stop by our lab to have the test done at a later time.     Video visits are billed at different rates depending on your insurance coverage.  Please reach out to your insurance provider with any questions.     If during the course of the call the physician/provider feels a video visit is not appropriate, you will not be charged for this service.\"     Patient has given verbal consent for Video visit? Yes    How would you like to obtain your AVS? MyChart  If the video visit is dropped, the invitation should be resent by: Text to cell phone: [phone]  Will anyone else be joining your video visit? No      Video Start Time: 8:30  No vitals were obtained today due to virtual visit.            Video-Visit Details    Type of service:  Video Visit    Video End Time:9:02    Originating Location (pt. Location): Home    Distant Location (provider location):  [facility] [place]     Platform used for Video Visit: [employer]       PRE SURGICAL WEIGHT LOSS NUTRITION APPOINTMENT    Abel Ward  1985  male  [id number]  35 year old    ASSESSMENT    Desired Surgical Procedure: gastric bypass    REASON FOR VISIT:  Abel Ward is a 35 year old year old male presents today for a pre-surgical weight loss follow-up appointment. Patient accompanied by self.    DIAGNOSIS:  Weight Status Obesity Grade III BMI >40    ANTHROPOMETRICS:  Height:  5' " "11\"  Initial Weight: 320 lb.    Weight last visit: 315.7 lb.   Current weight: 319 lb. Per pt.   BMI: 44.49  Kg/(m^2).  Wt Readings from Last 5 Encounters:   01/18/21 144.7 kg (319 lb)   01/04/21 149 kg (328 lb 8 oz)   12/18/20 143.2 kg (315 lb 11.2 oz)   12/18/20 143.2 kg (315 lb 11.2 oz)   12/17/20 143.2 kg (315 lb 11.2 oz)       VITAMINS AND MINERALS:  1 Multivitamin with Minerals-morning  500 mg Calcium with Vitamin D TID-lunch, dinner, bed time  10,000 International units Vitamin D    NUTRITION HISTORY:  Breakfast: protein drink/ protein bar 30 minutes later  Lunch: protein drink/ protein bar 30 minute later  Supper: meat, potatoes, corn or spaghetti meat sauce, salad  Snacks:  [employer] protein bar or 1 handful tortilla chips  Fluids Consumed: Water, enhanced water with caffeine  Eating slower: Yes  Chewing foods thoroughly: Yes  Take 20-30 minutes to consume each meal: No  Fluids and meals separate by at least 30 minutes: No  Carbonation: occasional (2 cans)  Caffeine: yes  Additional Information: 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 He volunteering to taking on more physical jobs at work (few hours). Discussed the importance of exercise outside of work, but he does not feel like he has time.  He reported his weight (328 lb) went up at his face to face visit with Jessa Zhou PA-C and she gave him a new weight loss goal (323 lb per pt). This RD will verify with Jessa via staff message.    PHYSICAL ACTIVITY:  Type: lifting/walking at work  Frequency: 5 (days per week)  Duration: 120 +(minutes)     DIAGNOSIS:  Previous Nutrition Diagnosis: Obesity related to long history of self- monitoring deficit and excessive energy intake evidenced by BMI of 44.03 kg/m2  No change, modified below    Previous goals:   Take calcium 500mg TID or 600mg BID-met  Separate fluids and meals by 30 minutes-improving  Take 20 minutes to eat meals-improving  Continue to work toward eliminating " caffeine-met    Current Nutrition Diagnosis: Obesity related to long history of self-monitoring deficit and excessive energy intake as evidenced by BMI of 44.49 kg/m2.    INTERVENTION:  Nutrition Prescription: Recommended energy/nutrient modification.    GOALS:  Continue to wean off of all caffeine over the next month  Focus on taking 20-30 minutes for each meal  Consisently avoid fluids 30 minutes before and 30 minutes after meals.    Intervention:  - Discussed progress towards previous goals.  - Reinforced importance of making behavior changes in preparation for bariatric surgery.   - Assessed learning needs and learning preferences       NUTRITION MONITORING AND EVALUATION:  Anticipated compliance: fair-good  Patient demonstrated good understanding.       Follow up: Continue to monitor patient closely regarding weight loss and diet.  # of visits needed: 1  Cleared by RD: Julia March RD, LD  [facility], [place]  [phone]

## 2021-01-15 ENCOUNTER — HEALTH MAINTENANCE LETTER (OUTPATIENT)
Age: 36
End: 2021-01-15

## 2021-01-18 ENCOUNTER — VIRTUAL VISIT (OUTPATIENT)
Dept: SURGERY | Facility: CLINIC | Age: 36
End: 2021-01-18
Payer: COMMERCIAL

## 2021-01-18 VITALS — WEIGHT: 315 LBS | BODY MASS INDEX: 44.49 KG/M2

## 2021-01-18 DIAGNOSIS — E66.01 MORBID OBESITY (H): ICD-10-CM

## 2021-01-18 PROCEDURE — 97803 MED NUTRITION INDIV SUBSEQ: CPT | Mod: GT | Performed by: DIETITIAN, REGISTERED

## 2021-01-25 ENCOUNTER — MEDICAL CORRESPONDENCE (OUTPATIENT)
Dept: HEALTH INFORMATION MANAGEMENT | Facility: CLINIC | Age: 36
End: 2021-01-25

## 2021-02-05 ENCOUNTER — TRANSFERRED RECORDS (OUTPATIENT)
Dept: HEALTH INFORMATION MANAGEMENT | Facility: CLINIC | Age: 36
End: 2021-02-05

## 2021-02-12 ENCOUNTER — VIRTUAL VISIT (OUTPATIENT)
Dept: SURGERY | Facility: CLINIC | Age: 36
End: 2021-02-12
Payer: COMMERCIAL

## 2021-02-12 VITALS — BODY MASS INDEX: 43.54 KG/M2 | HEIGHT: 71 IN | WEIGHT: 311 LBS

## 2021-02-12 DIAGNOSIS — E66.01 MORBID OBESITY (H): ICD-10-CM

## 2021-02-12 PROCEDURE — 97803 MED NUTRITION INDIV SUBSEQ: CPT | Mod: GT | Performed by: DIETITIAN, REGISTERED

## 2021-02-12 ASSESSMENT — MIFFLIN-ST. JEOR: SCORE: 2372.82

## 2021-02-12 NOTE — PROGRESS NOTES
"Shakir is a 36 year old who is being evaluated via a billable video visit.      How would you like to obtain your AVS? Estephaniehart  If the video visit is dropped, the invitation should be resent by: Text to cell phone: [phone]  Will anyone else be joining your video visit? No      Video Start Time: 2:25pm    Video-Visit Details    Type of service:  Video Visit    Video End Time: 2:43pm    Originating Location (pt. Location): Home    Distant Location (provider location): Provider Remote Setting    Platform used for Video Visit: [facility]     PRE SURGICAL WEIGHT LOSS NUTRITION APPOINTMENT    Abel Ward  1985  male  [id number]  36 year old    ASSESSMENT    Desired Surgical Procedure: gastric bypass    REASON FOR VISIT:  Abel Ward is a 36 year old year old male presents today for a pre-surgical weight loss follow-up appointment. Patient accompanied by self.    DIAGNOSIS:  Weight Status Obesity Grade III BMI >40    ANTHROPOMETRICS:  Height: 5' 11\"    Initial Weight: 320 lbs     Weight last visit: 319 lbs    Current weight: 311 lbs 0 oz    BMI: Body mass index is 43.38 kg/m .    VITAMINS AND MINERALS:  1 Multivitamin with Minerals-morning  600 mg Calcium with Vitamin D TID-lunch, bedtime  10,000 International units Vitamin D      NUTRITION HISTORY:  Breakfast: protein shake +/- protein bar   Lunch: [employer] - small burrito bowl  protein bar +/- shake   Supper: tater tot hotdish  chicken parmesan + noodles  meat + starch  tacos   Snacks: occasional; cheese and crackers  Fluids Consumed: Water, protein drink, minimal caffeine (1x/ every 1-2 weeks)  Eating slower: Yes  Chewing foods thoroughly: Yes  Take 20-30 minutes to consume each meal: Yes  Fluids and meals separate by at least 30 minutes: Yes  Carbonation: none  Caffeine: minimal  Additional Information: Pt feeling confident in guidelines and has started consistently exercising. Reviewed post-op diet advancement and vitamin/mineral " changes. Pt concerned about difference in home scale vs clinic scale. Will relay recommendation to RNs to have pt weigh-in at his local clinic prior to surgery to make sure at goal.     PHYSICAL ACTIVITY:  Type: walking up/down hills   Frequency: 3-4 (days per week)  Duration: 20(minutes)     DIAGNOSIS:  Previous Nutrition Diagnosis: Obesity related to long history of self- monitoring deficit and excessive energy intake evidenced by BMI of 44.49 kg/m2  No change, modified below    Previous goals:   Continue to wean off of all caffeine over the next month - met  Focus on taking 20-30 minutes for each meal - met  Consisently avoid fluids 30 minutes before and 30 minutes after meals. - met    Current Nutrition Diagnosis: Obesity related to long history of self-monitoring deficit and excessive energy intake as evidenced by BMI of 43.38 kg/m2.    INTERVENTION:  Nutrition Prescription: Recommended energy/nutrient modification.    GOALS:  Continue to practice all pre-op guidelines    Intervention:  - Discussed progress towards previous goals.  - Reinforced importance of making behavior changes in preparation for bariatric surgery.   - Assessed learning needs and learning preferences       NUTRITION MONITORING AND EVALUATION:  Anticipated compliance: fair-good  Patient demonstrated good understanding.   Patient has met pre bariatric surgery diet requirements    Follow up: Continue to monitor patient closely regarding weight loss and diet.  # of visits needed: 0  Cleared by RD: Yes     TIME SPENT WITH PATIENT: 18 minutes    Kimberly Jamison RD, LDC  Clinical Dietitian   [facility]      Physical Exam

## 2021-03-29 ENCOUNTER — OFFICE VISIT (OUTPATIENT)
Dept: SURGERY | Facility: CLINIC | Age: 36
End: 2021-03-29
Payer: COMMERCIAL

## 2021-03-29 VITALS
WEIGHT: 315 LBS | BODY MASS INDEX: 44.1 KG/M2 | HEIGHT: 71 IN | DIASTOLIC BLOOD PRESSURE: 80 MMHG | SYSTOLIC BLOOD PRESSURE: 130 MMHG | HEART RATE: 86 BPM

## 2021-03-29 DIAGNOSIS — E66.01 MORBID OBESITY (H): Primary | ICD-10-CM

## 2021-03-29 PROCEDURE — 99215 OFFICE O/P EST HI 40 MIN: CPT | Performed by: SURGERY

## 2021-03-29 ASSESSMENT — MIFFLIN-ST. JEOR: SCORE: 2391.87

## 2021-03-29 NOTE — PROGRESS NOTES
"Dear David Awan,      Referring provider:       10/28/2020   Who referred you? Dr. David Denney     I was asked to see the patient regarding obesity by the referring provider above.    I had the pleasure of meeting with your patient Abel Ward in our weight loss surgery office.  This patient is a 36 year old male who has been undergoing our thorough preoperative screening process in anticipation of potential bariatric surgery.    At initial evaluation we recorded Abel Ward's Height: 180.3 cm (5' 11\"),   and  .  The patient has been unsuccessful with other methods of permanent weight loss and suffers from multiple weight related medical conditions.  Due to lack of success in achieving weight loss through other methods, he is interested in undergoing bariatric surgery.    PREVIOUS WEIGHT LOSS ATTEMPTS:     10/28/2020   I have tried the following methods to lose weight Watching portions or calories, Exercise, Weight Watchers, Atkins type diet (low carb/high protein)       CO-MORBIDITIES OF OBESITY INCLUDE:     10/28/2020   I have the following health issues associated with obesity: High Blood Pressure, Sleep Apnea, GERD (Reflux)       VITALS:  /80   Pulse 86   Ht 1.803 m (5' 11\")   Wt 143 kg (315 lb 3.2 oz)   BMI 43.96 kg/m      PE:  GENERAL: Alert and oriented x3. NAD  HEENT exam: Sclerae not icteric. Hearing good. Head normocephalic and atraumatic.   CARDIOVASCULAR: No JVD  RESPIRATORY: Breathing unlabored  GASTROINTESTINAL: Obese  LOWER EXTREMITIES: no deformities  MUSCULOSKELETAL: Normal gait, Moves all 4 extremities equal and strong  NEUROLOGIC: no gross defect  SKIN: warm and dry to touch     In summary, he has undergone an appropriate medical evaluation, dietitian evaluation, as well as psychologic screening. The patient appears to be an appropriate candidate for bariatric surgery.    In the office today, I discussed the Robotic assisted laparoscopic Sincere-en-Y " gastric bypass surgery.  Risks, benefits and anticipated outcomes were outlined including the risk of death, staple line leak (1-2%), PE, DVT, ulcer, worsening GERD, N/V, stricture, hernia, wound infection, weight regain, and vitamin deficiencies. This patient has a good chance of sustaining permanent weight loss due to this procedure.  This should also allow improvement if not resolution of his/her weight related medical conditions.    At present we are going to present your patient's file for prior authorization to insurance.  Pending prior authorization, I anticipate a surgical date in the near future.  We will keep you updated on any progress.  If you have questions regarding care please feel free to contact me.  Total time spent in the clinic was 45 minutes with greater than 50% in face-to-face consultation.        Sincerely,    Seth Lundy MD    Please route or send letter to:  Primary Care Provider (PCP) and Referring Provider

## 2021-03-31 ENCOUNTER — PREP FOR PROCEDURE (OUTPATIENT)
Dept: SURGERY | Facility: CLINIC | Age: 36
End: 2021-03-31

## 2021-03-31 DIAGNOSIS — E66.01 MORBIDLY OBESE (H): Primary | ICD-10-CM

## 2021-04-05 ENCOUNTER — MYC MEDICAL ADVICE (OUTPATIENT)
Dept: FAMILY MEDICINE | Facility: CLINIC | Age: 36
End: 2021-04-05

## 2021-04-05 DIAGNOSIS — M79.671 RIGHT FOOT PAIN: ICD-10-CM

## 2021-04-05 DIAGNOSIS — M79.672 LEFT FOOT PAIN: Primary | ICD-10-CM

## 2021-04-06 NOTE — TELEPHONE ENCOUNTER
Please see my chart message below     Please review and advise  - it has been over a year since p has been seen in clinic     Thank you     Carmen Campos RN, BSN  [facility]

## 2021-04-22 ENCOUNTER — OFFICE VISIT (OUTPATIENT)
Dept: PODIATRY | Facility: CLINIC | Age: 36
End: 2021-04-22
Attending: FAMILY MEDICINE
Payer: COMMERCIAL

## 2021-04-22 VITALS
WEIGHT: 315 LBS | SYSTOLIC BLOOD PRESSURE: 114 MMHG | HEIGHT: 71 IN | DIASTOLIC BLOOD PRESSURE: 76 MMHG | BODY MASS INDEX: 44.1 KG/M2

## 2021-04-22 DIAGNOSIS — M79.671 RIGHT FOOT PAIN: ICD-10-CM

## 2021-04-22 DIAGNOSIS — M72.2 PLANTAR FASCIITIS, BILATERAL: Primary | ICD-10-CM

## 2021-04-22 DIAGNOSIS — M79.672 LEFT FOOT PAIN: ICD-10-CM

## 2021-04-22 PROCEDURE — 99203 OFFICE O/P NEW LOW 30 MIN: CPT | Performed by: PODIATRIST

## 2021-04-22 ASSESSMENT — MIFFLIN-ST. JEOR: SCORE: 2390.96
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
## 2021-04-22 NOTE — PATIENT INSTRUCTIONS
Thank you for choosing [facility] Podiatry / Foot & Ankle Surgery!    DR. CHEUNG'S CLINIC LOCATIONS     [facility] SCHEDULE SURGERY: [phone]   [address] [place] [address] APPOINTMENTS: [phone]   [place] [address] BILLING QUESTIONS: [phone]  [phone] RADIOLOGY: [phone]       [place]    [address]    [place] [address]    [phone]  [phone]      Follow up: as needed      PLANTAR FASCIITIS  Plantar fasciitis is often referred to as heel spurs or heel pain. Plantar fasciitis is a very common problem that affects people of all foot shapes, age, weight and activity level. Pain may be in the arch or on the weight-bearing surface of the heel. The pain may come on without injury or identifiable cause. Pain is generally present when first getting out of bed in the morning or up from a seated break.     CAUSES  The plantar fascia is a dense fibrous band of tissue that stretches across the bottom surface of the foot. The fascia helps support the foot muscles and arch. Plantar fasciitis is thought to be caused by mechanical strain or overload. Frequent walking without shoes or wearing unsupportive shoes is thought to cause structural overload and ultimately inflammation of the plantar fascia. Some people have heel spurs that can be seen on x-ray. The heel spur is actually a minor component of plantar fascitis and is largely ignored.       SELF TREATMENT   The easiest solution is to stop walking around your home without shoes. Plantar fasciitis is largely a shoe problem. Shoes are either not being worn often enough or your current shoes are inadequate for your weight, foot structure or activity level. The majority of shoes on the market today are not sufficient to resist development of plantar fasciitis or to promote healing. Assume that your current shoes are inadequate and will need to be replaced. Even high quality shoes wear out with 6 months to one year of  frequent use. Weight loss is another option. Losing ten pounds in the next two months may be enough to resolve the problem. Ice applied to the area of pain two to three times per day for ten minutes each session can be very helpful. Warm foot soaks in epsom salts can also relieve pain. This should continue until the problem resolves. Achilles tendon stretching is essential. Stretch multiple times daily to promote healing and to prevent recurrence in the future. Over all stretching of the body is helpful as well such as the calves, thighs and lower back. Normally when one area of the body is tight, other areas are too. Gentle Yoga can be good for this.     Over the counter topical anti inflammatories can be helpful such as biofreeze, bengay, salon pas, ect...  Oral ibuprofen or aleve is recommended as well to try to calm down inflammation.     Night splints can be helpful to gradually stretch the foot at night as a lot of pain is when you get up in the morning. Taking a towel or thera band and stretching the foot back multiple times before you get ou of bed can be beneficial as well.     MEDICAL TREATMENT  Medical treatments often include custom arch supports, cortisone injections, physical therapy, splints to be worn in bed, prescription medications and surgery. The home treatments listed above will be necessary regardless of these advanced medical treatments. Surgery is rarely needed but is very helpful in selected cases.     PROGNOSIS  Plantar fasciitis can last from one day to a lifetime. Some people get intermittent fascitis that is very short-lived. Others suffer daily for years. Excessive body weight, frequent bare foot walking, long hours on the feet, inadequate shoes, predisposing foot structures and excessive activity such as running are all potential issues that lead to chronic and/or recurring plantar fascitis. Having plantar fasciitis means that you are forever prone to this problem and will require  modification of some of the above factors. Most people seek treatment within one to four months. Healing usually requires a similar one to four month time frame. Healing time is relative to the amount of effort spent treating the problem.   Plantar fasciitis is highly recurrent. Risk factors often continue, including return to bare foot walking, inadequate shoes, excessive body weight, excessive activities, etc. Your life style and foot structure may predispose you to recurrent plantar fasciitis. A daily prevention regimen can be very helpful. Ongoing use of shoe inserts, careful attention to appropriate shoes, daily Achilles stretching, etc. may prevent recurrence. Prompt attention at the earliest warning signs of heel pain can resolve the problem in as short as a few days.     EXERCISES  Stair Exercise: Step on the stairs with the ball of your foot and hold your position for at least 15 seconds, then slowly step down with the heels of your foot. You can do this daily and as often as you want.   Picking the Towel: Sit comfortably and then pick the towel up with your toes. You can use any object other than a towel as long as the material can be soft and you can pick it up with your toes.  Rolling the Bottle: Use a small ball or frozen water bottle and then roll it around with your foot.   Flex the Toes: Sit comfortably and then flex your toes by pointing it towards the floor or towards your body. This will relax and flex your foot and exercise your plantar fascia, the calf, and the Achilles tendon. The inability of the foot to stretch often causes the bunching up of the plantar fascia area leading to the pain.  Calf/Achilles Stretching: Lay on you back and raise one foot, then point your toes towards the floor. See photo below:               Hold each stretch for 10 seconds. Stretch 10 times per set, three sets per day. Morning, afternoon and evening. If your heel pain is very severe in the morning, consider doing  the first set of stretches before you get out of bed.    [place] [facility] LOCATIONS  [facility]  [address]  [place] [address]  Phone: [phone]  Fax: [phone] [facility]  [address]  [place] [address]  Phone: [phone]   Fax: [phone]   [facility] [place]  [address]  [place] [address]  Phone: [phone]  Fax: [phone] [facility]  [address]  [place] [address]  Phone: [phone]   Fax: [phone]   [facility]   [address]  [place] [address]  Phone: [phone]  Fax: [phone] * Please call any location listed to make an appointment for a casting/fitting. Your referral was sent to their central office and they will all have the order on file.     WEARING YOUR CUSTOM FOOT ORTHOTICS   Most insurance plans cover one pair of orthotics per year. You must check with your   insurance plan to see what your payment responsibility will be. Please call your   insurance company by calling the number on the back of your insurance card.   Orthotic's are non-refundable and non-returnable.   Orthotics are made of various designs. Some orthotics are covered with material that extends beyond your toes. If your orthotic is of this design, you will likely need to trim the toe end to get a proper fit. The insole from your shoe can be used as a template. Simply overlay the shoe insert on top of the custom orthotic. Align the heel end while tracing the length of the insert onto the custom orthotic. Use a large scissor to trim the toe end until you get a proper fit in the shoe.   The orthotic needs to be pushed as far back in the shoe as possible. The heel portion should not ride forward so as not to irritate your heel.   Orthotics are designed to work with socks. Excessive perspiration will shorten the life span of the  orthotics. Remove the orthotic from the shoe frequently for proper drying.   The break-in period lasts for weeks. People new to orthotics will likely experience new aches and pains. The orthotic is forcing your foot into a new position. Arch, foot and leg muscle aches and fatigue are common during these weeks. Minor discomfort can be considered normal break in phenomenon. Start wearing your orthotic around your home your first day. Limited activity for one to two hours is recommended. You can increase one or two additional hours each day provided the aches and pains are subsiding. The degree of discomfort, fatigue and problems will dictate the speed of break in. You may require multiple weeks to work up to full time use.   Do not continue wearing your orthotics if they are creating problems such as blisters or sores. Do not hesitate to call the clinic to speak with a nurse regarding orthotic   break in, fit, trimming, etc. You may also need to see the doctor if the orthotics are   simply not working out. Adjustments are sometimes made to improve orthotic   function.     Orthotics will only work in certain styles and types of shoes. Orthotics rarely work in dress shoes. Slip-ons, clogs, sandals and heels are particularly troublesome. Specially designed orthotics may be necessary for these types of shoes. Your custom orthotic was designed for activities that require appropriate walking or running shoes. Lace up athletic shoes, walking shoes or work boots should work appropriately. You may need a wider or longer shoe. Shoes with a removable  or insert work best. In general, you want to remove an insert from the shoe before placing the orthotic into the shoe. Shoes without a removable liner may not work as well.     When purchasing new shoes, bring your orthotics along to get a proper fit. Shop at stores that are familiar with orthotics.   Frequent washing of the orthotic may shorten the life span of the top  cover. The top cover can be replaced but will generally last one to five years depending on use and foot perspiration.
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
## 2021-04-26 PROBLEM — E66.01 MORBIDLY OBESE (H): Status: ACTIVE | Noted: 2021-04-26

## 2021-05-06 ENCOUNTER — HOSPITAL ENCOUNTER (EMERGENCY)
Facility: CLINIC | Age: 36
Discharge: HOME | End: 2021-05-06
Attending: EMERGENCY MEDICINE | Admitting: EMERGENCY MEDICINE
Payer: COMMERCIAL

## 2021-05-06 VITALS
SYSTOLIC BLOOD PRESSURE: 146 MMHG | OXYGEN SATURATION: 93 % | TEMPERATURE: 98 F | DIASTOLIC BLOOD PRESSURE: 94 MMHG | RESPIRATION RATE: 18 BRPM | HEART RATE: 69 BPM

## 2021-05-06 DIAGNOSIS — S61.210A LACERATION OF RIGHT INDEX FINGER WITHOUT FOREIGN BODY WITHOUT DAMAGE TO NAIL, INITIAL ENCOUNTER: ICD-10-CM

## 2021-05-06 PROCEDURE — 250N000011 HC RX IP 250 OP 636: Performed by: EMERGENCY MEDICINE

## 2021-05-06 PROCEDURE — 90715 TDAP VACCINE 7 YRS/> IM: CPT | Performed by: EMERGENCY MEDICINE

## 2021-05-06 PROCEDURE — 90471 IMMUNIZATION ADMIN: CPT | Performed by: EMERGENCY MEDICINE

## 2021-05-06 PROCEDURE — 12001 RPR S/N/AX/GEN/TRNK 2.5CM/<: CPT

## 2021-05-06 PROCEDURE — 99283 EMERGENCY DEPT VISIT LOW MDM: CPT | Mod: 25

## 2021-05-06 PROCEDURE — 250N000013 HC RX MED GY IP 250 OP 250 PS 637: Performed by: EMERGENCY MEDICINE

## 2021-05-06 RX ORDER — CEPHALEXIN 500 MG/1
500 CAPSULE ORAL 4 TIMES DAILY
Qty: 12 CAPSULE | Refills: 0 | Status: SHIPPED | OUTPATIENT
Start: 2021-05-06 | End: 2021-07-05

## 2021-05-06 RX ORDER — CEPHALEXIN 500 MG/1
500 CAPSULE ORAL ONCE
Status: COMPLETED | OUTPATIENT
Start: 2021-05-06 | End: 2021-05-06

## 2021-05-06 RX ORDER — CEPHALEXIN 500 MG/1
500 CAPSULE ORAL 4 TIMES DAILY
Qty: 12 CAPSULE | Refills: 0 | Status: SHIPPED | OUTPATIENT
Start: 2021-05-06 | End: 2021-05-06

## 2021-05-06 RX ORDER — LIDOCAINE HYDROCHLORIDE 10 MG/ML
INJECTION, SOLUTION INFILTRATION; PERINEURAL
Status: DISCONTINUED
Start: 2021-05-06 | End: 2021-05-06 | Stop reason: HOSPADM

## 2021-05-06 RX ADMIN — CLOSTRIDIUM TETANI TOXOID ANTIGEN (FORMALDEHYDE INACTIVATED), CORYNEBACTERIUM DIPHTHERIAE TOXOID ANTIGEN (FORMALDEHYDE INACTIVATED), BORDETELLA PERTUSSIS TOXOID ANTIGEN (GLUTARALDEHYDE INACTIVATED), BORDETELLA PERTUSSIS FILAMENTOUS HEMAGGLUTININ ANTIGEN (FORMALDEHYDE INACTIVATED), BORDETELLA PERTUSSIS PERTACTIN ANTIGEN, AND BORDETELLA PERTUSSIS FIMBRIAE 2/3 ANTIGEN 0.5 ML: 5; 2; 2.5; 5; 3; 5 INJECTION, SUSPENSION INTRAMUSCULAR at 19:52

## 2021-05-06 RX ADMIN — CEPHALEXIN 500 MG: 500 CAPSULE ORAL at 19:52

## 2021-05-06 ASSESSMENT — ENCOUNTER SYMPTOMS: WOUND: 1

## 2021-05-06 NOTE — ED PROVIDER NOTES
History   Chief Complaint:  Wound     HPI   Abel Ward is a 36 year old male who presents with Finger wound. The patient states he was using a  about an hour ago when he injured his finger.     Review of Systems   Skin: Positive for wound.   All other systems reviewed and are negative.      Allergies:  The patient has no known allergies.       Medications:  Adderall  Wellbutrin  Catapres  Neurontin  Litlium  Ativan  Cozaar  Magnesium oxide  Prilosec  Zofran  Desyrel  Vibryd      Past Medical History:    Anxiety  ADD  GERD  Hypertension  Infectious mononucleosis  Low testosterone  MDD  Depression  Mild asthma  Suicidal ideation  Hypertension  Hypotestosteronism     Past Surgical History:    Alton teeth extration    Family History:    Hypertension  Depression  Lipids  Obesity  Thyroid disease  Polycythemia  Obesity  Protein S deficiency  Breast cancer    Social History:  Comes from work    Physical Exam     Patient Vitals for the past 24 hrs:   BP Temp Pulse Resp SpO2   05/06/21 1951 (!) 146/94 -- 69 -- 93 %   05/06/21 1754 125/73 98  F (36.7  C) 80 -- --   05/06/21 1753 -- -- 79 18 100 %       Physical Exam  Right 2nd digit  Upper Ext:  MSK:   No tenderness of the wrist or elbow with full AROM  Injury to digit 2.  No real tenderness to palpation,  5 mm  Laceration over dorsal surface of PIP joint.  5/5 flexion at MCP, PIP and DIP joints.  5/5 extension of MCP, PIP and DIP joints.  No laxity with lateral stress to the MCP, PIP and DIP. Exam of the wound showed the tendon to be exposed with minimal fraying of tendon  CV: brisk capillary refill in all 5 digits of the right hand  Neuro: sensation intact to pin prick on the pad of the right first digit, strength exam above.  Skin: lac as above, no surrounding redness      Emergency Department Course     Procedures  [facility] Procedure Note        Laceration Repair:    Performed by: Soraida Wright MD  Authorized by: Soraida Wright,  MD  Consent given by: Patient who states understanding of the procedure being performed after discussing the risks, benefits and alternatives.    Preparation: Patient was prepped and draped in usual sterile fashion.  Irrigation solution: saline    Body area:right index finger  Laceration length: 0.5 cm  Contamination: The wound is contaminated.  Foreign bodies:none  Tendon involvement: yes- extensor tendon of finger exposed and frayed  Anesthesia: Local  Local anesthetic: Bupivacaine 0.5%, Lidocaine     1%  Anesthetic total: 1ml    Debridement: none  Skin closure: Closed with 2 x 4.0 Ethilon  Technique:interupted  Approximation: close  Approximation difficulty: simple    Patient tolerance: Patient tolerated the procedure well with no immediate complications.      Emergency Department Course:    Assessments:  1842 I obtained history and examined the patient as noted above.   2045 I rechecked the patient and performed the procedure as documented above.       Interventions:  1952 Tdap  1952 Keflex 500 mg PO    Disposition:  The patient was discharged to home.       Impression & Plan       Medical Decision Making:  The patient presents for a laceration.  The wound was examined in a bloodless field through full AROM of the nearest joint.  There was evidence of deep structure injury with fraying and partial injury to extensor tendon.  The laceration was repaired as documented above.  No complications were noted.  CMS was distally intact following repair.  The patient was given wound care instructions.  Finger splinted in extension  Pt will follow up with ortho/hand for reevaluation of the tendon..     Diagnosis:    ICD-10-CM    1. Laceration of right index finger without foreign body without damage to nail, initial encounter  S61.210A          Scribe Disclosure:  Sky CAICEDO, am serving as a scribe at 6:42 PM on 5/6/2021 to document services personally performed by Soraida Wright MD based on my  observations and the provider's statements to me.            Soraida Wright MD  05/07/21 0050

## 2021-05-07 NOTE — DISCHARGE INSTRUCTIONS
Follow up with orthopedics for recheck of tendon.  Keep wound clean and dry.  Return to ED for increased pain, redness or swelling.  Suture removal in 7days or per orthopedics

## 2021-05-10 ENCOUNTER — OFFICE VISIT (OUTPATIENT)
Dept: FAMILY MEDICINE | Facility: CLINIC | Age: 36
End: 2021-05-10
Payer: COMMERCIAL

## 2021-05-10 VITALS
BODY MASS INDEX: 43.68 KG/M2 | SYSTOLIC BLOOD PRESSURE: 104 MMHG | OXYGEN SATURATION: 99 % | TEMPERATURE: 97.6 F | WEIGHT: 312 LBS | HEART RATE: 85 BPM | HEIGHT: 71 IN | DIASTOLIC BLOOD PRESSURE: 62 MMHG

## 2021-05-10 DIAGNOSIS — Z00.00 ROUTINE GENERAL MEDICAL EXAMINATION AT A HEALTH CARE FACILITY: Primary | ICD-10-CM

## 2021-05-10 DIAGNOSIS — F33.1 MAJOR DEPRESSIVE DISORDER, RECURRENT EPISODE, MODERATE (H): ICD-10-CM

## 2021-05-10 DIAGNOSIS — E66.01 MORBIDLY OBESE (H): ICD-10-CM

## 2021-05-10 DIAGNOSIS — K21.9 GASTROESOPHAGEAL REFLUX DISEASE WITHOUT ESOPHAGITIS: ICD-10-CM

## 2021-05-10 DIAGNOSIS — F90.9 ATTENTION DEFICIT HYPERACTIVITY DISORDER (ADHD), UNSPECIFIED ADHD TYPE: ICD-10-CM

## 2021-05-10 DIAGNOSIS — G47.33 OSA (OBSTRUCTIVE SLEEP APNEA): ICD-10-CM

## 2021-05-10 DIAGNOSIS — I10 HYPERTENSION GOAL BP (BLOOD PRESSURE) < 130/80: ICD-10-CM

## 2021-05-10 PROBLEM — R45.851 SUICIDAL IDEATION: Status: RESOLVED | Noted: 2017-11-06 | Resolved: 2021-05-10

## 2021-05-10 PROCEDURE — 82043 UR ALBUMIN QUANTITATIVE: CPT | Performed by: FAMILY MEDICINE

## 2021-05-10 PROCEDURE — 99395 PREV VISIT EST AGE 18-39: CPT | Performed by: FAMILY MEDICINE

## 2021-05-10 RX ORDER — VILAZODONE HYDROCHLORIDE 10 MG/1
30 TABLET ORAL EVERY MORNING
Qty: 270 TABLET | Refills: 1 | COMMUNITY
Start: 2021-05-10 | End: 2023-05-22

## 2021-05-10 RX ORDER — LOSARTAN POTASSIUM 50 MG/1
50 TABLET ORAL DAILY
Qty: 90 TABLET | Refills: 3 | Status: SHIPPED | OUTPATIENT
Start: 2021-05-10 | End: 2022-11-10

## 2021-05-10 ASSESSMENT — ANXIETY QUESTIONNAIRES
5. BEING SO RESTLESS THAT IT IS HARD TO SIT STILL: SEVERAL DAYS
7. FEELING AFRAID AS IF SOMETHING AWFUL MIGHT HAPPEN: SEVERAL DAYS
6. BECOMING EASILY ANNOYED OR IRRITABLE: SEVERAL DAYS
3. WORRYING TOO MUCH ABOUT DIFFERENT THINGS: SEVERAL DAYS
GAD7 TOTAL SCORE: 7
2. NOT BEING ABLE TO STOP OR CONTROL WORRYING: SEVERAL DAYS
GAD7 TOTAL SCORE: 7
7. FEELING AFRAID AS IF SOMETHING AWFUL MIGHT HAPPEN: SEVERAL DAYS
1. FEELING NERVOUS, ANXIOUS, OR ON EDGE: SEVERAL DAYS
GAD7 TOTAL SCORE: 7
4. TROUBLE RELAXING: SEVERAL DAYS

## 2021-05-10 ASSESSMENT — PATIENT HEALTH QUESTIONNAIRE - PHQ9
SUM OF ALL RESPONSES TO PHQ QUESTIONS 1-9: 6
SUM OF ALL RESPONSES TO PHQ QUESTIONS 1-9: 6
10. IF YOU CHECKED OFF ANY PROBLEMS, HOW DIFFICULT HAVE THESE PROBLEMS MADE IT FOR YOU TO DO YOUR WORK, TAKE CARE OF THINGS AT HOME, OR GET ALONG WITH OTHER PEOPLE: SOMEWHAT DIFFICULT

## 2021-05-10 ASSESSMENT — MIFFLIN-ST. JEOR: SCORE: 2377.35

## 2021-05-10 NOTE — PROGRESS NOTES
SUBJECTIVE:     Patient has been advised of split billing requirements and indicates understanding: Yes  Healthy Habits:     Getting at least 3 servings of Calcium per day:  Yes    Bi-annual eye exam:  Yes    Dental care twice a year:  NO    Sleep apnea or symptoms of sleep apnea:  Sleep apnea    Diet:  Regular (no restrictions)    Frequency of exercise:  2-3 days/week    Duration of exercise:  Greater than 60 minutes    Taking medications regularly:  Yes    Medication side effects:  None    PHQ-2 Total Score: 0    Additional concerns today:  No        Hypertension Follow-up      Do you check your blood pressure regularly outside of the clinic? No     Are you following a low salt diet? No    Are your blood pressures ever more than 140 on the top number (systolic) OR more   than 90 on the bottom number (diastolic), for example 140/90? No    Depression and Anxiety Follow-Up    How are you doing with your depression since your last visit? stable    How are you doing with your anxiety since your last visit?  stable    Are you having other symptoms that might be associated with depression or anxiety? No    Have you had a significant life event? No     Do you have any concerns with your use of alcohol or other drugs? No    Social History     Tobacco Use     Smoking status: Never Smoker     Smokeless tobacco: Never Used   Substance Use Topics     Alcohol use: Yes     Alcohol/week: 0.0 standard drinks     Drug use: Yes     Types: Marijuana     Comment: once a month or less, > 1 month     PHQ 2/5/2019 12/10/2019 5/10/2021   PHQ-9 Total Score 14 22 6   Q9: Thoughts of better off dead/self-harm past 2 weeks Several days More than half the days Not at all   F/U: Thoughts of suicide or self-harm Yes Yes -   F/U: Self harm-plan No No -   F/U: Self-harm action No No -   F/U: Safety concerns No No -     FATOUMATA-7 SCORE 2/5/2019 12/10/2019 5/10/2021   Total Score - - -   Total Score - - 7 (mild anxiety)   Total Score 17 17 7     Last  PHQ-9 5/10/2021   1.  Little interest or pleasure in doing things 0   2.  Feeling down, depressed, or hopeless 0   3.  Trouble falling or staying asleep, or sleeping too much 1   4.  Feeling tired or having little energy 2   5.  Poor appetite or overeating 1   6.  Feeling bad about yourself 1   7.  Trouble concentrating 1   8.  Moving slowly or restless 0   Q9: Thoughts of better off dead/self-harm past 2 weeks 0   PHQ-9 Total Score 6   Difficulty at work, home, or with people -   In the past two weeks have you had thoughts of suicide or self harm? -   Do you have concerns about your personal safety or the safety of others? -   In the past 2 weeks have you thought about a plan or had intention to harm yourself? -   In the past 2 weeks have you acted on these thoughts in any way? -     FATOUMATA-7  5/10/2021   1. Feeling nervous, anxious, or on edge 1   2. Not being able to stop or control worrying 1   3. Worrying too much about different things 1   4. Trouble relaxing 1   5. Being so restless that it is hard to sit still 1   6. Becoming easily annoyed or irritable 1   7. Feeling afraid, as if something awful might happen 1   FATOUMATA-7 Total Score 7   If you checked any problems, how difficult have they made it for you to do your work, take care of things at home, or get along with other people? -       Suicide Assessment Five-step Evaluation and Treatment (SAFE-T)    Medication Followup of ADHD/ Adderall    Taking Medication as prescribed: yes    Side Effects:  None    Medication Helping Symptoms:  yes       Today's PHQ-2 Score:   PHQ-2 ( 1999 [employer]) 5/10/2021 12/10/2019   Q1: Little interest or pleasure in doing things 0 2   Q2: Feeling down, depressed or hopeless 0 2   PHQ-2 Score 0 4   Q1: Little interest or pleasure in doing things Not at all -   Q2: Feeling down, depressed or hopeless Not at all -   PHQ-2 Score 0 -       Abuse: Current or Past(Physical, Sexual or Emotional)- No  Do you feel safe in your environment?  "Yes    Have you ever done Advance Care Planning? (For example, a Health Directive, POLST, or a discussion with a medical provider or your loved ones about your wishes): No, advance care planning information given to patient to review.  Patient declined advance care planning discussion at this time.    Social History     Tobacco Use     Smoking status: Never Smoker     Smokeless tobacco: Never Used   Substance Use Topics     Alcohol use: Yes     Alcohol/week: 0.0 standard drinks     If you drink alcohol do you typically have >3 drinks per day or >7 drinks per week? No                      Last PSA:   PSA   Date Value Ref Range Status   01/15/2013 0.28 0 - 4 ug/L Final       Reviewed orders with patient. Reviewed health maintenance and updated orders accordingly - Yes  Reviewed and updated as needed this visit by clinical staff  Tobacco  Allergies  Meds  Problems  Med Hx  Surg Hx  Fam Hx          Reviewed and updated as needed this visit by Provider  Tobacco  Allergies  Meds  Problems  Med Hx  Surg Hx  Fam Hx           ROS:  Constitutional, HEENT, cardiovascular, pulmonary, GI, , musculoskeletal, neuro, skin, endocrine and psych systems are negative, except as otherwise noted.  OBJECTIVE:   /62 (BP Location: Left arm, Cuff Size: Adult Large)   Pulse 85   Temp 97.6  F (36.4  C) (Tympanic)   Ht 1.803 m (5' 11\")   Wt 141.5 kg (312 lb)   SpO2 99%   BMI 43.52 kg/m    EXAM:  GENERAL: healthy, alert and no distress  EYES: Eyes grossly normal to inspection, PERRL and conjunctivae and sclerae normal  HENT: ear canals and TM's normal, nose and mouth without ulcers or lesions  NECK: no adenopathy, no asymmetry, masses, or scars and thyroid normal to palpation  RESP: lungs clear to auscultation - no rales, rhonchi or wheezes  BREAST: normal without masses, tenderness or nipple discharge and no palpable axillary masses or adenopathy  CV: regular rate and rhythm, normal S1 S2, no S3 or S4, no murmur, " "click or rub, no peripheral edema and peripheral pulses strong  ABDOMEN: soft, nontender, no hepatosplenomegaly, no masses and bowel sounds normal   (male): normal male genitalia without lesions or urethral discharge, no hernia  MS: no gross musculoskeletal defects noted, no edema  SKIN: no suspicious lesions or rashes  NEURO: Normal strength and tone, mentation intact and speech normal  PSYCH: mentation appears normal, affect normal/bright  LYMPH: no cervical, supraclavicular, axillary, or inguinal adenopathy        ASSESSMENT/PLAN:   Routine general medical examination at a health care facility      Hypertension goal BP (blood pressure) < 130/80  Controlled - continue medication.  - Albumin Random Urine Quantitative with Creat Ratio  - losartan (COZAAR) 50 MG tablet  Dispense: 90 tablet; Refill: 3    Major depressive disorder, recurrent episode, moderate (H)  Doing okay and sees psychiatrist    Attention deficit hyperactivity disorder (ADHD), unspecified ADHD type  Stable, further plan through psychiatry    MENDEZ (obstructive sleep apnea)  Known history and continue treatment    Morbidly obese (H)  Diet exercise lose weight would be recommended    Gastroesophageal reflux disease without esophagitis  Reflux and helped by over-the-counter omeprazole okay to continue.      COUNSELING:  Reviewed preventive health counseling, as reflected in patient instructions    Estimated body mass index is 43.52 kg/m  as calculated from the following:    Height as of this encounter: 1.803 m (5' 11\").    Weight as of this encounter: 141.5 kg (312 lb).  Weight management plan: Discussed healthy diet and exercise guidelines     reports that he has never smoked. He has never used smokeless tobacco.      Return in about 1 year (around 5/10/2022) for wellness exam with fasting labs with Jovany Denney MD, and, medication recheck.           Jovany Denney MD     [address] " [address]  [employer]     Office: [phone]     Answers for HPI/ROS submitted by the patient on 5/10/2021   Annual Exam:  If you checked off any problems, how difficult have these problems made it for you to do your work, take care of things at home, or get along with other people?: Somewhat difficult  PHQ9 TOTAL SCORE: 6  FATOUMATA 7 TOTAL SCORE: 7

## 2021-05-11 LAB
CREAT UR-MCNC: 280 MG/DL
MICROALBUMIN UR-MCNC: 22 MG/L
MICROALBUMIN/CREAT UR: 8 MG/G CR (ref 0–17)

## 2021-05-11 ASSESSMENT — ANXIETY QUESTIONNAIRES: GAD7 TOTAL SCORE: 7

## 2021-05-11 NOTE — RESULT ENCOUNTER NOTE
Dear Shakir,    Here is a summary of your recent test results:  -Microalbumin (urine protein) test is normal.  ADVISE: rechecking this annually.           Thank you very much for trusting me and [facility].     Have a peaceful day.    Healthy regards,  Jovany Denney MD

## 2021-05-14 ENCOUNTER — MEDICAL CORRESPONDENCE (OUTPATIENT)
Dept: HEALTH INFORMATION MANAGEMENT | Facility: CLINIC | Age: 36
End: 2021-05-14

## 2021-05-20 ENCOUNTER — TRANSFERRED RECORDS (OUTPATIENT)
Dept: HEALTH INFORMATION MANAGEMENT | Facility: CLINIC | Age: 36
End: 2021-05-20

## 2021-06-06 DIAGNOSIS — Z11.59 ENCOUNTER FOR SCREENING FOR OTHER VIRAL DISEASES: ICD-10-CM

## 2021-06-17 ENCOUNTER — TELEPHONE (OUTPATIENT)
Dept: SURGERY | Facility: CLINIC | Age: 36
End: 2021-06-17

## 2021-06-17 NOTE — TELEPHONE ENCOUNTER
Called pt to verify that he has worked with his psychiatrist to get Adderall and Wellbutrin switched over to regular release.  No answer  Left VM to call clinic, number provided, and/or read [employer] message.    Jerilyn Brownlee RN on 6/17/2021 at 3:35 PM

## 2021-07-04 ENCOUNTER — OFFICE VISIT (OUTPATIENT)
Dept: URGENT CARE | Facility: URGENT CARE | Age: 36
End: 2021-07-04
Payer: COMMERCIAL

## 2021-07-04 VITALS
BODY MASS INDEX: 44.37 KG/M2 | TEMPERATURE: 97.7 F | WEIGHT: 315 LBS | SYSTOLIC BLOOD PRESSURE: 124 MMHG | OXYGEN SATURATION: 98 % | HEART RATE: 95 BPM | DIASTOLIC BLOOD PRESSURE: 72 MMHG

## 2021-07-04 DIAGNOSIS — J32.9 SINUSITIS, UNSPECIFIED CHRONICITY, UNSPECIFIED LOCATION: Primary | ICD-10-CM

## 2021-07-04 DIAGNOSIS — J02.9 SORE THROAT: ICD-10-CM

## 2021-07-04 LAB
DEPRECATED S PYO AG THROAT QL EIA: NEGATIVE
LABORATORY COMMENT REPORT: NORMAL
SARS-COV-2 RNA RESP QL NAA+PROBE: NEGATIVE
SARS-COV-2 RNA RESP QL NAA+PROBE: NORMAL
SPECIMEN SOURCE: NORMAL
STREP GROUP A PCR: NOT DETECTED

## 2021-07-04 PROCEDURE — U0005 INFEC AGEN DETEC AMPLI PROBE: HCPCS | Performed by: PHYSICIAN ASSISTANT

## 2021-07-04 PROCEDURE — U0003 INFECTIOUS AGENT DETECTION BY NUCLEIC ACID (DNA OR RNA); SEVERE ACUTE RESPIRATORY SYNDROME CORONAVIRUS 2 (SARS-COV-2) (CORONAVIRUS DISEASE [COVID-19]), AMPLIFIED PROBE TECHNIQUE, MAKING USE OF HIGH THROUGHPUT TECHNOLOGIES AS DESCRIBED BY CMS-2020-01-R: HCPCS | Performed by: PHYSICIAN ASSISTANT

## 2021-07-04 PROCEDURE — 99213 OFFICE O/P EST LOW 20 MIN: CPT | Performed by: FAMILY MEDICINE

## 2021-07-04 PROCEDURE — 99N1174 PR STATISTIC STREP A RAPID: Performed by: PHYSICIAN ASSISTANT

## 2021-07-04 PROCEDURE — 87651 STREP A DNA AMP PROBE: CPT | Performed by: PHYSICIAN ASSISTANT

## 2021-07-04 NOTE — PROGRESS NOTES
Assessment & Plan     Sore throat  ***  - Symptomatic COVID-19 Virus (Coronavirus) by PCR  - Streptococcus A Rapid Scr w Reflx to PCR     COVID test collected by FRANCHESKA Miranda MD   [place] [facility]    Subjective     Shakir is a 36 year old male who presents to clinic today for the following health issues:  Chief Complaint   Patient presents with     Urgent Care     URI     start last night sx sore throat, runny nosse, nasal congestion, hx of sinus infection tx OTC Aleve sinus, saline spray      HPI    ***      COVID -19 moderna vaccine series completed on 4/26/21      Patient Active Problem List    Diagnosis Date Noted     Morbidly obese (H) 04/26/2021     Priority: Medium     Added automatically from request for surgery [id number]       MENDEZ (obstructive sleep apnea) 03/30/2018     Priority: Medium     3/20/2018 [facility] Sleep Study (330.0 lbs) - AHI 7.5, RDI 10.3, Supine AHI 10.3, REM AHI 29.4, Low O2 84.2%, Time Spent ?88% 0.3 minutes / Time Spent ?89% 0.6 minutes.       Severe episode of recurrent major depressive disorder, without psychotic features (H) 11/07/2017     Priority: Medium     Hypertension goal BP (blood pressure) < 130/80 09/05/2013     Priority: Medium     Hypotestosteronism 07/19/2011     Priority: Medium     Major depressive disorder, recurrent episode, moderate (H) 02/08/2011     Priority: Medium     Marcus score is 14 on 7-19-11       Anxiety 02/08/2011     Priority: Medium     CARDIOVASCULAR SCREENING; LDL GOAL LESS THAN 160 10/31/2010     Priority: Medium     Vitamin D deficiency 05/20/2009     Priority: Medium     Morbid obesity (H) 08/24/2007     Priority: Medium     Esophageal reflux      Priority: Medium     Dr Starks- had EGD ~2002       Attention deficit hyperactivity disorder (ADHD) 02/02/2005     Priority: Medium       Current Outpatient Medications   Medication     Amphetamine-Dextroamphetamine (ADDERALL PO)     buPROPion HCl (WELLBUTRIN PO)     buPROPion HCl  (WELLBUTRIN PO)     Calcium Citrate-Vitamin D (CALCIUM CITRATE CHEWY BITE PO)     Cholecalciferol (VITAMIN D3) 78912 UNITS TABS     cloNIDine (CATAPRES) 0.1 MG tablet     lithium (ESKALITH) 150 MG capsule     LORazepam (ATIVAN) 1 MG tablet     losartan (COZAAR) 50 MG tablet     magnesium oxide (MAG-OX) 400 (241.3 Mg) MG tablet     multivitamin w/minerals (MULTI-VITAMIN) tablet     omeprazole (PRILOSEC) 20 MG capsule     ondansetron (ZOFRAN) 4 MG tablet     traZODone (DESYREL) 50 MG tablet     vilazodone (VIIBRYD) 10 MG TABS tablet     cephALEXin (KEFLEX) 500 MG capsule     gabapentin (NEURONTIN) 100 MG capsule     No current facility-administered medications for this visit.            Objective    /72   Pulse 95   Temp 97.7  F (36.5  C)   Wt 144.3 kg (318 lb 1.6 oz)   SpO2 98%   BMI 44.37 kg/m    Physical Exam     {Exam List :[id number]}    No results found for any visits on 07/04/21.                  The use of Dragon/[employer] dictation services may have been used to construct the content in this note; any grammatical or spelling errors are non-intentional. Please contact the author of this note directly if you are in need of any clarification.

## 2021-07-04 NOTE — PROGRESS NOTES
Assessment & Plan     Sore throat  - Symptomatic COVID-19 Virus (Coronavirus) by PCR  - Streptococcus A Rapid Scr w Reflx to PCR  - Group A Streptococcus PCR Throat Swab    Sinusitis, unspecified chronicity, unspecified location  - amoxicillin-clavulanate (AUGMENTIN) 875-125 MG tablet  Dispense: 20 tablet; Refill: 0     Early presentation of symptoms recommended symptomatic care for viral illness if symptoms don't improve in next week then start augmentin.   Return PRN if symptoms worsen.       Kapil Miranda MD   [place] [facility]    Subjective     Shakir is a 36 year old male who presents to clinic today for the following health issues:  Chief Complaint   Patient presents with     Urgent Care     URI     start last night sx sore throat, runny nosse, nasal congestion, hx of sinus infection tx OTC Aleve sinus, saline spray      HPI    Denies recent dental procedures. Absent of fever. No known exposures to illnesses. Discomfort of teeth and radiating to forehead.     Treated maybe once for sinus infection about a year ago.     Absent of cough    COVID -19 moderna vaccine series completed on 4/26/21      Patient Active Problem List    Diagnosis Date Noted     Morbidly obese (H) 04/26/2021     Priority: Medium     Added automatically from request for surgery [id number]       MENDEZ (obstructive sleep apnea) 03/30/2018     Priority: Medium     3/20/2018 [facility] Sleep Study (330.0 lbs) - AHI 7.5, RDI 10.3, Supine AHI 10.3, REM AHI 29.4, Low O2 84.2%, Time Spent ?88% 0.3 minutes / Time Spent ?89% 0.6 minutes.       Severe episode of recurrent major depressive disorder, without psychotic features (H) 11/07/2017     Priority: Medium     Hypertension goal BP (blood pressure) < 130/80 09/05/2013     Priority: Medium     Hypotestosteronism 07/19/2011     Priority: Medium     Major depressive disorder, recurrent episode, moderate (H) 02/08/2011     Priority: Medium     Marcus score is 14 on 7-19-11       Anxiety  02/08/2011     Priority: Medium     CARDIOVASCULAR SCREENING; LDL GOAL LESS THAN 160 10/31/2010     Priority: Medium     Vitamin D deficiency 05/20/2009     Priority: Medium     Morbid obesity (H) 08/24/2007     Priority: Medium     Esophageal reflux      Priority: Medium     Dr Starks- had EGD ~2002       Attention deficit hyperactivity disorder (ADHD) 02/02/2005     Priority: Medium       Current Outpatient Medications   Medication     amoxicillin-clavulanate (AUGMENTIN) 875-125 MG tablet     Amphetamine-Dextroamphetamine (ADDERALL PO)     buPROPion HCl (WELLBUTRIN PO)     buPROPion HCl (WELLBUTRIN PO)     Calcium Citrate-Vitamin D (CALCIUM CITRATE CHEWY BITE PO)     Cholecalciferol (VITAMIN D3) 30904 UNITS TABS     cloNIDine (CATAPRES) 0.1 MG tablet     lithium (ESKALITH) 150 MG capsule     LORazepam (ATIVAN) 1 MG tablet     losartan (COZAAR) 50 MG tablet     magnesium oxide (MAG-OX) 400 (241.3 Mg) MG tablet     multivitamin w/minerals (MULTI-VITAMIN) tablet     omeprazole (PRILOSEC) 20 MG capsule     ondansetron (ZOFRAN) 4 MG tablet     traZODone (DESYREL) 50 MG tablet     vilazodone (VIIBRYD) 10 MG TABS tablet     cephALEXin (KEFLEX) 500 MG capsule     gabapentin (NEURONTIN) 100 MG capsule     No current facility-administered medications for this visit.            Objective    /72   Pulse 95   Temp 97.7  F (36.5  C)   Wt 144.3 kg (318 lb 1.6 oz)   SpO2 98%   BMI 44.37 kg/m    Physical Exam     Lungs: clear bilaterally  CV: RRR no m/r/g  Throat: no enlarged tonsils    Results for orders placed or performed in visit on 07/04/21   Streptococcus A Rapid Scr w Reflx to PCR     Status: None    Specimen: Throat   Result Value Ref Range    Strep Specimen Description Throat     Streptococcus Group A Rapid Screen Negative NEG^Negative                     The use of Dragon/[employer] dictation services may have been used to construct the content in this note; any grammatical or spelling errors are  non-intentional. Please contact the author of this note directly if you are in need of any clarification.

## 2021-07-04 NOTE — PATIENT INSTRUCTIONS
Nasal fluticasone steroid once a day into both nostrils  Use once daily in each nostril. Tilt head forward and spray upwards into nasal passage, then plug that side of the nostril for 5 seconds. Repeat on other side.   Benefits are not immediate but will gradually treat your condition of allergies/sinus inflammation.        Nasal saline rinses 1-2x a day        If symptoms persist going into next week then start the augmentin antibiotic(twice a day for 10 day)         COVID test should be back on MyChart in the next 24 hours or so      If symptoms worsen ( fever, shortness of breath) -- please return to be evaluated

## 2021-07-05 ENCOUNTER — ANCILLARY PROCEDURE (OUTPATIENT)
Dept: GENERAL RADIOLOGY | Facility: CLINIC | Age: 36
End: 2021-07-05
Attending: FAMILY MEDICINE
Payer: COMMERCIAL

## 2021-07-05 ENCOUNTER — OFFICE VISIT (OUTPATIENT)
Dept: FAMILY MEDICINE | Facility: CLINIC | Age: 36
End: 2021-07-05
Payer: COMMERCIAL

## 2021-07-05 VITALS
WEIGHT: 315 LBS | HEIGHT: 71 IN | BODY MASS INDEX: 44.1 KG/M2 | DIASTOLIC BLOOD PRESSURE: 78 MMHG | SYSTOLIC BLOOD PRESSURE: 132 MMHG | TEMPERATURE: 100 F | OXYGEN SATURATION: 97 % | RESPIRATION RATE: 18 BRPM | HEART RATE: 98 BPM

## 2021-07-05 DIAGNOSIS — Z01.818 PREOP GENERAL PHYSICAL EXAM: ICD-10-CM

## 2021-07-05 DIAGNOSIS — I10 HYPERTENSION GOAL BP (BLOOD PRESSURE) < 130/80: ICD-10-CM

## 2021-07-05 DIAGNOSIS — E66.01 MORBIDLY OBESE (H): ICD-10-CM

## 2021-07-05 DIAGNOSIS — K21.9 GASTROESOPHAGEAL REFLUX DISEASE WITHOUT ESOPHAGITIS: ICD-10-CM

## 2021-07-05 DIAGNOSIS — F90.9 ATTENTION DEFICIT HYPERACTIVITY DISORDER (ADHD), UNSPECIFIED ADHD TYPE: ICD-10-CM

## 2021-07-05 DIAGNOSIS — F33.1 MAJOR DEPRESSIVE DISORDER, RECURRENT EPISODE, MODERATE (H): ICD-10-CM

## 2021-07-05 DIAGNOSIS — R05.9 COUGH: ICD-10-CM

## 2021-07-05 DIAGNOSIS — G47.33 OSA (OBSTRUCTIVE SLEEP APNEA): ICD-10-CM

## 2021-07-05 LAB
ERYTHROCYTE [DISTWIDTH] IN BLOOD BY AUTOMATED COUNT: 12.2 % (ref 10–15)
HCT VFR BLD AUTO: 40.7 % (ref 40–53)
HGB BLD-MCNC: 14 G/DL (ref 13.3–17.7)
MCH RBC QN AUTO: 29.3 PG (ref 26.5–33)
MCHC RBC AUTO-ENTMCNC: 34.4 G/DL (ref 31.5–36.5)
MCV RBC AUTO: 85 FL (ref 78–100)
PLATELET # BLD AUTO: 237 10E9/L (ref 150–450)
RBC # BLD AUTO: 4.78 10E12/L (ref 4.4–5.9)
WBC # BLD AUTO: 10.8 10E9/L (ref 4–11)

## 2021-07-05 PROCEDURE — 71046 X-RAY EXAM CHEST 2 VIEWS: CPT | Mod: FY | Performed by: RADIOLOGY

## 2021-07-05 PROCEDURE — 85027 COMPLETE CBC AUTOMATED: CPT | Performed by: FAMILY MEDICINE

## 2021-07-05 PROCEDURE — 36415 COLL VENOUS BLD VENIPUNCTURE: CPT | Performed by: FAMILY MEDICINE

## 2021-07-05 PROCEDURE — 99214 OFFICE O/P EST MOD 30 MIN: CPT | Performed by: FAMILY MEDICINE

## 2021-07-05 RX ORDER — LITHIUM CARBONATE 300 MG
600 TABLET ORAL AT BEDTIME
COMMUNITY
Start: 2021-07-05 | End: 2023-05-22

## 2021-07-05 RX ORDER — DEXTROAMPHETAMINE SACCHARATE, AMPHETAMINE ASPARTATE, DEXTROAMPHETAMINE SULFATE AND AMPHETAMINE SULFATE 3.75; 3.75; 3.75; 3.75 MG/1; MG/1; MG/1; MG/1
15 TABLET ORAL DAILY
COMMUNITY
Start: 2021-06-07 | End: 2023-05-22

## 2021-07-05 RX ORDER — RISPERIDONE 1 MG/1
1 TABLET ORAL EVERY MORNING
COMMUNITY
Start: 2021-05-14 | End: 2022-12-29

## 2021-07-05 ASSESSMENT — MIFFLIN-ST. JEOR: SCORE: 2409.1

## 2021-07-05 NOTE — PROGRESS NOTES
[address] [address]  Phone: [phone]  Primary Provider: David Denney  Pre-op Performing Provider: TRAVIS VELEZ      PREOPERATIVE EVALUATION:  Today's date: 7/5/2021    Abel Ward is a 36 year old male who presents for a preoperative evaluation.    Surgical Information:  Surgery/Procedure:ROBOTIC ASSISTED LAPAROSCOPIC GASTRIC BYPASS WITH POSSIBLE LAPAROSCOPIC CHOLECYSTECTOMY    Surgery Location: [facility]   Surgeon:Seth Lundy MD   Surgery Date: 7/14/21  Time of Surgery: 8:30 AM  Where patient plans to recover: At home with family  Fax number for surgical facility: Note does not need to be faxed, will be available electronically in Epic.    Type of Anesthesia Anticipated: General    Assessment & Plan     The proposed surgical procedure is considered INTERMEDIATE risk.    Preop general physical exam  - XR Chest 2 Views; Future  - CBC with platelets    Cough: XR without acute findings. CBC normal. Suspect this may just be viral in nature. Okay to start previously prescribed antibiotic to cover for potential bacterial infection prior to surgery.  - XR Chest 2 Views; Future    Morbidly obese (H)    Major depressive disorder, recurrent episode, moderate (H)    Hypertension goal BP (blood pressure) < 130/80    Attention deficit hyperactivity disorder (ADHD), unspecified ADHD type    MENDEZ (obstructive sleep apnea)    Gastroesophageal reflux disease without esophagitis           Risks and Recommendations:  The patient has the following additional risks and recommendations for perioperative complications:  Obstructive Sleep Apnea:       Medication Instructions:  Patient is to take all scheduled medications on the day of surgery    RECOMMENDATION:  APPROVAL GIVEN to proceed with proposed procedure, without further diagnostic evaluation.        Subjective     HPI related to upcoming procedure: Shakir has tried  treating obesity in several different ways - calorie counting, exercise, weight watchers, low carb/high protein diet without success. He has several weight-related co morbidities including hypertension, MENDEZ, GERD. Planning for laparoscopic Sincere-en-Y gastric bypass surgery.    Preop Questions 7/3/2021   1. Have you ever had a heart attack or stroke? No   2. Have you ever had surgery on your heart or blood vessels, such as a stent placement, a coronary artery bypass, or surgery on an artery in your head, neck, heart, or legs? No   3. Do you have chest pain with activity? No   4. Do you have a history of  heart failure? No   5. Do you currently have a cold, bronchitis or symptoms of other infection? No   6. Do you have a cough, shortness of breath, or wheezing? No   7. Do you or anyone in your family have previous history of blood clots? No   8. Do you or does anyone in your family have a serious bleeding problem such as prolonged bleeding following surgeries or cuts? No   9. Have you ever had problems with anemia or been told to take iron pills? No   10. Have you had any abnormal blood loss such as black, tarry or bloody stools? No   11. Have you ever had a blood transfusion? No   12. Are you willing to have a blood transfusion if it is medically needed before, during, or after your surgery? Yes   13. Have you or any of your relatives ever had problems with anesthesia? No   14. Do you have sleep apnea, excessive snoring or daytime drowsiness? YES   14a. Do you have a CPAP machine? Yes   15. Do you have any artifical heart valves or other implanted medical devices like a pacemaker, defibrillator, or continuous glucose monitor? No   16. Do you have artificial joints? No   17. Are you allergic to latex? No       Health Care Directive:  Patient does not have a Health Care Directive or Living Will: Advance Directive received and scanned. Click on Code in the patient header to view.    Preoperative Review of :    reviewed - controlled substances reflected in medication list.      Status of Chronic Conditions:  DEPRESSION - Patient has a long history of Depression of moderate severity requiring medication for control with recent symptoms being stable..Current symptoms of depression include stable - follows with Vincent Suarez at [facility].     HYPERTENSION - Patient has longstanding history of HTN , currently denies any symptoms referable to elevated blood pressure. Specifically denies chest pain, palpitations, dyspnea, orthopnea, PND or peripheral edema. Blood pressure readings have been in normal range. Current medication regimen is as listed below. Patient denies any side effects of medication.     SLEEP PROBLEM - Patient has a longstanding history of MENDEZ on CPAP.      Review of Systems  CONSTITUTIONAL: NEGATIVE for fever, chills, change in weight  INTEGUMENTARY/SKIN: NEGATIVE for worrisome rashes, moles or lesions  EYES: NEGATIVE for vision changes or irritation  ENT/MOUTH: NEGATIVE for ear, mouth and throat problems  RESP:NEGATIVE for significant cough or SOB and POSITIVE for cough-non productive  CV: NEGATIVE for chest pain, palpitations or peripheral edema  GI: NEGATIVE for nausea, abdominal pain, heartburn, or change in bowel habits  : NEGATIVE for frequency, dysuria, or hematuria  MUSCULOSKELETAL: NEGATIVE for significant arthralgias or myalgia  NEURO: NEGATIVE for weakness, dizziness or paresthesias  ENDOCRINE: NEGATIVE for temperature intolerance, skin/hair changes  HEME: NEGATIVE for bleeding problems  PSYCHIATRIC: NEGATIVE for changes in mood or affect    Patient Active Problem List    Diagnosis Date Noted     Morbidly obese (H) 04/26/2021     Priority: Medium     Added automatically from request for surgery [id number]       MENDEZ (obstructive sleep apnea) 03/30/2018     Priority: Medium     3/20/2018 [facility] Sleep Study (330.0 lbs) - AHI 7.5, RDI 10.3, Supine AHI 10.3,  REM AHI 29.4, Low O2 84.2%, Time Spent ?88% 0.3 minutes / Time Spent ?89% 0.6 minutes.       Severe episode of recurrent major depressive disorder, without psychotic features (H) 11/07/2017     Priority: Medium     Hypertension goal BP (blood pressure) < 130/80 09/05/2013     Priority: Medium     Hypotestosteronism 07/19/2011     Priority: Medium     Major depressive disorder, recurrent episode, moderate (H) 02/08/2011     Priority: Medium     Marcus score is 14 on 7-19-11       Anxiety 02/08/2011     Priority: Medium     CARDIOVASCULAR SCREENING; LDL GOAL LESS THAN 160 10/31/2010     Priority: Medium     Vitamin D deficiency 05/20/2009     Priority: Medium     Morbid obesity (H) 08/24/2007     Priority: Medium     Esophageal reflux      Priority: Medium     Dr Esparza had EGD ~2002       Attention deficit hyperactivity disorder (ADHD) 02/02/2005     Priority: Medium      Past Medical History:   Diagnosis Date     Anxiety      Attention deficit disorder with hyperactivity(314.01) 2001     Esophageal reflux     Dr Esparza had EGD ~2202     Generalized anxiety disorder      Hypertension      Infectious mononucleosis 12/03     Low testosterone      Major depressive disorder, single episode in full remission (H)      Major depressive disorder, single episode, mild (H)      MDD (major depressive disorder)      Mild intermittent asthma      Suicidal ideation 11/6/2017     Viral URI with cough 1/3/2020     Past Surgical History:   Procedure Laterality Date     OTHER SURGICAL HISTORY      wisdom teeth extraction     Current Outpatient Medications   Medication Sig Dispense Refill     amoxicillin-clavulanate (AUGMENTIN) 875-125 MG tablet Take 1 tablet by mouth 2 times daily for 10 days 20 tablet 0     amphetamine-dextroamphetamine (ADDERALL) 15 MG tablet        buPROPion HCl (WELLBUTRIN PO) Take 150 mg by mouth       Calcium Citrate-Vitamin D (CALCIUM CITRATE CHEWY BITE PO) 3 chews daily       Cholecalciferol (VITAMIN D3)  33827 UNITS TABS Take 10,000 Units by mouth daily (patient purchases over-the-counter) this dose was NOT prescribed by a doctor.       cloNIDine (CATAPRES) 0.1 MG tablet        gabapentin (NEURONTIN) 100 MG capsule Take 100-200 mg by mouth nightly as needed (restless leg syndrome)        lithium (ESKALITH) 150 MG capsule        lithium (ESKALITH) 300 MG tablet Take 3 tablets (900 mg) by mouth At Bedtime Takes with 150 mg tablet for total 1050 mg daily dose.       LORazepam (ATIVAN) 1 MG tablet TAKE 1 TABLET BY MOUTH UP TO 3 TIMES DAILY AS NEEDED FOR ANXIETY FOR 14 DAYS **STOP BELSOMRA**  0     losartan (COZAAR) 50 MG tablet Take 1 tablet (50 mg) by mouth daily 90 tablet 3     magnesium oxide (MAG-OX) 400 (241.3 Mg) MG tablet Take 1 tablet by mouth daily 90 tablet 3     multivitamin w/minerals (MULTI-VITAMIN) tablet Take 1 tablet by mouth daily       ondansetron (ZOFRAN) 4 MG tablet Take 1-2 tablets (4-8 mg) by mouth daily 30 tablet 3     vilazodone (VIIBRYD) 10 MG TABS tablet Take 3 tablets (30 mg) by mouth daily 270 tablet 1     omeprazole (PRILOSEC) 20 MG capsule Take 1 capsule (20 mg) by mouth daily 90 capsule 3     risperiDONE (RISPERDAL) 1 MG tablet        traZODone (DESYREL) 50 MG tablet Take  mg by mouth nightly as needed for sleep         No Known Allergies     Social History     Tobacco Use     Smoking status: Never Smoker     Smokeless tobacco: Never Used   Substance Use Topics     Alcohol use: Yes     Alcohol/week: 0.0 standard drinks     Family History   Problem Relation Age of Onset     Hypertension Father      Depression Father      Lipids Father      Obesity Father      Thyroid Disease Father      Polycythemia Father      Diabetes Paternal Grandfather      Hypertension Paternal Grandfather      Heart Disease Paternal Grandfather      Breast Cancer Paternal Grandmother      Hypertension Mother      Obesity Mother      Thyroid Disease Mother      Depression Mother      Protein S deficiency Mother   "    Hypertension Maternal Grandfather      History   Drug Use     Types: Marijuana     Comment: once a month or less, > 1 month         Objective     /78   Pulse 98   Temp 100  F (37.8  C) (Tympanic)   Resp 18   Ht 1.803 m (5' 11\")   Wt 144.7 kg (319 lb)   SpO2 97%   BMI 44.49 kg/m      Physical Exam    GENERAL APPEARANCE: healthy, alert and no distress     EYES: EOMI,  PERRL     HENT: ear canals and TM's normal and nose and mouth without ulcers or lesions     NECK: no adenopathy, no asymmetry, masses, or scars and thyroid normal to palpation     RESP: lungs clear to auscultation - no rales, rhonchi or wheezes     CV: regular rates and rhythm, normal S1 S2, no S3 or S4 and no murmur, click or rub     ABDOMEN:  soft, nontender, no HSM or masses and bowel sounds normal     MS: extremities normal- no gross deformities noted, no evidence of inflammation in joints, FROM in all extremities.     SKIN: no suspicious lesions or rashes     NEURO: Normal strength and tone, sensory exam grossly normal, mentation intact and speech normal     PSYCH: mentation appears normal. and affect normal/bright     LYMPHATICS: No cervical adenopathy    Recent Labs   Lab Test 12/23/20  1554 08/23/20  0844   HGB 14.0 14.3    288    137   POTASSIUM 4.2 4.0   CR 0.98 0.93   A1C 5.0  --         Diagnostics:  Recent Results (from the past 240 hour(s))   Symptomatic COVID-19 Virus (Coronavirus) by PCR    Collection Time: 07/04/21 11:13 AM    Specimen: Nasopharyngeal   Result Value Ref Range    COVID-19 Virus PCR to [facility] - Source Nasopharyngeal     COVID-19 Virus PCR to [facility] - Result       Test received-See reflex to IDDL test SARS CoV2 (COVID-19) Virus RT-PCR   SARS-CoV-2 COVID-19 Virus (Coronavirus) by PCR    Collection Time: 07/04/21 11:13 AM    Specimen: Nasopharyngeal   Result Value Ref Range    SARS-CoV-2 Virus Specimen Source Nasopharyngeal     SARS-CoV-2 PCR Result NEGATIVE     SARS-CoV-2 PCR Comment       " Testing was performed using the Xpert Xpress SARS-CoV-2 Assay on the Cepheid Gene-Xpert   Instrument Systems. Additional information about this Emergency Use Authorization (EUA)   assay can be found via the Lab Guide.     Streptococcus A Rapid Scr w Reflx to PCR    Collection Time: 07/04/21 11:14 AM    Specimen: Throat   Result Value Ref Range    Strep Specimen Description Throat     Streptococcus Group A Rapid Screen Negative NEG^Negative   Group A Streptococcus PCR Throat Swab    Collection Time: 07/04/21 11:14 AM    Specimen: Throat   Result Value Ref Range    Specimen Description Throat     Strep Group A PCR Not Detected NDET^Not Detected   CBC with platelets    Collection Time: 07/05/21  3:31 PM   Result Value Ref Range    WBC 10.8 4.0 - 11.0 10e9/L    RBC Count 4.78 4.4 - 5.9 10e12/L    Hemoglobin 14.0 13.3 - 17.7 g/dL    Hematocrit 40.7 40.0 - 53.0 %    MCV 85 78 - 100 fl    MCH 29.3 26.5 - 33.0 pg    MCHC 34.4 31.5 - 36.5 g/dL    RDW 12.2 10.0 - 15.0 %    Platelet Count 237 150 - 450 10e9/L      No EKG required, no history of coronary heart disease, significant arrhythmia, peripheral arterial disease or other structural heart disease.    Revised Cardiac Risk Index (RCRI):  The patient has the following serious cardiovascular risks for perioperative complications:   - No serious cardiac risks = 0 points     RCRI Interpretation: 0 points: Class I (very low risk - 0.4% complication rate)           Signed Electronically by: Jostin Lara DO  Copy of this evaluation report is provided to requesting physician.

## 2021-07-09 ENCOUNTER — VIRTUAL VISIT (OUTPATIENT)
Dept: SURGERY | Facility: CLINIC | Age: 36
End: 2021-07-09
Payer: COMMERCIAL

## 2021-07-09 DIAGNOSIS — E66.01 MORBIDLY OBESE (H): Primary | ICD-10-CM

## 2021-07-09 PROCEDURE — 99207 PR NO CHARGE NURSE ONLY: CPT

## 2021-07-09 NOTE — PROGRESS NOTES
Virtual bariatric pre op class completed.  Class power point and patient checklist information gone over with patient.     All questions were answered.  Quiz was completed.     Patient below goal weight of 323# at preop visit 7/5. It was 319#.  Patient was advised to call if further questions.  Jerilyn Brownlee RN on 7/9/2021 at 11:06 AM

## 2021-07-11 DIAGNOSIS — Z11.59 ENCOUNTER FOR SCREENING FOR OTHER VIRAL DISEASES: ICD-10-CM

## 2021-07-11 LAB — SARS-COV-2 RNA RESP QL NAA+PROBE: NEGATIVE

## 2021-07-11 PROCEDURE — U0005 INFEC AGEN DETEC AMPLI PROBE: HCPCS

## 2021-07-11 PROCEDURE — U0003 INFECTIOUS AGENT DETECTION BY NUCLEIC ACID (DNA OR RNA); SEVERE ACUTE RESPIRATORY SYNDROME CORONAVIRUS 2 (SARS-COV-2) (CORONAVIRUS DISEASE [COVID-19]), AMPLIFIED PROBE TECHNIQUE, MAKING USE OF HIGH THROUGHPUT TECHNOLOGIES AS DESCRIBED BY CMS-2020-01-R: HCPCS

## 2021-07-13 ENCOUNTER — TELEPHONE (OUTPATIENT)
Dept: SURGERY | Facility: CLINIC | Age: 36
End: 2021-07-13

## 2021-07-13 RX ORDER — BUPROPION HYDROCHLORIDE 75 MG/1
150 TABLET ORAL 3 TIMES DAILY
COMMUNITY
Start: 2021-07-09

## 2021-07-14 ENCOUNTER — ANESTHESIA (OUTPATIENT)
Dept: SURGERY | Facility: CLINIC | Age: 36
DRG: 620 | End: 2021-07-14
Payer: COMMERCIAL

## 2021-07-14 ENCOUNTER — ANESTHESIA EVENT (OUTPATIENT)
Dept: SURGERY | Facility: CLINIC | Age: 36
DRG: 620 | End: 2021-07-14
Payer: COMMERCIAL

## 2021-07-14 ENCOUNTER — APPOINTMENT (OUTPATIENT)
Dept: SURGERY | Facility: PHYSICIAN GROUP | Age: 36
End: 2021-07-14
Payer: COMMERCIAL

## 2021-07-14 ENCOUNTER — HOSPITAL ENCOUNTER (INPATIENT)
Facility: CLINIC | Age: 36
LOS: 1 days | Discharge: HOME | DRG: 620 | End: 2021-07-15
Attending: SURGERY | Admitting: SURGERY
Payer: COMMERCIAL

## 2021-07-14 DIAGNOSIS — E66.01 MORBIDLY OBESE (H): ICD-10-CM

## 2021-07-14 DIAGNOSIS — Z98.84 BARIATRIC SURGERY STATUS: Primary | ICD-10-CM

## 2021-07-14 LAB
CREAT SERPL-MCNC: 1.02 MG/DL (ref 0.66–1.25)
CREAT SERPL-MCNC: 1.09 MG/DL (ref 0.66–1.25)
GFR SERPL CREATININE-BSD FRML MDRD: 88 ML/MIN/1.73M2
GFR SERPL CREATININE-BSD FRML MDRD: >90 ML/MIN/1.73M2
PLATELET # BLD AUTO: 302 10E3/UL (ref 150–450)
POTASSIUM BLD-SCNC: 4 MMOL/L (ref 3.4–5.3)

## 2021-07-14 PROCEDURE — 8E0W4CZ ROBOTIC ASSISTED PROCEDURE OF TRUNK REGION, PERCUTANEOUS ENDOSCOPIC APPROACH: ICD-10-PCS | Performed by: SURGERY

## 2021-07-14 PROCEDURE — 272N000001 HC OR GENERAL SUPPLY STERILE: Performed by: SURGERY

## 2021-07-14 PROCEDURE — 0D164ZA BYPASS STOMACH TO JEJUNUM, PERCUTANEOUS ENDOSCOPIC APPROACH: ICD-10-PCS | Performed by: SURGERY

## 2021-07-14 PROCEDURE — 250N000011 HC RX IP 250 OP 636: Performed by: NURSE ANESTHETIST, CERTIFIED REGISTERED

## 2021-07-14 PROCEDURE — 258N000001 HC RX 258: Performed by: SURGERY

## 2021-07-14 PROCEDURE — 250N000013 HC RX MED GY IP 250 OP 250 PS 637: Performed by: NURSE ANESTHETIST, CERTIFIED REGISTERED

## 2021-07-14 PROCEDURE — 120N000001 HC R&B MED SURG/OB

## 2021-07-14 PROCEDURE — 36415 COLL VENOUS BLD VENIPUNCTURE: CPT | Performed by: PHYSICIAN ASSISTANT

## 2021-07-14 PROCEDURE — S2900 ROBOTIC SURGICAL SYSTEM: HCPCS | Performed by: SURGERY

## 2021-07-14 PROCEDURE — 999N000141 HC STATISTIC PRE-PROCEDURE NURSING ASSESSMENT: Performed by: SURGERY

## 2021-07-14 PROCEDURE — 258N000003 HC RX IP 258 OP 636: Performed by: ANESTHESIOLOGY

## 2021-07-14 PROCEDURE — 84132 ASSAY OF SERUM POTASSIUM: CPT | Performed by: ANESTHESIOLOGY

## 2021-07-14 PROCEDURE — 82565 ASSAY OF CREATININE: CPT | Performed by: ANESTHESIOLOGY

## 2021-07-14 PROCEDURE — 250N000011 HC RX IP 250 OP 636: Performed by: PHYSICIAN ASSISTANT

## 2021-07-14 PROCEDURE — 250N000011 HC RX IP 250 OP 636: Performed by: ANESTHESIOLOGY

## 2021-07-14 PROCEDURE — 82565 ASSAY OF CREATININE: CPT | Performed by: PHYSICIAN ASSISTANT

## 2021-07-14 PROCEDURE — 36415 COLL VENOUS BLD VENIPUNCTURE: CPT | Performed by: ANESTHESIOLOGY

## 2021-07-14 PROCEDURE — 258N000003 HC RX IP 258 OP 636: Performed by: PHYSICIAN ASSISTANT

## 2021-07-14 PROCEDURE — 250N000009 HC RX 250: Performed by: NURSE ANESTHETIST, CERTIFIED REGISTERED

## 2021-07-14 PROCEDURE — 250N000011 HC RX IP 250 OP 636: Performed by: SURGERY

## 2021-07-14 PROCEDURE — 43644 LAP GASTRIC BYPASS/ROUX-EN-Y: CPT | Performed by: SURGERY

## 2021-07-14 PROCEDURE — 43644 LAP GASTRIC BYPASS/ROUX-EN-Y: CPT | Mod: AS | Performed by: SURGERY

## 2021-07-14 PROCEDURE — 250N000009 HC RX 250: Performed by: SURGERY

## 2021-07-14 PROCEDURE — 360N000077 HC SURGERY LEVEL 4, PER MIN: Performed by: SURGERY

## 2021-07-14 PROCEDURE — 370N000017 HC ANESTHESIA TECHNICAL FEE, PER MIN: Performed by: SURGERY

## 2021-07-14 PROCEDURE — 85049 AUTOMATED PLATELET COUNT: CPT | Performed by: PHYSICIAN ASSISTANT

## 2021-07-14 PROCEDURE — 710N000009 HC RECOVERY PHASE 1, LEVEL 1, PER MIN: Performed by: SURGERY

## 2021-07-14 PROCEDURE — 250N000025 HC SEVOFLURANE, PER MIN: Performed by: SURGERY

## 2021-07-14 RX ORDER — DEXAMETHASONE SODIUM PHOSPHATE 4 MG/ML
INJECTION, SOLUTION INTRA-ARTICULAR; INTRALESIONAL; INTRAMUSCULAR; INTRAVENOUS; SOFT TISSUE PRN
Status: DISCONTINUED | OUTPATIENT
Start: 2021-07-14 | End: 2021-07-14

## 2021-07-14 RX ORDER — HYDROMORPHONE HCL IN WATER/PF 6 MG/30 ML
0.2 PATIENT CONTROLLED ANALGESIA SYRINGE INTRAVENOUS EVERY 5 MIN PRN
Status: DISCONTINUED | OUTPATIENT
Start: 2021-07-14 | End: 2021-07-14

## 2021-07-14 RX ORDER — SODIUM CHLORIDE, SODIUM LACTATE, POTASSIUM CHLORIDE, CALCIUM CHLORIDE 600; 310; 30; 20 MG/100ML; MG/100ML; MG/100ML; MG/100ML
INJECTION, SOLUTION INTRAVENOUS CONTINUOUS
Status: DISCONTINUED | OUTPATIENT
Start: 2021-07-14 | End: 2021-07-14 | Stop reason: HOSPADM

## 2021-07-14 RX ORDER — BUPROPION HYDROCHLORIDE 75 MG/1
150 TABLET ORAL 3 TIMES DAILY
Status: DISCONTINUED | OUTPATIENT
Start: 2021-07-15 | End: 2021-07-15 | Stop reason: HOSPADM

## 2021-07-14 RX ORDER — ENALAPRILAT 1.25 MG/ML
1.25 INJECTION INTRAVENOUS EVERY 6 HOURS PRN
Status: DISCONTINUED | OUTPATIENT
Start: 2021-07-14 | End: 2021-07-15 | Stop reason: HOSPADM

## 2021-07-14 RX ORDER — MAGNESIUM HYDROXIDE 1200 MG/15ML
LIQUID ORAL PRN
Status: DISCONTINUED | OUTPATIENT
Start: 2021-07-14 | End: 2021-07-14 | Stop reason: HOSPADM

## 2021-07-14 RX ORDER — HYDROMORPHONE HCL IN WATER/PF 6 MG/30 ML
0.2 PATIENT CONTROLLED ANALGESIA SYRINGE INTRAVENOUS EVERY 5 MIN PRN
Status: DISCONTINUED | OUTPATIENT
Start: 2021-07-14 | End: 2021-07-14 | Stop reason: HOSPADM

## 2021-07-14 RX ORDER — LITHIUM CARBONATE 300 MG/1
900 CAPSULE ORAL AT BEDTIME
Status: DISCONTINUED | OUTPATIENT
Start: 2021-07-15 | End: 2021-07-15 | Stop reason: HOSPADM

## 2021-07-14 RX ORDER — BUPIVACAINE HYDROCHLORIDE AND EPINEPHRINE 2.5; 5 MG/ML; UG/ML
INJECTION, SOLUTION INFILTRATION; PERINEURAL PRN
Status: DISCONTINUED | OUTPATIENT
Start: 2021-07-14 | End: 2021-07-14 | Stop reason: HOSPADM

## 2021-07-14 RX ORDER — NALOXONE HYDROCHLORIDE 0.4 MG/ML
0.4 INJECTION, SOLUTION INTRAMUSCULAR; INTRAVENOUS; SUBCUTANEOUS
Status: DISCONTINUED | OUTPATIENT
Start: 2021-07-14 | End: 2021-07-15 | Stop reason: HOSPADM

## 2021-07-14 RX ORDER — LOSARTAN POTASSIUM 50 MG/1
50 TABLET ORAL DAILY
Status: DISCONTINUED | OUTPATIENT
Start: 2021-07-15 | End: 2021-07-15 | Stop reason: HOSPADM

## 2021-07-14 RX ORDER — PROPOFOL 10 MG/ML
INJECTION, EMULSION INTRAVENOUS PRN
Status: DISCONTINUED | OUTPATIENT
Start: 2021-07-14 | End: 2021-07-14

## 2021-07-14 RX ORDER — ONDANSETRON 2 MG/ML
4 INJECTION INTRAMUSCULAR; INTRAVENOUS EVERY 30 MIN PRN
Status: DISCONTINUED | OUTPATIENT
Start: 2021-07-14 | End: 2021-07-14 | Stop reason: HOSPADM

## 2021-07-14 RX ORDER — PROCHLORPERAZINE MALEATE 10 MG
10 TABLET ORAL EVERY 6 HOURS PRN
Status: DISCONTINUED | OUTPATIENT
Start: 2021-07-14 | End: 2021-07-15 | Stop reason: HOSPADM

## 2021-07-14 RX ORDER — CLONIDINE HYDROCHLORIDE 0.1 MG/1
0.1 TABLET ORAL EVERY EVENING
Status: DISCONTINUED | OUTPATIENT
Start: 2021-07-15 | End: 2021-07-15 | Stop reason: HOSPADM

## 2021-07-14 RX ORDER — VECURONIUM BROMIDE 1 MG/ML
INJECTION, POWDER, LYOPHILIZED, FOR SOLUTION INTRAVENOUS PRN
Status: DISCONTINUED | OUTPATIENT
Start: 2021-07-14 | End: 2021-07-14

## 2021-07-14 RX ORDER — HYDROMORPHONE HYDROCHLORIDE 1 MG/ML
.3-.5 INJECTION, SOLUTION INTRAMUSCULAR; INTRAVENOUS; SUBCUTANEOUS
Status: DISCONTINUED | OUTPATIENT
Start: 2021-07-14 | End: 2021-07-15 | Stop reason: HOSPADM

## 2021-07-14 RX ORDER — OXYCODONE HYDROCHLORIDE 5 MG/1
10 TABLET ORAL
Status: DISCONTINUED | OUTPATIENT
Start: 2021-07-14 | End: 2021-07-15 | Stop reason: HOSPADM

## 2021-07-14 RX ORDER — OXYCODONE HYDROCHLORIDE 5 MG/1
5 TABLET ORAL
Status: DISCONTINUED | OUTPATIENT
Start: 2021-07-14 | End: 2021-07-15 | Stop reason: HOSPADM

## 2021-07-14 RX ORDER — SODIUM CHLORIDE, SODIUM LACTATE, POTASSIUM CHLORIDE, CALCIUM CHLORIDE 600; 310; 30; 20 MG/100ML; MG/100ML; MG/100ML; MG/100ML
INJECTION, SOLUTION INTRAVENOUS CONTINUOUS
Status: DISCONTINUED | OUTPATIENT
Start: 2021-07-14 | End: 2021-07-15 | Stop reason: HOSPADM

## 2021-07-14 RX ORDER — ONDANSETRON 4 MG/1
4 TABLET, ORALLY DISINTEGRATING ORAL EVERY 6 HOURS PRN
Status: DISCONTINUED | OUTPATIENT
Start: 2021-07-14 | End: 2021-07-15 | Stop reason: HOSPADM

## 2021-07-14 RX ORDER — DEXTROAMPHETAMINE SACCHARATE, AMPHETAMINE ASPARTATE, DEXTROAMPHETAMINE SULFATE AND AMPHETAMINE SULFATE 1.25; 1.25; 1.25; 1.25 MG/1; MG/1; MG/1; MG/1
15 TABLET ORAL 2 TIMES DAILY
Status: DISCONTINUED | OUTPATIENT
Start: 2021-07-15 | End: 2021-07-15 | Stop reason: HOSPADM

## 2021-07-14 RX ORDER — ONDANSETRON 2 MG/ML
INJECTION INTRAMUSCULAR; INTRAVENOUS PRN
Status: DISCONTINUED | OUTPATIENT
Start: 2021-07-14 | End: 2021-07-14

## 2021-07-14 RX ORDER — LABETALOL HYDROCHLORIDE 5 MG/ML
10 INJECTION, SOLUTION INTRAVENOUS
Status: DISCONTINUED | OUTPATIENT
Start: 2021-07-14 | End: 2021-07-14 | Stop reason: HOSPADM

## 2021-07-14 RX ORDER — ONDANSETRON 2 MG/ML
4 INJECTION INTRAMUSCULAR; INTRAVENOUS EVERY 6 HOURS PRN
Status: DISCONTINUED | OUTPATIENT
Start: 2021-07-14 | End: 2021-07-15 | Stop reason: HOSPADM

## 2021-07-14 RX ORDER — HYDROMORPHONE HYDROCHLORIDE 1 MG/ML
.3-.5 INJECTION, SOLUTION INTRAMUSCULAR; INTRAVENOUS; SUBCUTANEOUS ONCE
Status: COMPLETED | OUTPATIENT
Start: 2021-07-14 | End: 2021-07-14

## 2021-07-14 RX ORDER — ACETAMINOPHEN 325 MG/1
650 TABLET ORAL EVERY 4 HOURS PRN
Status: DISCONTINUED | OUTPATIENT
Start: 2021-07-14 | End: 2021-07-15 | Stop reason: HOSPADM

## 2021-07-14 RX ORDER — TRAZODONE HYDROCHLORIDE 50 MG/1
50-150 TABLET, FILM COATED ORAL
Status: DISCONTINUED | OUTPATIENT
Start: 2021-07-15 | End: 2021-07-15 | Stop reason: HOSPADM

## 2021-07-14 RX ORDER — RISPERIDONE 1 MG/1
1 TABLET ORAL EVERY MORNING
Status: DISCONTINUED | OUTPATIENT
Start: 2021-07-15 | End: 2021-07-15 | Stop reason: HOSPADM

## 2021-07-14 RX ORDER — ALBUTEROL SULFATE 90 UG/1
AEROSOL, METERED RESPIRATORY (INHALATION) PRN
Status: DISCONTINUED | OUTPATIENT
Start: 2021-07-14 | End: 2021-07-14

## 2021-07-14 RX ORDER — HEPARIN SODIUM 5000 [USP'U]/.5ML
5000 INJECTION, SOLUTION INTRAVENOUS; SUBCUTANEOUS
Status: COMPLETED | OUTPATIENT
Start: 2021-07-14 | End: 2021-07-14

## 2021-07-14 RX ORDER — FENTANYL CITRATE 50 UG/ML
25-50 INJECTION, SOLUTION INTRAMUSCULAR; INTRAVENOUS ONCE
Status: COMPLETED | OUTPATIENT
Start: 2021-07-14 | End: 2021-07-14

## 2021-07-14 RX ORDER — KETOROLAC TROMETHAMINE 30 MG/ML
30 INJECTION, SOLUTION INTRAMUSCULAR; INTRAVENOUS EVERY 6 HOURS PRN
Status: DISCONTINUED | OUTPATIENT
Start: 2021-07-14 | End: 2021-07-15 | Stop reason: HOSPADM

## 2021-07-14 RX ORDER — NALOXONE HYDROCHLORIDE 0.4 MG/ML
0.2 INJECTION, SOLUTION INTRAMUSCULAR; INTRAVENOUS; SUBCUTANEOUS
Status: DISCONTINUED | OUTPATIENT
Start: 2021-07-14 | End: 2021-07-15 | Stop reason: HOSPADM

## 2021-07-14 RX ORDER — VILAZODONE HYDROCHLORIDE 10 MG/1
30 TABLET ORAL EVERY MORNING
Status: DISCONTINUED | OUTPATIENT
Start: 2021-07-15 | End: 2021-07-15 | Stop reason: HOSPADM

## 2021-07-14 RX ORDER — LITHIUM CARBONATE 150 MG/1
150 CAPSULE ORAL AT BEDTIME
Status: DISCONTINUED | OUTPATIENT
Start: 2021-07-15 | End: 2021-07-15 | Stop reason: HOSPADM

## 2021-07-14 RX ORDER — LIDOCAINE 40 MG/G
CREAM TOPICAL
Status: DISCONTINUED | OUTPATIENT
Start: 2021-07-14 | End: 2021-07-15 | Stop reason: HOSPADM

## 2021-07-14 RX ORDER — EPHEDRINE SULFATE 50 MG/ML
INJECTION, SOLUTION INTRAMUSCULAR; INTRAVENOUS; SUBCUTANEOUS PRN
Status: DISCONTINUED | OUTPATIENT
Start: 2021-07-14 | End: 2021-07-14

## 2021-07-14 RX ORDER — LORAZEPAM 0.5 MG/1
1 TABLET ORAL DAILY PRN
Status: DISCONTINUED | OUTPATIENT
Start: 2021-07-15 | End: 2021-07-15 | Stop reason: HOSPADM

## 2021-07-14 RX ORDER — CEFAZOLIN SODIUM IN 0.9 % NACL 3 G/100 ML
3 INTRAVENOUS SOLUTION, PIGGYBACK (ML) INTRAVENOUS
Status: COMPLETED | OUTPATIENT
Start: 2021-07-14 | End: 2021-07-14

## 2021-07-14 RX ORDER — FENTANYL CITRATE 50 UG/ML
50 INJECTION, SOLUTION INTRAMUSCULAR; INTRAVENOUS EVERY 5 MIN PRN
Status: DISCONTINUED | OUTPATIENT
Start: 2021-07-14 | End: 2021-07-14 | Stop reason: HOSPADM

## 2021-07-14 RX ORDER — DIPHENHYDRAMINE HYDROCHLORIDE 50 MG/ML
25 INJECTION INTRAMUSCULAR; INTRAVENOUS EVERY 6 HOURS PRN
Status: DISCONTINUED | OUTPATIENT
Start: 2021-07-14 | End: 2021-07-15 | Stop reason: HOSPADM

## 2021-07-14 RX ORDER — FENTANYL CITRATE 50 UG/ML
INJECTION, SOLUTION INTRAMUSCULAR; INTRAVENOUS PRN
Status: DISCONTINUED | OUTPATIENT
Start: 2021-07-14 | End: 2021-07-14

## 2021-07-14 RX ORDER — DIPHENHYDRAMINE HCL 25 MG
25 CAPSULE ORAL EVERY 6 HOURS PRN
Status: DISCONTINUED | OUTPATIENT
Start: 2021-07-14 | End: 2021-07-15 | Stop reason: HOSPADM

## 2021-07-14 RX ORDER — GABAPENTIN 100 MG/1
100-200 CAPSULE ORAL
Status: DISCONTINUED | OUTPATIENT
Start: 2021-07-15 | End: 2021-07-15 | Stop reason: HOSPADM

## 2021-07-14 RX ORDER — ONDANSETRON 4 MG/1
4 TABLET, ORALLY DISINTEGRATING ORAL EVERY 30 MIN PRN
Status: DISCONTINUED | OUTPATIENT
Start: 2021-07-14 | End: 2021-07-14 | Stop reason: HOSPADM

## 2021-07-14 RX ORDER — FENTANYL CITRATE 50 UG/ML
50 INJECTION, SOLUTION INTRAMUSCULAR; INTRAVENOUS EVERY 5 MIN PRN
Status: DISCONTINUED | OUTPATIENT
Start: 2021-07-14 | End: 2021-07-14

## 2021-07-14 RX ORDER — LIDOCAINE HYDROCHLORIDE 20 MG/ML
INJECTION, SOLUTION INFILTRATION; PERINEURAL PRN
Status: DISCONTINUED | OUTPATIENT
Start: 2021-07-14 | End: 2021-07-14

## 2021-07-14 RX ADMIN — PROPOFOL 200 MG: 10 INJECTION, EMULSION INTRAVENOUS at 08:34

## 2021-07-14 RX ADMIN — VECURONIUM BROMIDE 3 MG: 1 INJECTION, POWDER, LYOPHILIZED, FOR SOLUTION INTRAVENOUS at 10:02

## 2021-07-14 RX ADMIN — KETOROLAC TROMETHAMINE 30 MG: 30 INJECTION, SOLUTION INTRAMUSCULAR; INTRAVENOUS at 15:43

## 2021-07-14 RX ADMIN — Medication 10 MG: at 08:55

## 2021-07-14 RX ADMIN — Medication 5 MG: at 08:48

## 2021-07-14 RX ADMIN — SODIUM CHLORIDE, POTASSIUM CHLORIDE, SODIUM LACTATE AND CALCIUM CHLORIDE: 600; 310; 30; 20 INJECTION, SOLUTION INTRAVENOUS at 11:09

## 2021-07-14 RX ADMIN — SUCCINYLCHOLINE CHLORIDE 100 MG: 20 INJECTION, SOLUTION INTRAMUSCULAR; INTRAVENOUS; PARENTERAL at 08:34

## 2021-07-14 RX ADMIN — HYDROMORPHONE HYDROCHLORIDE 0.5 MG: 1 INJECTION, SOLUTION INTRAMUSCULAR; INTRAVENOUS; SUBCUTANEOUS at 20:47

## 2021-07-14 RX ADMIN — FENTANYL CITRATE 25 MCG: 50 INJECTION, SOLUTION INTRAMUSCULAR; INTRAVENOUS at 12:31

## 2021-07-14 RX ADMIN — Medication 3 G: at 08:49

## 2021-07-14 RX ADMIN — METHYLENE BLUE 12.5 MG: 5 INJECTION INTRAVENOUS at 10:38

## 2021-07-14 RX ADMIN — METHYLENE BLUE 12.5 MG: 5 INJECTION INTRAVENOUS at 10:35

## 2021-07-14 RX ADMIN — ONDANSETRON 4 MG: 2 INJECTION INTRAMUSCULAR; INTRAVENOUS at 11:10

## 2021-07-14 RX ADMIN — HYDROMORPHONE HYDROCHLORIDE 0.3 MG: 1 INJECTION, SOLUTION INTRAMUSCULAR; INTRAVENOUS; SUBCUTANEOUS at 12:31

## 2021-07-14 RX ADMIN — HEPARIN SODIUM 5000 UNITS: 10000 INJECTION, SOLUTION INTRAVENOUS; SUBCUTANEOUS at 08:49

## 2021-07-14 RX ADMIN — HYDROMORPHONE HYDROCHLORIDE 0.2 MG: 0.2 INJECTION, SOLUTION INTRAMUSCULAR; INTRAVENOUS; SUBCUTANEOUS at 13:35

## 2021-07-14 RX ADMIN — SODIUM CHLORIDE, POTASSIUM CHLORIDE, SODIUM LACTATE AND CALCIUM CHLORIDE: 600; 310; 30; 20 INJECTION, SOLUTION INTRAVENOUS at 16:00

## 2021-07-14 RX ADMIN — ROCURONIUM BROMIDE 50 MG: 10 INJECTION INTRAVENOUS at 08:40

## 2021-07-14 RX ADMIN — SODIUM CHLORIDE, POTASSIUM CHLORIDE, SODIUM LACTATE AND CALCIUM CHLORIDE: 600; 310; 30; 20 INJECTION, SOLUTION INTRAVENOUS at 08:05

## 2021-07-14 RX ADMIN — Medication 10 MG: at 08:51

## 2021-07-14 RX ADMIN — DEXAMETHASONE SODIUM PHOSPHATE 4 MG: 4 INJECTION, SOLUTION INTRA-ARTICULAR; INTRALESIONAL; INTRAMUSCULAR; INTRAVENOUS; SOFT TISSUE at 08:55

## 2021-07-14 RX ADMIN — HYDROMORPHONE HYDROCHLORIDE 0.5 MG: 1 INJECTION, SOLUTION INTRAMUSCULAR; INTRAVENOUS; SUBCUTANEOUS at 17:27

## 2021-07-14 RX ADMIN — PROPOFOL 100 MG: 10 INJECTION, EMULSION INTRAVENOUS at 08:35

## 2021-07-14 RX ADMIN — ROCURONIUM BROMIDE 50 MG: 10 INJECTION INTRAVENOUS at 09:00

## 2021-07-14 RX ADMIN — MIDAZOLAM 2 MG: 1 INJECTION INTRAMUSCULAR; INTRAVENOUS at 08:22

## 2021-07-14 RX ADMIN — FENTANYL CITRATE 50 MCG: 50 INJECTION, SOLUTION INTRAMUSCULAR; INTRAVENOUS at 08:34

## 2021-07-14 RX ADMIN — ALBUTEROL SULFATE 4 PUFF: 90 AEROSOL, METERED RESPIRATORY (INHALATION) at 11:37

## 2021-07-14 RX ADMIN — VECURONIUM BROMIDE 2 MG: 1 INJECTION, POWDER, LYOPHILIZED, FOR SOLUTION INTRAVENOUS at 10:45

## 2021-07-14 RX ADMIN — SUGAMMADEX 250 MG: 100 INJECTION, SOLUTION INTRAVENOUS at 11:27

## 2021-07-14 RX ADMIN — LIDOCAINE HYDROCHLORIDE 100 MG: 20 INJECTION, SOLUTION INFILTRATION; PERINEURAL at 08:34

## 2021-07-14 RX ADMIN — ONDANSETRON 4 MG: 2 INJECTION INTRAMUSCULAR; INTRAVENOUS at 12:06

## 2021-07-14 ASSESSMENT — ACTIVITIES OF DAILY LIVING (ADL)
ADLS_ACUITY_SCORE: 11
ADLS_ACUITY_SCORE: 11

## 2021-07-14 ASSESSMENT — MIFFLIN-ST. JEOR: SCORE: 2391.41

## 2021-07-14 NOTE — ANESTHESIA CARE TRANSFER NOTE
Patient: Abel Ward    Procedure(s):  ROBOTIC ASSISTED LAPAROSCOPIC GASTRIC BYPASS     Diagnosis: Morbidly obese (H) [E66.01]  Diagnosis Additional Information: No value filed.    Anesthesia Type:   General     Note:    Oropharynx: oropharynx clear of all foreign objects and CPAP/BIPAP  Level of Consciousness: drowsy  Oxygen Supplementation: face mask    Independent Airway: airway patency satisfactory and stable  Dentition: dentition unchanged      Patient transferred to: PACU    Handoff Report: Identifed the Patient, Identified the Reponsible Provider, Reviewed the pertinent medical history, Discussed the surgical course, Reviewed Intra-OP anesthesia mangement and issues during anesthesia, Set expectations for post-procedure period and Allowed opportunity for questions and acknowledgement of understanding      Vitals: (Last set prior to Anesthesia Care Transfer)  CRNA VITALS  7/14/2021 1108 - 7/14/2021 1146      7/14/2021             SpO2:  96 %    Resp Rate (set):  18  119/55  98          Electronically Signed By: THEO Junior CRNA  July 14, 2021  11:46 AM

## 2021-07-14 NOTE — OR NURSING
OK by GODWIN Hidalgo to transfer to the floor. Sent with 1 belonging bag, cpap and cell phone. Pt's wife Zoila called with update.

## 2021-07-14 NOTE — BRIEF OP NOTE
[facility]    Brief Operative Note    Pre-operative diagnosis: Morbidly obese (H) [E66.01]  Post-operative diagnosis Morbid Obesity    Procedure: Procedure(s):  ROBOTIC ASSISTED LAPAROSCOPIC GASTRIC BYPASS      Surgeon: Surgeon(s) and Role:     * Seth Lundy MD - Primary     * Umer Mcduffie PA-C - Assisting     Anesthesia: General   Estimated blood loss: 20 mL  Drains: None  Specimens: * No specimens in log *  Findings:   None.  GB wnl.  36 VisiGI used for sizing.  Complications: None.  Implants: * No implants in log *

## 2021-07-14 NOTE — OP NOTE
PREOPERATIVE DIAGNOSIS: Morbid obesity with  medical comorbidities including high blood pressure, sleep apnea and gastroesophageal reflux disease.  Failure of other methods of permanent weight loss including portion control and calorie counting, exercise, weight watchers, and Atkins diet.  BMI at surgeon consultation of 43.96    POSTOPERATIVE DIAGNOSIS: same    PROCEDURE: Robotic assisted laparoscopic Sincere-en-Y gastric bypass.     SURGEON: Seth Lundy MD     ASSISTANT: Umer Mcduffie PA-C, Physician assistant first assistant was necessary during the performance of this procedure for expertise in patient positioning, prepping, draping, trocar placement, camera management, retraction and exposure, and suctioning.  ANESTHESIA: General endotracheal.     ESTIMATED BLOOD LOSS: 10 cc.     DRAINS: None.     COMPLICATIONS: None.     SPECIMENS: None.     OPERATIVE INDICATIONS: Abel Ward has undergone the preoperative screening process through the [facility] and has been deemed an appropriate candidate for bariatric surgery. He  was furnished with a copy of our specialized consent form for said procedure. This document was reviewed with him  in great detail. After thoroughly discussing the procedures, potential risks and anticipated outcome he  wished to proceed.   Moreover, as the surgeon performing this procedure, I certify that the following are true in regards to this patient at or prior to the day of surgery:   1. The patient's body mass index (BMI) is or has been greater than or equal to 35 kg/m2.  2. The patient has at least one co-morbidity related to obesity (as outlined above).  3. The patient has been previously unsuccessful with medical treatment for obesity.  Please note that some of this information has been documented as part of a comprehensive pre-bariatric surgery process in the outpatient clinic and is NOT immediately available in the inpatient encounter for this  bariatric operation.    DESCRIPTION OF PROCEDURE: After informed consent was obtained, the patient was taken to the operating room and placed supine on the operating table. Following the induction of adequate general endotracheal anesthesia, the patient's abdomen was shaved, prepped and draped in the usual fashion. A surgical timeout was initiated and completed. Appropriate IV antibiotics as well as subq heparin were administered. Skin incision was then made to the left of the umbilicus in the mid abdomen and a 5 mm optical trocar was used to gain access to the peritoneal cavity. Carbon dioxide pneumoperitoneum was then established.  Under direct vision a Shanice liver retractor was then introduced through a subxiphoid stab incision and used to elevate the left lobe of the liver.  Under direct vision the following ports were then placed: 12 mm robotic port was placed in the right mid abdomen, inferior and medial to this between this 12 mm port in the initial 5 mm port a 5 mm port was placed, 12 mm port was placed left lateral to the initial 5 mm port, an 8 mm port was placed in the left anterior axillary line, finally the initial 5 mm port was exchanged for an 8 mm robotic port.  The robot was then uneventfully docked and I assumed control of the surgeon console.  Starting in the upper abdomen the angle of Hiss was taken down.  I then dissected along the lesser curvature the stomach and gain access to the retrogastric space.  36 [place] VISI G-tube was then introduced transorally into the upper stomach.  This was used as a means of sizing the gastric pouch.  Starting along the lesser curvature of the stomach the 60 mm blue load stapler was fired transversely.  Then using the G-tube is a guide additional firings were placed vertically to complete a completely divided gastric pouch.  With this then completed the G-tube was backed up into the esophagus.  The greater omentum was grasped and elevated.  This was divided  from its distal tip to the level of the transverse colon.  The transverse colon was then reflected superiorly and the small bowel was run proximally to the ligament of Treitz.  60 cm distal to ligament of Treitz a loop omega limb was brought up in an antecolic/antigastric manner.  I then proceeded with 2 layer handsewn gastrojejunostomy.  Initial posterior layer of suture was placed using 2-0 PDS strata fix suture.  A gastrotomy as well as enterotomy was then created using monopolar scissors.  The enterotomy in both the stomach pouch and the small bowel was large enough to accommodate the 36 Israeli VISI G-tube.  Posterior mucosal layer of sutures was then placed using 2-0 PDS strata fix suture.  I then initiated anterior mucosal closure again with 2-0 PDS strata fix suture.  When this was nearly closed the VISI G-tube was passed through the anastomosis and into the proximal small bowel and directed down what would be, are bypass Sincere limb in the omega limb that had been brought up.  The anterior mucosal closure was then completed.  Second layer closure was performed over this with 2-0 PDS strata fix suture.  Proximal to the gastrojejunostomy the omega limb was then divided using the 60 mm blue load stapler.  Leak test was then performed using immunofluorescence.  There was no evidence of leakage.  I then measured out 125 cm Sincere limb and a side-to-side jejunojejunostomy was created using the 60 mm blue load stapler.  The enterotomies for the stapler were sutured closed using 2-0 PDS strata fix suture.  The mesenteric defect was closed using running 2-0 silk suture.  This completed our Sincere-en-Y gastric bypass, the robot was then undocked.  The fascia at the 12 mm port sites were closed using a fascial closure instrument and 0 Vicryl tie. Trocars as well as Shanice retractor were all then removed. Carbon dioxide was massaged from the abdomen. Local anesthetic was injected at the incision sites and the incisions  were all closed with subcuticular 4-0 Vicryl stitches. Benzoin and Steri-Strips were applied. Needle and sponge counts were correct. The patient tolerated this without difficulty, was awakened in the operating room and taken to the recovery room in stable condition.     RAJENDRA ALCANTAR MD

## 2021-07-14 NOTE — ANESTHESIA PROCEDURE NOTES
Airway       Patient location during procedure: OR       Procedure Start/Stop Times: 7/14/2021 8:36 AM and 7/14/2021 8:36 AM  Staff -        Anesthesiologist:  Gualberto Hidalgo MD       CRNA: Kirill Barber APRN CRNA       Performed By: CRNAIndications and Patient Condition       Indications for airway management: dane-procedural         Mask difficulty assessment: 0 - not attempted    Final Airway Details       Final airway type: endotracheal airway    Endotracheal Airway Details        Cuffed: yes       Successful intubation technique: video laryngoscopy       VL Blade Size: Glidescope 4       Grade View of Cords: 1       Position: Right       Measured from: gums/teeth       Secured at (cm): 24       Bite block used: None    Post intubation assessment        Placement verified by: capnometry, equal breath sounds and chest rise        Number of attempts at approach: 1       Number of other approaches attempted: 0       Secured with: ties       Ease of procedure: easy       Dentition: Intact

## 2021-07-14 NOTE — PLAN OF CARE
Pt arrived from PACU at 1450; stable vitals on arrival, sating well on 2 liters NC. Rates abdominal pain 5/10, lap sites CDI. Voiding spontaneously, tolerating ice chips.

## 2021-07-14 NOTE — ANESTHESIA PREPROCEDURE EVALUATION
Anesthesia Pre-Procedure Evaluation    Patient: Abel Ward   MRN: [id number] : 1985        Preoperative Diagnosis: Morbidly obese (H) [E66.01]   Procedure : Procedure(s):  ROBOTIC ASSISTED LAPAROSCOPIC GASTRIC BYPASS WITH POSSIBLE LAPAROSCOPIC CHOLECYSTECTOMY      Past Medical History:   Diagnosis Date     Anxiety      Attention deficit disorder with hyperactivity(314.01)      Esophageal reflux     Dr Starks- had EGD ~2202     Generalized anxiety disorder      Hypertension      Infectious mononucleosis      Low testosterone      Major depressive disorder, single episode in full remission (H)      Major depressive disorder, single episode, mild (H)      MDD (major depressive disorder)      Mild intermittent asthma      Suicidal ideation 2017     Viral URI with cough 1/3/2020      Past Surgical History:   Procedure Laterality Date     OTHER SURGICAL HISTORY      wisdom teeth extraction      No Known Allergies   Social History     Tobacco Use     Smoking status: Never Smoker     Smokeless tobacco: Never Used   Substance Use Topics     Alcohol use: Yes     Alcohol/week: 0.0 standard drinks      Wt Readings from Last 1 Encounters:   21 144.7 kg (319 lb)        Anesthesia Evaluation            ROS/MED HX  ENT/Pulmonary:     (+) sleep apnea, uses CPAP, asthma     Neurologic:       Cardiovascular:     (+) hypertension-----    METS/Exercise Tolerance:     Hematologic:       Musculoskeletal:       GI/Hepatic:     (+) GERD,     Renal/Genitourinary:       Endo: Comment: Low testosterone;    (+) Obesity,     Psychiatric/Substance Use: Comment: ADHD;    (+) psychiatric history anxiety and depression     Infectious Disease:       Malignancy:       Other:            Physical Exam    Airway        Mallampati: III   TM distance: > 3 FB   Neck ROM: full   Mouth opening: > 3 cm    Respiratory Devices and Support         Dental  no notable dental history         Cardiovascular   cardiovascular  exam normal          Pulmonary   pulmonary exam normal                OUTSIDE LABS:  CBC:   Lab Results   Component Value Date    WBC 10.8 07/05/2021    WBC 8.5 12/23/2020    HGB 14.0 07/05/2021    HGB 14.0 12/23/2020    HCT 40.7 07/05/2021    HCT 42.4 12/23/2020     07/05/2021     12/23/2020     BMP:   Lab Results   Component Value Date     12/23/2020     08/23/2020    POTASSIUM 4.2 12/23/2020    POTASSIUM 4.0 08/23/2020    CHLORIDE 105 12/23/2020    CHLORIDE 108 08/23/2020    CO2 25 12/23/2020    CO2 24 08/23/2020    BUN 18 12/23/2020    BUN 16 08/23/2020    CR 0.98 12/23/2020    CR 0.93 08/23/2020    GLC 87 12/23/2020    GLC 90 08/23/2020     COAGS:   Lab Results   Component Value Date    PTT 27 10/30/2015    INR 1.01 07/08/2015     POC: No results found for: BGM, HCG, HCGS  HEPATIC:   Lab Results   Component Value Date    ALBUMIN 4.1 12/23/2020    PROTTOTAL 7.5 12/23/2020    ALT 39 12/23/2020    AST 18 12/23/2020    ALKPHOS 88 12/23/2020    BILITOTAL 0.4 12/23/2020     OTHER:   Lab Results   Component Value Date    A1C 5.0 12/23/2020    INEZ 9.8 12/23/2020    TSH 2.43 08/23/2020    T4 0.91 10/14/2019    CRP 35.6 (H) 05/03/2011       Anesthesia Plan    ASA Status:  3   NPO Status:  NPO Appropriate    Anesthesia Type: General.     - Airway: ETT   Induction: Intravenous, RSI.   Maintenance: Balanced.   Techniques and Equipment:     - Airway: Video-Laryngoscope         Consents    Anesthesia Plan(s) and associated risks, benefits, and realistic alternatives discussed. Questions answered and patient/representative(s) expressed understanding.     - Discussed with:  Patient         Postoperative Care    Pain management: IV analgesics.   PONV prophylaxis: Ondansetron (or other 5HT-3), Dexamethasone or Solumedrol     Comments:                JOSE OVERTON MD

## 2021-07-15 VITALS
RESPIRATION RATE: 16 BRPM | BODY MASS INDEX: 44.1 KG/M2 | HEIGHT: 71 IN | TEMPERATURE: 99 F | OXYGEN SATURATION: 97 % | WEIGHT: 315 LBS | SYSTOLIC BLOOD PRESSURE: 103 MMHG | DIASTOLIC BLOOD PRESSURE: 61 MMHG | HEART RATE: 76 BPM

## 2021-07-15 LAB
ANION GAP SERPL CALCULATED.3IONS-SCNC: 5 MMOL/L (ref 3–14)
CHLORIDE BLD-SCNC: 105 MMOL/L (ref 94–109)
CO2 SERPL-SCNC: 25 MMOL/L (ref 20–32)
GLUCOSE BLDC GLUCOMTR-MCNC: 84 MG/DL (ref 70–99)
GLUCOSE BLDC GLUCOMTR-MCNC: 89 MG/DL (ref 70–99)
HGB BLD-MCNC: 12.7 G/DL (ref 13.3–17.7)
POTASSIUM BLD-SCNC: 3.8 MMOL/L (ref 3.4–5.3)
SODIUM SERPL-SCNC: 135 MMOL/L (ref 133–144)

## 2021-07-15 PROCEDURE — 250N000013 HC RX MED GY IP 250 OP 250 PS 637: Performed by: SURGERY

## 2021-07-15 PROCEDURE — 250N000013 HC RX MED GY IP 250 OP 250 PS 637: Performed by: PHYSICIAN ASSISTANT

## 2021-07-15 PROCEDURE — 250N000011 HC RX IP 250 OP 636: Performed by: PHYSICIAN ASSISTANT

## 2021-07-15 PROCEDURE — 85018 HEMOGLOBIN: CPT | Performed by: PHYSICIAN ASSISTANT

## 2021-07-15 PROCEDURE — 82374 ASSAY BLOOD CARBON DIOXIDE: CPT | Performed by: PHYSICIAN ASSISTANT

## 2021-07-15 PROCEDURE — 258N000003 HC RX IP 258 OP 636: Performed by: PHYSICIAN ASSISTANT

## 2021-07-15 PROCEDURE — 36415 COLL VENOUS BLD VENIPUNCTURE: CPT | Performed by: PHYSICIAN ASSISTANT

## 2021-07-15 RX ORDER — OXYCODONE HYDROCHLORIDE 5 MG/1
5 TABLET ORAL
Qty: 15 TABLET | Refills: 0 | Status: SHIPPED | OUTPATIENT
Start: 2021-07-15 | End: 2021-07-20

## 2021-07-15 RX ADMIN — OXYCODONE HYDROCHLORIDE 5 MG: 5 TABLET ORAL at 13:02

## 2021-07-15 RX ADMIN — HYDROMORPHONE HYDROCHLORIDE 0.5 MG: 1 INJECTION, SOLUTION INTRAMUSCULAR; INTRAVENOUS; SUBCUTANEOUS at 00:08

## 2021-07-15 RX ADMIN — SODIUM CHLORIDE, POTASSIUM CHLORIDE, SODIUM LACTATE AND CALCIUM CHLORIDE: 600; 310; 30; 20 INJECTION, SOLUTION INTRAVENOUS at 02:03

## 2021-07-15 RX ADMIN — KETOROLAC TROMETHAMINE 30 MG: 30 INJECTION, SOLUTION INTRAMUSCULAR; INTRAVENOUS at 14:19

## 2021-07-15 RX ADMIN — PANTOPRAZOLE SODIUM 40 MG: 40 TABLET, DELAYED RELEASE ORAL at 08:00

## 2021-07-15 RX ADMIN — PROCHLORPERAZINE EDISYLATE 10 MG: 5 INJECTION INTRAMUSCULAR; INTRAVENOUS at 02:08

## 2021-07-15 RX ADMIN — HYDROMORPHONE HYDROCHLORIDE 0.5 MG: 1 INJECTION, SOLUTION INTRAMUSCULAR; INTRAVENOUS; SUBCUTANEOUS at 10:06

## 2021-07-15 RX ADMIN — HYDROMORPHONE HYDROCHLORIDE 0.5 MG: 1 INJECTION, SOLUTION INTRAMUSCULAR; INTRAVENOUS; SUBCUTANEOUS at 04:15

## 2021-07-15 RX ADMIN — VILAZODONE HYDROCHLORIDE 30 MG: 10 TABLET ORAL at 08:01

## 2021-07-15 RX ADMIN — ONDANSETRON 4 MG: 2 INJECTION INTRAMUSCULAR; INTRAVENOUS at 00:14

## 2021-07-15 RX ADMIN — OXYCODONE HYDROCHLORIDE 10 MG: 5 TABLET ORAL at 16:15

## 2021-07-15 RX ADMIN — ENOXAPARIN SODIUM 40 MG: 40 INJECTION SUBCUTANEOUS at 08:00

## 2021-07-15 RX ADMIN — HYDROMORPHONE HYDROCHLORIDE 0.5 MG: 1 INJECTION, SOLUTION INTRAMUSCULAR; INTRAVENOUS; SUBCUTANEOUS at 07:38

## 2021-07-15 RX ADMIN — LOSARTAN POTASSIUM 50 MG: 50 TABLET, FILM COATED ORAL at 08:01

## 2021-07-15 RX ADMIN — HYDROMORPHONE HYDROCHLORIDE 0.5 MG: 1 INJECTION, SOLUTION INTRAMUSCULAR; INTRAVENOUS; SUBCUTANEOUS at 02:03

## 2021-07-15 RX ADMIN — BUPROPION HYDROCHLORIDE 150 MG: 75 TABLET, FILM COATED ORAL at 16:15

## 2021-07-15 RX ADMIN — BUPROPION HYDROCHLORIDE 150 MG: 75 TABLET, FILM COATED ORAL at 08:01

## 2021-07-15 RX ADMIN — RISPERIDONE 1 MG: 1 TABLET ORAL at 08:01

## 2021-07-15 RX ADMIN — TRAZODONE HYDROCHLORIDE 50 MG: 50 TABLET ORAL at 00:06

## 2021-07-15 ASSESSMENT — ACTIVITIES OF DAILY LIVING (ADL)
ADLS_ACUITY_SCORE: 11

## 2021-07-15 NOTE — PLAN OF CARE
Pt is A&Ox4, VSS, on RA. PRN dilaudid for pain management. Lap sites x6, covered with band-aids, are CDI. ABD binder in place. LS clear, dim in bases. BS+ active, flatus-, voiding adequately. NPO ex ice ice chips. PRN zofran and compazine given x1 for intermittent nausea. Some nausea present after walking to and from bathroom, no emesis. Is up SBA.

## 2021-07-15 NOTE — PLAN OF CARE
[id number]: A&O x4. VSS on RA. Pain controlled by Oxycodone. CMS intact. LS diminished. BS+, BM-, flatus-, belching+. Lap sites CDI, WDL. Abdominal binder in place. Tolerating clear liquid diet. Denies N/V. Voiding adequately. Up independently.     Discharge instructions and education discussed with patient. Teach back utilized. Patient Discharged home with wife.

## 2021-07-15 NOTE — PLAN OF CARE
Ambulated patient  in [place] this afternoon. Pt c/o increase in pain this afternoon after he took oxycodone prn given torodol prn . Lap sites all dry and intact to abdomen and abdominal binder in place. Pt voiding has bowel sounds and denies passing flatus denies nausea tolerating liquids.  Noted congested cough at times and reports he recently had bronchitis. Noted now discharge order for this patient. Pt is on room air at 97% ivf patent. Informs me that dietician did call him to instruct.

## 2021-07-15 NOTE — ANESTHESIA POSTPROCEDURE EVALUATION
Patient: Abel Ward    Procedure(s):  ROBOTIC ASSISTED LAPAROSCOPIC GASTRIC BYPASS     Diagnosis:Morbidly obese (H) [E66.01]  Diagnosis Additional Information: No value filed.    Anesthesia Type:  General    Note:  Disposition: Admission   Postop Pain Control: Uneventful            Sign Out: Well controlled pain   PONV: No   Neuro/Psych: Uneventful            Sign Out: Acceptable/Baseline neuro status   Airway/Respiratory: Uneventful            Sign Out: Acceptable/Baseline resp. status   CV/Hemodynamics: Uneventful            Sign Out: Acceptable CV status; No obvious hypovolemia; No obvious fluid overload   Other NRE:    DID A NON-ROUTINE EVENT OCCUR?            Last vitals:  Vitals:    07/14/21 1727 07/14/21 1800 07/14/21 1953   BP:  132/69 118/58   Pulse:  80 67   Resp: 16 16 16   Temp:   36.9  C (98.4  F)   SpO2:  94% 98%       Last vitals prior to Anesthesia Care Transfer:  CRNA VITALS  7/14/2021 1108 - 7/14/2021 1208      7/14/2021             SpO2:  96 %    Resp Rate (set):  18          Electronically Signed By: JOSE OVERTON MD  July 14, 2021  9:05 PM

## 2021-07-15 NOTE — PROGRESS NOTES
"Bariatric Surgery Progress Note         Assessment:        Abel Ward is a 36 year old male S/P Robotic Lap Gastric Bypass  POD #1   Morbid Obesity, BMI >40            Plan:     Continue Clear liquid diet.  Fulls tomorrow morning.  Continue IVF at 125mL/hr.  Registered Dietician consult to see prior to discharge.  Done.  When tolerating clears, start PO pain medication.  Oxycodone Rx for discharge.  Antiemetics PRN post op N/V.  Pepcid IV to Prilosec now.  Lovenox and PCDs.  Ambulate at least QID.  Binder on when out of bed.  Encourage IS use, deep breathing, cough for pulmonary hygiene.   May shower POD2.  Remove bandaids POD2.   Discussed OR findings and all questions answered.    Dispo:Likely home tonight if O2 sats >90 on RA, other VSS, pain and N/V continue to be well controlled. Follow up in clinic next week.        Interval History:     Doing pretty well.  No flatus yet.  Burping some.  Pain tolerable with Dilaudid but has yet to try oxycodone.         Physical Exam:     /56 (BP Location: Left arm)   Pulse 72   Temp 98  F (36.7  C) (Oral)   Resp 16   Ht 1.803 m (5' 11\")   Wt 142.9 kg (315 lb 1.6 oz)   SpO2 93%   BMI 43.95 kg/m    I/O last 3 completed shifts:  In: 1100 [I.V.:1000; IV Piggyback:100]  Out: 2060 [Urine:2050; Blood:10]  General: NAD, pleasant, alert and oriented x3  Abdomen: soft, with binder on  Incision: no complaints     Labs (most recent at bottom):     Results for orders placed or performed during the hospital encounter of 07/14/21 (from the past 24 hour(s))   Creatinine   Result Value Ref Range    Creatinine 1.09 0.66 - 1.25 mg/dL    GFR Estimate 88 >60 mL/min/1.73m2   Platelet count   Result Value Ref Range    Platelet Count 302 150 - 450 10e3/uL   Nutrition Services Adult IP Consult    Narrative    Kimberly Jamison     7/15/2021  1:03 PM  -NUTRITION EDUCATION  *Televisit*    REASON FOR ASSESSMENT:  Bariatric Surgery Consult    CURRENT DIET:  Bariatric Clear " "Liquid    NUTRITION HISTORY:  Patient worked with clinical dietitian in [facility]   prior to surgery to assist with lifestyle modification.    ANTHROPOMETRICS:   Ht: 5'11\"  Wt: 142.9 kg  BMI: 43.95 kg/(m^2)  IBW: 75.3 kg  %IBW: 190%    ASSESSED NUTRITION NEEDS:  Energy Needs: 1572-2001kcals (11 - 14 kcal/kg ABW)           Protein Needs: 75-113g (1 - 1.5 gm/kg IBW)  Fluid Needs: 6881-4651 mL (1mL/kcal/ABW)    Know patient will not meet assessed needs due to the restrictive   nature of surgery.    NUTRITION DIAGNOSIS:   Food- and nutrition related knowledge deficit related to   bariatric surgery as evidence by robotic-assisted laparoscopic   gastric bypass surgery on 7/14/2021.    INTERVENTIONS:    Nutrition Prescription:    Recommended patient follow bariatric diet advancement for   robotic-assisted laparoscopic gastric bypass    Implementation:    Nutrition Education (Content):  a) Reviewed robotic-assisted laparoscopic gastric bypass diet   guidelines   b) Provided handouts:  Your Stage 2 Diet, Low-Fat Full Liquids   and Supplements After Weight Loss Surgery    Nutrition Education (Application):  c) Discussed current eating habits and recommended alternative   food choices  d) Patient verbalizes understanding of diet by listing   appropriate foods for home    Anticipate good compliance    Diet Education - refer to Education Flowsheet    Goals:    Patient will follow bariatric diet advancement schedule    Patient will follow post-operative vitamin mineral schedule      Follow Up:    Patient to follow up in 2 weeks with RD in [facility]    Provided RD contact information for future questions      Kimberly Jamison RD, LD  Clinical Dietitian      Glucose by meter   Result Value Ref Range    GLUCOSE BY METER POCT 84 70 - 99 mg/dL   Hemoglobin   Result Value Ref Range    Hemoglobin 12.7 (L) 13.3 - 17.7 g/dL   Electrolyte panel   Result Value Ref Range    Sodium 135 133 - 144 mmol/L    Potassium 3.8 3.4 - " 5.3 mmol/L    Chloride 105 94 - 109 mmol/L    Carbon Dioxide (CO2) 25 20 - 32 mmol/L    Anion Gap 5 3 - 14 mmol/L   Glucose by meter   Result Value Ref Range    GLUCOSE BY METER POCT 89 70 - 99 mg/dL         Umer Mcduffie PA-C  Pager 7am - 5pm: [phone]  [facility] after hours: [phone]

## 2021-07-15 NOTE — CONSULTS
"-NUTRITION EDUCATION  *Televisit*    REASON FOR ASSESSMENT:  Bariatric Surgery Consult    CURRENT DIET:  Bariatric Clear Liquid    NUTRITION HISTORY:  Patient worked with clinical dietitian in [facility] prior to surgery to assist with lifestyle modification.    ANTHROPOMETRICS:   Ht: 5'11\"  Wt: 142.9 kg  BMI: 43.95 kg/(m^2)  IBW: 75.3 kg  %IBW: 190%    ASSESSED NUTRITION NEEDS:  Energy Needs: 1572-2001kcals (11 - 14 kcal/kg ABW)           Protein Needs: 75-113g (1 - 1.5 gm/kg IBW)  Fluid Needs: 3567-7686 mL (1mL/kcal/ABW)    Know patient will not meet assessed needs due to the restrictive nature of surgery.    NUTRITION DIAGNOSIS:   Food- and nutrition related knowledge deficit related to bariatric surgery as evidence by robotic-assisted laparoscopic gastric bypass surgery on 7/14/2021.    INTERVENTIONS:    Nutrition Prescription:    Recommended patient follow bariatric diet advancement for robotic-assisted laparoscopic gastric bypass    Implementation:    Nutrition Education (Content):  a) Reviewed robotic-assisted laparoscopic gastric bypass diet guidelines   b) Provided handouts:  Your Stage 2 Diet, Low-Fat Full Liquids and Supplements After Weight Loss Surgery    Nutrition Education (Application):  c) Discussed current eating habits and recommended alternative food choices  d) Patient verbalizes understanding of diet by listing appropriate foods for home    Anticipate good compliance    Diet Education - refer to Education Flowsheet    Goals:    Patient will follow bariatric diet advancement schedule    Patient will follow post-operative vitamin mineral schedule      Follow Up:    Patient to follow up in 2 weeks with RD in [facility]    Provided RD contact information for future questions      Kimberly Jamison RD, LD  Clinical Dietitian     "

## 2021-07-15 NOTE — DISCHARGE INSTRUCTIONS
After Bariatric Surgery Discharge Instructions  [facility]     Note: Ask your nurse to order your medications from the pharmacy. Be sure you have your medications with you when you leave.    What should my diet be for the first 2 weeks after surgery?    Follow the bariatric diet the dietitian discussed with you.    How much fluid should I drink?    Strive for 48-64 ounces daily.    Carry a water bottle with you without a straw or sports top. Drink from it often.    Keep track of how much fluid you drink in a day.    Remember:  -Do not use straws, chew gum or suck on hard candies. They may cause painful gas.    -Sip, don't slurp when you drink.    -Practice small sips using a medicine cup for the first week postop.   -No ice or cold drinks. This could cause gas or spasms.   -No coffee, soda pop or drinks with caffeine. These may cause stomach pain.   -No alcohol. It is bad for your liver and will cause stomach pain.    How often should I do my deep breathing and coughing?    Use your incentive spirometer (small plastic breathing device) every hour while awake after you get home. Using the incentive spirometer helps you deep breath. Continuing to cough and deep breath will help prevent fevers and pneumonia.     If you do not have a fever after one week, you can stop using the incentive spirometer and discard it.       You can continue to take deep breaths without the incentive spirometer every one to two hours while awake for the month after surgery.    What kinds of activity can I do?    Get plenty of rest the first few days after surgery and try to balance rest and activity. You will need some time to recover - you may be more tired than you realize at first. You'll feel better and heal faster if you take good care of yourself.  For 4 weeks after surgery (Please review restrictions at your one week visit, they could change based on how well you are doing):  -Don't lift more  than 20 pounds.   -Take 4-5 short walks every day.  -Don't jog, run, or do belly exercises.    Don't swim, bathe or use a hot tub until your cuts are healed (scabs are gone).  You may shower    Don't plan to fly or take a road trip within the first 1 to 3 months after surgery.  You could get a blood clot in your legs. If you must travel, get up and move around every hour for at least 5 minutes before continuing on your journey.    Your care team can help you decide when it's safe for you to travel.     What can I do for pain control?  You had major belly surgery that involves all layers of your belly muscles. Pain is expected, even for some as far out as 6-8 weeks after surgery. Moving, sneezing, coughing, and breathing will cause discomfort because these activities use your belly muscles.     Please see your after visit summary medication review for what pain medication will be continued, discontinued and newly started for you.      You can take opioid pain medicine if prescribed and if needed. Try to wean off from it as soon as you feel comfortable.     Do not drive while you are taking opioid pain medicine. This is dangerous.    You can take acetaminophen (Tylenol) between your prescribed pain medicine on a scheduled basis OR take it scheduled every 6 hours (check with your care team for specifics).  -Acetaminophen formulation options:    -Liquid   -Caplet (Cut caplet in half before taking)   -Do not take more than 3000 mg Tylenol in a 24 hour period.  -You may also take Tylenol for pain in place of the opioid pain medicine (check with your care team for specifics).     You can apply ice or heat to the affected area(s). Just remember to wrap the ice in something and limit icing sessions to 20 minutes. Excessive icing can irritate the skin or cause skin damage.    You can apply heat with a hot, wet towel or heating pad. Just like cold therapy, limit heat application to 20 minutes. Never sleep with a heating pad  on. It could cause severe burns to your skin.    Wear your binder to support your belly muscles if you have one.  Take this off a little more each day and try to be off completely by 2 weeks after surgery. If you don't need your binder for comfort or support, you don't need to wear it.     You may not be able to sleep in a comfortable position for a few weeks after surgery. This is normal. You may be more comfortable sleeping in a recliner or propped up with 3 pillows for the first couple of weeks after surgery.    Do not take NSAID's (Non-Steroidal Anti-Inflammatory Drugs) (examples: ibuprofen, Motrin, Advil, Aleve, and Naproxen), aspirin, or use pain patches with NSAID's. They will increase your risk of bleeding or getting an ulcer.    Please call the clinic for any of the following pain concerns, we would like to talk to you:  -pain that does not improve with rest  -pain that gets worse and worse  -pain that is not controlled by your pain medicine  -a sudden severe increase in pain    What medications will I need to take after surgery?    You may be discharged with the following types of medications: antacids, pain medications, anti-nausea medications, etc.  Please see your after visit summary medication review for what will be continued, discontinued and newly started.    It is important to reduce the amount of acid in your new stomach for a couple of months after surgery while it is still healing. We will prescribe an acid reducer or antacid. Take it as directed. This will help prevent ulcers, heartburn and acid reflux.    If you took an acid reducer before you had surgery, your care team will let you know what acid reducer you will take after surgery.     It is okay to swallow any medications smaller than   inch in size.   -If it is larger than   inch, it may need to be cut, crushed, or in a liquid form. Check with your care team about which way is most appropriate for you and your medications.    What should  I know about my incisions (cuts)?  Your incisions are covered with white steri strips or butterfly tape and have band aids or gauze over the top. If you have gauze or band aids, they can come off in the hospital.    Leave your steri strips on until they fall off on their own. If the steri strips don't fall off after 1 to 2 weeks, you can take them off. If they fall off earlier, replace them with clean band aids trying to avoid touching the incision itself.     If you have gauze covered by a clear dressing, remove 2-3 days after surgery or as directed by your care team.    You may shower in the hospital after surgery and can get your incision coverings wet.    Do not submerge in water (e.g. No baths, swimming pools, hot tubs) until your care team tells you it is okay and your incisions are completely healed.    Call the clinic if you have any of these signs or symptoms of infection:  -Redness around the site.  -Drainage that smells bad.  -Drainage that is thick yellow or green.  -An increase in pain around the incision site  -An increase in swelling around the incision site  -Heat or warmth around incision site   -Fever of 101.5  F (38.3  C) or higher when taken under the tongue.    -Chills    Will my urine or bowel movements change?   Your first bowel movements (stools) will likely be liquid. You may also notice old blood or a darker color (black or maroon color) in your bowel movements.  This is not unusual and usually goes away after the first week, if not sooner. You may not have a bowel movement for a week.     If you have not had a bowel movement for at least three days after your surgery date and are passing gas, you can use over the counter stool softeners.  Please stop taking the stool softeners and laxatives if your stools are loose.    Increasing fluids and activity as well as getting off narcotics will help prevent constipation.    Call the clinic if:   -You have stomach pain.   -You continue to have  "constipation.  -You have excessive bloating after walking and passing gas.    How can I prevent dehydration if I feel nauseated (sick to my stomach) and vomit (throw up)?     Vomiting is not normal after surgery. If you continue to have nausea and vomiting, call the clinic.     Nausea can be a sign of dehydration. That is why it is very important to track your fluids.    Do not nap more than one hour during the day. Set a timer to wake yourself up, if needed. Too much sleep will keep you from drinking enough fluid during the day and lead to dehydration.    No outside activity in hot, humid weather until you can drink 48 to 64 ounces of fluid in 24 hours. If you sweat a lot, your body may lose too much water.    Try to take a 1 ounce sip of water (one medicine cup) every 15 minutes.  Set a timer to remind yourself.    Call the clinic if you have any of these signs or symptoms of dehydration:  -Dark colored urine  -Urinating (pass water) less than 2-3 times per day  -Lack of energy  -Nausea  -Dizziness  -Headache    Call the clinic ANY TIME at [phone] if:  -Your pain medicine is not working.  -You have a fever ? 101.5 F.  -You have belly or left shoulder pain that gets worse and worse.  -You have a swollen leg with redness, warmth, or pain behind the knee or calf.  -You have chest pain   -You feel very short of breath.  -You have a sudden severe increase in heart rate.  -You have vomiting that gets worse and worse.  -You have constant nausea (feeling sick to your stomach) that does not go away with medication.  -You have trouble swallowing.  -You have an increasing feeling that \"something is not right\".  -You have hiccups that do not stop.  -You have any questions or concerns.    If you have to go to the Emergency Room, we prefer you go to the hospital that did your surgery. Please let them know that you had bariatric surgery and to notify your surgeon.    When should I go back to the clinic?    Follow up with " your care team in 1-2 weeks.     If this appointment was not already made, please call: [phone]

## 2021-07-16 ENCOUNTER — PATIENT OUTREACH (OUTPATIENT)
Dept: FAMILY MEDICINE | Facility: CLINIC | Age: 36
End: 2021-07-16

## 2021-07-16 ENCOUNTER — TELEPHONE (OUTPATIENT)
Dept: SURGERY | Facility: CLINIC | Age: 36
End: 2021-07-16

## 2021-07-16 NOTE — TELEPHONE ENCOUNTER
"Called # [phone]     Hospital/TCU/ED for chronic condition Discharge Protocol    \"Hi, my name is Haim Gambino RN, a registered nurse, and I am calling from [facility].  I am calling to follow up and see how things are going for you after your recent emergency visit/hospital/TCU stay.\"    Tell me how you are doing now that you are home?\" feeling good       Discharge Instructions    \"Let's review your discharge instructions.  What is/are the follow-up recommendations?  Pt. Response: follow up with surgical team    \"Has an appointment with your primary care provider been scheduled?\"   Yes. (confirm)    \"When you see the provider, I would recommend that you bring your medications with you.\"    Medications    \"Tell me what changed about your medicines when you discharged?\"    Changes to chronic meds?    0-1    \"What questions do you have about your medications?\"    None     New diagnoses of heart failure, COPD, diabetes, or MI?    No              Post Discharge Medication Reconciliation Status: discharge medications reconciled and changed, per note/orders.    Was MTM referral placed (*Make sure to put transitions as reason for referral)?   No    Call Summary    \"What questions or concerns do you have about your recent visit and your follow-up care?\"     none    \"If you have questions or things don't continue to improve, we encourage you contact us through the main clinic number (give number).  Even if the clinic is not open, triage nurses are available 24/7 to help you.     We would like you to know that our clinic has extended hours (provide information).  We also have urgent care (provide details on closest location and hours/contact info)\"      \"Thank you for your time and take care!\"               Haim Gambino RN   [facility] [place] [facility]    "

## 2021-07-16 NOTE — DISCHARGE SUMMARY
[facility] Discharge Summary    Abel Ward MRN# [id number]   Age: 36 year old YOB: 1985     Date of Admission:  7/14/2021  Date of Discharge:  7/15/2021  6:55 PM  Admitting Provider:  Seth Lundy MD  Discharge Provider:  Umer Mcduffie PA-C with Dr. Lundy  Discharging Service: Bariatric Surgery     Primary Provider: David Denney  Primary Care Physician Phone Number: [phone]          Admission Diagnoses:   Principle Diagnosis: Morbidly obese (H) [E66.01]  Secondary Diagnoses associated with Obesity:  HTN,  GERD, MENDEZ   Other Diagnoses: Major depressive disorder, ADHD           Discharge Diagnosis:     Morbidly obese (H) [E66.01]  Bariatric Surgery Status          Procedures:     Procedure(s): Robotic assisted laparoscopic gastric bypass            Discharge Medications:     Discharge Medication List as of 7/15/2021  5:44 PM      START taking these medications    Details   oxyCODONE (ROXICODONE) 5 MG tablet Take 1 tablet (5 mg) by mouth every 3 hours as needed for moderate to severe pain, Disp-15 tablet, R-0, E-Prescribe         CONTINUE these medications which have NOT CHANGED    Details   amphetamine-dextroamphetamine (ADDERALL) 15 MG tablet Take 15 mg by mouth 2 times daily , Historical      buPROPion (WELLBUTRIN) 75 MG tablet Take 150 mg by mouth 3 times daily (2 X 75 mg), Historical      Calcium Citrate-Vitamin D (CALCIUM CITRATE CHEWY BITE PO) 3 chews daily, Historical      Cholecalciferol (VITAMIN D3) 55359 UNITS TABS Take 10,000 Units by mouth daily (patient purchases over-the-counter) this dose was NOT prescribed by a doctor., Historical      cloNIDine (CATAPRES) 0.1 MG tablet Historical      lithium (ESKALITH) 150 MG capsule Take 150 mg by mouth At Bedtime In addition to 900 mg evening dose., Historical      lithium (ESKALITH) 300 MG tablet Take 900 mg by mouth At Bedtime (3 x 300 mg) In addition to 150 mg evening dose for total 1050 mg daily dose.,  Historical      LORazepam (ATIVAN) 1 MG tablet Take 1 mg by mouth as needed for anxiety , R-0, Historical      losartan (COZAAR) 50 MG tablet Take 1 tablet (50 mg) by mouth daily, Disp-90 tablet, R-3, E-PrescribeProfile Rx: patient will contact pharmacy when needed.      magnesium oxide (MAG-OX) 400 (241.3 Mg) MG tablet Take 1 tablet by mouth daily, Disp-90 tablet, R-3, Historical      multivitamin w/minerals (MULTI-VITAMIN) tablet Take 1 tablet by mouth daily, Historical      omeprazole (PRILOSEC) 20 MG capsule Take 1 capsule (20 mg) by mouth daily, Disp-90 capsule, R-3, E-Prescribe      ondansetron (ZOFRAN) 4 MG tablet Take 1-2 tablets (4-8 mg) by mouth daily, Disp-30 tablet, R-3, E-Prescribe      risperiDONE (RISPERDAL) 1 MG tablet Take 1 mg by mouth every morning , Historical      traZODone (DESYREL) 50 MG tablet Take  mg by mouth nightly as needed for sleep, Historical      vilazodone (VIIBRYD) 10 MG TABS tablet Take 30 mg by mouth every morning (3 X 10 mg), Disp-270 tablet, R-1, Historical      gabapentin (NEURONTIN) 100 MG capsule Take 100-200 mg by mouth nightly as needed (restless leg syndrome) , Historical         STOP taking these medications       amoxicillin-clavulanate (AUGMENTIN) 875-125 MG tablet Comments:   Reason for Stopping:                   Allergies:       No Known Allergies          Brief History of Illness:   Abel Ward is a 36 year old-year-old male who underwent preoperative screening in anticipation for potential bariatric surgery. He was deemed an appropriate candidate for bariatric surgery by the consensus of our [place] [facility] team. In the office, surgical options were thoroughly discussed. He was furnished with a copy of our specialized consent form for bariatric surgery. The potential risks as outlined in that document were all thoroughly reviewed. All questions were answered and he wished to proceed.    On the day of surgery, after reviewing the risks,  "benefits, and possible complications, informed consent was obtained and the patient underwent the above procedure.  There were no complications.  Please see the Operative Report for full details.           Hospital Course:   Abel Ward's hospital course was unremarkable.  He recovered as anticipated and experienced no post-operative complications. His pain and nausea was well controlled by the end of POD1 and he was vitally stable, so we felt he was ready for discharge.    On the date of discharge, the patient was discharged to home in stable condition and afebrile.  He verbalized understanding of all discharge instructions and felt comfortable with the discharge plan.  He was asked to call with any further questions or concerns.           Condition on Discharge:        Discharge condition: Stable   Discharge vitals: Blood pressure 103/61, pulse 76, temperature 99  F (37.2  C), temperature source Oral, resp. rate 16, height 1.803 m (5' 11\"), weight 142.9 kg (315 lb 1.6 oz), SpO2 97 %.           Discharge Disposition:     Discharged to home          Discharge Instructions and Follow-Up:      Abel Ward was asked to follow up with the bariatric surgical team within 1 week.  He will see our clinic PA-C at that visit, with plans to reconvene with a Registered Dietician at the 2nd week office visit.    Umer Mcduffie PA-C  dictating on behalf of Dr. Martín Lundy  [phone]     "

## 2021-07-16 NOTE — TELEPHONE ENCOUNTER
Follow-up and recommended labs and tests  Follow up with Weight Loss Clinic next week as scheduled.  JUL 20  Bariatric Post Op Global Visit  with Carmen Long Rn, RN  Tuesday Jul 20, 2021 12:45 PM  [facility]  Surgical Weight Loss  Clinic [address] [id number] [phone]  Your medications have changed  Activity instructions  Diet instructions    Routing to triage to call    Haim NOVAK RN   [facility] - [facility]

## 2021-07-19 ENCOUNTER — PATIENT OUTREACH (OUTPATIENT)
Dept: FAMILY MEDICINE | Facility: CLINIC | Age: 36
End: 2021-07-19

## 2021-07-19 DIAGNOSIS — Z53.9 ERRONEOUS ENCOUNTER--DISREGARD: Primary | ICD-10-CM

## 2021-07-20 ENCOUNTER — OFFICE VISIT (OUTPATIENT)
Dept: SURGERY | Facility: CLINIC | Age: 36
End: 2021-07-20
Payer: COMMERCIAL

## 2021-07-20 VITALS
TEMPERATURE: 98.2 F | WEIGHT: 301.8 LBS | SYSTOLIC BLOOD PRESSURE: 106 MMHG | HEART RATE: 86 BPM | OXYGEN SATURATION: 97 % | RESPIRATION RATE: 16 BRPM | DIASTOLIC BLOOD PRESSURE: 84 MMHG | BODY MASS INDEX: 42.09 KG/M2

## 2021-07-20 DIAGNOSIS — E66.01 MORBID OBESITY (H): Primary | ICD-10-CM

## 2021-07-20 DIAGNOSIS — K91.2 POSTSURGICAL MALABSORPTION: ICD-10-CM

## 2021-07-20 DIAGNOSIS — I10 HYPERTENSION GOAL BP (BLOOD PRESSURE) < 130/80: ICD-10-CM

## 2021-07-20 DIAGNOSIS — E66.813 CLASS 3 SEVERE OBESITY DUE TO EXCESS CALORIES WITH SERIOUS COMORBIDITY AND BODY MASS INDEX (BMI) OF 40.0 TO 44.9 IN ADULT (H): ICD-10-CM

## 2021-07-20 DIAGNOSIS — Z98.84 BARIATRIC SURGERY STATUS: ICD-10-CM

## 2021-07-20 DIAGNOSIS — E66.01 CLASS 3 SEVERE OBESITY DUE TO EXCESS CALORIES WITH SERIOUS COMORBIDITY AND BODY MASS INDEX (BMI) OF 40.0 TO 44.9 IN ADULT (H): ICD-10-CM

## 2021-07-20 DIAGNOSIS — Z98.84 BARIATRIC SURGERY STATUS: Primary | ICD-10-CM

## 2021-07-20 PROCEDURE — 99024 POSTOP FOLLOW-UP VISIT: CPT | Performed by: PHYSICIAN ASSISTANT

## 2021-07-20 PROCEDURE — 99207 PR NO CHARGE NURSE ONLY: CPT

## 2021-07-20 NOTE — Clinical Note
Hi!    I had a 1 week bariatric PO visit with this patient today.  I think he will need close follow up with his BP and mental health medications.  He is having some degree of dizziness.  I asked patient to reach out to you as well.      Thanks  Daniel Johnson PA-C

## 2021-07-20 NOTE — PATIENT INSTRUCTIONS
PLAN:   Continue with recommended antacid medication for the next 3 months.   Strive to drink 64 oz of fluid daily.  Strive to move hourly while awake to decrease risk of developing a blood clot.  Continue to do deep breathing exercises twice daily or use your spirometer.  Follow up with your primary care provider to discuss obesity related conditions by next week AND provider regarding mental health and blood pressure.   Start recommended postoperative vitamins within in the first 6 weeks.  Advance diet per Dietitian at your 2 week appointment.   Make follow up appointment to meet with psychologist at 1 and 3 months post operatively.   Wear binder to support abdominal muscles and gradually wean off over the next few weeks.   Return to clinic for 2 wk post op appointment and bring post op vitamins along.  Call with questions or concerns at any time.

## 2021-07-20 NOTE — PROGRESS NOTES
[facility]   1 Week Surgical Follow-Up     PCP:  David Denney    DOS: 07/14/21  Surgeon: ABEBA  Surgery Type: Bypass  HISTORY OF PRESENT ILLNESS:  Abel Ward returns today for her follow-up appointment status post bariatric surgery.  He is currently using Nothing for pain medication.  Refill Needed: No.  Patient's Pain Scale: 1. The pain is located abdomen.  Patient's current daily fluid intake in oz is: 64 ounces water, sugar free drinks + 2 protein drinks daily .  Patient has started postoperative Vitamins: Yes.  Mentally doing ok. Taking all medications  Using CPAP nightly     Activity:   Activity: walking around house - getting mail   REVIEW OF SYSTEMS:  GI:    Nausea: Yes  Vomiting: No  Diarrhea: No  Constipation: No  Last BM: this morning - NL   Dysphagia: No  GERD: No - taking omeprazole    CV/Pulmonary:   Chest Pain: No  Dizzy: Yes  Light Headed: Yes  SOB: No - Using IS a couple times daily  Endo:  Diabetes: No  :    Contraception: male  Dysuria: No  Vascular:    LE Edema: No  PHYSICAL EXAMINATION:    /74 (BP Location: Left arm, Patient Position: Sitting, Cuff Size: Adult Large)   Pulse 86   Temp 98.2  F (36.8  C)   Resp 16   Wt 301 lb 12.8 oz (136.9 kg)   SpO2 97%   BMI 42.09 kg/m      Repeat BP is 106/82    GENERAL: No acute distress. Alert and oriented time 3.  HEART: Regular rate and rhythm.  LUNGS:  Clear to auscultation bilaterally.  ABDOMEN: Soft, incisions clean,dry, and intact. Tenderness WNL for post op.  EXTREMITIES: No lower extremity edema bilaterally. No calf swelling or tenderness. Negative paulie's  SKIN: No rashes.  PSYCHOLOGICAL: Stable. Pleasant      ASSESSMENT:    1. S/P bariatric surgery.  2. Post surgical malabsorption  3. Hypertension    PLAN:   Continue with recommended antacid medication for the next 3 months.   Strive to drink 64 oz of fluid daily.  Strive to move hourly while awake to decrease risk of developing a blood clot.  Continue  to do deep breathing exercises twice daily or use your spirometer.  Follow up with your primary care provider to discuss obesity related conditions by next week AND provider regarding mental health and blood pressure.   Start recommended postoperative vitamins within in the first 6 weeks.  Advance diet per Dietitian at your 2 week appointment.   Make follow up appointment to meet with psychologist at 1 and 3 months post operatively.   Wear binder to support abdominal muscles and gradually wean off over the next few weeks.   Return to clinic for 2 wk post op appointment and bring post op vitamins along.  Call with questions or concerns at any time.

## 2021-07-22 ENCOUNTER — LAB (OUTPATIENT)
Dept: LAB | Facility: CLINIC | Age: 36
End: 2021-07-22
Payer: COMMERCIAL

## 2021-07-22 DIAGNOSIS — F33.2 SEVERE RECURRENT MAJOR DEPRESSION WITHOUT PSYCHOTIC FEATURES (H): Primary | ICD-10-CM

## 2021-07-22 LAB
ALBUMIN SERPL-MCNC: 3.8 G/DL (ref 3.4–5)
ALP SERPL-CCNC: 78 U/L (ref 40–150)
ALT SERPL W P-5'-P-CCNC: 75 U/L (ref 0–70)
ANION GAP SERPL CALCULATED.3IONS-SCNC: 7 MMOL/L (ref 3–14)
AST SERPL W P-5'-P-CCNC: 15 U/L (ref 0–45)
BASOPHILS # BLD AUTO: 0 10E3/UL (ref 0–0.2)
BASOPHILS NFR BLD AUTO: 1 %
BILIRUB SERPL-MCNC: 0.6 MG/DL (ref 0.2–1.3)
BUN SERPL-MCNC: 20 MG/DL (ref 7–30)
CALCIUM SERPL-MCNC: 9.8 MG/DL (ref 8.5–10.1)
CHLORIDE BLD-SCNC: 105 MMOL/L (ref 94–109)
CO2 SERPL-SCNC: 24 MMOL/L (ref 20–32)
CREAT SERPL-MCNC: 1.1 MG/DL (ref 0.66–1.25)
EOSINOPHIL # BLD AUTO: 0.4 10E3/UL (ref 0–0.7)
EOSINOPHIL NFR BLD AUTO: 5 %
ERYTHROCYTE [DISTWIDTH] IN BLOOD BY AUTOMATED COUNT: 12.5 % (ref 10–15)
GFR SERPL CREATININE-BSD FRML MDRD: 87 ML/MIN/1.73M2
GLUCOSE BLD-MCNC: 83 MG/DL (ref 70–99)
HCT VFR BLD AUTO: 40.9 % (ref 40–53)
HGB BLD-MCNC: 14 G/DL (ref 13.3–17.7)
LITHIUM SERPL-SCNC: 1.3 MMOL/L
LYMPHOCYTES # BLD AUTO: 1.4 10E3/UL (ref 0.8–5.3)
LYMPHOCYTES NFR BLD AUTO: 18 %
MCH RBC QN AUTO: 28.7 PG (ref 26.5–33)
MCHC RBC AUTO-ENTMCNC: 34.2 G/DL (ref 31.5–36.5)
MCV RBC AUTO: 84 FL (ref 78–100)
MONOCYTES # BLD AUTO: 0.6 10E3/UL (ref 0–1.3)
MONOCYTES NFR BLD AUTO: 8 %
NEUTROPHILS # BLD AUTO: 5.5 10E3/UL (ref 1.6–8.3)
NEUTROPHILS NFR BLD AUTO: 69 %
PLATELET # BLD AUTO: 341 10E3/UL (ref 150–450)
POTASSIUM BLD-SCNC: 4.4 MMOL/L (ref 3.4–5.3)
PROT SERPL-MCNC: 7.9 G/DL (ref 6.8–8.8)
RBC # BLD AUTO: 4.88 10E6/UL (ref 4.4–5.9)
SODIUM SERPL-SCNC: 136 MMOL/L (ref 133–144)
TSH SERPL DL<=0.005 MIU/L-ACNC: 0.74 MU/L (ref 0.4–4)
WBC # BLD AUTO: 7.9 10E3/UL (ref 4–11)

## 2021-07-22 PROCEDURE — 80050 GENERAL HEALTH PANEL: CPT

## 2021-07-22 PROCEDURE — 36415 COLL VENOUS BLD VENIPUNCTURE: CPT

## 2021-07-22 PROCEDURE — 80178 ASSAY OF LITHIUM: CPT

## 2021-07-26 NOTE — PROGRESS NOTES
"BARIATRIC PROGRESS NOTE - 2 Week Post Op  DATE OF VISIT: July 26, 2021    Abel Ward  1985  male  [id number]  36 year old    ASSESSMENT:    REASON FOR VISIT:  Abel Ward is a 36 year old year old male presents today for a 2 Week Post Op nutrition follow-up appointment. Patient is accompanied by self      DIAGNOSIS:  Status: post gastric bypass surgery.   Obesity Grade III BMI >40    ANTHROPOMETRICS:  Height: 5'11\"   Initial weight: 320 lbs    Current Weight: 299 lbs 4.8 oz    BMI: Body mass index is 41.74 kg/m .    VITAMINS AND MINERALS:   1 Multivitamin with Minerals (AM)  650 mg Calcium With Vitamin D (BID - lunch and dinner)  10,000 International units Vitamin D  5000 mcg Vitamin B-12 sublingual - 1x/week  100mg Vitamin B1 - 1x/week    NUTRITION HISTORY:  Tolerating diet: Bariatric full liquid diet  Fluids/water intake: 64 ounces/day (water, protein drink)  Small bites: Yes  Portion size: 1/4-1/2c  20-30 minute meals: Yes  Fluids and meals  by 30 minutes: No  Chew foods thoroughly: Yes  Breakfast: protein shake  SF pudding or jello  malt-o-meal/cream of wheat  yogurt  Lunch: (same as breakfast)  Dinner: (same as lunch and breakfast)  Snacks: protein shake  Nausea: none  Vomiting: none  Constipation: some  Additional Information: Pt feeling well and tolerating diet. Struggling with light-headedness/dizziness with movement - see RN notes.      PHYSICAL ACTIVITY:  Type: walking around house  Frequency: 7 days a week.  Duration: 20 minutes.    DIAGNOSIS:     Current Nutrition Diagnosis: Altered gastrointestinal function related to alternation in gastrointestinal structure as evidenced by history of gastric bypass.     INTERVENTION  Nutrition Prescription: Recommended bariatric puree diet.    Goals:  Start puree diet at day 14 post op.  Continue MVT, calcium with vitamin D, vitamin B12, vitamin D, and vitamin B1  Increase MVT to double dose  Aim for 60 to 90 grams " protein.  Continue 48 to 64 oz of fluid per day.    Implementation:  Discussed transition to puree diet.  Emphasized importance of adequate protein.  Reviewed required vitamins and mineral supplements.  Verbalizes good understanding of surgery diet guidelines.  Assessed learning needs and learning preferences.      NUTRITION MONITORING AND EVALUATION:   Monitor diet tolerance and weight loss.      Anticipated Compliance: good  Follow Up: Continue to monitor patient closely regarding weight loss and diet.  At 4 weeks Post Op    TIME SPENT WITH PATIENT: 15 minutes.      Kimberly Jamison RD, LD  Clinical Dietitian

## 2021-07-27 ENCOUNTER — OFFICE VISIT (OUTPATIENT)
Dept: SURGERY | Facility: CLINIC | Age: 36
End: 2021-07-27
Payer: COMMERCIAL

## 2021-07-27 ENCOUNTER — MYC MEDICAL ADVICE (OUTPATIENT)
Dept: FAMILY MEDICINE | Facility: CLINIC | Age: 36
End: 2021-07-27

## 2021-07-27 VITALS — BODY MASS INDEX: 41.9 KG/M2 | WEIGHT: 299.3 LBS | HEIGHT: 71 IN

## 2021-07-27 VITALS
DIASTOLIC BLOOD PRESSURE: 69 MMHG | WEIGHT: 299.3 LBS | BODY MASS INDEX: 41.74 KG/M2 | HEART RATE: 87 BPM | TEMPERATURE: 98.2 F | OXYGEN SATURATION: 98 % | RESPIRATION RATE: 16 BRPM | SYSTOLIC BLOOD PRESSURE: 116 MMHG

## 2021-07-27 DIAGNOSIS — E66.813 CLASS 3 SEVERE OBESITY DUE TO EXCESS CALORIES WITH SERIOUS COMORBIDITY AND BODY MASS INDEX (BMI) OF 40.0 TO 44.9 IN ADULT (H): ICD-10-CM

## 2021-07-27 DIAGNOSIS — Z98.84 BARIATRIC SURGERY STATUS: ICD-10-CM

## 2021-07-27 DIAGNOSIS — Z98.84 BARIATRIC SURGERY STATUS: Primary | ICD-10-CM

## 2021-07-27 DIAGNOSIS — K91.2 POSTSURGICAL MALABSORPTION: ICD-10-CM

## 2021-07-27 DIAGNOSIS — E66.01 MORBIDLY OBESE (H): ICD-10-CM

## 2021-07-27 DIAGNOSIS — E66.01 CLASS 3 SEVERE OBESITY DUE TO EXCESS CALORIES WITH SERIOUS COMORBIDITY AND BODY MASS INDEX (BMI) OF 40.0 TO 44.9 IN ADULT (H): ICD-10-CM

## 2021-07-27 PROCEDURE — 99207 PR NO CHARGE NURSE ONLY: CPT

## 2021-07-27 PROCEDURE — 97803 MED NUTRITION INDIV SUBSEQ: CPT | Performed by: DIETITIAN, REGISTERED

## 2021-07-27 RX ORDER — LANOLIN ALCOHOL/MO/W.PET/CERES
100 CREAM (GRAM) TOPICAL WEEKLY
COMMUNITY

## 2021-07-27 ASSESSMENT — MIFFLIN-ST. JEOR: SCORE: 2319.75

## 2021-07-27 ASSESSMENT — PAIN SCALES - GENERAL: PAINLEVEL: NO PAIN (0)

## 2021-07-27 NOTE — LETTER
2021    To: Whom it may concern,    RE: Abel Ward  [address] [facility] [address]  : 1985    Type of Request: Medical Necessity - employee s own  Serious Health Condition  requiring hospitalization, and recovery for major surgery. Patient is able to return to work 2021 with restrictions of no lifting, pushing or pulling over 20 lbs until 2021. Pt is able to return to work without restrictions 2021.    Surgery took place on 2007 at:  [address] [address]  Surgeon: Seth Lundy MD      Sincerely,        Daniel Johnson PA-C

## 2021-07-27 NOTE — TELEPHONE ENCOUNTER
Routing to PCP to review mychart and eval of surgery team.    Haim NOVAK RN   [facility] - [place] Triage

## 2021-07-27 NOTE — PROGRESS NOTES
[facility]  2 Week Surgical Follow-Up     HISTORY OF PRESENT ILLNESS:  The patient returns today for two week follow-up appointment status post gastric bypass  The patient is accompanied by self.   The patient is drinking 64 oz of fluid daily, including water, milk with protein powder.    Pain:    The patient is currently using None for pain medication.  The patient rates pain at a 0/10  on the pain scale.      Activity:  The patient is considered a fall risk:  No. Interventions to secure the patient's safety are in place.  What are you doing for physical activity?  walking  How many days per week?  daily  How many minutes per day?  20 mins  Review of Systems:  GI:    Nausea occurs rarely.           Vomiting occurs never.     GERD occurs never.  Patient is currently taking Omeprazole 20 mg.           Diarrhea occurs never.  Patient's last bowel movement was today, with the color noted as unknown.           Constipation occurs never.         CV/Pulmonary:   Dizziness occurs daily.     Shortness of Breath never.  Chest pain occurs never.    Vascular:    Leg swelling none.     Regis's Negative     :  Form of birth control is other male.    PHYSICAL EXAMINATION:    VITALS:  See Epic for VS.  LUNGS:  clear to auscultation  HEART:  Apical pulse strong, regular.  ABDOMEN:  Abdomen soft, non-tender. BS normal.   INCISIONS:  dry and intact.  Steristrips were off.    MEDICATIONS/ALLERGIES REVIEWED:  Yes    ASSESSMENT:    1.  2 weeks status post gastric bypass surgery.    PLAN:    Increase activity per physical therapist recommendations today.   Make follow up appointment to meet with psychologist and 1 month post operatively.   Follow up with your primary care provider to obesity related conditions by one month post op.   Advance diet per Dietitian at your 2 week appointment.   Start recommended postoperative vitamins within in the first 6 weeks per Dietitian  Strive to drink 64 oz of fluid  daily.  Continue to do deep breathing exercises twice daily or use your spirometer.   Call with questions or concerns at any time.    INTERVENTIONS:      Symptoms given re: signs and symptoms of low BP and when to call PCP. Patient verbalized understanding.    Patient exercise/activity restrictions reviewed; exercise handout given.  Exercise plan postop is walking.  Reviewed when patient can return to bathing and swimming.  Reviewed patient vitamin timing.  Instructed patient to take calcium carbonate in divided doses with meals, to take calcium separate from multivitamin, and to take both multivitamins together.  Reviewed signs/symptoms of DVT with patient.  Patient return to work date is august 5th.  Reviewed FMLA.  Letter to be written.    Informed Daniel Johnson PA-C re pt feeling dizzy. Pt did not take his BP meds this morning.   Per Daniel, pt is to hold BP meds today and be in contact with PCP today.    Pt verbalized understanding and agreeable to plan.    No further needs or questions at this time.  Patient agrees to call clinic with any questions or concerns prior to next appointment.    Jerilyn Brownlee RN on 7/27/2021 at 10:34 AM

## 2021-08-08 ENCOUNTER — LAB (OUTPATIENT)
Dept: LAB | Facility: CLINIC | Age: 36
End: 2021-08-08
Payer: COMMERCIAL

## 2021-08-08 DIAGNOSIS — F33.2 SEVERE RECURRENT MAJOR DEPRESSION WITHOUT PSYCHOTIC FEATURES (H): Primary | ICD-10-CM

## 2021-08-08 PROCEDURE — 80178 ASSAY OF LITHIUM: CPT

## 2021-08-08 PROCEDURE — 36415 COLL VENOUS BLD VENIPUNCTURE: CPT

## 2021-08-09 LAB — LITHIUM SERPL-SCNC: 0.8 MMOL/L

## 2021-08-12 NOTE — PROGRESS NOTES
"Shakir is a 36 year old who is being evaluated via a billable video visit.      How would you like to obtain your AVS? Estephaniehart  If the video visit is dropped, the invitation should be resent by: Text to cell phone: [phone]  Will anyone else be joining your video visit? No      Video Start Time: 9:51am    Video-Visit Details    Type of service:  Video Visit    Video End Time:10:05am    Originating Location (pt. Location): Home    Distant Location (provider location): Provider Remote Setting    Platform used for Video Visit: [facility]     BARIATRIC PROGRESS NOTE - 4 Week Post Op  DATE OF VISIT: August 12, 2021    Abel Ward  1985  male  [id number]  36 year old    ASSESSMENT:    REASON FOR VISIT:  Abel Ward is a 36 year old year old male presents today for a 4 Week Post Op nutrition follow-up appointment. Patient is accompanied by self      DIAGNOSIS:  Status: post gastric bypass surgery.   Obesity Grade III BMI >40    ANTHROPOMETRICS:  Height: 5'11\"   Initial weight: 320 lbs    Current Weight: 289 lbs 0 oz    BMI: Body mass index is 40.31 kg/m .    VITAMINS AND MINERALS:   2 Multivitamin with Minerals (AM)  650 mg Calcium With Vitamin D (BID - lunch and dinner)  10,000 International units Vitamin D  5000 mcg Vitamin B-12 sublingual - 1x/week  100mg Vitamin B1 - 1x/week    NUTRITION HISTORY:  Tolerating diet: Bariatric pureed diet  Fluids/water intake: 64 ounces/day (water, protein shakes [3-4 per day])  Small bites: Yes  Portion size: 1/2c or less  20-30 minute meals: Yes  Fluids and meals  by 30 minutes: Yes/usually   Chew foods thoroughly: Yes  Breakfast: protein shake  applesauce  yogurt   Lunch: protein shake  Dinner: protein shake  mashed potatoes  cream of wheat   Snacks: protein shake  Nausea: occasional  Vomiting: none  Constipation: yes; will have RNs f/u with patient  Additional Information: Pt feeling well and tolerating diet. Working 60h per week and often " utilizing protein shakes as meal replacement; encouraged pt to have actual meals and use protein drinks between meals.       PHYSICAL ACTIVITY:  No intentional exercise - active job    DIAGNOSIS:   Previous Nutrition Diagnosis: Altered gastrointestinal function related to alternation in gastrointestinal structure as evidenced by history of gastric bypass.   No change    Previous Goals:  Start puree diet at day 14 post op. - met  Continue MVT, calcium with vitamin D, vitamin B12, vitamin D, and vitamin B1 - met  Increase MVT to double dose - met  Aim for 60 to 90 grams protein. - met  Continue 48 to 64 oz of fluid per day. - met    Current Nutrition Diagnosis: Altered gastrointestinal function related to alternation in gastrointestinal structure as evidenced by history of gastric bypass.     INTERVENTION  Nutrition Prescription: Recommended bariatric soft diet.    Goals:  Start soft diet at day 28 post op.  Continue MVI, calcium with vitamin D, vitamin B12, vitamin D, and vitamin B1  Aim for 60 to 90 grams protein.  Continue 48 to 64 oz of fluid per day.    Implementation:  Discussed transition to soft diet.  Emphasized importance of adequate protein.  Reviewed required vitamins and mineral supplements.  Verbalizes fair-good understanding of surgery diet guidelines.  Assessed learning needs and learning preferences.      NUTRITION MONITORING AND EVALUATION:   Monitor diet tolerance and weight loss.      Anticipated Compliance: fair-good  Follow Up: Continue to monitor patient closely regarding weight loss and diet.  At 3 months Post Op    TIME SPENT WITH PATIENT: 14 minutes.      Kimberly Jamison RD, LD  Clinical Dietitian

## 2021-08-16 ENCOUNTER — VIRTUAL VISIT (OUTPATIENT)
Dept: SURGERY | Facility: CLINIC | Age: 36
End: 2021-08-16
Payer: COMMERCIAL

## 2021-08-16 ENCOUNTER — TELEPHONE (OUTPATIENT)
Dept: SURGERY | Facility: CLINIC | Age: 36
End: 2021-08-16

## 2021-08-16 DIAGNOSIS — E66.01 MORBIDLY OBESE (H): ICD-10-CM

## 2021-08-16 DIAGNOSIS — K91.2 POSTSURGICAL MALABSORPTION: ICD-10-CM

## 2021-08-16 DIAGNOSIS — E66.01 CLASS 3 SEVERE OBESITY DUE TO EXCESS CALORIES WITH SERIOUS COMORBIDITY AND BODY MASS INDEX (BMI) OF 40.0 TO 44.9 IN ADULT (H): ICD-10-CM

## 2021-08-16 DIAGNOSIS — Z98.84 BARIATRIC SURGERY STATUS: ICD-10-CM

## 2021-08-16 DIAGNOSIS — E66.813 CLASS 3 SEVERE OBESITY DUE TO EXCESS CALORIES WITH SERIOUS COMORBIDITY AND BODY MASS INDEX (BMI) OF 40.0 TO 44.9 IN ADULT (H): ICD-10-CM

## 2021-08-16 PROCEDURE — 97803 MED NUTRITION INDIV SUBSEQ: CPT | Mod: GT | Performed by: DIETITIAN, REGISTERED

## 2021-08-16 NOTE — CONFIDENTIAL NOTE
Writer  Informed by DAE Harris that pt needs bowel regimen for constipation.  Called pt, no answer, left VM to call clinic, number provided. Informed pt that I would send ID [employer] message.    Jerilyn Brownlee RN on 8/16/2021 at 12:45 PM

## 2021-08-17 VITALS — HEIGHT: 71 IN | WEIGHT: 289 LBS | BODY MASS INDEX: 40.46 KG/M2

## 2021-08-17 ASSESSMENT — MIFFLIN-ST. JEOR: SCORE: 2273.03

## 2021-09-04 ENCOUNTER — HEALTH MAINTENANCE LETTER (OUTPATIENT)
Age: 36
End: 2021-09-04

## 2022-02-14 ENCOUNTER — HOSPITAL ENCOUNTER (EMERGENCY)
Facility: CLINIC | Age: 37
Discharge: HOME | End: 2022-02-14
Attending: EMERGENCY MEDICINE | Admitting: EMERGENCY MEDICINE
Payer: OTHER MISCELLANEOUS

## 2022-02-14 VITALS
SYSTOLIC BLOOD PRESSURE: 122 MMHG | HEART RATE: 61 BPM | WEIGHT: 240 LBS | BODY MASS INDEX: 33.47 KG/M2 | OXYGEN SATURATION: 99 % | TEMPERATURE: 97.8 F | RESPIRATION RATE: 16 BRPM | DIASTOLIC BLOOD PRESSURE: 67 MMHG

## 2022-02-14 DIAGNOSIS — S06.0X0A CONCUSSION WITHOUT LOSS OF CONSCIOUSNESS, INITIAL ENCOUNTER: ICD-10-CM

## 2022-02-14 PROCEDURE — 99282 EMERGENCY DEPT VISIT SF MDM: CPT

## 2022-02-14 ASSESSMENT — ENCOUNTER SYMPTOMS
CONFUSION: 1
COUGH: 0
VOMITING: 0
LIGHT-HEADEDNESS: 1
WOUND: 1
FEVER: 0
SORE THROAT: 0
NUMBNESS: 0
NECK PAIN: 0

## 2022-02-14 NOTE — DISCHARGE INSTRUCTIONS
1.  -Take acetaminophen 500 to 1000 mg by mouth every 4 to 6 hours as needed for pain or fever.  Do not take more than 4000 mg in 24 hours.  Do not take within 6 hours of another acetaminophen containing medication such as norco (vicodin) or percocet.  - Take ibuprofen 600 to 800 mg by mouth every 6 to 8 hours as needed for pain or fever  2. Stay hydrated.  3. Please stay in low stimulus, dim environments for the next 48 hours.  4. You may return to exercising or strenuous physical activity 7 days after your symptoms resolve.  5. Please cease activity that worsens your symptoms.  6. Please follow-up with your primary care doctor as needed.  If symptoms persist for weeks, you may discuss referral to a neurologist with your primary doctor.  7. You may return to the ED as needed for new or worsening symptoms such as vomiting and unable to keep anything down, severe confusion and inability to function at home, severe and uncontrollable pain, focal weakness.    Discharge Instructions  Concussion    You were seen today for signs of a concussion.  The symptoms will vary, depending on the nature of your injury and your health. You may have: headache, confusion, nausea (feel sick to your stomach), vomiting (throwing up) and problems with memory, concentrating, or sleep. You may feel dizzy, irritable, and tired. Children and teens may need help from their parents, teachers, and coaches to watch for symptoms as they recover.    Generally, every Emergency Department visit should have a follow-up clinic visit with either a primary or a specialty clinic/provider. Please follow-up as instructed by your emergency provider today.     Return to the Emergency Department if:  Your headache gets worse or you start to have a really bad headache even with the recommended treatment plan.   You feel drowsier, have growing confusion, or slurred speech.   You keep repeating yourself.   You have strange behavior or are feeling more irritable.    You have a seizure.   You vomit (throw up) more than once.   You have trouble walking.   You have weakness or numbness.  Your neck pain gets worse.   You have a loss of consciousness.   You have blood for fluid coming from your ears or nose.   You have new symptoms or anything that worries you.     Home Care:  Get lots of rest and get enough sleep at night. Take daytime naps or rest if you feel tired.   Limit physical activity and  thinking  activities. These can make symptoms worse.   Physical activities include gym, sports, weight training, running, exercise, and heavy lifting.   Thinking activities include homework, class work, job-related work, and screen time (phone, computer, tablet, TV, and video games).   Stick to a healthy diet and drink lots of fluids. Avoid alcohol.  As symptoms improve, you may slowly return to your daily activities. If symptoms get worse or return, reduce your activity.   Know that it is normal to feel sad or frustrated when you do not feel right and are less active.     Going Back to Work:  Your care team will tell you when you are ready to return to work.    Limit the amount of work you do soon after your injury. This may speed healing. Take breaks if your symptoms get worse. You should also reduce your physical activity as well as activities that require a lot of thinking until you see your doctor. You may need shorter work days and a lighter workload.  Avoid heavy lifting, working with machinery, driving and working at heights until your symptoms are gone or you are cleared by a provider.    Going Back to School:  If you are still having symptoms, you may need extra help at school.  Tell your teachers and school nurse about your injury and symptoms. Ask them to watch for problems with learning, memory, and concentrating. Symptoms may get worse when you do schoolwork, and you may become more irritable. You may need shorter school days, a reduced workload, and to postpone testing.   Do not drive or take gym class (physical activity) until cleared by a provider.    Returning to Sports:  Never return to play if you have any symptoms. A full recovery will reduce the chances of getting hurt again. Remember, it is better to miss one or two games than a whole season.  You should rest from all physical activity until you see your provider. Generally, if all symptoms have completely cleared, your provider can help guide you to slowly return to sports. If symptoms return or worsen, stop the activity and see your provider.  Important: If you are in an organized sport and under age 18, you will need written consent from a healthcare provider before you return to sports. Typically, this will be your primary care or sports medicine provider. Please make an appointment.    If you were given a prescription for medicine here today, be sure to read all of the information (including the package insert) that comes with your prescription.  This will include important information about the medicine, its side effects, and any warnings that you need to know about.  The pharmacist who fills the prescription can provide more information and answer questions you may have about the medicine.  If you have questions or concerns that the pharmacist cannot address, please call or return to the Emergency Department.     Remember that you can always come back to the Emergency Department if you are not able to see your regular provider in the amount of time listed above, if you get any new symptoms, or if there is anything that worries you.

## 2022-02-14 NOTE — ED TRIAGE NOTES
"Pt was at work and hit top of head on metal beam. Denies LOC and N/V/D, but c/o fatigue, dizziness, and \"Out of it.\" ABC in tact. A/OX4  "

## 2022-02-15 NOTE — ED PROVIDER NOTES
History   Chief Complaint:  Head Injury    The history is provided by the patient.      Abel Ward is a 37 year old male who presents alone for a head injury. While at work, the patient hit his head on a metal beam, which he denies losing consciousness to. This is the hardest he has ever hit his head on something. He reports to be light-headed, confused, and zoned out. He reports no falls or trauma. Furthermore, he denies having any nausea, vomiting, fevers, sore throat, numbness, coughs, neck pain, or double vision. He reports to occasionally drink alcohol. The patient is not on blood thinners.    Review of Systems   Constitutional: Negative for fever.   HENT: Negative for sore throat.    Eyes:        NEG: double vision   Respiratory: Negative for cough.    Gastrointestinal: Negative for vomiting.   Musculoskeletal: Negative for neck pain.   Skin: Positive for wound (Head).   Neurological: Positive for light-headedness. Negative for numbness.   Psychiatric/Behavioral: Positive for confusion.   All other systems reviewed and are negative.    Allergies:  Asa [Aspirin]  Nsaids    Medications:  amphetamine-dextroamphetamine   buPROPion  calcium carbonate  Cholecalciferol   cloNIDine   Cyanocobalamin   gabapentin   lithium  LORazepam   losartan  magnesium oxide   omeprazole  ondansetron   risperiDONE  thiamine   traZODone  vilazodone    Past Medical History:     Anxiety   ADHD  Esophageal reflux   Generalized anxiety disorder   Hypertension   Infectious mononucleosis   Low testosterone   Major depressive disorder  Mild intermittent asthma   Suicidal ideation  Viral URI with cough   Hypotestosteronism  Hypertension   Severe episode of recurrent major depressive disorder, without psychotic features  MENDEZ  Morbidly obese   Bariatric surgery status  Postsurgical malabsorption      Past Surgical History:    DaVinci bypass gastric sindy-en-y  [place] teeth extraction     Family History:    Hypertension -  Father   Depression - Father   Lipids - Father   Obesity - Father   Thyroid Disease - Father   Polycythemia - Father  Hypertension - Mother   Obesity - Mother   Thyroid Disease - Mother   Depression - Mother   Protein S deficiency - Mother    Social History:  The patient presents alone.  The patient is employed.  The patient occasionally drink alcohol.     Physical Exam     Patient Vitals for the past 24 hrs:   BP Temp Pulse Resp SpO2 Weight   22 1459 -- -- 61 16 99 % --   22 1318 122/67 97.8  F (36.6  C) 58 14 97 % 108.9 kg (240 lb)     Physical Exam  Constitutional: Well developed, nontox appearance  Head: Small abrasion to the left parietal scalp without crepitance or significant edema.  TMs clear bilaterally  Mouth/Throat: Oropharynx is clear and moist.   Neck:  no stridor, no C spine tenderness  Eyes: no scleral icterus, PERRL, EOMI  Cardiovascular: RRR, 2+ bilat radial pulses  Pulmonary/Chest: nml resp effort  Ext: Warm, well perfused, no edema  Neurological: A&O, symmetric facies, moves ext x4  Skin: Skin is warm and dry.   Psychiatric: Behavior is normal. Thought content normal.   Nursing note and vitals reviewed.    Emergency Department Course     Emergency Department Course:         Reviewed:  I reviewed nursing notes, vitals, past medical history, Care Everywhere and [facility]    Assessments:  1438 I obtained history and examined the patient as noted above.   1444 I rechecked the patient and explained findings.    Disposition:  The patient was discharged to home.     Impression & Plan     CMS Diagnoses: None.    Medical Decision Makin year old male presenting w/ head injury     This patient has a history and clinical exam consistent with concussion. Doubt skull fracture, epidural hematoma, subdural hematoma, intracerebral hemorrhage, and traumatic subarachnoid hemorrhage; all of these are highly unlikely in this clinical setting. This patient denies severe headache, seizure, and has no focal  neurological findings. The patient did not have prolonged LOC, sleepiness, repeated emesis, or poor orientation. By the [facility] head CT rules, the patient does not appear to warrant head CT imaging at this time. At this time I feel the patient is safe for discharge.  Concussion precautions given for home. Recommendations given regarding follow up with PCP and return to the emergency department as needed for new or worsening symptoms.  Counseled on all results, diagnosis and disposition.  Pt understanding and agreeable to plan. Patient discharged in stable condition.      Diagnosis:    ICD-10-CM    1. Concussion without loss of consciousness, initial encounter  S06.0X0A      Scribe Disclosure:  Bertha CAICEDOviet Vito, am serving as a scribe at 6:44 PM on 2/14/2022 to document services personally performed by Abel Mcbride MD based on my observations and the provider's statements to me.     Abel Mcbride MD  02/14/22 4613

## 2022-02-20 ENCOUNTER — LAB (OUTPATIENT)
Dept: LAB | Facility: CLINIC | Age: 37
End: 2022-02-20
Payer: COMMERCIAL

## 2022-02-20 DIAGNOSIS — F33.2 SEVERE RECURRENT MAJOR DEPRESSION WITHOUT PSYCHOTIC FEATURES (H): Primary | ICD-10-CM

## 2022-02-20 LAB
BASOPHILS # BLD AUTO: 0 10E3/UL (ref 0–0.2)
BASOPHILS NFR BLD AUTO: 1 %
EOSINOPHIL # BLD AUTO: 0.4 10E3/UL (ref 0–0.7)
EOSINOPHIL NFR BLD AUTO: 6 %
ERYTHROCYTE [DISTWIDTH] IN BLOOD BY AUTOMATED COUNT: 12.5 % (ref 10–15)
HCT VFR BLD AUTO: 43.8 % (ref 40–53)
HGB BLD-MCNC: 14.4 G/DL (ref 13.3–17.7)
LYMPHOCYTES # BLD AUTO: 1.6 10E3/UL (ref 0.8–5.3)
LYMPHOCYTES NFR BLD AUTO: 21 %
MCH RBC QN AUTO: 29.4 PG (ref 26.5–33)
MCHC RBC AUTO-ENTMCNC: 32.9 G/DL (ref 31.5–36.5)
MCV RBC AUTO: 90 FL (ref 78–100)
MONOCYTES # BLD AUTO: 0.7 10E3/UL (ref 0–1.3)
MONOCYTES NFR BLD AUTO: 9 %
NEUTROPHILS # BLD AUTO: 4.8 10E3/UL (ref 1.6–8.3)
NEUTROPHILS NFR BLD AUTO: 64 %
PLATELET # BLD AUTO: 290 10E3/UL (ref 150–450)
RBC # BLD AUTO: 4.89 10E6/UL (ref 4.4–5.9)
WBC # BLD AUTO: 7.4 10E3/UL (ref 4–11)

## 2022-02-20 PROCEDURE — 84439 ASSAY OF FREE THYROXINE: CPT

## 2022-02-20 PROCEDURE — 36415 COLL VENOUS BLD VENIPUNCTURE: CPT

## 2022-02-20 PROCEDURE — 80178 ASSAY OF LITHIUM: CPT

## 2022-02-20 PROCEDURE — 80050 GENERAL HEALTH PANEL: CPT

## 2022-02-21 LAB
ALBUMIN SERPL-MCNC: 3.9 G/DL (ref 3.4–5)
ALP SERPL-CCNC: 80 U/L (ref 40–150)
ALT SERPL W P-5'-P-CCNC: 29 U/L (ref 0–70)
ANION GAP SERPL CALCULATED.3IONS-SCNC: 9 MMOL/L (ref 3–14)
AST SERPL W P-5'-P-CCNC: 16 U/L (ref 0–45)
BILIRUB SERPL-MCNC: 0.5 MG/DL (ref 0.2–1.3)
BUN SERPL-MCNC: 15 MG/DL (ref 7–30)
CALCIUM SERPL-MCNC: 9.7 MG/DL (ref 8.5–10.1)
CHLORIDE BLD-SCNC: 108 MMOL/L (ref 94–109)
CO2 SERPL-SCNC: 22 MMOL/L (ref 20–32)
CREAT SERPL-MCNC: 1.05 MG/DL (ref 0.66–1.25)
GFR SERPL CREATININE-BSD FRML MDRD: >90 ML/MIN/1.73M2
GLUCOSE BLD-MCNC: 87 MG/DL (ref 70–99)
LITHIUM SERPL-SCNC: 0.6 MMOL/L
POTASSIUM BLD-SCNC: 4.1 MMOL/L (ref 3.4–5.3)
PROT SERPL-MCNC: 7.3 G/DL (ref 6.8–8.8)
SODIUM SERPL-SCNC: 139 MMOL/L (ref 133–144)
T4 FREE SERPL-MCNC: 0.91 NG/DL (ref 0.76–1.46)
TSH SERPL DL<=0.005 MIU/L-ACNC: 0.55 MU/L (ref 0.4–4)

## 2022-05-02 ENCOUNTER — DOCUMENTATION ONLY (OUTPATIENT)
Dept: SURGERY | Facility: CLINIC | Age: 37
End: 2022-05-02
Payer: COMMERCIAL

## 2022-05-31 NOTE — TELEPHONE ENCOUNTER
Called pt to check on postop status.  Pt responses below:    Surgery Date: 7/14/21  Surgery Type: gastric bypass  Surgeon: Kamron    Fluid Intake: 13-14 of water - work on it and keep track    Pain Assessment:    Pain level: 5/10  Location: left side abdomen  When did it start: PO  Is it constant?  Dull and constant  What makes it better or worse? Oxycodone and will start Tyl.   Description: worse with shifting    Medications:  RX for PPI?  yes  Do you understand how to take your medications postop?  Yes  Reviewed postop med changes:  Start oxycodone - took  5 mg this morning. Stopped Augmentin that he had for recent bronchitis per patient.  Have you restarted all of the medications that weren't changed after surgery?  Started all but vitamins and calcium   Do you have any questions about how to take your medications?  No     Diet: pudding SH and protein drink  How tolerated? okay    Nausea: Slight - has Zofran if need    Vomiting: No     NSAIDs: No   Smoking: No     Fever: No     Incisions: Not looked at them - feels about the same     BMs:  Last BM: preop  Flatus: positive     Urinary:  No issues    Sleeping/Rest: okay but did to find it difficult to get comfortable.    Activity: walking around house    Activity caution:  Advised pt to be careful to avoid straining abdominal muscles during recovery.    Incentive Spirometer: several times up to 1500 - 3250. Is aware due to recent bronchitis that needs to work at this.     Other symptoms (CP/SOB/dizzy/rapid HR): No     Plan/Advice:   1 week follow up appointment verified.     Use of incentive spirometer reinforced.    Call clinic with any questions or concerns.  Reviewed that clinic staff are available on call during nights and weekends for postoperative concerns.    Number given.    No further questions or concerns now. Pt agrees to call if having any questions or concerns.  Anju Dickinson, MS, RD, RN                         patient

## 2022-06-11 ENCOUNTER — HEALTH MAINTENANCE LETTER (OUTPATIENT)
Age: 37
End: 2022-06-11

## 2022-07-20 ENCOUNTER — VIRTUAL VISIT (OUTPATIENT)
Dept: SURGERY | Facility: CLINIC | Age: 37
End: 2022-07-20
Payer: COMMERCIAL

## 2022-07-20 VITALS — HEIGHT: 71 IN | BODY MASS INDEX: 32.9 KG/M2 | WEIGHT: 235 LBS

## 2022-07-20 VITALS — HEIGHT: 71 IN | WEIGHT: 235 LBS | BODY MASS INDEX: 32.9 KG/M2

## 2022-07-20 DIAGNOSIS — K91.2 POSTSURGICAL MALABSORPTION: ICD-10-CM

## 2022-07-20 DIAGNOSIS — K21.9 GASTROESOPHAGEAL REFLUX DISEASE WITHOUT ESOPHAGITIS: ICD-10-CM

## 2022-07-20 DIAGNOSIS — Z98.84 BARIATRIC SURGERY STATUS: ICD-10-CM

## 2022-07-20 DIAGNOSIS — E66.811 CLASS 1 OBESITY DUE TO EXCESS CALORIES WITH BODY MASS INDEX (BMI) OF 32.0 TO 32.9 IN ADULT, UNSPECIFIED WHETHER SERIOUS COMORBIDITY PRESENT: Primary | ICD-10-CM

## 2022-07-20 DIAGNOSIS — E55.9 VITAMIN D DEFICIENCY: ICD-10-CM

## 2022-07-20 DIAGNOSIS — E66.811 CLASS 1 OBESITY DUE TO EXCESS CALORIES WITH BODY MASS INDEX (BMI) OF 32.0 TO 32.9 IN ADULT, UNSPECIFIED WHETHER SERIOUS COMORBIDITY PRESENT: ICD-10-CM

## 2022-07-20 DIAGNOSIS — E66.09 CLASS 1 OBESITY DUE TO EXCESS CALORIES WITH BODY MASS INDEX (BMI) OF 32.0 TO 32.9 IN ADULT, UNSPECIFIED WHETHER SERIOUS COMORBIDITY PRESENT: Primary | ICD-10-CM

## 2022-07-20 DIAGNOSIS — E66.01 MORBIDLY OBESE (H): ICD-10-CM

## 2022-07-20 DIAGNOSIS — E66.09 CLASS 1 OBESITY DUE TO EXCESS CALORIES WITH BODY MASS INDEX (BMI) OF 32.0 TO 32.9 IN ADULT, UNSPECIFIED WHETHER SERIOUS COMORBIDITY PRESENT: ICD-10-CM

## 2022-07-20 PROCEDURE — 99213 OFFICE O/P EST LOW 20 MIN: CPT | Mod: 95 | Performed by: PHYSICIAN ASSISTANT

## 2022-07-20 PROCEDURE — 97803 MED NUTRITION INDIV SUBSEQ: CPT | Mod: GT | Performed by: DIETITIAN, REGISTERED

## 2022-07-20 NOTE — PATIENT INSTRUCTIONS
"Nice to talk with you today. Thank you for allowing me the privilege of caring for you. We hope we provided you with the excellent service you deserve.     To ensure the quality of our services you may receive a patient satisfaction survey from an independent monitoring company.  The greatest compliment you can give is \"Likely to Recommend\"    Below is our plan we discussed.-  ALBERTA Sanchez      Labs have been ordered in the system.You can have these drawn at any [facility] lab.  Please call [phone] set up an appointment.  Your results will be posted on [employer] as soon as they're complete.  After all are resulted, I will review and comment to you.    Continue your bariatric vitamins     FOLLOW-UP:  Please call [phone] to schedule your next visit.     Bariatric Post Op Guidelines  General:    To avoid marginal ulcers avoid all forms of tobacco, alcohol in excess, caffeine, and NSAIDS (unless indicated for cardioprotection or othewise and opposed by a PPI).    Exercise is key for continued weight loss and weight maintenance. Aim for 30-60 minutes of physical activity most days of the week. Include cardiovascular and strength training.    Continue lifelong vitamins supplementation and annual lab follow up.  All  patients should supplement with the following bariatric postoperative vitamins:  2 Complete multivitamins with minerals (at different times than calcium)  Vitamin D 5000 Int Units/125 mg daily   Calcium 600 mg twice daily or 500 mg hree times daily   Vitamin B12: 500 mcg sl daily or 1000 mcg Inj monthly  B complex daily or Thiamine 100 mg weekly  1 Iron/Vit C. Daily for females who menstruate and/or as directed    The bariatric team should be aware and evaluate all adverse gastrointestinal symptoms which can be a sign of complication. Inability to tolerate textured solid food (chicken, steak, fish) may need to be evaluated by endoscopy.    There is a 10% increase of Alcohol Use Disorder in " patients with bariatric surgery.   Most often occurring around 2 years post op.  Please call the clinic if you feel alcohol is interfering in your daily life.  We can help.     Follow up annually lifelong. Obesity is a chronic disease.  Weight gain can be expected. The goal of follow-up visits is to ensure adequate vitamin and protein absorption, evaluate food intake behavior, review exercise/activity level, and assist with weight regain.    Nutritional:  Eat 3 meals per day  (No snacks between meals.)  Do not skip meals.  This can cause overeating at the next meal and will prevent adequate protein and nutritional intake.    Aim for 60-80 grams of protein per day.  Always eat your protein first. This assists with optimal nutrition and helps you stay full longer.    Eat your protein first, and then follow with fiber.    Add fiber by including fruits, vegetables, whole grains, and beans.     Portions should be about 1 cup per meal. Use measuring cups to be accurate.  Continue to use saucer/salad plates, infant/toddler silverware to keep portion sizes small and take small bites.    Eat S-L-O-W-L-Y to make each meal last 20-30 minutes. Always stop eating when satisfied.    Aim for 64 oz. of calorie-free fluids daily.    Avoid drinking 30 min before, during, and 30 min after meal    Avoid high sugar and high fat foods to prevent high calorie intake. This will reduce your rate of weight loss and can cause weight regain.   Check nutrition labels for less than 10 grams of sugar and less than 10 grams of fat per serving.

## 2022-07-20 NOTE — PROGRESS NOTES
"Shakir is a 37 year old who is being evaluated via a billable video visit.      If the video visit is dropped, the invitation should be resent by: Text to cell phone: [phone]  Will anyone else be joining your video visit? No  {If patient encounters technical issues they should call [phone] :150956}    Video-Visit Details    Type of service:  Video Visit    Video Start Time: {video visit start/end time for provider to select:100566}    Video End Time:{video visit start/end time for provider to select:064427}        Originating Location (pt. Location): {video visit patient location:033093::\"Home\"}    Distant Location (provider location):  [facility] [place]     Platform used for Video Visit: {Virtual Visit Platforms:151064::\"AmWell\"}        Shakir is a 37 year old who is being evaluated via a billable video visit.      How would you like to obtain your AVS? MyChart  If the video visit is dropped, the invitation should be resent by: Text to cell phone: [phone]  Will anyone else be joining your video visit? No  {If patient encounters technical issues they should call [phone] :150956}    Video-Visit Details    Video Start Time: ***    Type of service:  Video Visit    Video End Time:***    Originating Location (pt. Location): {video visit patient location:015359::\"Home\"}    Distant Location (provider location): Provider Remote Setting    Platform used for Video Visit: {Virtual Visit Platforms:036863::\"AmWell\"}        NUTRITION POST OP APPOINTMENT  DATE OF VISIT: July 20, 2022    Abel Ward  1985  male  [id number]  37 year old     ASSESSMENT:    REASON FOR VISIT:  Abel is a 37 year old year old male presents today for 1 year PO nutrition follow-up appointment. Patient is accompanied by {ACCOMPANIED BY (ADULT):648649}.    DIAGNOSIS:  Status post gastric bypass surgery.  Obesity {.:161167}     ANTHROPOMETRICS:  Initial Weight: 320 lbs   Height: 5'11\"  "   Current Weight: ***    BMI: ***    VITAMINS AND MINERALS:  *** Multivitamin with Minerals  *** mg Calcium With Vitamin D ***  *** International units Vitamin D  *** mcg Vitamin B-12 {sublingual/injection:647110}  *** mg Iron  *** mg Vitamin C  *** Vitron C    NUTRITION HISTORY:  Breakfast: ***  Lunch: ***  Supper: ***  Snacks: ***  Fluids consumed: {Bar Fluids:171615}  Consuming liquid calories: {Yes & No:400631}  Protein intake: *** grams/day  Tolerate regular texture food: {Yes & No:246626}  Any foods not tolerated details: {Yes & No:498399}  If any food not tolerated: ***  Portion size: {Portion size:093003}  Take 20-30 minutes to consume each meal: {Yes & No:676511}   Eat protein foods first: {Yes & No:008464}  Fluids and meals separate by at least 30 minutes: {Yes & No:159453}  Chew foods thoroughly: {Yes & No:290338}  Tolerating diet: {Yes & No:177744}  Drinking high protein supplements: {Yes & No:400145}  Consuming snacks per day: ***  Additional Information: ***        PHYSICAL ACTIVITY:  Type: ***  Frequency (days per week): {1-7:117080}  Duration (min): ***    DIAGNOSIS:  Previous Nutrition Diagnosis: Altered gastrointestinal function related to alteration in gastrointestinal structure as evidenced by history of gastric bypass surgery.- no change    Previous goals:  (remote)    Current Nutrition Diagnosis: Altered gastrointestinal function related to alteration in gastrointestinal structure as evidenced by history of gastric bypass surgery.    INTERVENTION:   Nutrition Prescription: Eat 3 meals a day at regular intervals. Consume 60-90 grams of protein daily. Follow post-surgical vitamin and mineral protocol.  Assessed learning needs and learning preferences.    GOALS:  ***      Follow-Up:   Recommend annual follow up visits to assist with lifestyle changes or per insurance.  Implementation: Discussed progress toward previous goals; reinforced importance of following bariatric lifestyle  changes.    NUTRITION MONITORING AND EVALUATION:  Anticipated compliance: { :389970}  Verbalized { :609751} understanding.    Follow up: Patient to follow up in 12 months.    TIME SPENT WITH PATIENT:  *** minutes      Kimberly Jamison RD, LD  Clinical Dietitian

## 2022-07-20 NOTE — PROGRESS NOTES
Shakir is a 37 year old who is being evaluated via a billable video visit.      If the video visit is dropped, the invitation should be resent by:   Will anyone else be joining your video visit? No      Video-Visit Details    Type of service:  Video Visit    Video Start Time: 4:05    Video End Time: 4:18    24 minutes spent on the date of the encounter doing chart review, review of test results, patient visit and documentation     Originating Location (pt. Location): Home    Distant Location (provider location):  [facility]     Platform used for Video Visit: "[employer]          VIRTUAL BARIATRIC FOLLOW UP VISIT           July 20, 2022         HISTORY OF PRESENT ILLNESS: Pt returns today for his follow-up appointment status post laparoscopic gastric bypass. Has not been seen since 1 month PO. Overall doing better than expected. Has felt very fatigued in the past several months that has been quite debilitating. Has had some changes to his mental health medications which could be contributing. Does a lot of walking at his job.      Initial Weight (lbs): 320 lbs   Current Weight: 235 lb (106.6 kg) (pt reported)  Cumulative weight loss (lbs): 85  Last Visits Weight: 289 lb (131.1 kg)        BARIATRIC METRICS:  Current Weight: 235 lb (106.6 kg) (pt reported)  Body mass index is 32.78 kg/m .   Wt change since last visit (lbs): -54  Cumulative weight loss (lbs): 85       Patient is taking the following bariatric postoperative vitamins:  2 Multivitamin with Minerals (AM)  650 mg Calcium With Vitamin D (1-2x/day; separate from MVT)   10,000 International units Vitamin D  5000 mcg Vitamin B-12 sublingual - 1x/week  100 Vitamin B1 - 1x/week      Pt is exercising by walking a lot at work       OBESITY RELATED CONDITIONS:  MENDEZ - stopped using.  Not snoring loudly  GERD without esophagitis - takes omeprazole daily. Tried stopping it a few weeks ago and had symptoms.  Buys otc omeprazole - tired  "of hassle with insurance       SOCIAL HISTORY:  Pt denies smoking.  Pt denies alcohol use.  Avoids NSAIDS.  Caffeine in the forms of protein shakes      REVIEW OF SYSTEMS:     GI:  Nausea- No  Vomiting- No  Diarrhea- No  Constipation- No      Drinks over 64 oz  Dysphagia- No  Abdominal Pain- No  Heartburn- see above     SKIN:  Intertriginous irritation- No  Hair loss - No     PSYCH:  Depression- Working with psychiatrist with medication adjustments  Anxiety- see above      LABS/IMAGING/MEDICAL RECORDS REVIEW: [facility]    PHYSICAL EXAMINATION:   Ht 5' 11\" (1.803 m)   Wt 235 lb (106.6 kg)   BMI 32.78 kg/m    GENERAL: Healthy, alert and no distress  EYES: Eyes grossly normal to inspection.  No discharge or erythema, or obvious scleral/conjunctival abnormalities.  RESP: No audible wheeze, cough, or visible cyanosis.  No visible retractions or increased work of breathing.    SKIN: Visible skin clear. No significant rash, abnormal pigmentation or lesions.  NEURO: Cranial nerves grossly intact.  Mentation and speech appropriate for age.  PSYCH: Mentation appears normal, affect normal/bright, judgement and insight intact, normal speech and appearance well-groomed.        ASSESSMENT AND PLAN:    1 years status laparoscopic gastric bypass  Morbid Obesity - Resolved. BMI today is 32.78 kg/m2  Post surgical malabsorption:   Ordered CBC, vitamin B12, vitamin A, vitamin D, PTH, ferritin, TIBC, and iron labs.   Follow food plan per dietitian recommendations.   Continue taking recommended post-op vitamins.  GERD without esophagitis - continue omeprazole 20 mg daily. Offered to send over RX but patient declined.    Vitamin D deficiency - will get lab drawn  Return to clinic in 1 year for annual with provider and diet        "

## 2022-07-20 NOTE — PATIENT INSTRUCTIONS
"Karthik Schilling!        Great chatting with you today! Here's a summary of everything we chatted about today:    Your Stage 5 Diet: Regular Foods  [email or web address]       Goals:    1. Aim for 600-800 calories  - Also: 60-90g protein and 10-15g fiber  - We will increase calories once we shift to weight maintenance     2. Eat 3 food groups at each meal  - 1/2c protein, 1/4c vegetable/fruit, 1/4c fruit/starch  - I'll mail you a new sample meal plan    3.  Continue to eat slowly and chew well      4. Limit \"snacks\" to milk or protein drinks   - Have up to 1 protein drink per day OR 2 cups of low-fat milk    5. Avoid caffeine, carbonation and alcohol    6. Add in some strength training  - This helps build muscle, which increases metabolism.     7. Separate fluids and meals by 30 minutes  - Avoid sipping/drinking fluids during your meal, and for 30 minutes after            Plan on following up once per year. This can be scheduled via our call center at . Of course, reach out sooner with any questions or concerns. Have a great day!           Kimberly Jamison RD, LD  Clinical Dietitian      "

## 2022-07-20 NOTE — PROGRESS NOTES
"Shakir is a 37 year old who is being evaluated via a billable video visit.      How would you like to obtain your AVS? MyChart  Will anyone else be joining your video visit? No      Video-Visit Details    Video Start Time: 3:31pm    Type of service:  Video Visit    Video End Time:3:55pm    Originating Location (pt. Location): work    Distant Location (provider location): Provider Remote Setting    Platform used for Video Visit: [facility]    .  ..    NUTRITION POST OP APPOINTMENT  DATE OF VISIT: July 20, 2022    Abel Ward  1985  male  [id number]  37 year old     ASSESSMENT:    REASON FOR VISIT:  Abel is a 37 year old year old male presents today for 1 year PO nutrition follow-up appointment. Patient is accompanied by self.    DIAGNOSIS:  Status post gastric bypass surgery.  Obesity Obesity Grade I BMI 30-34.9     ANTHROPOMETRICS:  Initial Weight: 320 lbs   Height: 5' 11\"   Current Weight: 235 lbs 0 oz     BMI: Body mass index is 32.78 kg/m .    VITAMINS AND MINERALS:  2 Multivitamin with Minerals (AM)  650 mg Calcium With Vitamin D (1-2x/day; separate from MVT)   10,000 International units Vitamin D  5000 mcg Vitamin B-12 sublingual - 1x/week  100 Vitamin B1 - 1x/week    NUTRITION HISTORY:  Breakfast: [wakes at 4am, has protein shake upon waking]  Lunch: protein bar  lunchable  [facility] - 1/2 sub  Supper: [5-6pm] 1 slice of pizza  1/2 chicken quesedilla   Snacks: [mid-morning] protein bar  [mid afternoon] Atkins bar  [evenings] sweets  Fluids consumed: Water/enhanced water (64oz)  Consuming liquid calories: Yes  Protein intake: 60-90 grams/day  Tolerate regular texture food: Yes  Any foods not tolerated details: n/a  If any food not tolerated: n/a (doesn't like milk, however)  Portion size: 1 cup or more  Take 20-30 minutes to consume each meal: Yes   Eat protein foods first: Yes  Fluids and meals separate by at least 30 minutes: No  Chew foods thoroughly: Yes  Tolerating diet: " Yes  Drinking high protein supplements: Yes  Consuming snacks per day: 3  Additional Information: Pt not seen since 4w post-op. Overall quite successful with weight loss however does report plateau over the last several months. Reviewed portion/snacking guidelines. Reviewed rationale for  fluids and meals by 30 minutes. Recommended add strength training.         PHYSICAL ACTIVITY:  None outside of ADLs/work  10,000-15,000 steps per day     DIAGNOSIS:  Previous Nutrition Diagnosis: Altered gastrointestinal function related to alteration in gastrointestinal structure as evidenced by history of gastric bypass surgery.- no change    Previous goals:  (remote)    Current Nutrition Diagnosis: Altered gastrointestinal function related to alteration in gastrointestinal structure as evidenced by history of gastric bypass surgery.    INTERVENTION:   Nutrition Prescription: Eat 3 meals a day at regular intervals. Consume 60-90 grams of protein daily. Follow post-surgical vitamin and mineral protocol.  Assessed learning needs and learning preferences.    GOALS:  Aim for 1 cup of food with 3 food groups  Replace snacks with milk or protein drinks  Separate fluids and meals by 30 minutes  Add in strength training    Follow-Up:   Recommend annual follow up visits to assist with lifestyle changes or per insurance.  Implementation: Discussed progress toward previous goals; reinforced importance of following bariatric lifestyle changes.    NUTRITION MONITORING AND EVALUATION:  Anticipated compliance: fair-good  Verbalized good understanding.    Follow up: Patient to follow up in 12 months.    TIME SPENT WITH PATIENT:  24 minutes        Kimberly Jamison RD, LD  Clinical Dietitian

## 2022-08-05 DIAGNOSIS — F33.2 SEVERE RECURRENT MAJOR DEPRESSION WITHOUT PSYCHOTIC FEATURES (H): Primary | ICD-10-CM

## 2022-08-28 ENCOUNTER — LAB (OUTPATIENT)
Dept: LAB | Facility: CLINIC | Age: 37
End: 2022-08-28
Payer: COMMERCIAL

## 2022-08-28 DIAGNOSIS — K91.2 POSTSURGICAL MALABSORPTION: ICD-10-CM

## 2022-08-28 DIAGNOSIS — Z98.84 BARIATRIC SURGERY STATUS: ICD-10-CM

## 2022-08-28 DIAGNOSIS — F33.2 SEVERE RECURRENT MAJOR DEPRESSION WITHOUT PSYCHOTIC FEATURES (H): ICD-10-CM

## 2022-08-28 LAB
ALBUMIN SERPL-MCNC: 3.6 G/DL (ref 3.4–5)
ALP SERPL-CCNC: 65 U/L (ref 40–150)
ALT SERPL W P-5'-P-CCNC: 24 U/L (ref 0–70)
ANION GAP SERPL CALCULATED.3IONS-SCNC: 5 MMOL/L (ref 3–14)
AST SERPL W P-5'-P-CCNC: 15 U/L (ref 0–45)
BASOPHILS # BLD AUTO: 0.1 10E3/UL (ref 0–0.2)
BASOPHILS NFR BLD AUTO: 1 %
BILIRUB SERPL-MCNC: 0.6 MG/DL (ref 0.2–1.3)
BUN SERPL-MCNC: 14 MG/DL (ref 7–30)
CALCIUM SERPL-MCNC: 9.1 MG/DL (ref 8.5–10.1)
CHLORIDE BLD-SCNC: 111 MMOL/L (ref 94–109)
CHOLEST SERPL-MCNC: 152 MG/DL
CO2 SERPL-SCNC: 24 MMOL/L (ref 20–32)
CREAT SERPL-MCNC: 0.86 MG/DL (ref 0.66–1.25)
EOSINOPHIL # BLD AUTO: 0.4 10E3/UL (ref 0–0.7)
EOSINOPHIL NFR BLD AUTO: 7 %
ERYTHROCYTE [DISTWIDTH] IN BLOOD BY AUTOMATED COUNT: 12.5 % (ref 10–15)
FASTING STATUS PATIENT QL REPORTED: YES
FERRITIN SERPL-MCNC: 39 NG/ML (ref 26–388)
GFR SERPL CREATININE-BSD FRML MDRD: >90 ML/MIN/1.73M2
GLUCOSE BLD-MCNC: 88 MG/DL (ref 70–99)
HBA1C MFR BLD: 5.4 % (ref 0–5.6)
HCT VFR BLD AUTO: 41.8 % (ref 40–53)
HDLC SERPL-MCNC: 68 MG/DL
HGB BLD-MCNC: 14.2 G/DL (ref 13.3–17.7)
LDLC SERPL CALC-MCNC: 70 MG/DL
LITHIUM SERPL-SCNC: 0.6 MMOL/L (ref 0.6–1.2)
LYMPHOCYTES # BLD AUTO: 1.4 10E3/UL (ref 0.8–5.3)
LYMPHOCYTES NFR BLD AUTO: 23 %
MCH RBC QN AUTO: 30.2 PG (ref 26.5–33)
MCHC RBC AUTO-ENTMCNC: 34 G/DL (ref 31.5–36.5)
MCV RBC AUTO: 89 FL (ref 78–100)
MONOCYTES # BLD AUTO: 0.5 10E3/UL (ref 0–1.3)
MONOCYTES NFR BLD AUTO: 9 %
NEUTROPHILS # BLD AUTO: 3.8 10E3/UL (ref 1.6–8.3)
NEUTROPHILS NFR BLD AUTO: 61 %
NONHDLC SERPL-MCNC: 84 MG/DL
PLATELET # BLD AUTO: 258 10E3/UL (ref 150–450)
POTASSIUM BLD-SCNC: 3.9 MMOL/L (ref 3.4–5.3)
PROLACTIN SERPL 3RD IS-MCNC: 182 NG/ML (ref 4–15)
PROT SERPL-MCNC: 6.8 G/DL (ref 6.8–8.8)
PTH-INTACT SERPL-MCNC: 22 PG/ML (ref 15–65)
RBC # BLD AUTO: 4.7 10E6/UL (ref 4.4–5.9)
SODIUM SERPL-SCNC: 140 MMOL/L (ref 133–144)
T4 FREE SERPL-MCNC: 0.95 NG/DL (ref 0.76–1.46)
TRIGL SERPL-MCNC: 68 MG/DL
TSH SERPL DL<=0.005 MIU/L-ACNC: 1.37 MU/L (ref 0.4–4)
VIT B12 SERPL-MCNC: 932 PG/ML (ref 232–1245)
WBC # BLD AUTO: 6.1 10E3/UL (ref 4–11)

## 2022-08-28 PROCEDURE — 84439 ASSAY OF FREE THYROXINE: CPT

## 2022-08-28 PROCEDURE — 82306 VITAMIN D 25 HYDROXY: CPT

## 2022-08-28 PROCEDURE — 99000 SPECIMEN HANDLING OFFICE-LAB: CPT

## 2022-08-28 PROCEDURE — 80061 LIPID PANEL: CPT

## 2022-08-28 PROCEDURE — 36415 COLL VENOUS BLD VENIPUNCTURE: CPT

## 2022-08-28 PROCEDURE — 83036 HEMOGLOBIN GLYCOSYLATED A1C: CPT

## 2022-08-28 PROCEDURE — 82728 ASSAY OF FERRITIN: CPT

## 2022-08-28 PROCEDURE — 84590 ASSAY OF VITAMIN A: CPT | Mod: 90

## 2022-08-28 PROCEDURE — 84146 ASSAY OF PROLACTIN: CPT

## 2022-08-28 PROCEDURE — 82607 VITAMIN B-12: CPT

## 2022-08-28 PROCEDURE — 83550 IRON BINDING TEST: CPT

## 2022-08-28 PROCEDURE — 80050 GENERAL HEALTH PANEL: CPT

## 2022-08-28 PROCEDURE — 83970 ASSAY OF PARATHORMONE: CPT

## 2022-08-28 PROCEDURE — 80178 ASSAY OF LITHIUM: CPT

## 2022-08-29 LAB
DEPRECATED CALCIDIOL+CALCIFEROL SERPL-MC: 74 UG/L (ref 20–75)
IRON SATN MFR SERPL: 36 % (ref 15–46)
IRON SERPL-MCNC: 109 UG/DL (ref 35–180)
TIBC SERPL-MCNC: 306 UG/DL (ref 240–430)

## 2022-08-31 LAB
ANNOTATION COMMENT IMP: NORMAL
RETINYL PALMITATE SERPL-MCNC: 0.03 MG/L
VIT A SERPL-MCNC: 0.56 MG/L

## 2022-09-29 NOTE — IP AVS SNAPSHOT
MRN:[id number]                      After Visit Summary   11/15/2017    Abel Ward    MRN: [id number]           Visit Information        Provider Department      11/15/2017  9:00 AM Froy Weber MD [facility]           Review of your medicines      CONTINUE these medicines which have NOT CHANGED        Dose / Directions    ALPRAZolam 0.5 MG tablet   Commonly known as:  XANAX   Used for:  Anxiety        Dose:  0.5 mg   Take 1 tablet (0.5 mg) by mouth 3 times daily as needed for anxiety   Quantity:  20 tablet   Refills:  0       desvenlafaxine succinate 50 MG 24 hr tablet   Commonly known as:  PRISTIQ   Used for:  Severe episode of recurrent major depressive disorder, without psychotic features (H)        Dose:  50 mg   Take 1 tablet (50 mg) by mouth daily   Quantity:  30 tablet   Refills:  0       gabapentin 100 MG capsule   Commonly known as:  NEURONTIN   Used for:  Restless legs syndrome        Dose:  100-200 mg   Take 1-2 capsules (100-200 mg) by mouth nightly as needed   Refills:  0       omeprazole 20 MG CR capsule   Commonly known as:  priLOSEC   Used for:  Esophageal reflux        Dose:  20 mg   Take 1 capsule (20 mg) by mouth daily   Quantity:  90 capsule   Refills:  3       TRAZODONE HCL PO   Used for:  Morbid obesity (H)        Dose:  200 mg   Take 200 mg by mouth At Bedtime   Refills:  0       trifluoperazine 2 MG tablet   Commonly known as:  STELAZINE   Used for:  Anxiety        Dose:  2-4 mg   Take 1-2 tablets (2-4 mg) by mouth 3 times daily as needed (severe anxiety)   Quantity:  60 tablet   Refills:  0                Protect others around you: Learn how to safely use, store and throw away your medicines at www.disposemymeds.org.         Follow-ups after your visit        Your next 10 appointments already scheduled     Nov 17, 2017  7:30 AM CST   Electroconvulsive Therapy with Froy Weber MD   [facility] ([facility]  [facility])    [address]  [facility] [place] [address] [phone]               Care Instructions        Further instructions from your care team       ECT Discharge Instructions      During your ECT series:      Do not drive or work heavy equipment until 7 days after your last treatment.    Do not drink alcohol or use street drugs (illicit drugs) while you are having treatments.    Do not make important decisions, including legal decisions.    After each treatment:      Get plenty of rest. A responsible adult must stay with your for at least 6 hours.    Avoid heavy or risky activities for 24 hours.    If you feel light-headed, sit for a few minutes before standing. Have someone help you get up.    If you have nausea (feel sick to your stomach): Drink only clear liquids such as apple juice, ginger ale, broth or 7UP, Be sure to drink plenty of liquids. Move to a normal diet as you feel able.     If you received Toradol, wait 6 hours before taking ibuprofen.    Call your doctor if:     You have a fever over 100F (37.7 C) (taken under the tongue), or a fever that last more than 24 hours.    Your IV site is red, swollen, very painful or is getting more tender.    You have nausea that gets very bad or does not improve.      If you have any symptoms after ECT, tell our staff before your next treatment.    The ECT Department can be reached at [phone].  The ECT Department is open Mondays, Wednesdays and Fridays from 7:00 AM to 2:00 PM.    To speak to a doctor, call: Dr. Weber's office # [phone] Fax # [phone]    Other: You have received Toradol today at 930a.  Toradol is an NSAID.  Do not take any NSAID's including: Ibuprofen, Naproxen, Aspirin, Advil, Motrin, Aleve, Daniela, etc., until 6 hours after the above time (330p).  If you have any questions, contact your physician or pharmacist.  You also received Zofran 4mg at 930a.       Transported by: Christina  mother      _________________________________________________  ________________________  Patient/Responsible party Signature      Date          ________________________________________________   ________________________  Nurse Signature        Date     Additional Information About Your Visit        [employer] Information     [employer] gives you secure access to your electronic health record. If you see a primary care provider, you can also send messages to your care team and make appointments. If you have questions, please call your primary care clinic.  If you do not have a primary care provider, please call [phone] and they will assist you.        Care EveryWhere ID     This is your Care EveryWhere ID. This could be used by other organizations to access your [facility] medical records  [id number]        Your Vitals Were     Blood Pressure Pulse Temperature Respirations Pulse Oximetry       137/84 71 98.4  F (36.9  C) (Tympanic) 20 95%        Primary Care Provider Office Phone # Fax #    David Denney [phone]      Equal Access to Services     [facility] : Hadii cate Hamm, waaxda hernesto, qaybta brynnallauren mayes . So [facility] [phone].    ATENCIÓN: Si habla español, tiene a bagley disposición servicios gratuitos de asistencia lingüística. Anais al [phone].    We comply with applicable federal civil rights laws and [place] laws. We do not discriminate on the basis of race, color, national origin, age, disability, sex, sexual orientation, or gender identity.            Thank you!     Thank you for choosing [facility] for your care. Our goal is always to provide you with excellent care. Hearing back from our patients is one way we can continue to improve our services. Please take a few minutes to complete the written survey that you may receive in the mail after you visit with us. Thank you!             Medication List: This is a list  of all your medications and when to take them. Check marks below indicate your daily home schedule. Keep this list as a reference.      Medications           Morning Afternoon Evening Bedtime As Needed    ALPRAZolam 0.5 MG tablet   Commonly known as:  XANAX   Take 1 tablet (0.5 mg) by mouth 3 times daily as needed for anxiety                                desvenlafaxine succinate 50 MG 24 hr tablet   Commonly known as:  PRISTIQ   Take 1 tablet (50 mg) by mouth daily                                gabapentin 100 MG capsule   Commonly known as:  NEURONTIN   Take 1-2 capsules (100-200 mg) by mouth nightly as needed                                omeprazole 20 MG CR capsule   Commonly known as:  priLOSEC   Take 1 capsule (20 mg) by mouth daily                                TRAZODONE HCL PO   Take 200 mg by mouth At Bedtime                                trifluoperazine 2 MG tablet   Commonly known as:  STELAZINE   Take 1-2 tablets (2-4 mg) by mouth 3 times daily as needed (severe anxiety)                                   Coughing and shaking eating toast

## 2022-10-18 ENCOUNTER — MEDICAL CORRESPONDENCE (OUTPATIENT)
Dept: HEALTH INFORMATION MANAGEMENT | Facility: CLINIC | Age: 37
End: 2022-10-18

## 2022-10-22 ENCOUNTER — HEALTH MAINTENANCE LETTER (OUTPATIENT)
Age: 37
End: 2022-10-22

## 2022-10-25 ENCOUNTER — LAB (OUTPATIENT)
Dept: LAB | Facility: CLINIC | Age: 37
End: 2022-10-25
Payer: COMMERCIAL

## 2022-10-25 DIAGNOSIS — F33.2 SEVERE RECURRENT MAJOR DEPRESSION WITHOUT PSYCHOTIC FEATURES (H): Primary | ICD-10-CM

## 2022-10-25 DIAGNOSIS — F33.2 SEVERE RECURRENT MAJOR DEPRESSION WITHOUT PSYCHOTIC FEATURES (H): ICD-10-CM

## 2022-10-25 LAB
BASOPHILS # BLD AUTO: 0.1 10E3/UL (ref 0–0.2)
BASOPHILS NFR BLD AUTO: 1 %
EOSINOPHIL # BLD AUTO: 0.4 10E3/UL (ref 0–0.7)
EOSINOPHIL NFR BLD AUTO: 5 %
ERYTHROCYTE [DISTWIDTH] IN BLOOD BY AUTOMATED COUNT: 11.8 % (ref 10–15)
HCT VFR BLD AUTO: 41.4 % (ref 40–53)
HGB BLD-MCNC: 14.4 G/DL (ref 13.3–17.7)
IMM GRANULOCYTES # BLD: 0 10E3/UL
IMM GRANULOCYTES NFR BLD: 0 %
LYMPHOCYTES # BLD AUTO: 1.3 10E3/UL (ref 0.8–5.3)
LYMPHOCYTES NFR BLD AUTO: 16 %
MCH RBC QN AUTO: 30.1 PG (ref 26.5–33)
MCHC RBC AUTO-ENTMCNC: 34.8 G/DL (ref 31.5–36.5)
MCV RBC AUTO: 87 FL (ref 78–100)
MONOCYTES # BLD AUTO: 0.5 10E3/UL (ref 0–1.3)
MONOCYTES NFR BLD AUTO: 7 %
NEUTROPHILS # BLD AUTO: 5.7 10E3/UL (ref 1.6–8.3)
NEUTROPHILS NFR BLD AUTO: 71 %
PLATELET # BLD AUTO: 259 10E3/UL (ref 150–450)
RBC # BLD AUTO: 4.78 10E6/UL (ref 4.4–5.9)
WBC # BLD AUTO: 8 10E3/UL (ref 4–11)

## 2022-10-25 PROCEDURE — 36415 COLL VENOUS BLD VENIPUNCTURE: CPT

## 2022-10-25 PROCEDURE — 80178 ASSAY OF LITHIUM: CPT

## 2022-10-25 PROCEDURE — 84439 ASSAY OF FREE THYROXINE: CPT

## 2022-10-25 PROCEDURE — 80050 GENERAL HEALTH PANEL: CPT

## 2022-10-26 LAB
ALBUMIN SERPL-MCNC: 4.3 G/DL (ref 3.4–5)
ALP SERPL-CCNC: 70 U/L (ref 40–150)
ALT SERPL W P-5'-P-CCNC: 29 U/L (ref 0–70)
ANION GAP SERPL CALCULATED.3IONS-SCNC: 8 MMOL/L (ref 3–14)
AST SERPL W P-5'-P-CCNC: 16 U/L (ref 0–45)
BILIRUB SERPL-MCNC: 0.5 MG/DL (ref 0.2–1.3)
BUN SERPL-MCNC: 16 MG/DL (ref 7–30)
CALCIUM SERPL-MCNC: 10.3 MG/DL (ref 8.5–10.1)
CHLORIDE BLD-SCNC: 104 MMOL/L (ref 94–109)
CO2 SERPL-SCNC: 25 MMOL/L (ref 20–32)
CREAT SERPL-MCNC: 0.85 MG/DL (ref 0.66–1.25)
GFR SERPL CREATININE-BSD FRML MDRD: >90 ML/MIN/1.73M2
GLUCOSE BLD-MCNC: 133 MG/DL (ref 70–99)
LITHIUM SERPL-SCNC: 0.8 MMOL/L (ref 0.6–1.2)
POTASSIUM BLD-SCNC: 3.9 MMOL/L (ref 3.4–5.3)
PROT SERPL-MCNC: 7.4 G/DL (ref 6.8–8.8)
SODIUM SERPL-SCNC: 137 MMOL/L (ref 133–144)
T4 FREE SERPL-MCNC: 0.81 NG/DL (ref 0.76–1.46)
TSH SERPL DL<=0.005 MIU/L-ACNC: 0.66 MU/L (ref 0.4–4)

## 2022-11-10 ENCOUNTER — E-VISIT (OUTPATIENT)
Dept: FAMILY MEDICINE | Facility: CLINIC | Age: 37
End: 2022-11-10
Payer: COMMERCIAL

## 2022-11-10 DIAGNOSIS — Z20.828 EXPOSURE TO INFLUENZA: ICD-10-CM

## 2022-11-10 DIAGNOSIS — R05.9 COUGH, UNSPECIFIED TYPE: Primary | ICD-10-CM

## 2022-11-10 PROCEDURE — 99421 OL DIG E/M SVC 5-10 MIN: CPT | Performed by: FAMILY MEDICINE

## 2022-11-10 NOTE — PATIENT INSTRUCTIONS
Thank you for choosing us for your care. Given your symptoms, I would like you to do a lab-only visit to determine what is causing them.  I have placed the orders.  Please schedule an appointment with the lab right here in [employer], or call [phone].  I will let you know when the results are back and next steps to take.

## 2022-11-11 ENCOUNTER — ALLIED HEALTH/NURSE VISIT (OUTPATIENT)
Dept: FAMILY MEDICINE | Facility: CLINIC | Age: 37
End: 2022-11-11
Payer: COMMERCIAL

## 2022-11-11 DIAGNOSIS — R05.9 COUGH, UNSPECIFIED TYPE: ICD-10-CM

## 2022-11-11 DIAGNOSIS — Z20.828 EXPOSURE TO INFLUENZA: ICD-10-CM

## 2022-11-11 LAB
FLUAV AG SPEC QL IA: NEGATIVE
FLUBV AG SPEC QL IA: NEGATIVE

## 2022-11-11 PROCEDURE — 87804 INFLUENZA ASSAY W/OPTIC: CPT

## 2022-11-11 PROCEDURE — 99207 PR NO CHARGE NURSE ONLY: CPT

## 2022-11-11 NOTE — PROGRESS NOTES
Patient was seen today for a flu swab. The patient tolorated this procedure will and will follow up with the ordering physician.    Eloise Duarte MA

## 2022-12-20 ENCOUNTER — LAB (OUTPATIENT)
Dept: URGENT CARE | Facility: URGENT CARE | Age: 37
End: 2022-12-20
Payer: COMMERCIAL

## 2022-12-20 ENCOUNTER — E-VISIT (OUTPATIENT)
Dept: URGENT CARE | Facility: CLINIC | Age: 37
End: 2022-12-20
Payer: COMMERCIAL

## 2022-12-20 ENCOUNTER — MYC MEDICAL ADVICE (OUTPATIENT)
Dept: FAMILY MEDICINE | Facility: CLINIC | Age: 37
End: 2022-12-20

## 2022-12-20 DIAGNOSIS — R05.1 ACUTE COUGH: ICD-10-CM

## 2022-12-20 DIAGNOSIS — R05.1 ACUTE COUGH: Primary | ICD-10-CM

## 2022-12-20 LAB
FLUAV AG SPEC QL IA: NEGATIVE
FLUBV AG SPEC QL IA: NEGATIVE

## 2022-12-20 PROCEDURE — U0005 INFEC AGEN DETEC AMPLI PROBE: HCPCS

## 2022-12-20 PROCEDURE — U0003 INFECTIOUS AGENT DETECTION BY NUCLEIC ACID (DNA OR RNA); SEVERE ACUTE RESPIRATORY SYNDROME CORONAVIRUS 2 (SARS-COV-2) (CORONAVIRUS DISEASE [COVID-19]), AMPLIFIED PROBE TECHNIQUE, MAKING USE OF HIGH THROUGHPUT TECHNOLOGIES AS DESCRIBED BY CMS-2020-01-R: HCPCS

## 2022-12-20 PROCEDURE — 87804 INFLUENZA ASSAY W/OPTIC: CPT

## 2022-12-20 PROCEDURE — 99421 OL DIG E/M SVC 5-10 MIN: CPT | Performed by: EMERGENCY MEDICINE

## 2022-12-20 NOTE — LETTER
[facility] [place]  [address] [address]  Phone: [phone]    December 20, 2022        Abel Ward  [address] [address]          To whom it may concern:    RE: Abel Wadr    Patient was seen and treated today at our clinic and should be excused for missing work. He should be excused until 12/23/22, but may return sooner if better.    Please contact me for questions or concerns.      Sincerely,        David Denney MD

## 2022-12-20 NOTE — PATIENT INSTRUCTIONS
"  Dear Abel Ward    After reviewing your responses, I've been able to diagnose you with \"Bronchitis\" which is a common infection of your lungs that is nearly always caused by a virus. The virus causes swelling and irritation of the air passages of your lungs which leads to cough. The illness spreads from your nose and throat to your windpipe and airways. It is often called a \"chest cold\" and can last up to 2 weeks, but is not a serious illness. Exposure to cigarette smoke usually makes this significantly worse.      To treat bronchitis, the main thing to do is drink lots of fluids and rest. Cough medications over-the-counter such as mucinex, robitussin or \"cold and sinus\" medications can be helpful. Ibuprofen and Tylenol also help with fevers or aching feelings that you often have with this kind of illness. Do not take ibuprofen if you have kidney disease, stomach ulcers or allergy to aspirin.     Bronchitis is most often highly contagious as viruses are spread through the air or touch. Avoid contact with others who may become infected, particularly children, the elderly and those whose immune systems might be weak.     If your symptoms worsen, you develop chest pain or shortness of breath, fevers over 101, or are not improving in 5 days, please contact your primary care provider for an appointment or visit any of our convenient Walk-in Care or Urgent Care Centers to be seen which can be found on our website here.    Thanks again for choosing us as your health care partner,    David Khan MD  "

## 2022-12-21 LAB — SARS-COV-2 RNA RESP QL NAA+PROBE: NEGATIVE

## 2022-12-22 NOTE — TELEPHONE ENCOUNTER
I placed a work note in chart this am. CAROLE Khan MD                     Patient did evisit 12/20/22 for a cough David Khan MD      pcp is out of office     Please review [employer] message/letter request   [employer] message sent to patient asking for dates out of work    Dottie LAM RN   [facility] Triage

## 2022-12-29 ENCOUNTER — ANCILLARY PROCEDURE (OUTPATIENT)
Dept: GENERAL RADIOLOGY | Facility: CLINIC | Age: 37
End: 2022-12-29
Attending: PHYSICIAN ASSISTANT
Payer: COMMERCIAL

## 2022-12-29 ENCOUNTER — OFFICE VISIT (OUTPATIENT)
Dept: URGENT CARE | Facility: URGENT CARE | Age: 37
End: 2022-12-29
Payer: COMMERCIAL

## 2022-12-29 VITALS
DIASTOLIC BLOOD PRESSURE: 66 MMHG | RESPIRATION RATE: 18 BRPM | TEMPERATURE: 97.2 F | OXYGEN SATURATION: 97 % | HEART RATE: 65 BPM | SYSTOLIC BLOOD PRESSURE: 108 MMHG

## 2022-12-29 DIAGNOSIS — R05.1 ACUTE COUGH: ICD-10-CM

## 2022-12-29 DIAGNOSIS — J20.8 VIRAL BRONCHITIS: Primary | ICD-10-CM

## 2022-12-29 LAB
FLUAV AG SPEC QL IA: NEGATIVE
FLUBV AG SPEC QL IA: NEGATIVE

## 2022-12-29 PROCEDURE — U0003 INFECTIOUS AGENT DETECTION BY NUCLEIC ACID (DNA OR RNA); SEVERE ACUTE RESPIRATORY SYNDROME CORONAVIRUS 2 (SARS-COV-2) (CORONAVIRUS DISEASE [COVID-19]), AMPLIFIED PROBE TECHNIQUE, MAKING USE OF HIGH THROUGHPUT TECHNOLOGIES AS DESCRIBED BY CMS-2020-01-R: HCPCS | Performed by: PHYSICIAN ASSISTANT

## 2022-12-29 PROCEDURE — U0005 INFEC AGEN DETEC AMPLI PROBE: HCPCS | Performed by: PHYSICIAN ASSISTANT

## 2022-12-29 PROCEDURE — 71046 X-RAY EXAM CHEST 2 VIEWS: CPT | Mod: TC | Performed by: RADIOLOGY

## 2022-12-29 PROCEDURE — 87804 INFLUENZA ASSAY W/OPTIC: CPT | Performed by: PHYSICIAN ASSISTANT

## 2022-12-29 PROCEDURE — 99213 OFFICE O/P EST LOW 20 MIN: CPT | Performed by: PHYSICIAN ASSISTANT

## 2022-12-29 ASSESSMENT — PAIN SCALES - GENERAL: PAINLEVEL: NO PAIN (0)

## 2022-12-29 NOTE — PATIENT INSTRUCTIONS
Please monitor symptoms carefully.  If developing chest pain, shortness of breath, fever, coughing up blood, extreme fatigue, or any other new, concerning symptoms, come back to clinic or go to ER immediately.  Otherwise, if no improvement in symptoms, follow-up with your primary care provider.    Bronchitis Instructions  You have a viral bronchitis. Bronchitis is inflammation and swelling of the lining of the lungs. This is often caused by an infection. Symptoms include a dry, hacking cough that is worse at night. The cough may bring up yellow-green mucus. You may also feel short of breath or wheeze. Other symptoms may include tiredness, chest discomfort, and chills.  Bronchitis that is caused by a virus is not treated with antibiotics. Instead, medicines may be given to help relieve symptoms. Symptoms can last up to 2 weeks, although the cough may last much longer.  This illness is contagious during the first few days and is spread through the air by coughing and sneezing, or by direct contact (touching the sick person and then touching your own eyes, nose, or mouth).  Most viral illnesses resolve within 10 to 14 days with rest and simple home remedies, although they may sometimes last for several weeks.  Home care  If symptoms are severe, rest at home for the first 2 to 3 days. When you go back to your usual activities, don't let yourself get too tired.  Do not smoke. Also avoid being exposed to secondhand smoke.  You may use over-the-counter medicine to control fever or pain, unless another pain medicine was prescribed. If you have chronic liver or kidney disease or have ever had a stomach ulcer or gastrointestinal bleeding, talk with your healthcare provider before using these medicines. Also talk to your provider if you are taking medicine to prevent blood clots. Aspirin should never be given to anyone younger than 18 years of age who is ill with a viral infection or fever. It may cause severe liver or brain  damage. Take Tylenol 650mg every 4 hours or ibuprofen 600mg every 6 hours by mouth for pain/fever.  Do not exceed 4000mg of acetaminophen or 2400mg of ibuprofen from any source in a 24 hour period.  Taking Tylenol and ibuprofen together may be helpful in reducing pain.   Your appetite may be poor, so a light diet is fine. Avoid dehydration by drinking 6 to 8 glasses of fluids per day (such as water, soft drinks, sports drinks, juices, tea, or soup). Extra fluids will help loosen secretions in the nose and lungs.  Over-the-counter cough, cold, and sore-throat medicines will not shorten the length of the illness, but they may help to reduce symptoms. Don't use decongestants if you have high blood pressure.  Follow-up care  Follow up with your healthcare provider, or as advised. If you had an X-ray or ECG (electrocardiogram), a specialist will review it. You will be notified of any new findings that may affect your care.  If you are age 65 or older, or if you have a chronic lung disease or condition that affects your immune system, or you smoke, ask your healthcare provider about getting a pneumococcal vaccine and a yearly flu shot (influenza vaccine).  When to seek medical advice  Call your healthcare provider right away if any of these occur:  Fever of 100.4 F (38 C) or higher, or as directed by your healthcare provider  Coughing up increased amounts of colored sputum  Weakness, drowsiness, headache, facial pain, ear pain, or a stiff neck  Call 911  Call 911 if any of these occur:  Coughing up blood  Worsening weakness, drowsiness, headache, or stiff neck  Trouble breathing, wheezing, or pain with breathing  Date Last Reviewed: 9/13/2015 2000-2017 [employer]. [address] [place] [address], [place] [address]. All rights reserved. This information is not intended as a substitute for professional medical care. Always follow your healthcare professional's instructions.

## 2022-12-29 NOTE — PROGRESS NOTES
Assessment/Plan:    No acute distress or toxicity noted. Lungs CTAB, O2 sat 97%. CXR negative for pneumonia per my read. Feel symptoms are still c/w viral illness. Supportive treatments discussed- continue use of over the counter treatments such as ibuprofen, acetaminophen, guaifenesin as needed.    Despite having influenza and COVID tests last week, pt would like to repeat these tests. Flu negative, COVID pending.    See patient instructions below.  At the end of the encounter, I discussed results, diagnosis, medications. Discussed red flags for immediate return to clinic/ER, as well as indications for follow up if no improvement. Patient understood and agreed to plan. Patient was stable for discharge.      ICD-10-CM    1. Viral bronchitis  J20.8 Influenza A & B Antigen     Symptomatic COVID-19 Virus (Coronavirus) by PCR Nose      2. Acute cough  R05.1 XR Chest 2 Views            Return in about 1 week (around 1/5/2023) for Follow up w/ primary care provider if not better.    Allison Hernandez, MEHNAZ, PATON  [facility]    ------------------------------------------------------------------------------------------------------------------------------------------------------------------------  HPI:  Abel Ward is a 37 year old male who presents for evaluation of cough onset 10 days ago. Cough has improved somewhat but the past couple of days he has felt intermittent chest tightness and SOB, decreased appetite, and generalized weakness. He did an E-visit on 12/20 and had influenza and COVID testing done, which were both negative. He was diagnosed with viral bronchitis. No treatments tried. Patient reports no fever/chills, myalgias, nasal congestion, sore throat, loss of sense of taste or smell, headache, chest pain, shortness of breath, abdominal pain, nausea, vomiting, diarrhea, rash, or any other symptoms. No known sick contacts/COVID exposure. He had asthma as a child but has not had  issues with this in many years.      Past Medical History:   Diagnosis Date     Anxiety      Attention deficit disorder with hyperactivity(314.01) 2001     Esophageal reflux     Dr Starks- had EGD ~2202     Generalized anxiety disorder      Hypertension      Infectious mononucleosis 12/03     Low testosterone      Major depressive disorder, single episode in full remission (H)      Major depressive disorder, single episode, mild (H)      MDD (major depressive disorder)      Mild intermittent asthma      Suicidal ideation 11/6/2017     Viral URI with cough 1/3/2020       Vitals:    12/29/22 1044   BP: 108/66   Pulse: 65   Resp: 18   Temp: 97.2  F (36.2  C)   TempSrc: Tympanic   SpO2: 97%       Physical Exam  Vitals and nursing note reviewed.   HENT:      Right Ear: Tympanic membrane normal.      Left Ear: Tympanic membrane normal.      Mouth/Throat:      Mouth: Mucous membranes are moist.      Pharynx: Oropharynx is clear.   Cardiovascular:      Rate and Rhythm: Normal rate and regular rhythm.   Pulmonary:      Effort: Pulmonary effort is normal.      Breath sounds: Normal breath sounds.   Neurological:      Mental Status: He is alert.       Labs/Imaging:  Results for orders placed or performed in visit on 12/29/22 (from the past 24 hour(s))   Influenza A & B Antigen    Specimen: Nose; Swab   Result Value Ref Range    Influenza A antigen Negative Negative    Influenza B antigen Negative Negative    Narrative    Test results must be correlated with clinical data. If necessary, results should be confirmed by a molecular assay or viral culture.     No results found for this or any previous visit (from the past 24 hour(s)).  CXR - Reviewed and interpreted by me Normal- no infiltrates, effusions, pneumothoraces, cardiomegaly or masses    Patient Instructions     Please monitor symptoms carefully.  If developing chest pain, shortness of breath, fever, coughing up blood, extreme fatigue, or any other new, concerning  symptoms, come back to clinic or go to ER immediately.  Otherwise, if no improvement in symptoms, follow-up with your primary care provider.    Bronchitis Instructions  You have a viral bronchitis. Bronchitis is inflammation and swelling of the lining of the lungs. This is often caused by an infection. Symptoms include a dry, hacking cough that is worse at night. The cough may bring up yellow-green mucus. You may also feel short of breath or wheeze. Other symptoms may include tiredness, chest discomfort, and chills.  Bronchitis that is caused by a virus is not treated with antibiotics. Instead, medicines may be given to help relieve symptoms. Symptoms can last up to 2 weeks, although the cough may last much longer.  This illness is contagious during the first few days and is spread through the air by coughing and sneezing, or by direct contact (touching the sick person and then touching your own eyes, nose, or mouth).  Most viral illnesses resolve within 10 to 14 days with rest and simple home remedies, although they may sometimes last for several weeks.  Home care    If symptoms are severe, rest at home for the first 2 to 3 days. When you go back to your usual activities, don't let yourself get too tired.    Do not smoke. Also avoid being exposed to secondhand smoke.    You may use over-the-counter medicine to control fever or pain, unless another pain medicine was prescribed. If you have chronic liver or kidney disease or have ever had a stomach ulcer or gastrointestinal bleeding, talk with your healthcare provider before using these medicines. Also talk to your provider if you are taking medicine to prevent blood clots. Aspirin should never be given to anyone younger than 18 years of age who is ill with a viral infection or fever. It may cause severe liver or brain damage. Take Tylenol 650mg every 4 hours or ibuprofen 600mg every 6 hours by mouth for pain/fever.  Do not exceed 4000mg of acetaminophen or 2400mg of  ibuprofen from any source in a 24 hour period.  Taking Tylenol and ibuprofen together may be helpful in reducing pain.     Your appetite may be poor, so a light diet is fine. Avoid dehydration by drinking 6 to 8 glasses of fluids per day (such as water, soft drinks, sports drinks, juices, tea, or soup). Extra fluids will help loosen secretions in the nose and lungs.    Over-the-counter cough, cold, and sore-throat medicines will not shorten the length of the illness, but they may help to reduce symptoms. Don't use decongestants if you have high blood pressure.  Follow-up care  Follow up with your healthcare provider, or as advised. If you had an X-ray or ECG (electrocardiogram), a specialist will review it. You will be notified of any new findings that may affect your care.  If you are age 65 or older, or if you have a chronic lung disease or condition that affects your immune system, or you smoke, ask your healthcare provider about getting a pneumococcal vaccine and a yearly flu shot (influenza vaccine).  When to seek medical advice  Call your healthcare provider right away if any of these occur:    Fever of 100.4 F (38 C) or higher, or as directed by your healthcare provider    Coughing up increased amounts of colored sputum    Weakness, drowsiness, headache, facial pain, ear pain, or a stiff neck  Call 911  Call 911 if any of these occur:    Coughing up blood    Worsening weakness, drowsiness, headache, or stiff neck    Trouble breathing, wheezing, or pain with breathing  Date Last Reviewed: 9/13/2015 2000-2017 [employer]. [address] [place] [address], [place] [address]. All rights reserved. This information is not intended as a substitute for professional medical care. Always follow your healthcare professional's instructions.

## 2022-12-30 LAB — SARS-COV-2 RNA RESP QL NAA+PROBE: NEGATIVE

## 2023-04-04 ENCOUNTER — MYC MEDICAL ADVICE (OUTPATIENT)
Dept: FAMILY MEDICINE | Facility: CLINIC | Age: 38
End: 2023-04-04

## 2023-04-04 ENCOUNTER — LAB (OUTPATIENT)
Dept: LAB | Facility: CLINIC | Age: 38
End: 2023-04-04
Payer: COMMERCIAL

## 2023-04-04 ENCOUNTER — ALLIED HEALTH/NURSE VISIT (OUTPATIENT)
Dept: FAMILY MEDICINE | Facility: CLINIC | Age: 38
End: 2023-04-04
Payer: COMMERCIAL

## 2023-04-04 VITALS — DIASTOLIC BLOOD PRESSURE: 76 MMHG | HEART RATE: 72 BPM | SYSTOLIC BLOOD PRESSURE: 128 MMHG

## 2023-04-04 DIAGNOSIS — F33.2 SEVERE RECURRENT MAJOR DEPRESSION WITHOUT PSYCHOTIC FEATURES (H): Primary | ICD-10-CM

## 2023-04-04 DIAGNOSIS — I10 HYPERTENSION GOAL BP (BLOOD PRESSURE) < 130/80: Primary | ICD-10-CM

## 2023-04-04 DIAGNOSIS — F41.1 GENERALIZED ANXIETY DISORDER: ICD-10-CM

## 2023-04-04 LAB
ALBUMIN SERPL BCG-MCNC: 4.5 G/DL (ref 3.5–5.2)
ALP SERPL-CCNC: 76 U/L (ref 40–129)
ALT SERPL W P-5'-P-CCNC: 21 U/L (ref 10–50)
ANION GAP SERPL CALCULATED.3IONS-SCNC: 10 MMOL/L (ref 7–15)
AST SERPL W P-5'-P-CCNC: 19 U/L (ref 10–50)
BASOPHILS # BLD AUTO: 0 10E3/UL (ref 0–0.2)
BASOPHILS NFR BLD AUTO: 1 %
BILIRUB SERPL-MCNC: 0.4 MG/DL
BUN SERPL-MCNC: 10.5 MG/DL (ref 6–20)
CALCIUM SERPL-MCNC: 10.2 MG/DL (ref 8.6–10)
CHLORIDE SERPL-SCNC: 104 MMOL/L (ref 98–107)
CREAT SERPL-MCNC: 0.85 MG/DL (ref 0.67–1.17)
DEPRECATED HCO3 PLAS-SCNC: 27 MMOL/L (ref 22–29)
EOSINOPHIL # BLD AUTO: 0.3 10E3/UL (ref 0–0.7)
EOSINOPHIL NFR BLD AUTO: 5 %
ERYTHROCYTE [DISTWIDTH] IN BLOOD BY AUTOMATED COUNT: 12.8 % (ref 10–15)
GFR SERPL CREATININE-BSD FRML MDRD: >90 ML/MIN/1.73M2
GLUCOSE SERPL-MCNC: 124 MG/DL (ref 70–99)
HCT VFR BLD AUTO: 43.9 % (ref 40–53)
HGB BLD-MCNC: 14.7 G/DL (ref 13.3–17.7)
LITHIUM SERPL-SCNC: 0.8 MMOL/L (ref 0.6–1.2)
LYMPHOCYTES # BLD AUTO: 1.1 10E3/UL (ref 0.8–5.3)
LYMPHOCYTES NFR BLD AUTO: 18 %
MCH RBC QN AUTO: 30.5 PG (ref 26.5–33)
MCHC RBC AUTO-ENTMCNC: 33.5 G/DL (ref 31.5–36.5)
MCV RBC AUTO: 91 FL (ref 78–100)
MONOCYTES # BLD AUTO: 0.4 10E3/UL (ref 0–1.3)
MONOCYTES NFR BLD AUTO: 6 %
NEUTROPHILS # BLD AUTO: 4.3 10E3/UL (ref 1.6–8.3)
NEUTROPHILS NFR BLD AUTO: 71 %
PLATELET # BLD AUTO: 267 10E3/UL (ref 150–450)
POTASSIUM SERPL-SCNC: 4.6 MMOL/L (ref 3.4–5.3)
PROT SERPL-MCNC: 6.9 G/DL (ref 6.4–8.3)
RBC # BLD AUTO: 4.82 10E6/UL (ref 4.4–5.9)
SODIUM SERPL-SCNC: 141 MMOL/L (ref 136–145)
T4 FREE SERPL-MCNC: 1.06 NG/DL (ref 0.9–1.7)
TSH SERPL DL<=0.005 MIU/L-ACNC: 0.98 UIU/ML (ref 0.3–4.2)
WBC # BLD AUTO: 6 10E3/UL (ref 4–11)

## 2023-04-04 PROCEDURE — 99207 PR NO CHARGE NURSE ONLY: CPT

## 2023-04-04 PROCEDURE — 84439 ASSAY OF FREE THYROXINE: CPT

## 2023-04-04 PROCEDURE — 80178 ASSAY OF LITHIUM: CPT

## 2023-04-04 PROCEDURE — 36415 COLL VENOUS BLD VENIPUNCTURE: CPT

## 2023-04-04 PROCEDURE — 80050 GENERAL HEALTH PANEL: CPT

## 2023-04-04 NOTE — PROGRESS NOTES
Abel Ward is a 38 year old patient who comes in today for a Blood Pressure check.  Initial BP:  /76 (BP Location: Right arm, Patient Position: Sitting, Cuff Size: Adult Large)   Pulse 72      Disposition: follow-up as previously indicated by provider.    Soni Cano  [facility]

## 2023-05-22 ENCOUNTER — OFFICE VISIT (OUTPATIENT)
Dept: FAMILY MEDICINE | Facility: CLINIC | Age: 38
End: 2023-05-22
Payer: COMMERCIAL

## 2023-05-22 VITALS
RESPIRATION RATE: 14 BRPM | TEMPERATURE: 98.9 F | HEIGHT: 71 IN | HEART RATE: 72 BPM | DIASTOLIC BLOOD PRESSURE: 64 MMHG | WEIGHT: 243 LBS | SYSTOLIC BLOOD PRESSURE: 118 MMHG | OXYGEN SATURATION: 99 % | BODY MASS INDEX: 34.02 KG/M2

## 2023-05-22 DIAGNOSIS — Z00.00 ROUTINE GENERAL MEDICAL EXAMINATION AT A HEALTH CARE FACILITY: Primary | ICD-10-CM

## 2023-05-22 DIAGNOSIS — R73.09 ELEVATED GLUCOSE: ICD-10-CM

## 2023-05-22 DIAGNOSIS — F41.9 ANXIETY: ICD-10-CM

## 2023-05-22 DIAGNOSIS — K21.9 GASTROESOPHAGEAL REFLUX DISEASE WITHOUT ESOPHAGITIS: ICD-10-CM

## 2023-05-22 DIAGNOSIS — F90.9 ATTENTION DEFICIT HYPERACTIVITY DISORDER (ADHD), UNSPECIFIED ADHD TYPE: ICD-10-CM

## 2023-05-22 DIAGNOSIS — I10 HYPERTENSION GOAL BP (BLOOD PRESSURE) < 130/80: ICD-10-CM

## 2023-05-22 DIAGNOSIS — F33.1 MAJOR DEPRESSIVE DISORDER, RECURRENT EPISODE, MODERATE (H): ICD-10-CM

## 2023-05-22 DIAGNOSIS — E55.9 VITAMIN D DEFICIENCY: ICD-10-CM

## 2023-05-22 DIAGNOSIS — Z98.84 BARIATRIC SURGERY STATUS: ICD-10-CM

## 2023-05-22 DIAGNOSIS — G47.33 OSA (OBSTRUCTIVE SLEEP APNEA): ICD-10-CM

## 2023-05-22 DIAGNOSIS — Z13.220 SCREENING FOR LIPID DISORDERS: ICD-10-CM

## 2023-05-22 LAB
ANION GAP SERPL CALCULATED.3IONS-SCNC: 10 MMOL/L (ref 7–15)
BUN SERPL-MCNC: 12.2 MG/DL (ref 6–20)
CALCIUM SERPL-MCNC: 9.7 MG/DL (ref 8.6–10)
CHLORIDE SERPL-SCNC: 106 MMOL/L (ref 98–107)
CHOLEST SERPL-MCNC: 164 MG/DL
CREAT SERPL-MCNC: 0.84 MG/DL (ref 0.67–1.17)
CREAT UR-MCNC: 137 MG/DL
DEPRECATED CALCIDIOL+CALCIFEROL SERPL-MC: 62 UG/L (ref 20–75)
DEPRECATED HCO3 PLAS-SCNC: 25 MMOL/L (ref 22–29)
GFR SERPL CREATININE-BSD FRML MDRD: >90 ML/MIN/1.73M2
GLUCOSE SERPL-MCNC: 94 MG/DL (ref 70–99)
HBA1C MFR BLD: 5.1 % (ref 0–5.6)
HDLC SERPL-MCNC: 64 MG/DL
LDLC SERPL CALC-MCNC: 89 MG/DL
MICROALBUMIN UR-MCNC: <12 MG/L
MICROALBUMIN/CREAT UR: NORMAL MG/G{CREAT}
NONHDLC SERPL-MCNC: 100 MG/DL
POTASSIUM SERPL-SCNC: 4.4 MMOL/L (ref 3.4–5.3)
SODIUM SERPL-SCNC: 141 MMOL/L (ref 136–145)
TRIGL SERPL-MCNC: 53 MG/DL

## 2023-05-22 PROCEDURE — 80048 BASIC METABOLIC PNL TOTAL CA: CPT | Performed by: FAMILY MEDICINE

## 2023-05-22 PROCEDURE — 36415 COLL VENOUS BLD VENIPUNCTURE: CPT | Performed by: FAMILY MEDICINE

## 2023-05-22 PROCEDURE — 80061 LIPID PANEL: CPT | Performed by: FAMILY MEDICINE

## 2023-05-22 PROCEDURE — 82043 UR ALBUMIN QUANTITATIVE: CPT | Performed by: FAMILY MEDICINE

## 2023-05-22 PROCEDURE — 99395 PREV VISIT EST AGE 18-39: CPT | Performed by: FAMILY MEDICINE

## 2023-05-22 PROCEDURE — 82570 ASSAY OF URINE CREATININE: CPT | Performed by: FAMILY MEDICINE

## 2023-05-22 PROCEDURE — 82306 VITAMIN D 25 HYDROXY: CPT | Performed by: FAMILY MEDICINE

## 2023-05-22 PROCEDURE — 83036 HEMOGLOBIN GLYCOSYLATED A1C: CPT | Performed by: FAMILY MEDICINE

## 2023-05-22 RX ORDER — QUETIAPINE FUMARATE 50 MG/1
150 TABLET, FILM COATED ORAL EVERY EVENING
COMMUNITY
Start: 2023-05-22

## 2023-05-22 RX ORDER — CLONIDINE HYDROCHLORIDE 0.1 MG/1
0.1 TABLET ORAL AT BEDTIME
COMMUNITY
Start: 2023-05-22 | End: 2024-06-24

## 2023-05-22 RX ORDER — QUETIAPINE FUMARATE 50 MG/1
TABLET, FILM COATED ORAL
COMMUNITY
Start: 2023-05-08 | End: 2023-05-22

## 2023-05-22 RX ORDER — DEXTROAMPHETAMINE SACCHARATE, AMPHETAMINE ASPARTATE, DEXTROAMPHETAMINE SULFATE AND AMPHETAMINE SULFATE 5; 5; 5; 5 MG/1; MG/1; MG/1; MG/1
TABLET ORAL
COMMUNITY
Start: 2023-05-19 | End: 2023-05-22

## 2023-05-22 RX ORDER — QUETIAPINE FUMARATE 25 MG/1
25 TABLET, FILM COATED ORAL 2 TIMES DAILY PRN
COMMUNITY
Start: 2023-05-22 | End: 2024-06-24

## 2023-05-22 RX ORDER — DEXTROAMPHETAMINE SACCHARATE, AMPHETAMINE ASPARTATE, DEXTROAMPHETAMINE SULFATE AND AMPHETAMINE SULFATE 5; 5; 5; 5 MG/1; MG/1; MG/1; MG/1
20 TABLET ORAL 2 TIMES DAILY
COMMUNITY
Start: 2023-05-22 | End: 2024-06-24

## 2023-05-22 RX ORDER — QUETIAPINE FUMARATE 25 MG/1
TABLET, FILM COATED ORAL
COMMUNITY
Start: 2023-05-08 | End: 2023-05-22

## 2023-05-22 RX ORDER — VILAZODONE HYDROCHLORIDE 40 MG/1
40 TABLET ORAL DAILY
COMMUNITY
Start: 2023-05-15 | End: 2024-02-15

## 2023-05-22 RX ORDER — LITHIUM CARBONATE 300 MG
1200 TABLET ORAL AT BEDTIME
COMMUNITY
Start: 2023-05-22

## 2023-05-22 ASSESSMENT — PATIENT HEALTH QUESTIONNAIRE - PHQ9
10. IF YOU CHECKED OFF ANY PROBLEMS, HOW DIFFICULT HAVE THESE PROBLEMS MADE IT FOR YOU TO DO YOUR WORK, TAKE CARE OF THINGS AT HOME, OR GET ALONG WITH OTHER PEOPLE: SOMEWHAT DIFFICULT
SUM OF ALL RESPONSES TO PHQ QUESTIONS 1-9: 12
SUM OF ALL RESPONSES TO PHQ QUESTIONS 1-9: 12

## 2023-05-22 ASSESSMENT — ENCOUNTER SYMPTOMS
ABDOMINAL PAIN: 0
PARESTHESIAS: 0
JOINT SWELLING: 0
NAUSEA: 0
PALPITATIONS: 0
HEMATOCHEZIA: 0
MYALGIAS: 0
EYE PAIN: 0
SHORTNESS OF BREATH: 0
COUGH: 0
SORE THROAT: 0
NERVOUS/ANXIOUS: 0
FREQUENCY: 0
DYSURIA: 0
HEMATURIA: 0
CONSTIPATION: 0
DIARRHEA: 0
CHILLS: 0
HEADACHES: 0
WEAKNESS: 0
ARTHRALGIAS: 0
HEARTBURN: 0
DIZZINESS: 0
FEVER: 0

## 2023-05-22 ASSESSMENT — COLUMBIA-SUICIDE SEVERITY RATING SCALE - C-SSRS
3. IN THE PAST MONTH, HAVE YOU BEEN THINKING ABOUT HOW YOU MIGHT KILL YOURSELF?: NO
1. WITHIN THE PAST MONTH, HAVE YOU WISHED YOU WERE DEAD OR WISHED YOU COULD GO TO SLEEP AND NOT WAKE UP?: YES
6. HAVE YOU EVER DONE ANYTHING, STARTED TO DO ANYTHING, OR PREPARED TO DO ANYTHING TO END YOUR LIFE?: NO
5. IN THE PAST MONTH, HAVE YOU STARTED TO WORK OUT OR WORKED OUT THE DETAILS OF HOW TO KILL YOURSELF? DO YOU INTEND TO CARRY OUT THIS PLAN?: NO
2. IN THE PAST MONTH, HAVE YOU ACTUALLY HAD ANY THOUGHTS OF KILLING YOURSELF?: YES
5. IN THE PAST MONTH, HAVE YOU STARTED TO WORK OUT OR WORKED OUT THE DETAILS OF HOW TO KILL YOURSELF? DO YOU INTEND TO CARRY OUT THIS PLAN?: NO

## 2023-05-22 ASSESSMENT — PAIN SCALES - GENERAL: PAINLEVEL: NO PAIN (0)

## 2023-05-22 NOTE — PROGRESS NOTES
SUBJECTIVE:   CC: Shakir is an 38 year old who presents for preventative health visit.   Patient has been advised of split billing requirements and indicates understanding: Yes  Healthy Habits:     Getting at least 3 servings of Calcium per day:  Yes    Bi-annual eye exam:  Yes    Dental care twice a year:  Yes    Sleep apnea or symptoms of sleep apnea:  Daytime drowsiness    Diet:  Other    Frequency of exercise:  None    Taking medications regularly:  Yes    Medication side effects:  None    PHQ-2 Total Score: 2    Additional concerns today:  No        Depression and Anxiety Follow-Up    How are you doing with your depression since your last visit? No change    How are you doing with your anxiety since your last visit?  No change    Have you had a significant life event? No     Do you have any concerns with your use of alcohol or other drugs? No    Social History     Tobacco Use     Smoking status: Never     Smokeless tobacco: Never   Vaping Use     Vaping status: Never Used   Substance Use Topics     Alcohol use: Yes     Drug use: Yes     Types: Marijuana     Comment: once a month or less, > 1 month         12/10/2019    11:45 AM 5/10/2021    11:50 AM 5/22/2023     5:31 AM   PHQ   PHQ-9 Total Score 22 6 12   Q9: Thoughts of better off dead/self-harm past 2 weeks More than half the days Not at all Several days   F/U: Thoughts of suicide or self-harm Yes  Yes   F/U: Self harm-plan No  No   F/U: Self-harm action No  No   F/U: Safety concerns No  No         2/5/2019    10:00 AM 12/10/2019    11:45 AM 5/10/2021    11:50 AM   FATOUMATA-7 SCORE   Total Score   7 (mild anxiety)   Total Score 17 17 7         5/22/2023     5:31 AM   Last PHQ-9   1.  Little interest or pleasure in doing things 1   2.  Feeling down, depressed, or hopeless 1   3.  Trouble falling or staying asleep, or sleeping too much 1   4.  Feeling tired or having little energy 3   5.  Poor appetite or overeating 2   6.  Feeling bad about yourself 1   7.  Trouble  concentrating 1   8.  Moving slowly or restless 1   Q9: Thoughts of better off dead/self-harm past 2 weeks 1   PHQ-9 Total Score 12   In the past two weeks have you had thoughts of suicide or self harm? Yes   Do you have concerns about your personal safety or the safety of others? No   In the past 2 weeks have you thought about a plan or had intention to harm yourself? No   In the past 2 weeks have you acted on these thoughts in any way? No         5/10/2021    11:50 AM   FATOUMATA-7    1. Feeling nervous, anxious, or on edge 1   2. Not being able to stop or control worrying 1   3. Worrying too much about different things 1   4. Trouble relaxing 1   5. Being so restless that it is hard to sit still 1   6. Becoming easily annoyed or irritable 1   7. Feeling afraid, as if something awful might happen 1   FATOUMATA-7 Total Score 7            Follow Up Actions Taken       Discussed the following ways the patient can remain in a safe environment:    Suicide Assessment Five-step Evaluation and Treatment (SAFE-T)        Social History     Tobacco Use     Smoking status: Never     Smokeless tobacco: Never   Vaping Use     Vaping status: Never Used   Substance Use Topics     Alcohol use: Yes           5/22/2023     5:33 AM   Alcohol Use   Prescreen: >3 drinks/day or >7 drinks/week? No       Last PSA:   PSA   Date Value Ref Range Status   01/15/2013 0.28 0 - 4 ug/L Final       Reviewed orders with patient. Reviewed health maintenance and updated orders accordingly - Yes      Reviewed and updated as needed this visit by clinical staff   Tobacco  Allergies  Meds  Problems  Med Hx  Surg Hx  Fam Hx          Reviewed and updated as needed this visit by Provider   Tobacco  Allergies  Meds  Problems  Med Hx  Surg Hx  Fam Hx           Review of Systems   Constitutional: Negative for chills and fever.   HENT: Negative for congestion, ear pain, hearing loss and sore throat.    Eyes: Negative for pain and visual disturbance.  "  Respiratory: Negative for cough and shortness of breath.    Cardiovascular: Negative for chest pain, palpitations and peripheral edema.   Gastrointestinal: Negative for abdominal pain, constipation, diarrhea, heartburn, hematochezia and nausea.   Genitourinary: Negative for dysuria, frequency, genital sores, hematuria, impotence, penile discharge and urgency.   Musculoskeletal: Negative for arthralgias, joint swelling and myalgias.   Skin: Negative for rash.   Neurological: Negative for dizziness, weakness, headaches and paresthesias.   Psychiatric/Behavioral: Negative for mood changes. The patient is not nervous/anxious.        OBJECTIVE:   /64   Pulse 72   Temp 98.9  F (37.2  C) (Tympanic)   Resp 14   Ht 1.803 m (5' 11\")   Wt 110.2 kg (243 lb)   SpO2 99%   BMI 33.89 kg/m    EXAM:  GENERAL: healthy, alert and no distress  EYES: Eyes grossly normal to inspection, PERRL and conjunctivae and sclerae normal  HENT: ear canals and TM's normal, nose and mouth without ulcers or lesions  NECK: no adenopathy, no asymmetry, masses, or scars and thyroid normal to palpation  RESP: lungs clear to auscultation - no rales, rhonchi or wheezes  BREAST: normal without masses, tenderness or nipple discharge and no palpable axillary masses or adenopathy  CV: regular rate and rhythm, normal S1 S2, no S3 or S4, no murmur, click or rub, no peripheral edema and peripheral pulses strong  ABDOMEN: soft, nontender, no hepatosplenomegaly, no masses and bowel sounds normal   (male): normal male genitalia without lesions or urethral discharge, no hernia  MS: no gross musculoskeletal defects noted, no edema  SKIN: no suspicious lesions or rashes  NEURO: Normal strength and tone, mentation intact and speech normal  PSYCH: mentation appears normal, affect normal/bright  LYMPH: no cervical, supraclavicular, axillary, or inguinal adenopathy    ASSESSMENT/PLAN:   Routine general medical examination at a [facility] " [facility]facility      Attention deficit hyperactivity disorder (ADHD), unspecified ADHD type  Major depressive disorder, recurrent episode, moderate (H)  Anxiety  Ongoing suicidal thoughts, no plans, does have good support.  Works with psychiatrist as well.    Hypertension goal BP (blood pressure) < 130/80  Blood pressure stable today off medicine since weight loss  - Albumin Random Urine Quantitative with Creat Ratio  - Basic metabolic panel  (Ca, Cl, CO2, Creat, Gluc, K, Na, BUN)  - Albumin Random Urine Quantitative with Creat Ratio  - Basic metabolic panel  (Ca, Cl, CO2, Creat, Gluc, K, Na, BUN)    Gastroesophageal reflux disease without esophagitis  Intermittent symptoms, medication as needed    Vitamin D deficiency  Continue supplementation and monitor:    MENDEZ (obstructive sleep apnea)  Continue treatment    Screening for lipid disorders  - Lipid panel reflex to direct LDL Fasting  - Lipid panel reflex to direct LDL Fasting    Bariatric surgery status  - Vitamin D Deficiency  - Vitamin D Deficiency    Elevated glucose  - Hemoglobin A1c  - Hemoglobin A1c      COUNSELING:  Reviewed preventive health counseling, as reflected in patient instructions      BMI:   Estimated body mass index is 33.89 kg/m  as calculated from the following:    Height as of this encounter: 1.803 m (5' 11\").    Weight as of this encounter: 110.2 kg (243 lb).   Weight management plan: Discussed healthy diet and exercise guidelines    Depression Screening Follow Up        5/22/2023     5:31 AM   PHQ   PHQ-9 Total Score 12   Q9: Thoughts of better off dead/self-harm past 2 weeks Several days   F/U: Thoughts of suicide or self-harm Yes   F/U: Self harm-plan No   F/U: Self-harm action No   F/U: Safety concerns No         5/22/2023     5:31 AM   Last PHQ-9   1.  Little interest or pleasure in doing things 1   2.  Feeling down, depressed, or hopeless 1   3.  Trouble falling or staying asleep, or sleeping too much 1   4.  Feeling tired or having little " energy 3   5.  Poor appetite or overeating 2   6.  Feeling bad about yourself 1   7.  Trouble concentrating 1   8.  Moving slowly or restless 1   Q9: Thoughts of better off dead/self-harm past 2 weeks 1   PHQ-9 Total Score 12   In the past two weeks have you had thoughts of suicide or self harm? Yes   Do you have concerns about your personal safety or the safety of others? No   In the past 2 weeks have you thought about a plan or had intention to harm yourself? No   In the past 2 weeks have you acted on these thoughts in any way? No             5/22/2023     7:28 AM   C-SSRS (Brief [place])   Within the last month, have you wished you were dead or wished you could go to sleep and not wake up? Yes   Within the last month, have you had any actual thoughts of killing yourself? Yes   Within the last month, have you been thinking about how you might do this? No   Within the last month, have you had these thoughts and had some intention of acting on them? No   Within the last month, have you started to work out or worked out the details of how to kill yourself with the intent to carry out this plan? No   Within the last month, have you ever done anything, started to do anything, or prepared to do anything to end your life? No         Follow Up     Follow Up Actions Taken  Crisis resource information provided in the After Visit Summary    Discussed the following ways the patient can remain in a safe environment:  be around others         reports that he has never smoked. He has never used smokeless tobacco.      Return in about 1 year (around 5/22/2024) for wellness exam with fasting labs with Jovany Denney MD.    Follow-up Visit   Expected date:  May 22, 2024 (Approximate)      Follow Up Appointment Details:     Follow-up with whom?: Me    Follow-Up for what?: Adult Preventive    How?: In Person    Is this an as-needed follow-up?: No                        Jovany Denney MD     [address] [place] [address]  [email or web address]     Office: [phone]

## 2023-05-23 NOTE — RESULT ENCOUNTER NOTE
Dear Shakir,    Here is a summary of your recent test results:  -Cholesterol levels (LDL,HDL, Triglycerides) are normal.  ADVISE: rechecking in 1 year.   -Kidney function is normal (Cr, GFR), Sodium is normal, Potassium is normal, Calcium is normal, Glucose is normal.   -A1C (test of diabetes control the last 2-3 months) is at your goal. Please continue with your current plan. Also, you should make an appointment to see me and recheck your A1C test in 6 months.   -Vitamin D level is normal and getting 1000 IU daily in your diet or supplements is recommended.   -Microalbumin (urine protein) test is normal.  ADVISE: rechecking this annually.    For additional lab test information, [email or web address] is a very good reference.           Thank you very much for trusting me and [facility].     Have a peaceful day.    Healthy regards,  Jovany Denney MD

## 2023-06-26 ENCOUNTER — DOCUMENTATION ONLY (OUTPATIENT)
Dept: SURGERY | Facility: CLINIC | Age: 38
End: 2023-06-26
Payer: COMMERCIAL

## 2023-07-20 ENCOUNTER — MEDICAL CORRESPONDENCE (OUTPATIENT)
Dept: HEALTH INFORMATION MANAGEMENT | Facility: CLINIC | Age: 38
End: 2023-07-20

## 2023-08-28 ENCOUNTER — DOCUMENTATION ONLY (OUTPATIENT)
Dept: FAMILY MEDICINE | Facility: CLINIC | Age: 38
End: 2023-08-28
Payer: COMMERCIAL

## 2023-08-28 ENCOUNTER — TELEPHONE (OUTPATIENT)
Dept: FAMILY MEDICINE | Facility: CLINIC | Age: 38
End: 2023-08-28
Payer: COMMERCIAL

## 2023-08-28 DIAGNOSIS — Z98.84 BARIATRIC SURGERY STATUS: ICD-10-CM

## 2023-08-28 DIAGNOSIS — I10 HYPERTENSION GOAL BP (BLOOD PRESSURE) < 130/80: Primary | ICD-10-CM

## 2023-08-28 DIAGNOSIS — R73.09 ELEVATED GLUCOSE: ICD-10-CM

## 2023-08-28 DIAGNOSIS — F33.1 MAJOR DEPRESSIVE DISORDER, RECURRENT EPISODE, MODERATE (H): ICD-10-CM

## 2023-08-28 DIAGNOSIS — K21.9 GASTROESOPHAGEAL REFLUX DISEASE WITHOUT ESOPHAGITIS: ICD-10-CM

## 2023-08-28 DIAGNOSIS — F41.9 ANXIETY: ICD-10-CM

## 2023-08-28 DIAGNOSIS — E34.9 HYPOTESTOSTERONISM: ICD-10-CM

## 2023-08-28 NOTE — TELEPHONE ENCOUNTER
Shakir is scheduled for a lab appointment tomorrow, but there are no orders in their chart. Would you send those in?

## 2023-08-29 ENCOUNTER — LAB (OUTPATIENT)
Dept: LAB | Facility: CLINIC | Age: 38
End: 2023-08-29
Payer: COMMERCIAL

## 2023-08-29 DIAGNOSIS — R73.09 ELEVATED GLUCOSE: ICD-10-CM

## 2023-08-29 DIAGNOSIS — E34.9 HYPOTESTOSTERONISM: ICD-10-CM

## 2023-08-29 DIAGNOSIS — I10 HYPERTENSION GOAL BP (BLOOD PRESSURE) < 130/80: ICD-10-CM

## 2023-08-29 DIAGNOSIS — K21.9 GASTROESOPHAGEAL REFLUX DISEASE WITHOUT ESOPHAGITIS: ICD-10-CM

## 2023-08-29 DIAGNOSIS — F33.1 MAJOR DEPRESSIVE DISORDER, RECURRENT EPISODE, MODERATE (H): ICD-10-CM

## 2023-08-29 DIAGNOSIS — F41.9 ANXIETY: ICD-10-CM

## 2023-08-29 LAB
BASOPHILS # BLD AUTO: 0 10E3/UL (ref 0–0.2)
BASOPHILS NFR BLD AUTO: 1 %
CHOLEST SERPL-MCNC: 176 MG/DL
EOSINOPHIL # BLD AUTO: 0.3 10E3/UL (ref 0–0.7)
EOSINOPHIL NFR BLD AUTO: 5 %
ERYTHROCYTE [DISTWIDTH] IN BLOOD BY AUTOMATED COUNT: 11.9 % (ref 10–15)
HBA1C MFR BLD: 5.2 % (ref 0–5.6)
HCT VFR BLD AUTO: 45.3 % (ref 40–53)
HDLC SERPL-MCNC: 65 MG/DL
HGB BLD-MCNC: 14.9 G/DL (ref 13.3–17.7)
IMM GRANULOCYTES # BLD: 0 10E3/UL
IMM GRANULOCYTES NFR BLD: 0 %
LDLC SERPL CALC-MCNC: 97 MG/DL
LITHIUM SERPL-SCNC: 1.04 MMOL/L (ref 0.6–1.2)
LYMPHOCYTES # BLD AUTO: 1.5 10E3/UL (ref 0.8–5.3)
LYMPHOCYTES NFR BLD AUTO: 25 %
MCH RBC QN AUTO: 29.2 PG (ref 26.5–33)
MCHC RBC AUTO-ENTMCNC: 32.9 G/DL (ref 31.5–36.5)
MCV RBC AUTO: 89 FL (ref 78–100)
MONOCYTES # BLD AUTO: 0.5 10E3/UL (ref 0–1.3)
MONOCYTES NFR BLD AUTO: 8 %
NEUTROPHILS # BLD AUTO: 3.8 10E3/UL (ref 1.6–8.3)
NEUTROPHILS NFR BLD AUTO: 61 %
NONHDLC SERPL-MCNC: 111 MG/DL
PLATELET # BLD AUTO: 263 10E3/UL (ref 150–450)
PROLACTIN SERPL 3RD IS-MCNC: 41 NG/ML (ref 4–15)
RBC # BLD AUTO: 5.11 10E6/UL (ref 4.4–5.9)
T3FREE SERPL-MCNC: 2.8 PG/ML (ref 2–4.4)
T4 FREE SERPL-MCNC: 1.07 NG/DL (ref 0.9–1.7)
TRIGL SERPL-MCNC: 72 MG/DL
TSH SERPL DL<=0.005 MIU/L-ACNC: 1.93 UIU/ML (ref 0.3–4.2)
WBC # BLD AUTO: 6.2 10E3/UL (ref 4–11)

## 2023-08-29 PROCEDURE — 84439 ASSAY OF FREE THYROXINE: CPT

## 2023-08-29 PROCEDURE — 36415 COLL VENOUS BLD VENIPUNCTURE: CPT

## 2023-08-29 PROCEDURE — 84481 FREE ASSAY (FT-3): CPT

## 2023-08-29 PROCEDURE — 83036 HEMOGLOBIN GLYCOSYLATED A1C: CPT

## 2023-08-29 PROCEDURE — 84146 ASSAY OF PROLACTIN: CPT

## 2023-08-29 PROCEDURE — 80178 ASSAY OF LITHIUM: CPT

## 2023-08-29 PROCEDURE — 84443 ASSAY THYROID STIM HORMONE: CPT

## 2023-08-29 PROCEDURE — 80061 LIPID PANEL: CPT

## 2023-08-29 PROCEDURE — 85025 COMPLETE CBC W/AUTO DIFF WBC: CPT

## 2023-08-30 NOTE — RESULT ENCOUNTER NOTE
Dear Shakir,    Here is a summary of your recent test results:  -Normal red blood cell (hgb) levels, normal white blood cell count and normal platelet levels.  -A1C (test of diabetes control the last 2-3 months) is at your goal. Please continue with your current plan. Also, you should make an appointment to see me and recheck your A1C test in 6 months.   -TSH (thyroid stimulating hormone) level is normal which indicates normal thyroid function.  -Lithium levels are at a therapeutic level.   -Cholesterol levels (LDL,HDL, Triglycerides) are normal.  ADVISE: rechecking in 1 year.          For additional lab test information, [email or web address] is a very good reference.    In addition, here is a list of due or overdue Health Maintenance reminders:  Anxiety Assessment due on 05/10/2022    Please call us at [phone] (or use [facility]) to address the above recommendations if needed.           Thank you very much for trusting me and [facility].     Have a peaceful day.    Healthy regards,  Jovany Denney MD

## 2023-10-10 ENCOUNTER — VIRTUAL VISIT (OUTPATIENT)
Dept: FAMILY MEDICINE | Facility: CLINIC | Age: 38
End: 2023-10-10
Payer: COMMERCIAL

## 2023-10-10 DIAGNOSIS — R33.9 URINARY RETENTION: Primary | ICD-10-CM

## 2023-10-10 PROCEDURE — 99213 OFFICE O/P EST LOW 20 MIN: CPT | Mod: 95 | Performed by: FAMILY MEDICINE

## 2023-10-10 RX ORDER — TAMSULOSIN HYDROCHLORIDE 0.4 MG/1
0.4 CAPSULE ORAL DAILY
Qty: 30 CAPSULE | Refills: 1 | Status: SHIPPED | OUTPATIENT
Start: 2023-10-10 | End: 2023-12-18

## 2023-10-10 ASSESSMENT — ANXIETY QUESTIONNAIRES
1. FEELING NERVOUS, ANXIOUS, OR ON EDGE: MORE THAN HALF THE DAYS
5. BEING SO RESTLESS THAT IT IS HARD TO SIT STILL: MORE THAN HALF THE DAYS
GAD7 TOTAL SCORE: 14
6. BECOMING EASILY ANNOYED OR IRRITABLE: MORE THAN HALF THE DAYS
GAD7 TOTAL SCORE: 14
IF YOU CHECKED OFF ANY PROBLEMS ON THIS QUESTIONNAIRE, HOW DIFFICULT HAVE THESE PROBLEMS MADE IT FOR YOU TO DO YOUR WORK, TAKE CARE OF THINGS AT HOME, OR GET ALONG WITH OTHER PEOPLE: VERY DIFFICULT
7. FEELING AFRAID AS IF SOMETHING AWFUL MIGHT HAPPEN: MORE THAN HALF THE DAYS
4. TROUBLE RELAXING: MORE THAN HALF THE DAYS
3. WORRYING TOO MUCH ABOUT DIFFERENT THINGS: MORE THAN HALF THE DAYS
2. NOT BEING ABLE TO STOP OR CONTROL WORRYING: MORE THAN HALF THE DAYS

## 2023-10-10 ASSESSMENT — PATIENT HEALTH QUESTIONNAIRE - PHQ9
SUM OF ALL RESPONSES TO PHQ QUESTIONS 1-9: 21
10. IF YOU CHECKED OFF ANY PROBLEMS, HOW DIFFICULT HAVE THESE PROBLEMS MADE IT FOR YOU TO DO YOUR WORK, TAKE CARE OF THINGS AT HOME, OR GET ALONG WITH OTHER PEOPLE: SOMEWHAT DIFFICULT
SUM OF ALL RESPONSES TO PHQ QUESTIONS 1-9: 21

## 2023-10-10 NOTE — PROGRESS NOTES
Shakir is a 38 year old who is being evaluated via a billable telephone visit.      What phone number would you like to be contacted at? [phone]   How would you like to obtain your AVS? [place]    Distant Location (provider location):  On-site    Assessment & Plan   Urinary retention  - tamsulosin (FLOMAX) 0.4 MG capsule  Dispense: 30 capsule; Refill: 1  We discussed about anticholinergic effects of the drug that can cause urinary retention. He was recommended to cut back on fluid before bedtime. He was recommended to bring his psychiatrist's opinion. He was prescribed Flomax for 30 days. He was advised to avoid Sudafed. He can take antihistamines like Claritin or Allegra.      Depression Screening Follow Up        10/10/2023     4:13 PM   PHQ   PHQ-9 Total Score 21   Q9: Thoughts of better off dead/self-harm past 2 weeks More than half the days   F/U: Thoughts of suicide or self-harm Yes   F/U: Self harm-plan No   F/U: Self-harm action No   F/U: Safety concerns No         Follow Up    Follow Up Actions Taken  Crisis resource information provided in the After Visit Summary    Discussed the following ways the patient can remain in a safe environment:  be around others        Return in about 1 month (around 11/10/2023) for symptoms failing to improve or sooner if worsening.          Jovany Denney MD      [address] [address]  [email or web address]   Office: [phone]       Subjective   Shakir is a 38 year old, presenting for the following health issues:  Recheck Medication        10/10/2023     4:27 PM   Additional Questions   Roomed by nessa vincent       History of Present Illness       Reason for visit:  Prostate enlargement? due to Seroquel  Symptom onset:  More than a month  Symptoms include:  Frequent urination, trouble starting  Symptom intensity:  Mild  Symptom progression:  Staying the same  Had these symptoms before:  No      He started taking Seroquel 150 mg  about 6 months ago and they have been ramping it up on him because he could not sleep. He also kept waking up too early because he needed to use the restroom, so he started taking more of it and now urinary retention problem is getting worse. He has not had any other issues. He has to go all the time, especially at night. It is hard for him to start the stream and it takes a minute. The stream is normal, but it feels like a disproportionate amount of effort for the amount of output. He has muscle strain with urination and he has to push harder. He has not noticed any dry mouth from the medicine.  He waits for more volume and it seems to be less bad, but there have been times that he has given up. He denies any pain with urination. He notices it more in the evenings when he takes Seroquel.  He has noticed a little bit of constipation, but it is not as noticeable as the urination. He takes clonidine for sleep. He does not get dry eyes. He very rarely takes Sudafed.    Social History     Tobacco Use    Smoking status: Never    Smokeless tobacco: Never   Vaping Use    Vaping Use: Never used   Substance Use Topics    Alcohol use: Yes    Drug use: Yes     Types: Marijuana     Comment: once a month or less, > 1 month         5/10/2021    11:50 AM 5/22/2023     5:31 AM 10/10/2023     4:13 PM   PHQ   PHQ-9 Total Score 6 12 21   Q9: Thoughts of better off dead/self-harm past 2 weeks Not at all Several days More than half the days   F/U: Thoughts of suicide or self-harm  Yes Yes   F/U: Self harm-plan  No No   F/U: Self-harm action  No No   F/U: Safety concerns  No No         12/10/2019    11:45 AM 5/10/2021    11:50 AM 10/10/2023     4:14 PM   FATOUMATA-7 SCORE   Total Score  7 (mild anxiety) 14 (moderate anxiety)   Total Score 17 7 14         10/10/2023     4:13 PM   Last PHQ-9   1.  Little interest or pleasure in doing things 2   2.  Feeling down, depressed, or hopeless 2   3.  Trouble falling or staying asleep, or sleeping too  much 3   4.  Feeling tired or having little energy 3   5.  Poor appetite or overeating 2   6.  Feeling bad about yourself 2   7.  Trouble concentrating 3   8.  Moving slowly or restless 2   Q9: Thoughts of better off dead/self-harm past 2 weeks 2   PHQ-9 Total Score 21   In the past two weeks have you had thoughts of suicide or self harm? Yes   Do you have concerns about your personal safety or the safety of others? No   In the past 2 weeks have you thought about a plan or had intention to harm yourself? No   In the past 2 weeks have you acted on these thoughts in any way? No         10/10/2023     4:14 PM   FATOUMATA-7    1. Feeling nervous, anxious, or on edge 2   2. Not being able to stop or control worrying 2   3. Worrying too much about different things 2   4. Trouble relaxing 2   5. Being so restless that it is hard to sit still 2   6. Becoming easily annoyed or irritable 2   7. Feeling afraid, as if something awful might happen 2   FATOUMATA-7 Total Score 14   If you checked any problems, how difficult have they made it for you to do your work, take care of things at home, or get along with other people? Very difficult             Follow Up Actions Taken       Discussed the following ways the patient can remain in a safe environment:    Suicide Assessment Five-step Evaluation and Treatment (SAFE-T)          Review of Systems         Objective           Vitals:  No vitals were obtained today due to virtual visit.    Physical Exam   healthy, alert, and no distress  PSYCH: Alert and oriented times 3; coherent speech, normal   rate and volume, able to articulate logical thoughts, able   to abstract reason, no tangential thoughts, no hallucinations   or delusions  His affect is normal  RESP: No cough, no audible wheezing, able to talk in full sentences  Remainder of exam unable to be completed due to telephone visits                Phone call duration: 10 minutes

## 2023-11-17 ENCOUNTER — TELEPHONE (OUTPATIENT)
Dept: SURGERY | Facility: CLINIC | Age: 38
End: 2023-11-17
Payer: COMMERCIAL

## 2023-11-17 DIAGNOSIS — Z98.84 BARIATRIC SURGERY STATUS: Primary | ICD-10-CM

## 2023-11-17 DIAGNOSIS — K91.2 POSTSURGICAL MALABSORPTION: ICD-10-CM

## 2023-11-17 NOTE — TELEPHONE ENCOUNTER
General Call    Contacts         Type Contact Phone/Fax    11/17/2023 12:50 PM CST Phone (Incoming) Shakir Ward (Self) [phone] (H)          Reason for Call:  Labs    What are your questions or concerns:  pt has upcoming appt 2/15 and would like labs placed. He will be going to a  location    Could we send this information to you in [employer] or would you prefer to receive a phone call?:   Patient would prefer a phone call   Okay to leave a detailed message?: Yes at Cell number on file:    Telephone Information:   Mobile [phone]

## 2023-11-24 ENCOUNTER — VIRTUAL VISIT (OUTPATIENT)
Dept: FAMILY MEDICINE | Facility: CLINIC | Age: 38
End: 2023-11-24
Payer: COMMERCIAL

## 2023-11-24 DIAGNOSIS — J40 BRONCHITIS: Primary | ICD-10-CM

## 2023-11-24 PROCEDURE — 99213 OFFICE O/P EST LOW 20 MIN: CPT | Mod: VID | Performed by: NURSE PRACTITIONER

## 2023-11-24 RX ORDER — ALBUTEROL SULFATE 90 UG/1
2 AEROSOL, METERED RESPIRATORY (INHALATION) EVERY 6 HOURS PRN
Qty: 18 G | Refills: 1 | Status: SHIPPED | OUTPATIENT
Start: 2023-11-24

## 2023-11-24 ASSESSMENT — PATIENT HEALTH QUESTIONNAIRE - PHQ9
SUM OF ALL RESPONSES TO PHQ QUESTIONS 1-9: 9
10. IF YOU CHECKED OFF ANY PROBLEMS, HOW DIFFICULT HAVE THESE PROBLEMS MADE IT FOR YOU TO DO YOUR WORK, TAKE CARE OF THINGS AT HOME, OR GET ALONG WITH OTHER PEOPLE: SOMEWHAT DIFFICULT
SUM OF ALL RESPONSES TO PHQ QUESTIONS 1-9: 9

## 2023-11-24 ASSESSMENT — ENCOUNTER SYMPTOMS: COUGH: 1

## 2023-11-24 NOTE — PROGRESS NOTES
Shakir is a 38 year old who is being evaluated via a billable video visit.      How would you like to obtain your AVS? MyChart  If the video visit is dropped, the invitation should be resent by: Text to cell phone: [phone]  Will anyone else be joining your video visit? No        Assessment & Plan     Shakir was seen today for cough.    Diagnoses and all orders for this visit:    Bronchitis  Patient education completed regarding viral cause, typical course, symptomatic treatment and when to follow-up.   Encouraged scheduling Albuterol inhaler until cough improves and trial of Tessalon perles as needed for cough.    -     albuterol (PROAIR HFA/PROVENTIL HFA/VENTOLIN HFA) 108 (90 Base) MCG/ACT inhaler; Inhale 2 puffs into the lungs every 6 hours as needed for shortness of breath, wheezing or cough    Return in about 1 week (around 12/1/2023) for No improvement or sooner with worsening symptoms.        Liat James, [facility]   Shakir is a 38 year old, presenting for the following health issues:  Cough        11/24/2023     1:42 PM   Additional Questions   Roomed by Vicki HOUGH CMA   Cough    History of Present Illness     Patient reports onset of cough last Friday.    Cough is worse at night and with physical exertion.  Cough was non-productive at the onset and then was slightly productive.  +Mild shortness of breath with exertion.  No wheezing.    +Fatigue.    No chest tightness.    Feels like the cough is getting slightly better.    No significant URI symptoms.    No one else is sick at home.    Is using Mucinex DM - does feel like this helps.  Doesn't help at night.    Has Tessalon at home - only tried once.   History of this happening annually.      Reason for visit:  Cough  Symptom onset:  1-3 days ago  Symptoms include:  Started with dry sinuses, progressed to a persistent cough. COVID test negative  Symptom intensity:  Mild  Symptom progression:  Staying the  same  Had these symptoms before:  Yes  Has tried/received treatment for these symptoms:  Yes  Previous treatment was successful:  Yes  Prior treatment description:  Cough syrup, as I recall  What makes it worse:  Exertion, running out of breath  What makes it better:  Mucinex, somewhat    He eats 0-1 servings of fruits and vegetables daily.He consumes 1 sweetened beverage(s) daily.He exercises with enough effort to increase his heart rate 10 to 19 minutes per day.  He exercises with enough effort to increase his heart rate 3 or less days per week.   He is taking medications regularly.           5/22/2023     5:31 AM 10/10/2023     4:13 PM 11/24/2023     9:39 AM   PHQ   PHQ-9 Total Score 12 21 9   Q9: Thoughts of better off dead/self-harm past 2 weeks Several days More than half the days Several days   F/U: Thoughts of suicide or self-harm Yes Yes No   F/U: Self harm-plan No No    F/U: Self-harm action No No    F/U: Safety concerns No No No   Feeling like mood is stable, no acute concerns.        Review of Systems   Respiratory:  Positive for cough.       Constitutional, HEENT, cardiovascular, pulmonary, gi and gu systems are negative, except as otherwise noted.      Objective           Vitals:  No vitals were obtained today due to virtual visit.    Physical Exam     GENERAL: Healthy, alert and no distress  EYES: Eyes grossly normal to inspection.   RESP: +Cough No audible wheeze or visible cyanosis.  No visible retractions or increased work of breathing.    PSYCH: Mentation appears normal, affect normal/bright, judgement and insight intact, normal speech and appearance well-groomed.          Video-Visit Details    Type of service:  Video Visit     Originating Location (pt. Location): Home  Distant Location (provider location):  On-site  Platform used for Video Visit: [employer]

## 2023-11-28 ENCOUNTER — LAB (OUTPATIENT)
Dept: LAB | Facility: CLINIC | Age: 38
End: 2023-11-28
Payer: COMMERCIAL

## 2023-11-28 DIAGNOSIS — Z98.84 BARIATRIC SURGERY STATUS: ICD-10-CM

## 2023-11-28 DIAGNOSIS — K91.2 POSTSURGICAL MALABSORPTION: ICD-10-CM

## 2023-11-28 LAB
ERYTHROCYTE [DISTWIDTH] IN BLOOD BY AUTOMATED COUNT: 12.1 % (ref 10–15)
FERRITIN SERPL-MCNC: 125 NG/ML (ref 31–409)
HCT VFR BLD AUTO: 44.2 % (ref 40–53)
HGB BLD-MCNC: 14.2 G/DL (ref 13.3–17.7)
IRON BINDING CAPACITY (ROCHE): 311 UG/DL (ref 240–430)
IRON SATN MFR SERPL: 27 % (ref 15–46)
IRON SERPL-MCNC: 83 UG/DL (ref 61–157)
MCH RBC QN AUTO: 29.7 PG (ref 26.5–33)
MCHC RBC AUTO-ENTMCNC: 32.1 G/DL (ref 31.5–36.5)
MCV RBC AUTO: 93 FL (ref 78–100)
PLATELET # BLD AUTO: 272 10E3/UL (ref 150–450)
PTH-INTACT SERPL-MCNC: 19 PG/ML (ref 15–65)
RBC # BLD AUTO: 4.78 10E6/UL (ref 4.4–5.9)
VIT B12 SERPL-MCNC: 1219 PG/ML (ref 232–1245)
VIT D+METAB SERPL-MCNC: 65 NG/ML (ref 20–50)
WBC # BLD AUTO: 10.3 10E3/UL (ref 4–11)

## 2023-11-28 PROCEDURE — 82607 VITAMIN B-12: CPT

## 2023-11-28 PROCEDURE — 85027 COMPLETE CBC AUTOMATED: CPT

## 2023-11-28 PROCEDURE — 84590 ASSAY OF VITAMIN A: CPT | Mod: 90

## 2023-11-28 PROCEDURE — 83970 ASSAY OF PARATHORMONE: CPT

## 2023-11-28 PROCEDURE — 83550 IRON BINDING TEST: CPT

## 2023-11-28 PROCEDURE — 99000 SPECIMEN HANDLING OFFICE-LAB: CPT

## 2023-11-28 PROCEDURE — 82728 ASSAY OF FERRITIN: CPT

## 2023-11-28 PROCEDURE — 82306 VITAMIN D 25 HYDROXY: CPT

## 2023-11-28 PROCEDURE — 83540 ASSAY OF IRON: CPT

## 2023-11-28 PROCEDURE — 36415 COLL VENOUS BLD VENIPUNCTURE: CPT

## 2023-11-29 ENCOUNTER — MYC MEDICAL ADVICE (OUTPATIENT)
Dept: FAMILY MEDICINE | Facility: CLINIC | Age: 38
End: 2023-11-29
Payer: COMMERCIAL

## 2023-11-29 DIAGNOSIS — I10 HYPERTENSION GOAL BP (BLOOD PRESSURE) < 130/80: ICD-10-CM

## 2023-12-01 LAB
ANNOTATION COMMENT IMP: NORMAL
RETINYL PALMITATE SERPL-MCNC: 0.02 MG/L
VIT A SERPL-MCNC: 0.79 MG/L

## 2023-12-04 RX ORDER — AMLODIPINE BESYLATE 5 MG/1
5 TABLET ORAL DAILY
Qty: 90 TABLET | Refills: 3 | Status: SHIPPED | OUTPATIENT
Start: 2023-12-04

## 2023-12-17 DIAGNOSIS — R33.9 URINARY RETENTION: ICD-10-CM

## 2023-12-18 RX ORDER — TAMSULOSIN HYDROCHLORIDE 0.4 MG/1
0.4 CAPSULE ORAL DAILY
Qty: 90 CAPSULE | Refills: 2 | Status: SHIPPED | OUTPATIENT
Start: 2023-12-18 | End: 2024-09-23

## 2024-01-18 ENCOUNTER — MEDICAL CORRESPONDENCE (OUTPATIENT)
Dept: LAB | Facility: CLINIC | Age: 39
End: 2024-01-18
Payer: COMMERCIAL

## 2024-01-29 ENCOUNTER — E-VISIT (OUTPATIENT)
Dept: URGENT CARE | Facility: CLINIC | Age: 39
End: 2024-01-29
Payer: COMMERCIAL

## 2024-01-29 ENCOUNTER — OFFICE VISIT (OUTPATIENT)
Dept: URGENT CARE | Facility: URGENT CARE | Age: 39
End: 2024-01-29
Payer: COMMERCIAL

## 2024-01-29 VITALS
SYSTOLIC BLOOD PRESSURE: 126 MMHG | BODY MASS INDEX: 33.39 KG/M2 | DIASTOLIC BLOOD PRESSURE: 79 MMHG | OXYGEN SATURATION: 98 % | TEMPERATURE: 98.3 F | WEIGHT: 239.4 LBS | HEART RATE: 65 BPM

## 2024-01-29 DIAGNOSIS — R10.13 EPIGASTRIC DISCOMFORT: Primary | ICD-10-CM

## 2024-01-29 DIAGNOSIS — R10.13 DYSPEPSIA: Primary | ICD-10-CM

## 2024-01-29 LAB
BASOPHILS # BLD AUTO: 0.1 10E3/UL (ref 0–0.2)
BASOPHILS NFR BLD AUTO: 1 %
EOSINOPHIL # BLD AUTO: 0.5 10E3/UL (ref 0–0.7)
EOSINOPHIL NFR BLD AUTO: 6 %
ERYTHROCYTE [DISTWIDTH] IN BLOOD BY AUTOMATED COUNT: 11.9 % (ref 10–15)
HCT VFR BLD AUTO: 43 % (ref 40–53)
HGB BLD-MCNC: 13.8 G/DL (ref 13.3–17.7)
IMM GRANULOCYTES # BLD: 0 10E3/UL
IMM GRANULOCYTES NFR BLD: 0 %
LYMPHOCYTES # BLD AUTO: 1.6 10E3/UL (ref 0.8–5.3)
LYMPHOCYTES NFR BLD AUTO: 19 %
MCH RBC QN AUTO: 29.2 PG (ref 26.5–33)
MCHC RBC AUTO-ENTMCNC: 32.1 G/DL (ref 31.5–36.5)
MCV RBC AUTO: 91 FL (ref 78–100)
MONOCYTES # BLD AUTO: 0.6 10E3/UL (ref 0–1.3)
MONOCYTES NFR BLD AUTO: 7 %
NEUTROPHILS # BLD AUTO: 6 10E3/UL (ref 1.6–8.3)
NEUTROPHILS NFR BLD AUTO: 68 %
PLATELET # BLD AUTO: 266 10E3/UL (ref 150–450)
RBC # BLD AUTO: 4.72 10E6/UL (ref 4.4–5.9)
WBC # BLD AUTO: 8.8 10E3/UL (ref 4–11)

## 2024-01-29 PROCEDURE — 36415 COLL VENOUS BLD VENIPUNCTURE: CPT | Performed by: FAMILY MEDICINE

## 2024-01-29 PROCEDURE — 99214 OFFICE O/P EST MOD 30 MIN: CPT | Performed by: FAMILY MEDICINE

## 2024-01-29 PROCEDURE — 99207 PR NON-BILLABLE SERV PER CHARTING: CPT | Performed by: EMERGENCY MEDICINE

## 2024-01-29 PROCEDURE — 85025 COMPLETE CBC W/AUTO DIFF WBC: CPT | Performed by: FAMILY MEDICINE

## 2024-01-29 RX ORDER — FLUOXETINE 40 MG/1
CAPSULE ORAL
COMMUNITY
Start: 2024-01-18 | End: 2024-06-24

## 2024-01-29 NOTE — PROGRESS NOTES
Assessment & Plan     Epigastric discomfort  - CBC with platelets and differential  - CBC with platelets and differential       Exam does not suggest pancreatitis/cholecystitis/appendicitis. Try calcium carbonate if having discomfort with meals. Continue omeprazole 40mg daily. If symptoms escalate return to be evaluated.     No suggestion on history taking of unstable angina    Kapil Miranda MD   [facility]    Subjective     Shakir is a 39 year old male who presents to clinic today for the following health issues:  Chief Complaint   Patient presents with    Gastrointestinal Problem     Start yesterday sx really bad heartburn, dark stools, reports have regular BM hx of acid reflux tx      HPI    No dizziness/fainting episodes or diaphoresis with regular to mild activities    Not on blood thinners    No present abdominal pain, no prior hx of GI bleeds.   Had one darker than usual stool.     Patient has a history of acid reflux and takes omeprazole daily for this.  Starting the last day he has had epigastric discomfort and since then has not had much of an appetite.  No episodes of diaphoresis or shortness of breath.  No reported cough or fever.    Denies hx of alcoholism/pancreatitis.    Patient Active Problem List    Diagnosis Date Noted    Bariatric surgery status 07/20/2021     Priority: Medium    Postsurgical malabsorption 07/20/2021     Priority: Medium    MENDEZ (obstructive sleep apnea) 03/30/2018     Priority: Medium     3/20/2018 [facility] Sleep Study (330.0 lbs) - AHI 7.5, RDI 10.3, Supine AHI 10.3, REM AHI 29.4, Low O2 84.2%, Time Spent ?88% 0.3 minutes / Time Spent ?89% 0.6 minutes.      Severe episode of recurrent major depressive disorder, without psychotic features (H) 11/07/2017     Priority: Medium    Hypertension goal BP (blood pressure) < 130/80 09/05/2013     Priority: Medium    Hypotestosteronism 07/19/2011     Priority: Medium    Major depressive disorder, recurrent episode,  moderate (H) 02/08/2011     Priority: Medium     Marcus score is 14 on 7-19-11      Anxiety 02/08/2011     Priority: Medium    Vitamin D deficiency 05/20/2009     Priority: Medium    Esophageal reflux      Priority: Medium     Dr Starks- had EGD ~2002      Attention deficit hyperactivity disorder (ADHD) 02/02/2005     Priority: Medium       Current Outpatient Medications   Medication    amLODIPine (NORVASC) 5 MG tablet    amphetamine-dextroamphetamine (ADDERALL) 20 MG tablet    buPROPion (WELLBUTRIN) 75 MG tablet    calcium carbonate (OS-INEZ) 1500 (600 Ca) MG tablet    Cholecalciferol (VITAMIN D3) 66581 UNITS TABS    cloNIDine (CATAPRES) 0.1 MG tablet    Cyanocobalamin 5000 MCG TBDP    FLUoxetine (PROZAC) 40 MG capsule    lithium (ESKALITH) 300 MG tablet    LORazepam (ATIVAN) 1 MG tablet    magnesium oxide (MAG-OX) 400 (241.3 Mg) MG tablet    multivitamin w/minerals (THERA-VIT-M) tablet    omeprazole (PRILOSEC) 20 MG capsule    QUEtiapine (SEROQUEL) 25 MG tablet    QUEtiapine (SEROQUEL) 50 MG tablet    tamsulosin (FLOMAX) 0.4 MG capsule    thiamine (B-1) 100 MG tablet    traZODone (DESYREL) 50 MG tablet    albuterol (PROAIR HFA/PROVENTIL HFA/VENTOLIN HFA) 108 (90 Base) MCG/ACT inhaler    gabapentin (NEURONTIN) 100 MG capsule    ondansetron (ZOFRAN) 4 MG tablet    vilazodone (VIIBRYD) 40 MG TABS tablet     No current facility-administered medications for this visit.         Objective    /79   Pulse 65   Temp 98.3  F (36.8  C)   Wt 108.6 kg (239 lb 6.4 oz)   SpO2 98%   BMI 33.39 kg/m    Physical Exam       GEN: NAD, no yellowing of eyes/skin  CV: RRR no m/r/g  Abd: no pain with palpation of all quadrants, no guarding    Results for orders placed or performed in visit on 01/29/24   CBC with platelets and differential     Status: None   Result Value Ref Range    WBC Count 8.8 4.0 - 11.0 10e3/uL    RBC Count 4.72 4.40 - 5.90 10e6/uL    Hemoglobin 13.8 13.3 - 17.7 g/dL    Hematocrit 43.0 40.0 - 53.0 %    MCV 91  78 - 100 fL    MCH 29.2 26.5 - 33.0 pg    MCHC 32.1 31.5 - 36.5 g/dL    RDW 11.9 10.0 - 15.0 %    Platelet Count 266 150 - 450 10e3/uL    % Neutrophils 68 %    % Lymphocytes 19 %    % Monocytes 7 %    % Eosinophils 6 %    % Basophils 1 %    % Immature Granulocytes 0 %    Absolute Neutrophils 6.0 1.6 - 8.3 10e3/uL    Absolute Lymphocytes 1.6 0.8 - 5.3 10e3/uL    Absolute Monocytes 0.6 0.0 - 1.3 10e3/uL    Absolute Eosinophils 0.5 0.0 - 0.7 10e3/uL    Absolute Basophils 0.1 0.0 - 0.2 10e3/uL    Absolute Immature Granulocytes 0.0 <=0.4 10e3/uL   CBC with platelets and differential     Status: None    Narrative    The following orders were created for panel order CBC with platelets and differential.  Procedure                               Abnormality         Status                     ---------                               -----------         ------                     CBC with platelets and d...[[id number]]                      Final result                 Please view results for these tests on the individual orders.                     The use of [employer] services may have been used to construct the content in this note; any grammatical or spelling errors are non-intentional. Please contact the author of this note directly if you are in need of any clarification.

## 2024-01-30 NOTE — PATIENT INSTRUCTIONS
Continue omeprazole once daily      For flare ups of epigastric discomfort/heart burn try calcium carbonate or tums      If you have left chest pain, arm pain, or become lightheaded/dizzy go to the emergency room right away

## 2024-02-08 NOTE — PROGRESS NOTES
"Shakri is a 39 year old who is being evaluated via a billable video visit.      The patient has been notified of following:     \"This video visit will be conducted via a call between you and your physician/provider. We have found that certain health care needs can be provided without the need for an in-person physical exam.  This service lets us provide the care you need with a video conversation.  If a prescription is necessary we can send it directly to your pharmacy.  If lab work is needed we can place an order for that and you can then stop by our lab to have the test done at a later time.    Video visits are billed at different rates depending on your insurance coverage.  Please reach out to your insurance provider with any questions.    If during the course of the call the physician/provider feels a video visit is not appropriate, you will not be charged for this service.\"    Patient has given verbal consent for Video visit? Yes    How would you like to obtain your AVS? MyChart    If the video visit is dropped, the invitation should be resent by: Text to cell phone: [phone]    Will anyone else be joining your video visit? No    I    Video-Visit Details    Type of service:  Video Visit    Originating Location (pt. Location): Home    Distant Location (provider location):   Off-Site - Provider Home Office    Platform used for Video Visit: [employer]    Video Start Time:  3:03    Video End Time: 3:35            Return Bariatric Surgery Note      RE: Abel Ward  MR#: [id number]  : 1985  VISIT DATE: Feb 15, 2024    Dear David Denney,    I had the pleasure of seeing your patient, Abel Ward, in my post-bariatric surgery assessment clinic.      CHIEF COMPLAINT: Post-bariatric surgery follow-up    Has been dealing with some challenging mental health concerns. Will be starting ketamine treatment. Concern today is about a breakdown of several of his teeth. Dentist gave him a prescription " toothpaste that has kept it from worsening.  Wondering about his calcium and if this is impacting his teeth.    Has been dealing with GERD since surgery. Taking omeprazole 40 mg daily. Wakes up with a sore throat randomly. Also has sinus issues that could be playing a role. In the past few weeks went to urgent care due to severe epigastric pain. Not sure why is was so bad that day but was better the day after.    No nicotine or NSAID's but ingests quite a bit of caffeine from energy packets daily. Reports low energy  .     HISTORY OF PRESENT ILLNESS:      2/15/2024     2:09 PM   Questions Regarding Prior Weight Loss Surgery Reviewed With Patient   I had the following weight loss procedure Sincere-en-y Gastric Bypass   What year was your surgery? 2021   How has your weight changed since your last visit? I have lost weight   Do you currently have any of the following Sleep Apnea    Heartburn, acid reflux, or GERD (acid reflux disease)?    Hypertension (high blood pressure)?   Do you have any concerns today? No     Patient is taking the following bariatric postoperative vitamins:  1 Nature's Way Multivitamin with Minerals (AM)  500 mg Calcium citrate with magnesium (TID) Not from MVT)   5,000 International units Vitamin D for the past couple of months  5000 mcg Vitamin B-12 sublingual - 1x/week  100 mg Vitamin B1 - 1x/week  1 slow release iron ferrous sulfate - 9 or so months ago      Weight History:      2/15/2024     2:09 PM   --   What is your highest lifetime weight? 335   What is your lowest weight since surgery? (In pounds) 230     Initial Weight (lbs): 320 lbs  Weight: 230 lb (104.3 kg)     Cumulative weight loss (lbs): 90  Weight Loss Percentage: 28.13%        2/15/2024     2:09 PM   Questions Regarding Co-Morbidities and Health Concerns Reviewed With Patient   Pre-diabetes Never   Diabetes II Never   High Blood Pressure Stayed the same   High cholesterol Never   Heartburn/Reflux Improved   Sleep apnea Improved    PCOS Never   Back pain Stayed the same   Joint pain Stayed the same   Lower leg swelling Never           2/15/2024     2:09 PM   Eating Habits   How many meals do you eat per day? 2   Do you snack between meals? Sometimes   How much food are you eating at each meal? 1/2 cup to 1 cup   Are you able to separate your meals and liquids by at least 30 minutes? No   Are you able to avoid liquid calories? Yes     Has no appetite most of the day. Can get nauseated if eats too much.         2/15/2024     2:09 PM   Exercise Questions Reviewed With Patient   How often do you exercise? Never   What keeps you from being more active? I am as active as I can possbily be   Moves most of the day    Social History:      2/15/2024     2:09 PM   --   Are you smoking? No   Are you drinking alcohol? No       Medications:  Current Outpatient Medications   Medication    amLODIPine (NORVASC) 5 MG tablet    amphetamine-dextroamphetamine (ADDERALL) 20 MG tablet    buPROPion (WELLBUTRIN) 75 MG tablet    calcium carbonate (OS-INEZ) 1500 (600 Ca) MG tablet    Cholecalciferol (VITAMIN D3) 14856 UNITS TABS    cloNIDine (CATAPRES) 0.1 MG tablet    Cyanocobalamin 5000 MCG TBDP    FLUoxetine (PROZAC) 40 MG capsule    lithium (ESKALITH) 300 MG tablet    LORazepam (ATIVAN) 1 MG tablet    magnesium oxide (MAG-OX) 400 (241.3 Mg) MG tablet    multivitamin w/minerals (THERA-VIT-M) tablet    omeprazole (PRILOSEC) 20 MG capsule    omeprazole (PRILOSEC) 40 MG DR capsule    QUEtiapine (SEROQUEL) 25 MG tablet    QUEtiapine (SEROQUEL) 50 MG tablet    tamsulosin (FLOMAX) 0.4 MG capsule    thiamine (B-1) 100 MG tablet    traZODone (DESYREL) 50 MG tablet    albuterol (PROAIR HFA/PROVENTIL HFA/VENTOLIN HFA) 108 (90 Base) MCG/ACT inhaler    gabapentin (NEURONTIN) 100 MG capsule    ondansetron (ZOFRAN) 4 MG tablet     No current facility-administered medications for this visit.         2/15/2024     2:09 PM   --   Do you avoid NSAIDs such as (Ibuprofen, Aleve,  Naproxen, Advil)? Yes     Energy drink packets with approx 200 mg caffeine. 1-2 daily.     ROS:  GI:       2/15/2024     2:09 PM   --   Vomiting No   Diarrhea No   Constipation No   Swallowing trouble No   Abdominal pain No   Heartburn Yes     Skin:       2/15/2024     2:09 PM   BAR RBS ROS - SKIN   Rash in skin folds No     Psych:       2/15/2024     2:09 PM   --   Depression Yes   Anxiety Yes     :       2/15/2024     2:09 PM   BAR RBS ROS -    Stress urinary incontinence No       LABS/IMAGING/MEDICAL RECORDS REVIEW: [facility]      PHYSICAL EXAMINATION:  Wt 230 lb (104.3 kg)   BMI 32.08 kg/m     GENERAL: Healthy, alert and no distress  EYES: Eyes grossly normal to inspection.  No discharge or erythema, or obvious scleral/conjunctival abnormalities.  RESP: No audible wheeze, cough, or visible cyanosis.  No visible retractions or increased work of breathing.    SKIN: Visible skin clear. No significant rash, abnormal pigmentation or lesions.  NEURO: Cranial nerves grossly intact.  Mentation and speech appropriate for age.  PSYCH: Mentation appears normal, affect normal/bright, judgement and insight intact, normal speech and appearance well-groomed.        ASSESSMENT AND PLAN:    1. 2.5 years status laparoscopic gastric bypass  We reviewed the important post op bariatric recommendations:  eating 3 meals daily  eating protein first, getting >60gm protein daily  eating slowly, chewing food well  avoiding/limiting calorie containing beverages  avoiding fluids 30 minutes before, during, and after meals  limiting restaurant or cafeteria eating to twice a week or less  Pt reminded to avoid marginal ulcers he should avoid tobacco at all, alcohol in excess, caffeine, and NSAIDS (unless indicated for cardioprotection or othewise and opposed by a PPI).  Pt encouraged to establish and maintain a consistent physical activity routine, 6-8 hours of restorative sleep each night and optimal stress management.  Pt counseled on the  importance of life long vitamin supplementation and life long follow up.    Discussed the potential impact of his caffeine use on his GERD. Strongly encouraged patient to stop all caffeine use. RX for Omeprazole 40 mg BID sent over to pharmacy      2. Morbid Obesity resolved. current BMI: Body mass index is 32.08 kg/m .  3. Post surgical malabsorption:   Reviewed Labs previously ordered per protocol.   Follow food plan per dietitian recommendations.   Continue taking recommended post-op vitamins.  4. GERD without esophagitis - RX for omeprazole 40 mg BID sent over to pharmacy. Patient is strongly encouraged to stop all caffeine use.   4. Return to clinic in 1 year for annual with provider and diet.    Sincerely,    Daniel Johnson PA-C    35 minutes spent on the date of the encounter doing chart review, review of test results, patient visit and documentation

## 2024-02-14 ENCOUNTER — LAB (OUTPATIENT)
Dept: LAB | Facility: CLINIC | Age: 39
End: 2024-02-14
Payer: COMMERCIAL

## 2024-02-14 ENCOUNTER — MYC MEDICAL ADVICE (OUTPATIENT)
Dept: SURGERY | Facility: CLINIC | Age: 39
End: 2024-02-14

## 2024-02-14 DIAGNOSIS — F90.9 ATTENTION DEFICIT HYPERACTIVITY DISORDER: ICD-10-CM

## 2024-02-14 DIAGNOSIS — F33.2 SEVERE RECURRENT MAJOR DEPRESSION WITHOUT PSYCHOTIC FEATURES (H): Primary | ICD-10-CM

## 2024-02-14 DIAGNOSIS — Z98.84 BARIATRIC SURGERY STATUS: ICD-10-CM

## 2024-02-14 DIAGNOSIS — K91.2 POSTSURGICAL MALABSORPTION: ICD-10-CM

## 2024-02-14 LAB
ALBUMIN SERPL BCG-MCNC: 4.2 G/DL (ref 3.5–5.2)
ALP SERPL-CCNC: 72 U/L (ref 40–150)
ALT SERPL W P-5'-P-CCNC: 20 U/L (ref 0–70)
AMPHETAMINES UR QL SCN: ABNORMAL
ANION GAP SERPL CALCULATED.3IONS-SCNC: 14 MMOL/L (ref 7–15)
AST SERPL W P-5'-P-CCNC: 18 U/L (ref 0–45)
BARBITURATES UR QL SCN: ABNORMAL
BASOPHILS # BLD AUTO: 0 10E3/UL (ref 0–0.2)
BASOPHILS NFR BLD AUTO: 1 %
BENZODIAZ UR QL SCN: ABNORMAL
BILIRUB SERPL-MCNC: 0.3 MG/DL
BUN SERPL-MCNC: 15.3 MG/DL (ref 6–20)
BZE UR QL SCN: ABNORMAL
CALCIUM SERPL-MCNC: 9.7 MG/DL (ref 8.6–10)
CANNABINOIDS UR QL SCN: ABNORMAL
CHLORIDE SERPL-SCNC: 104 MMOL/L (ref 98–107)
CREAT SERPL-MCNC: 0.82 MG/DL (ref 0.67–1.17)
DEPRECATED HCO3 PLAS-SCNC: 21 MMOL/L (ref 22–29)
EGFRCR SERPLBLD CKD-EPI 2021: >90 ML/MIN/1.73M2
EOSINOPHIL # BLD AUTO: 0.4 10E3/UL (ref 0–0.7)
EOSINOPHIL NFR BLD AUTO: 7 %
ERYTHROCYTE [DISTWIDTH] IN BLOOD BY AUTOMATED COUNT: 11.9 % (ref 10–15)
FENTANYL UR QL: ABNORMAL
GLUCOSE SERPL-MCNC: 202 MG/DL (ref 70–99)
HCT VFR BLD AUTO: 43 % (ref 40–53)
HGB BLD-MCNC: 13.9 G/DL (ref 13.3–17.7)
IMM GRANULOCYTES # BLD: 0 10E3/UL
IMM GRANULOCYTES NFR BLD: 0 %
LITHIUM SERPL-SCNC: 0.79 MMOL/L (ref 0.6–1.2)
LYMPHOCYTES # BLD AUTO: 0.9 10E3/UL (ref 0.8–5.3)
LYMPHOCYTES NFR BLD AUTO: 16 %
MCH RBC QN AUTO: 30 PG (ref 26.5–33)
MCHC RBC AUTO-ENTMCNC: 32.3 G/DL (ref 31.5–36.5)
MCV RBC AUTO: 93 FL (ref 78–100)
MONOCYTES # BLD AUTO: 0.3 10E3/UL (ref 0–1.3)
MONOCYTES NFR BLD AUTO: 4 %
NEUTROPHILS # BLD AUTO: 4.2 10E3/UL (ref 1.6–8.3)
NEUTROPHILS NFR BLD AUTO: 72 %
OPIATES UR QL SCN: ABNORMAL
PCP QUAL URINE (ROCHE): ABNORMAL
PLATELET # BLD AUTO: 258 10E3/UL (ref 150–450)
POTASSIUM SERPL-SCNC: 3.9 MMOL/L (ref 3.4–5.3)
PROT SERPL-MCNC: 6.7 G/DL (ref 6.4–8.3)
RBC # BLD AUTO: 4.63 10E6/UL (ref 4.4–5.9)
SODIUM SERPL-SCNC: 139 MMOL/L (ref 135–145)
T4 FREE SERPL-MCNC: 0.55 NG/DL (ref 0.9–1.7)
TSH SERPL DL<=0.005 MIU/L-ACNC: 0.56 UIU/ML (ref 0.3–4.2)
WBC # BLD AUTO: 5.9 10E3/UL (ref 4–11)

## 2024-02-14 PROCEDURE — 82306 VITAMIN D 25 HYDROXY: CPT

## 2024-02-14 PROCEDURE — 84439 ASSAY OF FREE THYROXINE: CPT

## 2024-02-14 PROCEDURE — 85025 COMPLETE CBC W/AUTO DIFF WBC: CPT

## 2024-02-14 PROCEDURE — 84443 ASSAY THYROID STIM HORMONE: CPT

## 2024-02-14 PROCEDURE — 80053 COMPREHEN METABOLIC PANEL: CPT

## 2024-02-14 PROCEDURE — 36415 COLL VENOUS BLD VENIPUNCTURE: CPT

## 2024-02-14 PROCEDURE — 80307 DRUG TEST PRSMV CHEM ANLYZR: CPT

## 2024-02-14 PROCEDURE — 80178 ASSAY OF LITHIUM: CPT

## 2024-02-15 ENCOUNTER — VIRTUAL VISIT (OUTPATIENT)
Dept: SURGERY | Facility: CLINIC | Age: 39
End: 2024-02-15
Payer: COMMERCIAL

## 2024-02-15 VITALS — BODY MASS INDEX: 32.08 KG/M2 | WEIGHT: 230 LBS

## 2024-02-15 DIAGNOSIS — E66.811 CLASS 1 OBESITY DUE TO EXCESS CALORIES WITH SERIOUS COMORBIDITY AND BODY MASS INDEX (BMI) OF 32.0 TO 32.9 IN ADULT: ICD-10-CM

## 2024-02-15 DIAGNOSIS — Z98.84 BARIATRIC SURGERY STATUS: Primary | ICD-10-CM

## 2024-02-15 DIAGNOSIS — K91.2 POSTSURGICAL MALABSORPTION: ICD-10-CM

## 2024-02-15 DIAGNOSIS — E66.09 CLASS 1 OBESITY DUE TO EXCESS CALORIES WITH SERIOUS COMORBIDITY AND BODY MASS INDEX (BMI) OF 32.0 TO 32.9 IN ADULT: ICD-10-CM

## 2024-02-15 DIAGNOSIS — K21.9 GERD WITHOUT ESOPHAGITIS: ICD-10-CM

## 2024-02-15 LAB — VIT D+METAB SERPL-MCNC: 69 NG/ML (ref 20–50)

## 2024-02-15 PROCEDURE — 99214 OFFICE O/P EST MOD 30 MIN: CPT | Mod: 95 | Performed by: PHYSICIAN ASSISTANT

## 2024-02-15 RX ORDER — OMEPRAZOLE 40 MG/1
40 CAPSULE, DELAYED RELEASE ORAL 2 TIMES DAILY
Qty: 180 CAPSULE | Refills: 1 | Status: SHIPPED | OUTPATIENT
Start: 2024-02-15

## 2024-02-15 NOTE — PATIENT INSTRUCTIONS
"To ensure the quality you may receive a patient satisfaction survey. The greatest compliment you can give is \"Likely to Recommend\"    Nice to talk with you today. Thank you for allowing me the privilege of caring for you. Below is the plan discussed.-  . Daniel Johnson PA-C    Plan:  Labs ordered. Call [phone] to schedule.  Continue your bariatric vitamins       FOLLOW-UP:  Call [phone] to schedule next visit.     Bariatric Post Op Guidelines  General:    To avoid marginal ulcers avoid all forms of tobacco, alcohol in excess, caffeine, and NSAIDS     Exercise is key for weight loss and weight maintenance. Aim for 30-60 minutes of physical activity most days.  Include cardiovascular and strength training.    Continue lifelong vitamins supplementation and annual lab follow up.  All  patients should supplement with the following bariatric postoperative vitamins:  2 Complete multivitamins with minerals (at different times than calcium)  Vitamin D 5000 Int Units/125 mg daily   Calcium 600 mg twice daily or 500 mg three times daily   Vitamin B12: 500 mcg sl daily or 1000 mcg Inj monthly  B complex daily or Thiamine 100 mg weekly  1 Iron/Vit C. Daily for females who menstruate and/or as directed    The bariatric team should be aware and evaluate all GI symptoms which can be a sign of complications. Inability to tolerate textured solid food (chicken, steak, fish) may need to be evaluated by endoscopy.    There is a 10% increase of Alcohol Use Disorder in patients with bariatric surgery.   Most often occurring around 2 years post op.  Call if you feel alcohol is interfering in your daily life.  We can help.     Follow up annually lifelong. Obesity is a chronic disease.  Weight gain can be expected. The goal of follow-up visits is to ensure adequate vitamin and protein absorption, evaluate food intake behavior, review exercise/activity level, and assist with weight regain.    Nutritional:  Eat 3 meals per day  (No " snacks between meals.)  Do not skip meals.  This can cause overeating at the next meal and will prevent adequate protein and nutritional intake.    Aim for 60-80 grams of protein per day.  Always eat your protein first. This assists with optimal nutrition and helps you stay full longer.    Eat your protein first, and then follow with fiber.    Add fiber by including fruits, vegetables, whole grains, and beans.     Portions should be about 1 cup per meal. Use measuring cups to be accurate.  Continue to use saucer/salad plates, infant/toddler silverware to keep portion sizes small and take small bites.    Eat S-L-O-W-L-Y to make each meal last 20-30 minutes. Always stop eating when satisfied.    Aim for 64 oz. of calorie-free fluids daily.    Avoid drinking 30 min before, during, and 30 min after meal    Avoid high sugar and high fat foods to prevent high calorie intake. This will reduce your rate of weight loss and can cause weight regain.   Check nutrition labels for less than 10 grams of sugar and less than 10 grams of fat per serving.

## 2024-02-20 ENCOUNTER — MYC MEDICAL ADVICE (OUTPATIENT)
Dept: FAMILY MEDICINE | Facility: CLINIC | Age: 39
End: 2024-02-20
Payer: COMMERCIAL

## 2024-02-20 DIAGNOSIS — Z98.84 BARIATRIC SURGERY STATUS: Primary | ICD-10-CM

## 2024-02-20 DIAGNOSIS — R79.89 LOW SERUM THYROXINE (T4) LEVEL: Primary | ICD-10-CM

## 2024-02-20 DIAGNOSIS — K91.2 POSTSURGICAL MALABSORPTION: ICD-10-CM

## 2024-02-20 NOTE — TELEPHONE ENCOUNTER
Please see my chart message below     Please review and advise     Thank you     Carmen Campos RN, BSN  [place] [facility]

## 2024-02-21 ENCOUNTER — LAB (OUTPATIENT)
Dept: LAB | Facility: CLINIC | Age: 39
End: 2024-02-21
Attending: FAMILY MEDICINE
Payer: COMMERCIAL

## 2024-02-21 DIAGNOSIS — K91.2 POSTSURGICAL MALABSORPTION: ICD-10-CM

## 2024-02-21 DIAGNOSIS — Z98.84 BARIATRIC SURGERY STATUS: ICD-10-CM

## 2024-02-21 DIAGNOSIS — R79.89 LOW SERUM THYROXINE (T4) LEVEL: ICD-10-CM

## 2024-02-21 LAB
PTH-INTACT SERPL-MCNC: 35 PG/ML (ref 15–65)
T3FREE SERPL-MCNC: 2.7 PG/ML (ref 2–4.4)
T4 FREE SERPL-MCNC: 0.81 NG/DL (ref 0.9–1.7)
TSH SERPL DL<=0.005 MIU/L-ACNC: 1.17 UIU/ML (ref 0.3–4.2)
VIT D+METAB SERPL-MCNC: 70 NG/ML (ref 20–50)

## 2024-02-21 PROCEDURE — 84481 FREE ASSAY (FT-3): CPT

## 2024-02-21 PROCEDURE — 83970 ASSAY OF PARATHORMONE: CPT

## 2024-02-21 PROCEDURE — 86800 THYROGLOBULIN ANTIBODY: CPT

## 2024-02-21 PROCEDURE — 86376 MICROSOMAL ANTIBODY EACH: CPT

## 2024-02-21 PROCEDURE — 82306 VITAMIN D 25 HYDROXY: CPT

## 2024-02-21 PROCEDURE — 84439 ASSAY OF FREE THYROXINE: CPT

## 2024-02-21 PROCEDURE — 36415 COLL VENOUS BLD VENIPUNCTURE: CPT

## 2024-02-21 PROCEDURE — 84443 ASSAY THYROID STIM HORMONE: CPT

## 2024-02-22 DIAGNOSIS — K91.2 POSTSURGICAL MALABSORPTION: ICD-10-CM

## 2024-02-22 DIAGNOSIS — Z98.84 BARIATRIC SURGERY STATUS: Primary | ICD-10-CM

## 2024-02-22 LAB
THYROGLOB AB SERPL IA-ACNC: <20 IU/ML
THYROPEROXIDASE AB SERPL-ACNC: <10 IU/ML

## 2024-02-22 NOTE — RESULT ENCOUNTER NOTE
Dear Shakir,    Here is a summary of your recent test results:  -TSH (thyroid stimulating hormone) level is normal which indicates normal thyroid function.  Free T4 is near normal now.    For additional lab test information, www.[email or web address].com is a very good reference.    Thank you very much for trusting me and [facility].     Have a peaceful day.    Healthy regards,  Jovany Denney MD

## 2024-03-06 ENCOUNTER — MYC MEDICAL ADVICE (OUTPATIENT)
Dept: FAMILY MEDICINE | Facility: CLINIC | Age: 39
End: 2024-03-06
Payer: COMMERCIAL

## 2024-03-06 NOTE — TELEPHONE ENCOUNTER
Please see message below and advise. Patient reports he is noticing an increase in blood pressure, taking amlodipine 5 mg as directed

## 2024-04-02 ENCOUNTER — DOCUMENTATION ONLY (OUTPATIENT)
Dept: SURGERY | Facility: CLINIC | Age: 39
End: 2024-04-02
Payer: COMMERCIAL

## 2024-04-19 ENCOUNTER — LAB (OUTPATIENT)
Dept: LAB | Facility: CLINIC | Age: 39
End: 2024-04-19
Payer: COMMERCIAL

## 2024-04-19 DIAGNOSIS — F33.2 SEVERE RECURRENT MAJOR DEPRESSION WITHOUT PSYCHOTIC FEATURES (H): Primary | ICD-10-CM

## 2024-04-19 LAB
AMPHETAMINES UR QL: NOT DETECTED
BARBITURATES UR QL SCN: NOT DETECTED
BASOPHILS # BLD AUTO: 0.1 10E3/UL (ref 0–0.2)
BASOPHILS NFR BLD AUTO: 1 %
BENZODIAZ UR QL SCN: NOT DETECTED
BUPRENORPHINE UR QL: NOT DETECTED
CANNABINOIDS UR QL: NOT DETECTED
COCAINE UR QL SCN: NOT DETECTED
D-METHAMPHET UR QL: NOT DETECTED
EOSINOPHIL # BLD AUTO: 0.5 10E3/UL (ref 0–0.7)
EOSINOPHIL NFR BLD AUTO: 7 %
ERYTHROCYTE [DISTWIDTH] IN BLOOD BY AUTOMATED COUNT: 12.1 % (ref 10–15)
HCT VFR BLD AUTO: 40.3 % (ref 40–53)
HGB BLD-MCNC: 13.7 G/DL (ref 13.3–17.7)
IMM GRANULOCYTES # BLD: 0 10E3/UL
IMM GRANULOCYTES NFR BLD: 0 %
LITHIUM SERPL-SCNC: 0.66 MMOL/L (ref 0.6–1.2)
LYMPHOCYTES # BLD AUTO: 1.4 10E3/UL (ref 0.8–5.3)
LYMPHOCYTES NFR BLD AUTO: 21 %
MCH RBC QN AUTO: 30.3 PG (ref 26.5–33)
MCHC RBC AUTO-ENTMCNC: 34 G/DL (ref 31.5–36.5)
MCV RBC AUTO: 89 FL (ref 78–100)
METHADONE UR QL SCN: NOT DETECTED
MONOCYTES # BLD AUTO: 0.6 10E3/UL (ref 0–1.3)
MONOCYTES NFR BLD AUTO: 8 %
NEUTROPHILS # BLD AUTO: 4.3 10E3/UL (ref 1.6–8.3)
NEUTROPHILS NFR BLD AUTO: 64 %
OPIATES UR QL SCN: NOT DETECTED
OXYCODONE UR QL SCN: NOT DETECTED
PCP UR QL SCN: NOT DETECTED
PLATELET # BLD AUTO: 256 10E3/UL (ref 150–450)
RBC # BLD AUTO: 4.52 10E6/UL (ref 4.4–5.9)
TRICYCLICS UR QL SCN: NOT DETECTED
WBC # BLD AUTO: 6.8 10E3/UL (ref 4–11)

## 2024-04-19 PROCEDURE — 80306 DRUG TEST PRSMV INSTRMNT: CPT

## 2024-04-19 PROCEDURE — 84443 ASSAY THYROID STIM HORMONE: CPT

## 2024-04-19 PROCEDURE — 36415 COLL VENOUS BLD VENIPUNCTURE: CPT

## 2024-04-19 PROCEDURE — 80178 ASSAY OF LITHIUM: CPT

## 2024-04-19 PROCEDURE — 80053 COMPREHEN METABOLIC PANEL: CPT

## 2024-04-19 PROCEDURE — 85025 COMPLETE CBC W/AUTO DIFF WBC: CPT

## 2024-04-21 LAB
ALBUMIN SERPL BCG-MCNC: 4.5 G/DL (ref 3.5–5.2)
ALP SERPL-CCNC: 80 U/L (ref 40–150)
ALT SERPL W P-5'-P-CCNC: 27 U/L (ref 0–70)
ANION GAP SERPL CALCULATED.3IONS-SCNC: 10 MMOL/L (ref 7–15)
AST SERPL W P-5'-P-CCNC: 23 U/L (ref 0–45)
BILIRUB SERPL-MCNC: 0.2 MG/DL
BUN SERPL-MCNC: 17.3 MG/DL (ref 6–20)
CALCIUM SERPL-MCNC: 9.7 MG/DL (ref 8.6–10)
CHLORIDE SERPL-SCNC: 106 MMOL/L (ref 98–107)
CREAT SERPL-MCNC: 0.78 MG/DL (ref 0.67–1.17)
DEPRECATED HCO3 PLAS-SCNC: 22 MMOL/L (ref 22–29)
EGFRCR SERPLBLD CKD-EPI 2021: >90 ML/MIN/1.73M2
GLUCOSE SERPL-MCNC: 78 MG/DL (ref 70–99)
POTASSIUM SERPL-SCNC: 4.6 MMOL/L (ref 3.4–5.3)
PROT SERPL-MCNC: 6.8 G/DL (ref 6.4–8.3)
SODIUM SERPL-SCNC: 138 MMOL/L (ref 135–145)
TSH SERPL DL<=0.005 MIU/L-ACNC: 0.66 UIU/ML (ref 0.3–4.2)

## 2024-04-22 ENCOUNTER — PATIENT OUTREACH (OUTPATIENT)
Dept: CARE COORDINATION | Facility: CLINIC | Age: 39
End: 2024-04-22
Payer: COMMERCIAL

## 2024-04-23 DIAGNOSIS — F33.2 SEVERE RECURRENT MAJOR DEPRESSION WITHOUT PSYCHOTIC FEATURES (H): Primary | ICD-10-CM

## 2024-04-30 ENCOUNTER — LAB (OUTPATIENT)
Dept: LAB | Facility: CLINIC | Age: 39
End: 2024-04-30
Payer: COMMERCIAL

## 2024-04-30 DIAGNOSIS — F33.2 SEVERE RECURRENT MAJOR DEPRESSION WITHOUT PSYCHOTIC FEATURES (H): ICD-10-CM

## 2024-04-30 DIAGNOSIS — I10 HYPERTENSION GOAL BP (BLOOD PRESSURE) < 130/80: Primary | ICD-10-CM

## 2024-04-30 LAB — LITHIUM SERPL-SCNC: 1.1 MMOL/L (ref 0.6–1.2)

## 2024-04-30 PROCEDURE — 80178 ASSAY OF LITHIUM: CPT

## 2024-04-30 PROCEDURE — 36415 COLL VENOUS BLD VENIPUNCTURE: CPT

## 2024-04-30 PROCEDURE — 82306 VITAMIN D 25 HYDROXY: CPT

## 2024-05-01 LAB — VIT D+METAB SERPL-MCNC: 53 NG/ML (ref 20–50)

## 2024-05-03 DIAGNOSIS — F33.2 SEVERE RECURRENT MAJOR DEPRESSION WITHOUT PSYCHOTIC FEATURES (H): Primary | ICD-10-CM

## 2024-05-06 ENCOUNTER — PATIENT OUTREACH (OUTPATIENT)
Dept: CARE COORDINATION | Facility: CLINIC | Age: 39
End: 2024-05-06
Payer: COMMERCIAL

## 2024-05-24 DIAGNOSIS — F33.2 MAJOR DEPRESSIVE DISORDER, RECURRENT EPISODE, SEVERE (H): Primary | ICD-10-CM

## 2024-06-06 ENCOUNTER — LAB (OUTPATIENT)
Dept: LAB | Facility: CLINIC | Age: 39
End: 2024-06-06
Payer: COMMERCIAL

## 2024-06-06 DIAGNOSIS — F33.2 MAJOR DEPRESSIVE DISORDER, RECURRENT EPISODE, SEVERE (H): ICD-10-CM

## 2024-06-06 DIAGNOSIS — Z98.84 BARIATRIC SURGERY STATUS: ICD-10-CM

## 2024-06-06 DIAGNOSIS — I10 HYPERTENSION GOAL BP (BLOOD PRESSURE) < 130/80: ICD-10-CM

## 2024-06-06 DIAGNOSIS — F33.2 SEVERE RECURRENT MAJOR DEPRESSION WITHOUT PSYCHOTIC FEATURES (H): ICD-10-CM

## 2024-06-06 DIAGNOSIS — K91.2 POSTSURGICAL MALABSORPTION: ICD-10-CM

## 2024-06-06 LAB — LITHIUM SERPL-SCNC: 0.93 MMOL/L (ref 0.6–1.2)

## 2024-06-06 PROCEDURE — 82570 ASSAY OF URINE CREATININE: CPT

## 2024-06-06 PROCEDURE — 83970 ASSAY OF PARATHORMONE: CPT

## 2024-06-06 PROCEDURE — 80048 BASIC METABOLIC PNL TOTAL CA: CPT

## 2024-06-06 PROCEDURE — 84439 ASSAY OF FREE THYROXINE: CPT

## 2024-06-06 PROCEDURE — 80178 ASSAY OF LITHIUM: CPT

## 2024-06-06 PROCEDURE — 82306 VITAMIN D 25 HYDROXY: CPT

## 2024-06-06 PROCEDURE — 84443 ASSAY THYROID STIM HORMONE: CPT

## 2024-06-06 PROCEDURE — 36415 COLL VENOUS BLD VENIPUNCTURE: CPT

## 2024-06-06 PROCEDURE — 82043 UR ALBUMIN QUANTITATIVE: CPT

## 2024-06-07 LAB
ANION GAP SERPL CALCULATED.3IONS-SCNC: 10 MMOL/L (ref 7–15)
BUN SERPL-MCNC: 15.8 MG/DL (ref 6–20)
CALCIUM SERPL-MCNC: 10.3 MG/DL (ref 8.6–10)
CHLORIDE SERPL-SCNC: 104 MMOL/L (ref 98–107)
CREAT SERPL-MCNC: 0.88 MG/DL (ref 0.67–1.17)
CREAT UR-MCNC: 71.3 MG/DL
DEPRECATED HCO3 PLAS-SCNC: 25 MMOL/L (ref 22–29)
EGFRCR SERPLBLD CKD-EPI 2021: >90 ML/MIN/1.73M2
GLUCOSE SERPL-MCNC: 70 MG/DL (ref 70–99)
MICROALBUMIN UR-MCNC: <12 MG/L
MICROALBUMIN/CREAT UR: NORMAL MG/G{CREAT}
POTASSIUM SERPL-SCNC: 4.4 MMOL/L (ref 3.4–5.3)
PTH-INTACT SERPL-MCNC: 24 PG/ML (ref 15–65)
SODIUM SERPL-SCNC: 139 MMOL/L (ref 135–145)
T4 FREE SERPL-MCNC: 0.65 NG/DL (ref 0.9–1.7)
TSH SERPL DL<=0.005 MIU/L-ACNC: 1.87 UIU/ML (ref 0.3–4.2)
VIT D+METAB SERPL-MCNC: 55 NG/ML (ref 20–50)

## 2024-06-08 NOTE — RESULT ENCOUNTER NOTE
Dear Shakir,    Here is a summary of your recent test results:  -Microalbumin (urine protein) test is normal.  ADVISE: rechecking this annually.    For additional lab test information, www.[email or web address].com is a very good reference.    In addition, here is a list of due or overdue Health Maintenance reminders:  COVID-19 Vaccine(5 - 2023-24 season) due on 09/01/2023  DEPRESSION 12 MO INDEX REPEAT PHQ-9 due on 03/23/2024  ANNUAL REVIEW OF HM ORDERS due on 05/22/2024  Depression Assessment due on 05/24/2024  Yearly Preventive Visit due on 05/22/2024    Please call us at [phone] (or use [employer]) to address the above recommendations if needed.           Thank you very much for trusting me and [facility].     Have a peaceful day.    Healthy regards,  Jovany Denney MD

## 2024-06-23 ASSESSMENT — PATIENT HEALTH QUESTIONNAIRE - PHQ9
SUM OF ALL RESPONSES TO PHQ QUESTIONS 1-9: 9
SUM OF ALL RESPONSES TO PHQ QUESTIONS 1-9: 9
10. IF YOU CHECKED OFF ANY PROBLEMS, HOW DIFFICULT HAVE THESE PROBLEMS MADE IT FOR YOU TO DO YOUR WORK, TAKE CARE OF THINGS AT HOME, OR GET ALONG WITH OTHER PEOPLE: VERY DIFFICULT

## 2024-06-24 ENCOUNTER — VIRTUAL VISIT (OUTPATIENT)
Dept: FAMILY MEDICINE | Facility: CLINIC | Age: 39
End: 2024-06-24
Payer: COMMERCIAL

## 2024-06-24 DIAGNOSIS — F33.1 MAJOR DEPRESSIVE DISORDER, RECURRENT EPISODE, MODERATE (H): Primary | ICD-10-CM

## 2024-06-24 DIAGNOSIS — K21.9 GASTROESOPHAGEAL REFLUX DISEASE WITHOUT ESOPHAGITIS: ICD-10-CM

## 2024-06-24 DIAGNOSIS — E03.9 HYPOTHYROIDISM, UNSPECIFIED TYPE: ICD-10-CM

## 2024-06-24 PROCEDURE — 99213 OFFICE O/P EST LOW 20 MIN: CPT | Mod: 95 | Performed by: FAMILY MEDICINE

## 2024-06-24 RX ORDER — OMEPRAZOLE 40 MG/1
80 CAPSULE, DELAYED RELEASE ORAL DAILY
COMMUNITY
Start: 2024-06-24

## 2024-06-24 RX ORDER — QUETIAPINE FUMARATE 100 MG/1
TABLET, FILM COATED ORAL
COMMUNITY
Start: 2024-04-02

## 2024-06-24 RX ORDER — LIOTHYRONINE SODIUM 25 UG/1
TABLET ORAL
COMMUNITY
Start: 2024-06-12 | End: 2024-06-24

## 2024-06-24 RX ORDER — MODAFINIL 200 MG/1
TABLET ORAL
COMMUNITY
Start: 2024-04-16

## 2024-06-24 RX ORDER — LURASIDONE HYDROCHLORIDE 40 MG/1
80 TABLET, FILM COATED ORAL DAILY
COMMUNITY
Start: 2024-06-22

## 2024-06-24 RX ORDER — LEVOTHYROXINE SODIUM 50 UG/1
50 TABLET ORAL DAILY
Qty: 90 TABLET | Refills: 3 | Status: SHIPPED | OUTPATIENT
Start: 2024-06-24

## 2024-06-24 ASSESSMENT — ANXIETY QUESTIONNAIRES
1. FEELING NERVOUS, ANXIOUS, OR ON EDGE: SEVERAL DAYS
3. WORRYING TOO MUCH ABOUT DIFFERENT THINGS: SEVERAL DAYS
4. TROUBLE RELAXING: SEVERAL DAYS
GAD7 TOTAL SCORE: 7
2. NOT BEING ABLE TO STOP OR CONTROL WORRYING: SEVERAL DAYS
5. BEING SO RESTLESS THAT IT IS HARD TO SIT STILL: SEVERAL DAYS
IF YOU CHECKED OFF ANY PROBLEMS ON THIS QUESTIONNAIRE, HOW DIFFICULT HAVE THESE PROBLEMS MADE IT FOR YOU TO DO YOUR WORK, TAKE CARE OF THINGS AT HOME, OR GET ALONG WITH OTHER PEOPLE: SOMEWHAT DIFFICULT
8. IF YOU CHECKED OFF ANY PROBLEMS, HOW DIFFICULT HAVE THESE MADE IT FOR YOU TO DO YOUR WORK, TAKE CARE OF THINGS AT HOME, OR GET ALONG WITH OTHER PEOPLE?: SOMEWHAT DIFFICULT
6. BECOMING EASILY ANNOYED OR IRRITABLE: SEVERAL DAYS
GAD7 TOTAL SCORE: 7
7. FEELING AFRAID AS IF SOMETHING AWFUL MIGHT HAPPEN: SEVERAL DAYS
7. FEELING AFRAID AS IF SOMETHING AWFUL MIGHT HAPPEN: SEVERAL DAYS
GAD7 TOTAL SCORE: 7

## 2024-06-24 NOTE — PROGRESS NOTES
Shakir is a 39 year old who is being evaluated via a billable video visit.    How would you like to obtain your AVS? MyChart  If the video visit is dropped, the invitation should be resent by: Text to cell phone: [phone]  Will anyone else be joining your video visit? No      Assessment & Plan   Major depressive disorder, recurrent episode, moderate (H)  Ongoing issues and working with psychiatry with adjusting his medications.    Gastroesophageal reflux disease without esophagitis  Has been taken omeprazole 80 mg daily and will try going down to 40 mg and see how he tolerates this    Hypothyroidism, unspecified type  Subclinical will switch from Cytomel to levothyroxine to see if this works better for him and relieving any possible hypothyroidism symptoms.  Recheck labs in 6 weeks.  - levothyroxine (SYNTHROID/LEVOTHROID) 50 MCG tablet  Dispense: 90 tablet; Refill: 3  - TSH  - T4 free  - T3 Free      Depression Screening Follow Up    Follow Up Actions Taken  Crisis resource information provided in the After Visit Summary    Discussed the following ways the patient can remain in a safe environment:  be around others    No follow-ups on file.   Follow-up Visit   Expected date:  Aug 24, 2024 (Approximate)      Follow Up Appointment Details:     Follow-up with whom?: Other Primary Care Services    Follow-Up for what?: Lab Visit    How?: In Person    Is this an as-needed follow-up?: No                         Jovany Denney MD      [address] [address]  [email or web address]   Office: [phone]         Subjective   Shakir is a 39 year old, presenting for the following health issues:  Results    History of Present Illness       Hypothyroidism:     Since last visit, patient describes the following symptoms::  Anxiety, Constipation, Depression, Dry skin (has never had this before), Fatigue, Tremors and Weight gain.  Recent TSH was in the normal range but T4 was low,    Weight gain::  25-30 lbs. Over the last 3 months - had psych medications changed around some and also stopped his Adderall (made him manic with ketamine treatments.)  on cytomel 25 mcg for a few months).  He is unsure why they chose Cytomel and never did have an elevated TSH but was treated due to lack of energy.    He eats 0-1 servings of fruits and vegetables daily.He consumes 0 sweetened beverage(s) daily.He exercises with enough effort to increase his heart rate 9 or less minutes per day.  He exercises with enough effort to increase his heart rate 3 or less days per week.   He is taking medications regularly.         Objective           Vitals:  No vitals were obtained today due to virtual visit.    Physical Exam   GENERAL: alert and no distress  EYES: Eyes grossly normal to inspection.  No discharge or erythema, or obvious scleral/conjunctival abnormalities.  RESP: No audible wheeze, cough, or visible cyanosis.    SKIN: Visible skin clear. No significant rash, abnormal pigmentation or lesions.  NEURO: Cranial nerves grossly intact.  Mentation and speech appropriate for age.  PSYCH: Appropriate affect, tone, and pace of words          Video-Visit Details    Type of service:  Video Visit   Originating Location (pt. Location): Home    Distant Location (provider location):  On-site  Platform used for Video Visit: [employer]  Signed Electronically by: David Denney MD

## 2024-06-25 ENCOUNTER — TELEPHONE (OUTPATIENT)
Dept: FAMILY MEDICINE | Facility: CLINIC | Age: 39
End: 2024-06-25
Payer: COMMERCIAL

## 2024-06-26 NOTE — TELEPHONE ENCOUNTER
Med not listed in current meds or history. Please call Pt to clarify.    Jennifer Gonzalez RN [facility]

## 2024-06-26 NOTE — TELEPHONE ENCOUNTER
Called #   Telephone Information:   Mobile [phone]     Pt stated that he does not need this refill    Carmen Campos RN, BSN  [facility] [facility]

## 2024-06-27 ENCOUNTER — MYC MEDICAL ADVICE (OUTPATIENT)
Dept: FAMILY MEDICINE | Facility: CLINIC | Age: 39
End: 2024-06-27
Payer: COMMERCIAL

## 2024-06-27 NOTE — TELEPHONE ENCOUNTER
Faxed signed form to [employer] - Return to Work Form    Fax #[phone]    Copied into HIMS / Filed in South / Zina MALONE

## 2024-06-27 NOTE — TELEPHONE ENCOUNTER
Patient called clinic to follow up on My Chart message he sent this morning. States he had been out of work for psychiatric reasons and is scheduled to return to work Mon. 7/1/24.  He does not anticipate needing any work restrictions and the form to be completed is attached to the My Chart message.  This is a time sensitive matter as the form needs to be returned to employer by midday Fri. 6/28/24 or he will not be allowed to return to work next Mon. RUBI Rojas R.N.

## 2024-06-28 NOTE — TELEPHONE ENCOUNTER
Pt stopped into FD.  Stated that HR said they still did not receive fax.  Printed a copy for pt to take to HR and will try emailing directly to his HR contact.

## 2024-08-11 ENCOUNTER — HEALTH MAINTENANCE LETTER (OUTPATIENT)
Age: 39
End: 2024-08-11

## 2024-08-14 DIAGNOSIS — F19.90 PSYCHOACTIVE SUBSTANCE USE DISORDER: Primary | ICD-10-CM

## 2024-08-14 DIAGNOSIS — I10 HYPERTENSION GOAL BP (BLOOD PRESSURE) < 130/80: Primary | ICD-10-CM

## 2024-08-19 ENCOUNTER — LAB (OUTPATIENT)
Dept: LAB | Facility: CLINIC | Age: 39
End: 2024-08-19
Payer: COMMERCIAL

## 2024-08-19 DIAGNOSIS — F19.90 PSYCHOACTIVE SUBSTANCE USE DISORDER: ICD-10-CM

## 2024-08-19 DIAGNOSIS — E03.9 HYPOTHYROIDISM, UNSPECIFIED TYPE: ICD-10-CM

## 2024-08-19 PROCEDURE — 80178 ASSAY OF LITHIUM: CPT

## 2024-08-19 PROCEDURE — 84481 FREE ASSAY (FT-3): CPT

## 2024-08-19 PROCEDURE — 36415 COLL VENOUS BLD VENIPUNCTURE: CPT

## 2024-08-19 PROCEDURE — 84443 ASSAY THYROID STIM HORMONE: CPT

## 2024-08-19 PROCEDURE — 84439 ASSAY OF FREE THYROXINE: CPT

## 2024-08-20 ENCOUNTER — MYC MEDICAL ADVICE (OUTPATIENT)
Dept: FAMILY MEDICINE | Facility: CLINIC | Age: 39
End: 2024-08-20

## 2024-08-20 LAB
LITHIUM SERPL-SCNC: 0.86 MMOL/L (ref 0.6–1.2)
T3FREE SERPL-MCNC: 3.1 PG/ML (ref 2–4.4)
T4 FREE SERPL-MCNC: 0.84 NG/DL (ref 0.9–1.7)
TSH SERPL DL<=0.005 MIU/L-ACNC: 0.35 UIU/ML (ref 0.3–4.2)

## 2024-08-21 NOTE — RESULT ENCOUNTER NOTE
Dear Shakir,    Here is a summary of your recent test results:  -TSH (thyroid stimulating hormone) level is normal which indicates appropriate thyroid replacement dosing.  ADVISE: continuing same replacement dose and rechecking this in 12 months.    For additional lab test information, www.[email or web address].com is a very good reference.    In addition, here is a list of due or overdue Health Maintenance reminders:  COVID-19 Vaccine(5 - 2023-24 season) due on 09/01/2023  Yearly Preventive Visit due on 05/22/2024  Cholesterol Lab due on 08/29/2024    Please call us at [phone] (or use [employer]) to address the above recommendations if needed.           Thank you very much for trusting me and [facility].     Have a peaceful day.    Healthy regards,  Jovany Denney MD

## 2024-09-01 ENCOUNTER — LAB (OUTPATIENT)
Dept: LAB | Facility: CLINIC | Age: 39
End: 2024-09-01
Payer: COMMERCIAL

## 2024-09-01 DIAGNOSIS — I10 HYPERTENSION GOAL BP (BLOOD PRESSURE) < 130/80: ICD-10-CM

## 2024-09-01 LAB
CHOLEST SERPL-MCNC: 174 MG/DL
FASTING STATUS PATIENT QL REPORTED: YES
HDLC SERPL-MCNC: 61 MG/DL
LDLC SERPL CALC-MCNC: 99 MG/DL
NONHDLC SERPL-MCNC: 113 MG/DL
TRIGL SERPL-MCNC: 69 MG/DL

## 2024-09-01 PROCEDURE — 80061 LIPID PANEL: CPT

## 2024-09-01 PROCEDURE — 36415 COLL VENOUS BLD VENIPUNCTURE: CPT

## 2024-09-04 NOTE — RESULT ENCOUNTER NOTE
Dear Shakir,    Here is a summary of your recent test results:  -Cholesterol levels (LDL,HDL, Triglycerides) are normal.  ADVISE: rechecking in 1-3 years.     For additional lab test information, www.[email or web address].com is a very good reference.    In addition, here is a list of due or overdue Health Maintenance reminders:  Yearly Preventive Visit due on 05/22/2024  Flu Vaccine(1) due on 09/01/2024  COVID-19 Vaccine(5 - 2023-24 season) due on 09/01/2024    Please call us at [phone] (or use [employer]) to address the above recommendations if needed.           Thank you very much for trusting me and [facility].     Have a peaceful day.    Healthy regards,  Jovany Denney MD

## 2024-09-11 ENCOUNTER — VIRTUAL VISIT (OUTPATIENT)
Dept: FAMILY MEDICINE | Facility: CLINIC | Age: 39
End: 2024-09-11
Payer: COMMERCIAL

## 2024-09-11 DIAGNOSIS — J06.9 VIRAL UPPER RESPIRATORY TRACT INFECTION: Primary | ICD-10-CM

## 2024-09-11 PROCEDURE — 99212 OFFICE O/P EST SF 10 MIN: CPT | Mod: 95 | Performed by: GENERAL PRACTICE

## 2024-09-11 NOTE — LETTER
September 11, 2024      Abel AISHA Duttonboone  [address] [address]        To Whom It May Concern:    Abel Ward was seen in our clinic. He may return to work in 1-2 days if improvement in symptoms.       Sincerely,

## 2024-09-11 NOTE — PROGRESS NOTES
Shakir is a 39 year old who is being evaluated via a billable video visit.    How would you like to obtain your AVS? MyChart  If the video visit is dropped, the invitation should be resent by: Text to cell phone: [phone]  Will anyone else be joining your video visit? No      Assessment & Plan     Viral upper respiratory tract infection  Continue conservative management  Follow-up if no improvement for worsening symptoms                Subjective   Shakir is a 39 year old, presenting for the following health issues:  Cough (Sinus congestion  )        9/11/2024     2:56 PM   Additional Questions   Roomed by Codie BERNAL LPN       URI since Sunday/4 days    Started with a scratchy throat and then non productive cough  +sinus pressure around the cheeks  No pain in ears  Tolerate PO intake  Denies SOB, chest pain  Generalized fatigue  Symptoms have been stable.  Has tessalon pearls at home  Has flonase    COVID is negative at home.          Acute Illness     (this morning took a COVID test and it was negative)   Acute illness concerns: dry cough, runny nose, scratchy throat.   Onset/Duration: past few days   Symptoms:  Fever: No  Chills/Sweats: No  Headache (location?): YES- sinus pressure above the eyes   Sinus Pressure: YES  Conjunctivitis:  No  Ear Pain: no  Rhinorrhea: No  Congestion: No  Sore Throat: YES  Cough: YES-non-productive, waxing and waning over time    symptoms worse during the night   Wheeze: No  Decreased Appetite: YES- not eating a lot   Nausea: No  Vomiting: No  Diarrhea: No  Dysuria/Freq.: No  Dysuria or Hematuria: No  Fatigue/Achiness: YES- fatigue   Sick/Strep Exposure: No  Therapies tried and outcome: Mucinex and sudafed sinus         Review of Systems  Constitutional, HEENT, cardiovascular, pulmonary, gi and gu systems are negative, except as otherwise noted.      Objective           Vitals:  No vitals were obtained today due to virtual visit.    Physical Exam   GENERAL: alert and no  distress  EYES: Eyes grossly normal to inspection.  No discharge or erythema, or obvious scleral/conjunctival abnormalities.  RESP: No audible wheeze, cough, or visible cyanosis.    SKIN: Visible skin clear. No significant rash, abnormal pigmentation or lesions.  NEURO: Cranial nerves grossly intact.  Mentation and speech appropriate for age.  PSYCH: Appropriate affect, tone, and pace of words          Video-Visit Details    Type of service:  Video Visit   Originating Location (pt. Location): Home    Distant Location (provider location):  On-site  Platform used for Video Visit: Other: [employer]  Signed Electronically by: Mayte Scott MD

## 2024-09-11 NOTE — LETTER
2024    Abel Ward   1985        To Whom it May Concern;    Please excuse Abel Ward from work/school for a healthcare visit on Sep 11, 2024.    Sincerely,        Mayte Scott MD

## 2024-09-11 NOTE — LETTER
September 11, 2024      Abel Ward  [address] [address]        To Whom It May Concern:    Abel Ward was seen in our clinic for an illness since Sunday. He may return to work in 1-2 days if improvement in symptoms.      Sincerely,        Mayte Scott MD

## 2024-09-22 DIAGNOSIS — R33.9 URINARY RETENTION: ICD-10-CM

## 2024-09-23 RX ORDER — TAMSULOSIN HYDROCHLORIDE 0.4 MG/1
0.4 CAPSULE ORAL DAILY
Qty: 90 CAPSULE | Refills: 0 | Status: SHIPPED | OUTPATIENT
Start: 2024-09-23

## 2024-09-25 ENCOUNTER — TELEPHONE (OUTPATIENT)
Dept: FAMILY MEDICINE | Facility: CLINIC | Age: 39
End: 2024-09-25
Payer: COMMERCIAL

## 2024-09-25 DIAGNOSIS — E03.9 HYPOTHYROIDISM, UNSPECIFIED TYPE: Primary | ICD-10-CM

## 2024-09-25 NOTE — TELEPHONE ENCOUNTER
Order/Referral Request    Who is requesting: Pt requesting a referral for endocrinologist - pt suffering from hypothroidism and states its affecting his mental ability.      Orders being requested: referral    Reason service is needed/diagnosis: symptoms stated above    When are orders needed by: asap    Has this been discussed with Provider: Julia Denney    Does patient have a preference on a Group/Provider/Facility? above    Does patient have an appointment scheduled?: No    Last appt 06/24/24    Where to send orders: N/A - call pt    Could we send this information to you in [facility] or would you prefer to receive a phone call?:   Patient would prefer a phone call   Okay to leave a detailed message?: Yes at Cell number on file:    Telephone Information:   Mobile [phone]     Zina MALONE

## 2024-09-26 NOTE — TELEPHONE ENCOUNTER
I will send a referral but typically can take months to get diagnosed endocrinology.  Thankfully his recent thyroid labs are essentially normal 8/19/2024.  Scheduling should be calling him to get this arranged.

## 2024-09-26 NOTE — TELEPHONE ENCOUNTER
Placed call to patient to advise referral placed and PCPs message below. Pt presented understanding.

## 2024-11-18 ENCOUNTER — PATIENT OUTREACH (OUTPATIENT)
Dept: CARE COORDINATION | Facility: CLINIC | Age: 39
End: 2024-11-18
Payer: COMMERCIAL

## 2024-12-15 DIAGNOSIS — I10 HYPERTENSION GOAL BP (BLOOD PRESSURE) < 130/80: ICD-10-CM

## 2024-12-16 RX ORDER — AMLODIPINE BESYLATE 5 MG/1
5 TABLET ORAL DAILY
Qty: 90 TABLET | Refills: 0 | Status: SHIPPED | OUTPATIENT
Start: 2024-12-16

## 2024-12-28 ENCOUNTER — VIRTUAL VISIT (OUTPATIENT)
Dept: URGENT CARE | Facility: CLINIC | Age: 39
End: 2024-12-28
Payer: COMMERCIAL

## 2024-12-28 DIAGNOSIS — U07.1 INFECTION DUE TO 2019 NOVEL CORONAVIRUS: Primary | ICD-10-CM

## 2024-12-28 PROCEDURE — 99213 OFFICE O/P EST LOW 20 MIN: CPT | Mod: 95

## 2024-12-28 RX ORDER — VILAZODONE HYDROCHLORIDE 40 MG/1
40 TABLET ORAL DAILY
COMMUNITY

## 2024-12-28 RX ORDER — DEXTROAMPHETAMINE SACCHARATE, AMPHETAMINE ASPARTATE, DEXTROAMPHETAMINE SULFATE AND AMPHETAMINE SULFATE 2.5; 2.5; 2.5; 2.5 MG/1; MG/1; MG/1; MG/1
10 TABLET ORAL 2 TIMES DAILY
COMMUNITY

## 2024-12-28 NOTE — PROGRESS NOTES
"Virtual Visit    Assessment/Plan:      ICD-10-CM    1. Infection due to 2019 novel coronavirus  U07.1           COVID-19 positive patient.  Encounter for consideration of medication intervention.    Patient does qualify for a prescription of Molnupiravir due to psych medications.  Full discussion with patient including medication options, risks and benefits.   Potential drug interactions reviewed with patient.      Red flag symptoms needing urgent evaluation discussed.         Temporary change to home medications:   None    Follow up with primary care provider with any problems, questions or concerns or if symptoms worsen or fail to improve. Patient verbalized understanding and is agreeable to plan.         Roshan Roman  is a 39 year old  who has a confirmed new positive COVID-19 diagnosis.      He has been identified as high risk for complications of this infection and is being evaluated via a billable visit.       Concern for COVID-19  Exactly how many days ago did these symptoms start? 2 days ago 12/26/24     Are any of the following symptoms significant for you?  new or worsening difficulty breathing? No  worsening cough? Yes, it's a dry cough.   Fever or chills? Yes, I felt feverish or had chills.  Headache: YES  Sore throat: YES  Chest pain: No  body aches? YES      COVID positive test: 12/27/24          REVIEW OF SYSTEMS  All systems reviewed and negative except per HPI.    Objective    Vitals:  No vitals were obtained today due to virtual visit.  Estimated body mass index is 32.08 kg/m  as calculated from the following:    Height as of 5/22/23: 1.803 m (5' 11\").    Weight as of 2/15/24: 104.3 kg (230 lb).     GENERAL: alert and no distress  EYES: Eyes grossly normal to inspection.  No discharge or erythema, or obvious scleral/conjunctival abnormalities.  RESP: No audible wheeze, cough, or visible cyanosis.    SKIN: Visible skin clear. No significant rash, abnormal pigmentation or " lesions.  PSYCH: Appropriate affect, tone, and pace of words    Laboratory and Diagnostics    GFR Estimate   Date Value Ref Range Status   06/06/2024 >90 >60 mL/min/1.73m2 Final   12/23/2020 >90 >60 mL/min/[1.73_m2] Final     Comment:     Non  GFR Calc  Starting 12/18/2018, serum creatinine based estimated GFR (eGFR) will be   calculated using the Chronic Kidney Disease Epidemiology Collaboration   (CKD-EPI) equation.                }        Video-Visit Details  Type of service:  Video Visit  Video Start Time: 8:40 AM  Video End Time: 8:52 AM    Originating Location (pt. Location): Home    Distant Location (provider location):  [facility]     Platform used for Video Visit: [employer]

## 2024-12-29 DIAGNOSIS — R33.9 URINARY RETENTION: ICD-10-CM

## 2024-12-30 RX ORDER — TAMSULOSIN HYDROCHLORIDE 0.4 MG/1
0.4 CAPSULE ORAL DAILY
Qty: 90 CAPSULE | Refills: 0 | Status: SHIPPED | OUTPATIENT
Start: 2024-12-30

## 2025-01-06 ENCOUNTER — PATIENT OUTREACH (OUTPATIENT)
Dept: CARE COORDINATION | Facility: CLINIC | Age: 40
End: 2025-01-06
Payer: COMMERCIAL

## 2025-01-07 DIAGNOSIS — Z76.89 REFERRAL OF PATIENT WITHOUT EXAMINATION OR TREATMENT: Primary | ICD-10-CM

## 2025-02-03 ENCOUNTER — E-VISIT (OUTPATIENT)
Dept: URGENT CARE | Facility: CLINIC | Age: 40
End: 2025-02-03
Payer: COMMERCIAL

## 2025-02-03 ENCOUNTER — OFFICE VISIT (OUTPATIENT)
Dept: URGENT CARE | Facility: URGENT CARE | Age: 40
End: 2025-02-03
Payer: COMMERCIAL

## 2025-02-03 ENCOUNTER — ANCILLARY PROCEDURE (OUTPATIENT)
Dept: GENERAL RADIOLOGY | Facility: CLINIC | Age: 40
End: 2025-02-03
Attending: NURSE PRACTITIONER
Payer: COMMERCIAL

## 2025-02-03 VITALS
OXYGEN SATURATION: 99 % | RESPIRATION RATE: 16 BRPM | WEIGHT: 256 LBS | BODY MASS INDEX: 35.7 KG/M2 | TEMPERATURE: 97.4 F | HEART RATE: 71 BPM | DIASTOLIC BLOOD PRESSURE: 73 MMHG | SYSTOLIC BLOOD PRESSURE: 115 MMHG

## 2025-02-03 DIAGNOSIS — Z76.89 REFERRAL OF PATIENT WITHOUT EXAMINATION OR TREATMENT: ICD-10-CM

## 2025-02-03 DIAGNOSIS — R05.1 ACUTE COUGH: ICD-10-CM

## 2025-02-03 DIAGNOSIS — J11.1 INFLUENZA-LIKE ILLNESS: Primary | ICD-10-CM

## 2025-02-03 DIAGNOSIS — R69: Primary | ICD-10-CM

## 2025-02-03 LAB
BASOPHILS # BLD AUTO: 0 10E3/UL (ref 0–0.2)
BASOPHILS NFR BLD AUTO: 0 %
EOSINOPHIL # BLD AUTO: 0.3 10E3/UL (ref 0–0.7)
EOSINOPHIL NFR BLD AUTO: 3 %
ERYTHROCYTE [DISTWIDTH] IN BLOOD BY AUTOMATED COUNT: 12.7 % (ref 10–15)
EST. AVERAGE GLUCOSE BLD GHB EST-MCNC: 105 MG/DL
HBA1C MFR BLD: 5.3 % (ref 0–5.6)
HCT VFR BLD AUTO: 37.5 % (ref 40–53)
HGB BLD-MCNC: 12.7 G/DL (ref 13.3–17.7)
IMM GRANULOCYTES # BLD: 0 10E3/UL
IMM GRANULOCYTES NFR BLD: 0 %
LYMPHOCYTES # BLD AUTO: 1.7 10E3/UL (ref 0.8–5.3)
LYMPHOCYTES NFR BLD AUTO: 18 %
MCH RBC QN AUTO: 29 PG (ref 26.5–33)
MCHC RBC AUTO-ENTMCNC: 33.9 G/DL (ref 31.5–36.5)
MCV RBC AUTO: 86 FL (ref 78–100)
MONOCYTES # BLD AUTO: 0.6 10E3/UL (ref 0–1.3)
MONOCYTES NFR BLD AUTO: 6 %
NEUTROPHILS # BLD AUTO: 6.9 10E3/UL (ref 1.6–8.3)
NEUTROPHILS NFR BLD AUTO: 73 %
PLATELET # BLD AUTO: 286 10E3/UL (ref 150–450)
RBC # BLD AUTO: 4.38 10E6/UL (ref 4.4–5.9)
WBC # BLD AUTO: 9.4 10E3/UL (ref 4–11)

## 2025-02-03 PROCEDURE — 36415 COLL VENOUS BLD VENIPUNCTURE: CPT | Performed by: NURSE PRACTITIONER

## 2025-02-03 PROCEDURE — 83970 ASSAY OF PARATHORMONE: CPT | Performed by: NURSE PRACTITIONER

## 2025-02-03 PROCEDURE — 83036 HEMOGLOBIN GLYCOSYLATED A1C: CPT | Performed by: NURSE PRACTITIONER

## 2025-02-03 PROCEDURE — 82306 VITAMIN D 25 HYDROXY: CPT | Performed by: NURSE PRACTITIONER

## 2025-02-03 PROCEDURE — 84443 ASSAY THYROID STIM HORMONE: CPT | Performed by: NURSE PRACTITIONER

## 2025-02-03 PROCEDURE — 80053 COMPREHEN METABOLIC PANEL: CPT | Performed by: NURSE PRACTITIONER

## 2025-02-03 PROCEDURE — 85025 COMPLETE CBC W/AUTO DIFF WBC: CPT | Performed by: NURSE PRACTITIONER

## 2025-02-03 PROCEDURE — 84439 ASSAY OF FREE THYROXINE: CPT | Performed by: NURSE PRACTITIONER

## 2025-02-03 PROCEDURE — 71046 X-RAY EXAM CHEST 2 VIEWS: CPT | Mod: TC | Performed by: RADIOLOGY

## 2025-02-03 PROCEDURE — 82607 VITAMIN B-12: CPT | Performed by: NURSE PRACTITIONER

## 2025-02-03 PROCEDURE — 99207 PR NON-BILLABLE SERV PER CHARTING: CPT | Performed by: NURSE PRACTITIONER

## 2025-02-03 PROCEDURE — 99214 OFFICE O/P EST MOD 30 MIN: CPT | Performed by: NURSE PRACTITIONER

## 2025-02-03 ASSESSMENT — ENCOUNTER SYMPTOMS
FATIGUE: 1
WHEEZING: 0
SORE THROAT: 0
CHILLS: 1
SHORTNESS OF BREATH: 0
FEVER: 0
EYES NEGATIVE: 1
COUGH: 1
RHINORRHEA: 1
CARDIOVASCULAR NEGATIVE: 1

## 2025-02-03 NOTE — PROGRESS NOTES
Assessment & Plan       ICD-10-CM    1. Influenza-like illness  J11.1       2. Acute cough  R05.1 CBC with platelets and differential     XR Chest 2 Views     CANCELED: CBC with platelets and differential      3. Referral of patient without examination or treatment  Z76.89 CBC with Platelets & Differential     Comprehensive metabolic panel     TSH     Parathyroid Hormone Intact     Vitamin B12     Hemoglobin A1c     T4 free     Vitamin D Deficiency     25 Hydroxyvitamin D2 and D3           Patient Instructions   Chest xray negative for pneumonia or other bacterial process - radiologist's read pending. Only will call if any significant results.    CBC - lower hemoglobin, hematocrit, RBC than previously. Continue with iron supplements and protein in diet. Follow up in primary care and other specialist regarding results.     Increase fluids with water, Pedialyte, Gatorade, or rehydrating beverages.     Alternate Tylenol and Ibuprofen as needed for aches, pains or fever.     If needed, follow soft food diet. Rest as much as possible. Run humidifier at night. Gargle with hot salty water. Have warm tea or water with honey. Follow up in clinic if symptoms persist or worsen. This usually can last 10-14 days.      Patient agreed to the treatment plan and verbalized understanding.     Results for orders placed or performed in visit on 02/03/25 (from the past 24 hours)   CBC with Platelets & Differential    Narrative    The following orders were created for panel order CBC with Platelets & Differential.  Procedure                               Abnormality         Status                     ---------                               -----------         ------                     CBC with platelets and d...[[id number]]  Abnormal            Final result                 Please view results for these tests on the individual orders.   Hemoglobin A1c   Result Value Ref Range    Estimated Average Glucose 105 <117 mg/dL    Hemoglobin A1C  5.3 0.0 - 5.6 %   CBC with platelets and differential   Result Value Ref Range    WBC Count 9.4 4.0 - 11.0 10e3/uL    RBC Count 4.38 (L) 4.40 - 5.90 10e6/uL    Hemoglobin 12.7 (L) 13.3 - 17.7 g/dL    Hematocrit 37.5 (L) 40.0 - 53.0 %    MCV 86 78 - 100 fL    MCH 29.0 26.5 - 33.0 pg    MCHC 33.9 31.5 - 36.5 g/dL    RDW 12.7 10.0 - 15.0 %    Platelet Count 286 150 - 450 10e3/uL    % Neutrophils 73 %    % Lymphocytes 18 %    % Monocytes 6 %    % Eosinophils 3 %    % Basophils 0 %    % Immature Granulocytes 0 %    Absolute Neutrophils 6.9 1.6 - 8.3 10e3/uL    Absolute Lymphocytes 1.7 0.8 - 5.3 10e3/uL    Absolute Monocytes 0.6 0.0 - 1.3 10e3/uL    Absolute Eosinophils 0.3 0.0 - 0.7 10e3/uL    Absolute Basophils 0.0 0.0 - 0.2 10e3/uL    Absolute Immature Granulocytes 0.0 <=0.4 10e3/uL       Roshan Schilling is a 40 year old male who presents to clinic today for the following health issues:  Chief Complaint   Patient presents with    URI     Start 1/17/25 sx fatigue, sometimes productive cough white/clear, metallic taste in mouth, runny nose tx rest      HPI  Patient has had cough, congestion, and fatigue over two weeks. He states the cough and congestion have improved over the last 2 weeks. He denies any sinus pressure, shortness of breath, sore throat, or ear pain. He states at the beginning of his illness he had a fever but he has not had a fever in over a week. Patient took Mucinex and Tylenol as needed while he has had his symptoms. No medications today. He states his wife tested positive for influenza two weeks ago but his test was never positive, although he had the same symptoms.      Review of Systems   Constitutional:  Positive for chills and fatigue. Negative for fever.   HENT:  Positive for congestion, postnasal drip and rhinorrhea. Negative for ear pain and sore throat.    Eyes: Negative.    Respiratory:  Positive for cough. Negative for shortness of breath and wheezing.    Cardiovascular: Negative.         Problem List:  2024-02: Class 1 obesity due to excess calories with serious   comorbidity and body mass index (BMI) of 32.0 to 32.9 in adult  2021-07: Bariatric surgery status  2021-07: Postsurgical malabsorption  2018-03: MENDEZ (obstructive sleep apnea)  2017-11: Severe episode of recurrent major depressive disorder,   without psychotic features (H)  2017-11: Suicidal ideation  2014-09: Sternum pain  2014-09: Pain of right scapula  2013-09: Hypertension goal BP (blood pressure) < 130/80  2011-07: Hypotestosteronism  2011-02: Major depressive disorder, recurrent episode, moderate (H)  2011-02: Anxiety  2009-05: Vitamin D deficiency  2008-10: Major depressive disorder, single episode in full remission  2008-04: Major depressive disorder, single episode, mild  2007-12: Depressive disorder, not elsewhere classified  2005-02: Attention deficit hyperactivity disorder (ADHD)  GERD without esophagitis      Past Medical History:   Diagnosis Date    Anxiety     Attention deficit disorder with hyperactivity(314.01) 2001    Esophageal reflux     Dr Starks- had EGD ~2202    Generalized anxiety disorder     Hypertension     Infectious mononucleosis 12/03    Low testosterone     Major depressive disorder, single episode in full remission     Major depressive disorder, single episode, mild     MDD (major depressive disorder)     Mild intermittent asthma     Suicidal ideation 11/6/2017    Viral URI with cough 1/3/2020         Social History     Tobacco Use    Smoking status: Never    Smokeless tobacco: Never   Substance Use Topics    Alcohol use: Yes           Objective    /73   Pulse 71   Temp 97.4  F (36.3  C)   Resp 16   Wt 116.1 kg (256 lb)   SpO2 99%   BMI 35.70 kg/m    Physical Exam  Vitals and nursing note reviewed.   Constitutional:       Appearance: Normal appearance.   HENT:      Head: Normocephalic and atraumatic.      Right Ear: Tympanic membrane and ear canal normal.      Left Ear: Tympanic membrane  and ear canal normal.      Nose: Congestion and rhinorrhea (clear nasal drainage) present.      Mouth/Throat:      Mouth: Mucous membranes are moist.      Pharynx: Oropharynx is clear. No oropharyngeal exudate or posterior oropharyngeal erythema.   Eyes:      General:         Right eye: No discharge.         Left eye: No discharge.      Extraocular Movements: Extraocular movements intact.      Conjunctiva/sclera: Conjunctivae normal.      Pupils: Pupils are equal, round, and reactive to light.   Cardiovascular:      Rate and Rhythm: Normal rate and regular rhythm.      Heart sounds: Normal heart sounds.   Pulmonary:      Effort: Pulmonary effort is normal.      Breath sounds: Normal breath sounds.   Musculoskeletal:      Cervical back: Normal range of motion and neck supple.   Lymphadenopathy:      Cervical: No cervical adenopathy.   Neurological:      Mental Status: He is alert and oriented to person, place, and time.              Randi Arellano NP

## 2025-02-03 NOTE — PATIENT INSTRUCTIONS
Dear Abel Ward,    We are sorry you are not feeling well. Based on the responses you provided, it is recommended that you be seen in-person in urgent care so we can better evaluate your symptoms. Please click here to find the nearest urgent care location to you.   You will not be charged for this Visit. Thank you for trusting us with your care.    THEO Rodriguez CNP

## 2025-02-03 NOTE — PATIENT INSTRUCTIONS
Chest xray negative for pneumonia or other bacterial process - radiologist's read pending. Only will call if any significant results.    CBC - lower hemoglobin, hematocrit, RBC than previously. Continue with iron supplements and protein in diet. Follow up in primary care and other specialist regarding results.     Increase fluids with water, Pedialyte, Gatorade, or rehydrating beverages.     Alternate Tylenol and Ibuprofen as needed for aches, pains or fever.     If needed, follow soft food diet. Rest as much as possible. Run humidifier at night. Gargle with hot salty water. Have warm tea or water with honey. Follow up in clinic if symptoms persist or worsen. This usually can last 10-14 days.

## 2025-02-04 LAB
ALBUMIN SERPL BCG-MCNC: 4.3 G/DL (ref 3.5–5.2)
ALP SERPL-CCNC: 70 U/L (ref 40–150)
ALT SERPL W P-5'-P-CCNC: 18 U/L (ref 0–70)
ANION GAP SERPL CALCULATED.3IONS-SCNC: 10 MMOL/L (ref 7–15)
AST SERPL W P-5'-P-CCNC: 19 U/L (ref 0–45)
BILIRUB SERPL-MCNC: 0.4 MG/DL
BUN SERPL-MCNC: 13.5 MG/DL (ref 6–20)
CALCIUM SERPL-MCNC: 9.9 MG/DL (ref 8.8–10.4)
CHLORIDE SERPL-SCNC: 106 MMOL/L (ref 98–107)
CREAT SERPL-MCNC: 0.89 MG/DL (ref 0.67–1.17)
EGFRCR SERPLBLD CKD-EPI 2021: >90 ML/MIN/1.73M2
GLUCOSE SERPL-MCNC: 99 MG/DL (ref 70–99)
HCO3 SERPL-SCNC: 23 MMOL/L (ref 22–29)
POTASSIUM SERPL-SCNC: 4.1 MMOL/L (ref 3.4–5.3)
PROT SERPL-MCNC: 6.9 G/DL (ref 6.4–8.3)
PTH-INTACT SERPL-MCNC: 52 PG/ML (ref 15–65)
SODIUM SERPL-SCNC: 139 MMOL/L (ref 135–145)
T4 FREE SERPL-MCNC: 0.63 NG/DL (ref 0.9–1.7)
TSH SERPL DL<=0.005 MIU/L-ACNC: 0.17 UIU/ML (ref 0.3–4.2)
VIT B12 SERPL-MCNC: 1223 PG/ML (ref 232–1245)
VIT D+METAB SERPL-MCNC: 66 NG/ML (ref 20–50)

## 2025-02-04 NOTE — IP AVS SNAPSHOT
MRN:[id number]                      After Visit Summary   6/28/2018    Abel Ward    MRN: [id number]           Thank you!     Thank you for choosing [facility] for your care. Our goal is always to provide you with excellent care.        Patient Information     Date Of Birth          1985        About your hospital stay     You were admitted on:  June 28, 2018 You last received care in the:  [facility]    You were discharged on:  July 3, 2018        Reason for your hospital stay       depression                  Who to Call     For medical emergencies, please call 911.  For non-urgent questions about your medical care, please call your primary care provider or clinic, [phone]          Attending Provider     Provider Specialty    José Miguel Mayo MD Emergency Medicine    Desrosier, Santy Saldana MD Psychiatry       Primary Care Provider Office Phone # Fax #    David Denney [phone]      After Care Instructions     Activity       Your activity upon discharge: activity as tolerated            Diet       Follow this diet upon discharge: Orders Placed This Encounter      Regular Diet Adult            Discharge Instructions       1. Please do not harm yourself or others.  2. Please continue to take your medications.  3. Please follow up with your outpatient care team.  4. Please do not take drugs or alcohol as this will worsen your condition.  5. Please do not take more than the prescribed doses of medications as this may make them dangerous.   6. Please follow your safety plan of action.  7. Please call crisis if having trouble.  8. If having thoughts of harming self or others please come in to the emergency department as soon as possible.                  Additional Services     Medication Therapy Management Referral       MTM referral reason  Lithium prescribed    This service is designed to help you get the most from your medications.  A specially trained  No current facility-administered medications on file prior to encounter.     Current Outpatient Medications on File Prior to Encounter   Medication Sig    ascorbic acid, vitamin C, (VITAMIN C) 1000 MG tablet Take 1,000 mg by mouth once daily.    atorvastatin (LIPITOR) 10 MG tablet TAKE 1 TABLET BY MOUTH EVERY DAY (Patient taking differently: Take 10 mg by mouth once daily.)    betamethasone dipropionate 0.05 % cream Apply topically once daily.    bexarotene 75 mg Cap Take 6 capsules by mouth Daily.    calcium carbonate 1250 MG capsule Take 1,250 mg by mouth once daily.    cholecalciferol, vitamin D3, (VITAMIN D3) 25 mcg (1,000 unit) capsule Take 1,000 Units by mouth once daily.    EScitalopram oxalate (LEXAPRO) 5 MG Tab TAKE 1 TABLET BY MOUTH EVERY DAY (Patient taking differently: Take 5 mg by mouth nightly.)    fenofibrate micronized (LOFIBRA) 200 MG Cap Take 200 mg by mouth daily with breakfast.    ferrous sulfate (FEOSOL) 325 mg (65 mg iron) Tab tablet Take 325 mg by mouth once daily.    latanoprost 0.005 % ophthalmic solution Place 1 drop into both eyes every evening.    levothyroxine (SYNTHROID) 100 MCG tablet Take 100 mcg by mouth before breakfast.    melatonin 10 mg Tab Take 10 mg by mouth nightly as needed.    metoprolol succinate (TOPROL-XL) 50 MG 24 hr tablet TAKE 1 TABLET BY MOUTH ONCE  DAILY (Patient taking differently: Take 50 mg by mouth once daily.)    omeprazole (PRILOSEC) 20 MG capsule Take 20 mg by mouth once daily.       Review of patient's allergies indicates:   Allergen Reactions    Lisinopril      hyperkalemia       Past Medical History:   Diagnosis Date    Anxiety     Bilateral leg edema     Breast cancer     Cellulitis of left leg     CKD (chronic kidney disease), stage III     Hypertension     Hypothyroidism     Iron deficiency anemia     Osteopenia     Ovarian cyst     Teratoma and Benign Serous Cystadenoma    Pancreatic cyst     Pulmonary nodules     Renal mass     Vitamin D deficiency       Past Surgical History:   Procedure Laterality Date    BLOCK, NERVE, GENICULAR Bilateral 1/16/2025    Procedure: B/L GNB;  Surgeon: Rasta Lambert MD;  Location: [facility];  Service: Pain Management;  Laterality: Bilateral;  no sed-no ac    BREAST SURGERY      debridement      Left leg     Removal of Ovaries      SKIN BIOPSY      1/5/24     Family History       Problem Relation (Age of Onset)    COPD Brother    Cholelithiasis Brother    Colon cancer Brother, Brother    Diabetes Father    Diabetes type I Other    Heart disease Brother, Brother    Hypertension Mother, Father    Lung cancer Sister          Tobacco Use    Smoking status: Never    Smokeless tobacco: Never   Substance and Sexual Activity    Alcohol use: No    Drug use: Never    Sexual activity: Not Currently     Review of Systems   Constitutional:  Negative for chills and fever.   Respiratory:  Negative for cough and shortness of breath.    Cardiovascular:  Negative for chest pain and palpitations.   Gastrointestinal:  Negative for abdominal pain, nausea and vomiting.   Genitourinary:  Negative for dysuria.   Skin:  Positive for color change and wound.   Neurological:  Negative for dizziness and light-headedness.     Objective:     Vital Signs (Most Recent):  Temp: 97.9 °F (36.6 °C) (02/04/25 1203)  Pulse: 73 (02/04/25 1203)  Resp: 18 (02/04/25 1415)  BP: (!) 131/56 (02/04/25 1203)  SpO2: 97 % (02/04/25 1203) Vital Signs (24h Range):  Temp:  [97.9 °F (36.6 °C)-98 °F (36.7 °C)] 97.9 °F (36.6 °C)  Pulse:  [67-90] 73  Resp:  [16-20] 18  SpO2:  [96 %-100 %] 97 %  BP: (122-189)/(56-75) 131/56     Weight: 68 kg (149 lb 14.6 oz)  Body mass index is 29.28 kg/m².     Physical Exam  Vitals reviewed.   Constitutional:       Appearance: Normal appearance.   Cardiovascular:      Rate and Rhythm: Normal rate.      Pulses: Normal pulses.   Pulmonary:      Effort: Pulmonary effort is normal.   Abdominal:      Palpations: Abdomen is soft.   Skin:      pharmacist will work closely with you and your doctors  to solve any problems related to your medications and to help you get the   best results from taking them.      The Medication Therapy Management staff will call you to schedule an appointment.                  Future tests that were ordered for you     ELECTROCONVULSIVE THERAPY,1 SEIZ                 Further instructions from your care team        Behavioral Discharge Planning and Instructions      Summary:  You were admitted on 6/28/2018  due to Depression.  You were treated by Dr. Santy Jacobs MD and discharged on ***/***/*** from [facility] to Home      Principal Diagnosis: Major Depressive Disorder; Recurrent; Severe      Health Care Follow-up Appointments:   GIN Suarez - Next Appointment Friday July 6th (arrive 6:45am) for 7am  Therapist Marcell London - Appointment Thursday, July 5th at 6:45pm for 7pm    [facility]   [address] [place] [address]  [phone]  [phone]      Attend all scheduled appointments with your outpatient providers. Call at least 24 hours in advance if you need to reschedule an appointment to ensure continued access to your outpatient providers.   Major Treatments, Procedures and Findings:  You were provided with: a psychiatric assessment, assessed for medical stability, medication evaluation and/or management, group therapy and milieu management    Symptoms to Report: thoughts of suicide    Early warning signs can include: increased depression or anxiety sleep disturbances increased thoughts or behaviors of suicide or self-harm     Safety and Wellness:  Take all medicines as directed.  Make no changes unless your doctor suggests them.      Follow treatment recommendations.  Refrain from alcohol and non-prescribed drugs.  If there is a concern for safety, call 911.    Resources:   Crisis Intervention: 638.877.4064 or 872-447-1805 (TTY: 345.625.8390).   Call anytime for help.  [employer] [place] [employer]ness (www.[place].anayeli.org): 568.507.2941 or 952-009-7537.    The treatment team has appreciated the opportunity to work with you.     If you have any questions or concerns our unit number is [phone].      Pending Results     No orders found from 6/26/2018 to 6/29/2018.            Statement of Approval     Ordered          07/03/18 1109  I have reviewed and agree with all the recommendations and orders detailed in this document.  EFFECTIVE NOW     Approved and electronically signed by:  Santy Jacobs MD             Admission Information     Date & Time Provider Department Dept. Phone    6/28/2018 Santy Jacobs MD [facility] [phone]      Your Vitals Were     Blood Pressure Pulse Temperature Respirations Weight Pulse Oximetry    116/60 68 97.3  F (36.3  C) 16 151.6 kg (334 lb 3.2 oz) 98%    BMI (Body Mass Index)                   46.61 kg/m2           [employer] Information     [facility] gives you secure access to your electronic health record. If you see a primary care provider, you can also send messages to your care team and make appointments. If you have questions, please call your primary care clinic.  If you do not have a primary care provider, please call [phone] and they will assist you.        Care EveryWhere ID     This is your Care EveryWhere ID. This could be used by other organizations to access your [facility] medical records  [id number]        Equal Access to Services     ZANDER BRAN : Hadii cate Hamm, waaxda luqadaha, qaybta kaalmada lauren martinez. So [facility] [phone].    ATENCIÓN: Si habla español, tiene a bagley disposición servicios gratuitos de asistencia lingüística. Llame al [phone].    We comply with applicable federal civil rights laws and [place] laws. We do not discriminate on the basis of race, color, national origin, age, disability, sex, sexual  Findings: Erythema present.      Comments: Erythema of the bilateral legs associated with known superficial squamous cell cancer. No underlying fluctuance, bullae, or drain able lesions noted.   Neurological:      General: No focal deficit present.      Mental Status: She is alert and oriented to person, place, and time.            I have reviewed all pertinent lab results within the past 24 hours.  CBC:   Recent Labs   Lab 02/04/25  0246   WBC 4.51   RBC 2.89*   HGB 8.8*   HCT 29.1*      *   MCH 30.4   MCHC 30.2*     CMP:   Recent Labs   Lab 02/04/25  0245   *   CALCIUM 9.3   ALBUMIN 2.8*   PROT 5.7*      K 4.6   CO2 20*   *   BUN 37*   CREATININE 1.7*   ALKPHOS 35*   ALT 24   AST 38   BILITOT 0.3       Significant Diagnostics:  I have reviewed all pertinent imaging results/findings within the past 24 hours.     orientation, or gender identity.               Review of your medicines      START taking        Dose / Directions    OLANZapine 10 MG tablet   Commonly known as:  zyPREXA   Used for:  Severe episode of recurrent major depressive disorder, without psychotic features (H), Anxiety        Dose:  10 mg   Take 1 tablet (10 mg) by mouth 2 times daily as needed (anxiety)   Quantity:  60 tablet   Refills:  0         CONTINUE these medicines which have NOT CHANGED        Dose / Directions    gabapentin 100 MG capsule   Commonly known as:  NEURONTIN   Used for:  Restless legs syndrome        Dose:  100-200 mg   Take 100-200 mg by mouth nightly as needed (restless leg syndrome)   Refills:  0       LITHIUM CARBONATE PO        Dose:  600 mg   Take 600 mg by mouth daily   Refills:  0       losartan 50 MG tablet   Commonly known as:  COZAAR   Used for:  Essential hypertension with goal blood pressure less than 140/90        Dose:  50 mg   Take 1 tablet (50 mg) by mouth daily   Quantity:  90 tablet   Refills:  1       omeprazole 20 MG CR capsule   Commonly known as:  priLOSEC   Used for:  Esophageal reflux        Dose:  20 mg   Take 1 capsule (20 mg) by mouth daily   Quantity:  90 capsule   Refills:  3       PRISTIQ 100 MG 24 hr tablet   Generic drug:  desvenlafaxine succinate        Dose:  100 mg   Take 100 mg by mouth daily   Refills:  0       traZODone 50 MG tablet   Commonly known as:  DESYREL        Dose:   mg   Take  mg by mouth nightly as needed for sleep   Refills:  0       Vitamin D3 11228 units Tabs        Dose:  50082 Units   Take 10,000 Units by mouth daily (patient purchases over-the-counter) this dose was NOT prescribed by a doctor.   Refills:  0         STOP taking     ARIPiprazole 10 MG tablet   Commonly known as:  ABILIFY           LAMOTRIGINE PO           naltrexone 50 MG tablet   Commonly known as:  DEPADE;REVIA           trifluoperazine 1 MG tablet   Commonly known as:  STELAZINE           VALIUM PO                 Where to get your medicines      These medications were sent to [facility] [place], [place] - [address]  [address], [facility] [place] [address]     Phone:  [phone]     OLANZapine 10 MG tablet                Protect others around you: Learn how to safely use, store and throw away your medicines at www.disposemymeds.org.             Medication List: This is a list of all your medications and when to take them. Check marks below indicate your daily home schedule. Keep this list as a reference.      Medications           Morning Afternoon Evening Bedtime As Needed    gabapentin 100 MG capsule   Commonly known as:  NEURONTIN   Take 100-200 mg by mouth nightly as needed (restless leg syndrome)                                LITHIUM CARBONATE PO   Take 600 mg by mouth daily   Last time this was given:  600 mg on 7/3/2018  8:40 AM                                losartan 50 MG tablet   Commonly known as:  COZAAR   Take 1 tablet (50 mg) by mouth daily   Last time this was given:  50 mg on 7/3/2018  8:41 AM                                OLANZapine 10 MG tablet   Commonly known as:  zyPREXA   Take 1 tablet (10 mg) by mouth 2 times daily as needed (anxiety)   Last time this was given:  10 mg on 7/3/2018  2:48 PM                                omeprazole 20 MG CR capsule   Commonly known as:  priLOSEC   Take 1 capsule (20 mg) by mouth daily   Last time this was given:  20 mg on 7/3/2018  8:40 AM                                PRISTIQ 100 MG 24 hr tablet   Take 100 mg by mouth daily   Last time this was given:  100 mg on 7/3/2018  8:40 AM   Generic drug:  desvenlafaxine succinate                                traZODone 50 MG tablet   Commonly known as:  DESYREL   Take  mg by mouth nightly as needed for sleep   Last time this was given:  100 mg on 7/2/2018  8:33 PM                                Vitamin D3 37225 units Tabs   Take 10,000 Units by mouth daily (patient purchases  over-the-counter) this dose was NOT prescribed by a doctor.

## 2025-02-09 ENCOUNTER — LAB (OUTPATIENT)
Dept: LAB | Facility: CLINIC | Age: 40
End: 2025-02-09
Payer: COMMERCIAL

## 2025-02-09 DIAGNOSIS — Z76.89 REFERRAL OF PATIENT WITHOUT EXAMINATION OR TREATMENT: ICD-10-CM

## 2025-02-09 LAB
CHOLEST SERPL-MCNC: 168 MG/DL
DEPRECATED CALCIDIOL+CALCIFEROL SERPL-MC: <68 UG/L (ref 20–75)
FASTING STATUS PATIENT QL REPORTED: YES
HDLC SERPL-MCNC: 61 MG/DL
LDLC SERPL CALC-MCNC: 93 MG/DL
LITHIUM SERPL-SCNC: 1.08 MMOL/L (ref 0.6–1.2)
NONHDLC SERPL-MCNC: 107 MG/DL
TRIGL SERPL-MCNC: 72 MG/DL
VITAMIN D2 SERPL-MCNC: <5 UG/L
VITAMIN D3 SERPL-MCNC: 63 UG/L

## 2025-02-09 PROCEDURE — 36415 COLL VENOUS BLD VENIPUNCTURE: CPT

## 2025-02-09 PROCEDURE — 80178 ASSAY OF LITHIUM: CPT

## 2025-02-09 PROCEDURE — 80061 LIPID PANEL: CPT

## 2025-03-09 ENCOUNTER — LAB (OUTPATIENT)
Dept: LAB | Facility: CLINIC | Age: 40
End: 2025-03-09
Payer: COMMERCIAL

## 2025-03-09 DIAGNOSIS — Z76.89 PERSONS ENCOUNTERING HEALTH SERVICES IN OTHER SPECIFIED CIRCUMSTANCES: ICD-10-CM

## 2025-03-09 PROCEDURE — 80178 ASSAY OF LITHIUM: CPT

## 2025-03-09 PROCEDURE — 36415 COLL VENOUS BLD VENIPUNCTURE: CPT

## 2025-03-10 LAB — LITHIUM SERPL-SCNC: 1.08 MMOL/L (ref 0.6–1.2)

## 2025-03-11 DIAGNOSIS — Z76.89 ENCOUNTER FOR PERSON ENCOUNTERING HEALTH SERVICES: Primary | ICD-10-CM

## 2025-03-17 ENCOUNTER — OFFICE VISIT (OUTPATIENT)
Dept: ENDOCRINOLOGY | Facility: CLINIC | Age: 40
End: 2025-03-17
Attending: FAMILY MEDICINE
Payer: COMMERCIAL

## 2025-03-17 VITALS
SYSTOLIC BLOOD PRESSURE: 120 MMHG | RESPIRATION RATE: 16 BRPM | WEIGHT: 254 LBS | HEIGHT: 71 IN | OXYGEN SATURATION: 94 % | DIASTOLIC BLOOD PRESSURE: 69 MMHG | BODY MASS INDEX: 35.56 KG/M2 | HEART RATE: 67 BPM | TEMPERATURE: 98 F

## 2025-03-17 DIAGNOSIS — R53.83 OTHER FATIGUE: Primary | ICD-10-CM

## 2025-03-17 DIAGNOSIS — E03.9 HYPOTHYROIDISM, UNSPECIFIED TYPE: ICD-10-CM

## 2025-03-17 PROCEDURE — 3078F DIAST BP <80 MM HG: CPT | Performed by: PHYSICIAN ASSISTANT

## 2025-03-17 PROCEDURE — 3074F SYST BP LT 130 MM HG: CPT | Performed by: PHYSICIAN ASSISTANT

## 2025-03-17 PROCEDURE — 99203 OFFICE O/P NEW LOW 30 MIN: CPT | Performed by: PHYSICIAN ASSISTANT

## 2025-03-17 RX ORDER — LITHIUM CARBONATE 150 MG/1
CAPSULE ORAL
COMMUNITY
Start: 2025-03-11

## 2025-03-17 NOTE — PATIENT INSTRUCTIONS
[facility]  Dr Cedillo, Endocrinology Department    [address] [facility] [id number]  [place] [address]  Appointment [phone]  Fax: [phone]  [place]: Monday - Thursday

## 2025-03-17 NOTE — LETTER
3/17/2025      bAel Ward  [address] [facility] [facility] [address] [address]      Dear Colleague,    Thank you for referring your patient, Abel Ward, to the [facility]. Please see a copy of my visit note below.    Assessment/Plan :   Hypothyroidism. We discussed the diagnosis of hypothyroidism. We also reviewed the differences between levothyroxine and liothyronine. Shakir takes lithium to help with mood stabilization. Lithium is known to affect thyroid tissue and thyroid hormone levels. This is the most likely cause of his newly diagnosed hypothyroidism. We also discussed the signs and symptoms of hypothyroidism and hyperthyroidism. I would like to repeat thyroid testing. It has been about a month since the levothyroxine and liothyronine was last adjusted. I will contact him with the results and we will continue to adjust the dose, as needed. We will follow-up in 6 mos.  Low testosterone. We discussed how testosterone can affect energy and mood. His testosterone levels were last checked in 2018. I will place a lab order for a free and total testosterone. I will contact him with the results.       I have independently reviewed and interpreted labs, imaging as indicated.      Chief complaint:  Abel is a 40 year old male seen in consultation at the request of Dr. Denney.    I have reviewed Care Everywhere including [facility], [facility], [facility] , [facility], [place] lab reports, imaging reports and provider notes as indicated.      HISTORY OF PRESENT ILLNESS  Shakir comes to our office for help in the management of hypothyroidism. He has been struggling with ongoing fatigue and exhaustion. He also has episodes of lightheadedness that seem to come and go. These episodes usually occur in the afternoon or evening. He has a significant mental history and he has taken a variety of medications to help stabilize his mood.  He feels like it is difficult to know what symptoms are due to the medications and which are due to his thyroid. Lastly, he is also worried about his testosterone. He has been told in the past, that he has low testosterone.    He was first started on liothyronine towards the end of 2023. At that time, he had been experiencing progressive fatigue. He mentioned this to his psychiatrist and they checked his thyroid levels. Shakir was told that his thyroid level was off and so they added the liothyronine. Shakir is not sure if this every really made a difference. He was then told of follow-up with his primary care provider. Follow-up testing indicated that FT4 level remained low but his TSH was within normal limits. His PCP started him on levothyroxine, which decreased his TSH but did not seem to improve his FT4 level. Shakir was then referred to our office.    Endocrine relevant labs are as follows:   Latest Reference Range & Units 02/03/25 16:17   TSH 0.30 - 4.20 uIU/mL 0.17 (L)   (L): Data is abnormally low   Latest Reference Range & Units 02/03/25 16:17   T4 Free 0.90 - 1.70 ng/dL 0.63 (L)   (L): Data is abnormally low   Latest Reference Range & Units 08/19/24 16:07   TSH 0.30 - 4.20 uIU/mL 0.35      Latest Reference Range & Units 08/19/24 16:07   T4 Free 0.90 - 1.70 ng/dL 0.84 (L)   (L): Data is abnormally low    REVIEW OF SYSTEMS    Endocrine: positive for thyroid disorder  Skin: negative  Eyes: negative for, visual blurring, redness, tearing  Ears/Nose/Throat: negative for, hoarseness, feeling of fullness  Respiratory: No shortness of breath, dyspnea on exertion, cough, or hemoptysis  Cardiovascular: negative for, chest pain, dyspnea on exertion, lower extremity edema, and exercise intolerance  Gastrointestinal: negative for, nausea, vomiting, constipation, and diarrhea  Genitourinary: negative for, nocturia, dysuria, frequency, and urgency  Musculoskeletal: negative for, muscular weakness, nocturnal cramping, and  foot pain  Neurologic: negative for, local weakness, numbness or tingling of hands, and numbness or tingling of feet  Psychiatric: positive for anxiety and depression  Hematologic/Lymphatic/Immunologic: negative    Past Medical History  Past Medical History:   Diagnosis Date     Anxiety      Attention deficit disorder with hyperactivity(314.01) 2001     Esophageal reflux     Dr Starks- had EGD ~2202     Generalized anxiety disorder      Hypertension      Infectious mononucleosis 12/03     Low testosterone      Major depressive disorder, single episode in full remission      Major depressive disorder, single episode, mild      MDD (major depressive disorder)      Mild intermittent asthma      Suicidal ideation 11/6/2017     Viral URI with cough 1/3/2020       Medications  Current Outpatient Medications   Medication Sig Dispense Refill     lithium (ESKALITH) 150 MG capsule 1500 mg daily       amLODIPine (NORVASC) 5 MG tablet Take 1 tablet (5 mg) by mouth daily. 90 tablet 1     amphetamine-dextroamphetamine (ADDERALL) 15 MG tablet        calcium carbonate (OS-INEZ) 1500 (600 Ca) MG tablet Take 600 mg by mouth 2 times daily (with meals) Viactive       Cholecalciferol (VITAMIN D3) 44180 UNITS TABS Take 5,000 Units by mouth daily (patient purchases over-the-counter) this dose was NOT prescribed by a doctor.       Cyanocobalamin 5000 MCG TBDP Take 5,000 mcg by mouth Once weekly       levothyroxine (SYNTHROID/LEVOTHROID) 25 MCG tablet Take 1 tablet (25 mcg) by mouth daily. 90 tablet 3     liothyronine (CYTOMEL) 25 MCG tablet Take 1 tablet (25 mcg) by mouth daily. 90 tablet 3     LORazepam (ATIVAN) 1 MG tablet Take 1 mg by mouth as needed for anxiety   0     lurasidone (LATUDA) 40 MG TABS tablet Take 80 mg by mouth daily.       magnesium oxide (MAG-OX) 400 (241.3 Mg) MG tablet Take 1 tablet by mouth daily 90 tablet 3     multivitamin w/minerals (THERA-VIT-M) tablet Take 1 tablet by mouth every morning        omeprazole  "(PRILOSEC) 40 MG DR capsule Take 1 capsule (40 mg) by mouth 2 times daily 180 capsule 1     ondansetron (ZOFRAN) 4 MG tablet Take 1-2 tablets (4-8 mg) by mouth daily 30 tablet 3     QUEtiapine (SEROQUEL) 100 MG tablet        tamsulosin (FLOMAX) 0.4 MG capsule Take 1 capsule (0.4 mg) by mouth daily. 90 capsule 1     thiamine (B-1) 100 MG tablet Take 100 mg by mouth once a week       traZODone (DESYREL) 50 MG tablet Take  mg by mouth nightly as needed for sleep       vilazodone (VIIBRYD) 40 MG TABS tablet Take 40 mg by mouth daily.         Allergies  Allergies   Allergen Reactions     Asa [Aspirin] Other (See Comments)     Gastric bypass 7/14/21.     Nsaids Other (See Comments)     Gastric bypass 7/14/21.         Family History  family history includes Breast Cancer in his paternal grandmother; Depression in his father and mother; Diabetes in his paternal grandfather; Heart Disease in his paternal grandfather; Hypertension in his father, maternal grandfather, mother, and paternal grandfather; Lipids in his father; No Known Problems in his sister; Obesity in his father, mother, and sister; Polycythemia in his father; Protein S deficiency in his mother; Thyroid Disease in his father and mother.    Social History  Social History     Tobacco Use     Smoking status: Never     Smokeless tobacco: Never   Vaping Use     Vaping status: Never Used   Substance Use Topics     Alcohol use: Yes     Drug use: Yes     Types: Marijuana     Comment: once a month or less, > 1 month       Physical Exam  /69 (BP Location: Left arm, Patient Position: Chair, Cuff Size: Adult Large)   Pulse 67   Temp 98  F (36.7  C) (Tympanic)   Resp 16   Ht 1.803 m (5' 10.98\")   Wt 115.2 kg (254 lb)   SpO2 94%   BMI 35.44 kg/m    Body mass index is 35.44 kg/m .  GENERAL :  In no apparent distress  SKIN: Normal color, normal temperature, texture.  No hirsutism, alopecia or purple striae.     EYES: PERRL, EOMI, No scleral icterus,  No " proptosis, conjunctival redness, stare, retraction  NECK: No visible masses. No palpable adenopathy, or masses. No carotid bruits.   THYROID:  Normal, nontender, smooth / firm texture,  no nodules, no Bruit   RESP: Lungs clear to auscultation bilaterally  CARDIAC: Regular rate and rhythm, normal S1 S2, without murmurs, rubs or gallops    NEURO: awake, alert, responds appropriately to questions.  Cranial nerves intact.   Moves all extremities; Gait normal.  No tremor of the outstretched hand.   EXTREMITIES: No clubbing, cyanosis or edema.              Again, thank you for allowing me to participate in the care of your patient.        Sincerely,        Meche Jacinto PA-C    Electronically signed

## 2025-03-17 NOTE — PROGRESS NOTES
Assessment/Plan :   Hypothyroidism. We discussed the diagnosis of hypothyroidism. We also reviewed the differences between levothyroxine and liothyronine. Shakir takes lithium to help with mood stabilization. Lithium is known to affect thyroid tissue and thyroid hormone levels. This is the most likely cause of his newly diagnosed hypothyroidism. We also discussed the signs and symptoms of hypothyroidism and hyperthyroidism. I would like to repeat thyroid testing. It has been about a month since the levothyroxine and liothyronine was last adjusted. I will contact him with the results and we will continue to adjust the dose, as needed. We will follow-up in 6 mos.  Low testosterone. We discussed how testosterone can affect energy and mood. His testosterone levels were last checked in 2018. I will place a lab order for a free and total testosterone. I will contact him with the results.       I have independently reviewed and interpreted labs, imaging as indicated.      Chief complaint:  Abel is a 40 year old male seen in consultation at the request of Dr. Denney.    I have reviewed Care Everywhere including [facility], [facility], [facility], [facility] , [facility], [place] lab reports, imaging reports and provider notes as indicated.      HISTORY OF PRESENT ILLNESS  Shakir comes to our office for help in the management of hypothyroidism. He has been struggling with ongoing fatigue and exhaustion. He also has episodes of lightheadedness that seem to come and go. These episodes usually occur in the afternoon or evening. He has a significant mental history and he has taken a variety of medications to help stabilize his mood. He feels like it is difficult to know what symptoms are due to the medications and which are due to his thyroid. Lastly, he is also worried about his testosterone. He has been told in the past, that he has low testosterone.    He was first started on  liothyronine towards the end of 2023. At that time, he had been experiencing progressive fatigue. He mentioned this to his psychiatrist and they checked his thyroid levels. Shakir was told that his thyroid level was off and so they added the liothyronine. Shakir is not sure if this every really made a difference. He was then told of follow-up with his primary care provider. Follow-up testing indicated that FT4 level remained low but his TSH was within normal limits. His PCP started him on levothyroxine, which decreased his TSH but did not seem to improve his FT4 level. Shakir was then referred to our office.    Endocrine relevant labs are as follows:   Latest Reference Range & Units 02/03/25 16:17   TSH 0.30 - 4.20 uIU/mL 0.17 (L)   (L): Data is abnormally low   Latest Reference Range & Units 02/03/25 16:17   T4 Free 0.90 - 1.70 ng/dL 0.63 (L)   (L): Data is abnormally low   Latest Reference Range & Units 08/19/24 16:07   TSH 0.30 - 4.20 uIU/mL 0.35      Latest Reference Range & Units 08/19/24 16:07   T4 Free 0.90 - 1.70 ng/dL 0.84 (L)   (L): Data is abnormally low    REVIEW OF SYSTEMS    Endocrine: positive for thyroid disorder  Skin: negative  Eyes: negative for, visual blurring, redness, tearing  Ears/Nose/Throat: negative for, hoarseness, feeling of fullness  Respiratory: No shortness of breath, dyspnea on exertion, cough, or hemoptysis  Cardiovascular: negative for, chest pain, dyspnea on exertion, lower extremity edema, and exercise intolerance  Gastrointestinal: negative for, nausea, vomiting, constipation, and diarrhea  Genitourinary: negative for, nocturia, dysuria, frequency, and urgency  Musculoskeletal: negative for, muscular weakness, nocturnal cramping, and foot pain  Neurologic: negative for, local weakness, numbness or tingling of hands, and numbness or tingling of feet  Psychiatric: positive for anxiety and depression  Hematologic/Lymphatic/Immunologic: negative    Past Medical History  Past Medical  History:   Diagnosis Date    Anxiety     Attention deficit disorder with hyperactivity(314.01) 2001    Esophageal reflux     Dr Starks- had EGD ~2202    Generalized anxiety disorder     Hypertension     Infectious mononucleosis 12/03    Low testosterone     Major depressive disorder, single episode in full remission     Major depressive disorder, single episode, mild     MDD (major depressive disorder)     Mild intermittent asthma     Suicidal ideation 11/6/2017    Viral URI with cough 1/3/2020       Medications  Current Outpatient Medications   Medication Sig Dispense Refill    lithium (ESKALITH) 150 MG capsule 1500 mg daily      amLODIPine (NORVASC) 5 MG tablet Take 1 tablet (5 mg) by mouth daily. 90 tablet 1    amphetamine-dextroamphetamine (ADDERALL) 15 MG tablet       calcium carbonate (OS-INEZ) 1500 (600 Ca) MG tablet Take 600 mg by mouth 2 times daily (with meals) Viactive      Cholecalciferol (VITAMIN D3) 66421 UNITS TABS Take 5,000 Units by mouth daily (patient purchases over-the-counter) this dose was NOT prescribed by a doctor.      Cyanocobalamin 5000 MCG TBDP Take 5,000 mcg by mouth Once weekly      levothyroxine (SYNTHROID/LEVOTHROID) 25 MCG tablet Take 1 tablet (25 mcg) by mouth daily. 90 tablet 3    liothyronine (CYTOMEL) 25 MCG tablet Take 1 tablet (25 mcg) by mouth daily. 90 tablet 3    LORazepam (ATIVAN) 1 MG tablet Take 1 mg by mouth as needed for anxiety   0    lurasidone (LATUDA) 40 MG TABS tablet Take 80 mg by mouth daily.      magnesium oxide (MAG-OX) 400 (241.3 Mg) MG tablet Take 1 tablet by mouth daily 90 tablet 3    multivitamin w/minerals (THERA-VIT-M) tablet Take 1 tablet by mouth every morning       omeprazole (PRILOSEC) 40 MG DR capsule Take 1 capsule (40 mg) by mouth 2 times daily 180 capsule 1    ondansetron (ZOFRAN) 4 MG tablet Take 1-2 tablets (4-8 mg) by mouth daily 30 tablet 3    QUEtiapine (SEROQUEL) 100 MG tablet       tamsulosin (FLOMAX) 0.4 MG capsule Take 1 capsule (0.4 mg)  "by mouth daily. 90 capsule 1    thiamine (B-1) 100 MG tablet Take 100 mg by mouth once a week      traZODone (DESYREL) 50 MG tablet Take  mg by mouth nightly as needed for sleep      vilazodone (VIIBRYD) 40 MG TABS tablet Take 40 mg by mouth daily.         Allergies  Allergies   Allergen Reactions    Asa [Aspirin] Other (See Comments)     Gastric bypass 7/14/21.    Nsaids Other (See Comments)     Gastric bypass 7/14/21.         Family History  family history includes Breast Cancer in his paternal grandmother; Depression in his father and mother; Diabetes in his paternal grandfather; Heart Disease in his paternal grandfather; Hypertension in his father, maternal grandfather, mother, and paternal grandfather; Lipids in his father; No Known Problems in his sister; Obesity in his father, mother, and sister; Polycythemia in his father; Protein S deficiency in his mother; Thyroid Disease in his father and mother.    Social History  Social History     Tobacco Use    Smoking status: Never    Smokeless tobacco: Never   Vaping Use    Vaping status: Never Used   Substance Use Topics    Alcohol use: Yes    Drug use: Yes     Types: Marijuana     Comment: once a month or less, > 1 month       Physical Exam  /69 (BP Location: Left arm, Patient Position: Chair, Cuff Size: Adult Large)   Pulse 67   Temp 98  F (36.7  C) (Tympanic)   Resp 16   Ht 1.803 m (5' 10.98\")   Wt 115.2 kg (254 lb)   SpO2 94%   BMI 35.44 kg/m    Body mass index is 35.44 kg/m .  GENERAL :  In no apparent distress  SKIN: Normal color, normal temperature, texture.  No hirsutism, alopecia or purple striae.     EYES: PERRL, EOMI, No scleral icterus,  No proptosis, conjunctival redness, stare, retraction  NECK: No visible masses. No palpable adenopathy, or masses. No carotid bruits.   THYROID:  Normal, nontender, smooth / firm texture,  no nodules, no Bruit   RESP: Lungs clear to auscultation bilaterally  CARDIAC: Regular rate and rhythm, normal S1 " S2, without murmurs, rubs or gallops    NEURO: awake, alert, responds appropriately to questions.  Cranial nerves intact.   Moves all extremities; Gait normal.  No tremor of the outstretched hand.   EXTREMITIES: No clubbing, cyanosis or edema.

## 2025-03-18 ENCOUNTER — LAB (OUTPATIENT)
Dept: LAB | Facility: CLINIC | Age: 40
End: 2025-03-18
Payer: COMMERCIAL

## 2025-03-18 DIAGNOSIS — R53.83 OTHER FATIGUE: ICD-10-CM

## 2025-03-18 DIAGNOSIS — Z76.89 ENCOUNTER FOR PERSON ENCOUNTERING HEALTH SERVICES: ICD-10-CM

## 2025-03-18 DIAGNOSIS — E03.9 HYPOTHYROIDISM, UNSPECIFIED TYPE: ICD-10-CM

## 2025-03-18 LAB — LITHIUM SERPL-SCNC: 0.9 MMOL/L (ref 0.6–1.2)

## 2025-03-18 PROCEDURE — 80048 BASIC METABOLIC PNL TOTAL CA: CPT

## 2025-03-18 PROCEDURE — 84270 ASSAY OF SEX HORMONE GLOBUL: CPT

## 2025-03-18 PROCEDURE — 80178 ASSAY OF LITHIUM: CPT

## 2025-03-18 PROCEDURE — 36415 COLL VENOUS BLD VENIPUNCTURE: CPT

## 2025-03-18 PROCEDURE — 86376 MICROSOMAL ANTIBODY EACH: CPT

## 2025-03-18 PROCEDURE — 84443 ASSAY THYROID STIM HORMONE: CPT

## 2025-03-18 PROCEDURE — 84439 ASSAY OF FREE THYROXINE: CPT

## 2025-03-19 LAB
ANION GAP SERPL CALCULATED.3IONS-SCNC: 10 MMOL/L (ref 7–15)
BUN SERPL-MCNC: 13.3 MG/DL (ref 6–20)
CALCIUM SERPL-MCNC: 10 MG/DL (ref 8.8–10.4)
CHLORIDE SERPL-SCNC: 104 MMOL/L (ref 98–107)
CREAT SERPL-MCNC: 0.87 MG/DL (ref 0.67–1.17)
EGFRCR SERPLBLD CKD-EPI 2021: >90 ML/MIN/1.73M2
GLUCOSE SERPL-MCNC: 92 MG/DL (ref 70–99)
HCO3 SERPL-SCNC: 26 MMOL/L (ref 22–29)
POTASSIUM SERPL-SCNC: 4.5 MMOL/L (ref 3.4–5.3)
SHBG SERPL-SCNC: 63 NMOL/L (ref 11–80)
SODIUM SERPL-SCNC: 140 MMOL/L (ref 135–145)
T4 FREE SERPL-MCNC: 0.57 NG/DL (ref 0.9–1.7)
THYROPEROXIDASE AB SERPL-ACNC: <10 IU/ML
TSH SERPL DL<=0.005 MIU/L-ACNC: 0.67 UIU/ML (ref 0.3–4.2)

## 2025-05-12 DIAGNOSIS — Z76.89 ENCOUNTER FOR PERSON ENCOUNTERING HEALTH SERVICES: Primary | ICD-10-CM

## 2025-05-18 ENCOUNTER — LAB (OUTPATIENT)
Dept: LAB | Facility: CLINIC | Age: 40
End: 2025-05-18
Payer: COMMERCIAL

## 2025-05-18 DIAGNOSIS — R73.09 ELEVATED GLUCOSE: Primary | ICD-10-CM

## 2025-05-18 DIAGNOSIS — Z76.89 ENCOUNTER FOR PERSON ENCOUNTERING HEALTH SERVICES: ICD-10-CM

## 2025-05-18 LAB — LITHIUM SERPL-SCNC: 0.73 MMOL/L (ref 0.6–1.2)

## 2025-05-18 PROCEDURE — 80178 ASSAY OF LITHIUM: CPT

## 2025-05-18 PROCEDURE — 36415 COLL VENOUS BLD VENIPUNCTURE: CPT

## 2025-05-20 DIAGNOSIS — Z79.899 OTHER LONG TERM (CURRENT) DRUG THERAPY: Primary | ICD-10-CM

## 2025-05-21 DIAGNOSIS — Z79.899 ENCOUNTER FOR LONG-TERM (CURRENT) USE OF MEDICATIONS: Primary | ICD-10-CM

## 2025-05-31 ENCOUNTER — LAB (OUTPATIENT)
Dept: LAB | Facility: CLINIC | Age: 40
End: 2025-05-31
Payer: COMMERCIAL

## 2025-05-31 DIAGNOSIS — Z79.899 ENCOUNTER FOR LONG-TERM (CURRENT) USE OF MEDICATIONS: ICD-10-CM

## 2025-05-31 DIAGNOSIS — E03.9 HYPOTHYROIDISM, UNSPECIFIED TYPE: ICD-10-CM

## 2025-05-31 DIAGNOSIS — Z79.899 OTHER LONG TERM (CURRENT) DRUG THERAPY: ICD-10-CM

## 2025-05-31 LAB
ALBUMIN SERPL BCG-MCNC: 4.4 G/DL (ref 3.5–5.2)
ALP SERPL-CCNC: 82 U/L (ref 40–150)
ALT SERPL W P-5'-P-CCNC: 28 U/L (ref 0–70)
ANION GAP SERPL CALCULATED.3IONS-SCNC: 9 MMOL/L (ref 7–15)
AST SERPL W P-5'-P-CCNC: 22 U/L (ref 0–45)
BILIRUB SERPL-MCNC: 0.7 MG/DL
BUN SERPL-MCNC: 15.4 MG/DL (ref 6–20)
CALCIUM SERPL-MCNC: 10.2 MG/DL (ref 8.8–10.4)
CHLORIDE SERPL-SCNC: 104 MMOL/L (ref 98–107)
CREAT SERPL-MCNC: 0.94 MG/DL (ref 0.67–1.17)
EGFRCR SERPLBLD CKD-EPI 2021: >90 ML/MIN/1.73M2
GLUCOSE SERPL-MCNC: 90 MG/DL (ref 70–99)
HCO3 SERPL-SCNC: 24 MMOL/L (ref 22–29)
POTASSIUM SERPL-SCNC: 4.4 MMOL/L (ref 3.4–5.3)
PROT SERPL-MCNC: 6.9 G/DL (ref 6.4–8.3)
SODIUM SERPL-SCNC: 137 MMOL/L (ref 135–145)
T3FREE SERPL-MCNC: 4.9 PG/ML (ref 2–4.4)
T4 FREE SERPL-MCNC: 0.63 NG/DL (ref 0.9–1.7)
TSH SERPL DL<=0.005 MIU/L-ACNC: 1 UIU/ML (ref 0.3–4.2)

## 2025-05-31 PROCEDURE — 84439 ASSAY OF FREE THYROXINE: CPT

## 2025-05-31 PROCEDURE — 80178 ASSAY OF LITHIUM: CPT

## 2025-05-31 PROCEDURE — 84481 FREE ASSAY (FT-3): CPT

## 2025-05-31 PROCEDURE — 84443 ASSAY THYROID STIM HORMONE: CPT

## 2025-05-31 PROCEDURE — 36415 COLL VENOUS BLD VENIPUNCTURE: CPT

## 2025-05-31 PROCEDURE — 80053 COMPREHEN METABOLIC PANEL: CPT

## 2025-06-01 ENCOUNTER — RESULTS FOLLOW-UP (OUTPATIENT)
Dept: FAMILY MEDICINE | Facility: CLINIC | Age: 40
End: 2025-06-01
Payer: COMMERCIAL

## 2025-06-01 DIAGNOSIS — E03.9 HYPOTHYROIDISM, UNSPECIFIED TYPE: ICD-10-CM

## 2025-06-01 LAB — LITHIUM SERPL-SCNC: 1.05 MMOL/L (ref 0.6–1.2)

## 2025-06-01 RX ORDER — LEVOTHYROXINE SODIUM 50 UG/1
25 TABLET ORAL DAILY
Qty: 90 TABLET | Refills: 4 | Status: SHIPPED | OUTPATIENT
Start: 2025-06-01 | End: 2025-06-02

## 2025-06-02 RX ORDER — LEVOTHYROXINE SODIUM 50 UG/1
50 TABLET ORAL DAILY
Qty: 90 TABLET | Refills: 4 | Status: SHIPPED | OUTPATIENT
Start: 2025-06-02

## 2025-06-02 NOTE — RESULT ENCOUNTER NOTE
Dear Shakir,    Here is a summary of your recent test results:  -TSH (thyroid stimulating hormone) is abnormal suggesting you are currently underreplaced.  ADVISE: changing your medication dose to 50 micrograms/day (a prescription has been sent to your pharmacy) and rechecking your TSH with a lab appointment in 6-8 weeks.    For additional lab test information, www.[email or web address] is a very good reference.    In addition, here is a list of due or overdue Health Maintenance reminders:  Health Maintenance Due   Topic Date Due    Yearly Preventive Visit  05/22/2024    COVID-19 Vaccine (5 - 2024-25 season) 09/01/2024    Depression Assessment  03/12/2025    Kidney Microalbumin Urine Test  06/06/2025       Please call us at [phone] (or use [employer]) to address the above recommendations if needed.           Thank you very much for trusting me and [facility].     Have a peaceful day.    Healthy regards,  Jovany Denney MD

## 2025-06-21 ENCOUNTER — LAB (OUTPATIENT)
Dept: LAB | Facility: CLINIC | Age: 40
End: 2025-06-21
Payer: COMMERCIAL

## 2025-06-21 DIAGNOSIS — Z79.899 ENCOUNTER FOR LONG-TERM (CURRENT) USE OF MEDICATIONS: ICD-10-CM

## 2025-06-21 PROCEDURE — 80178 ASSAY OF LITHIUM: CPT

## 2025-06-21 PROCEDURE — 36415 COLL VENOUS BLD VENIPUNCTURE: CPT

## 2025-06-22 LAB — LITHIUM SERPL-SCNC: 1.07 MMOL/L (ref 0.6–1.2)

## 2025-06-25 DIAGNOSIS — Z76.89 ENCOUNTER TO ESTABLISH CARE: Primary | ICD-10-CM

## 2025-07-02 ENCOUNTER — LAB (OUTPATIENT)
Dept: LAB | Facility: CLINIC | Age: 40
End: 2025-07-02
Payer: COMMERCIAL

## 2025-07-02 DIAGNOSIS — Z76.89 ENCOUNTER TO ESTABLISH CARE: ICD-10-CM

## 2025-07-02 DIAGNOSIS — I10 HYPERTENSION GOAL BP (BLOOD PRESSURE) < 130/80: Primary | ICD-10-CM

## 2025-07-02 LAB — LITHIUM SERPL-SCNC: 1.06 MMOL/L (ref 0.6–1.2)

## 2025-07-02 PROCEDURE — 80178 ASSAY OF LITHIUM: CPT

## 2025-07-02 PROCEDURE — 36415 COLL VENOUS BLD VENIPUNCTURE: CPT

## 2025-07-08 ENCOUNTER — VIRTUAL VISIT (OUTPATIENT)
Dept: FAMILY MEDICINE | Facility: CLINIC | Age: 40
End: 2025-07-08
Payer: COMMERCIAL

## 2025-07-08 DIAGNOSIS — F41.0 PANIC ATTACKS: Primary | ICD-10-CM

## 2025-07-08 PROCEDURE — 98006 SYNCH AUDIO-VIDEO EST MOD 30: CPT | Performed by: FAMILY MEDICINE

## 2025-07-08 RX ORDER — LURASIDONE HYDROCHLORIDE 80 MG/1
80 TABLET, FILM COATED ORAL DAILY
COMMUNITY
Start: 2025-04-08

## 2025-07-08 RX ORDER — VILAZODONE HYDROCHLORIDE 10 MG/1
10 TABLET ORAL DAILY
COMMUNITY
Start: 2025-06-26

## 2025-07-08 RX ORDER — LITHIUM CARBONATE 300 MG/1
900 CAPSULE ORAL AT BEDTIME
COMMUNITY
Start: 2025-07-07

## 2025-07-08 RX ORDER — DEXTROAMPHETAMINE SACCHARATE, AMPHETAMINE ASPARTATE, DEXTROAMPHETAMINE SULFATE AND AMPHETAMINE SULFATE 3.75; 3.75; 3.75; 3.75 MG/1; MG/1; MG/1; MG/1
15 TABLET ORAL 2 TIMES DAILY
COMMUNITY
Start: 2025-07-08

## 2025-07-08 RX ORDER — FLUVOXAMINE MALEATE 100 MG
100 TABLET ORAL DAILY
COMMUNITY
Start: 2025-06-30

## 2025-07-08 RX ORDER — VILAZODONE HYDROCHLORIDE 20 MG/1
20 TABLET ORAL DAILY
COMMUNITY
Start: 2025-06-26 | End: 2025-07-08 | Stop reason: DRUGHIGH

## 2025-07-08 RX ORDER — TRAZODONE HYDROCHLORIDE 100 MG/1
100 TABLET ORAL AT BEDTIME
COMMUNITY
Start: 2025-04-08

## 2025-07-08 ASSESSMENT — ANXIETY QUESTIONNAIRES
5. BEING SO RESTLESS THAT IT IS HARD TO SIT STILL: MORE THAN HALF THE DAYS
6. BECOMING EASILY ANNOYED OR IRRITABLE: MORE THAN HALF THE DAYS
GAD7 TOTAL SCORE: 14
4. TROUBLE RELAXING: MORE THAN HALF THE DAYS
7. FEELING AFRAID AS IF SOMETHING AWFUL MIGHT HAPPEN: MORE THAN HALF THE DAYS
8. IF YOU CHECKED OFF ANY PROBLEMS, HOW DIFFICULT HAVE THESE MADE IT FOR YOU TO DO YOUR WORK, TAKE CARE OF THINGS AT HOME, OR GET ALONG WITH OTHER PEOPLE?: SOMEWHAT DIFFICULT
3. WORRYING TOO MUCH ABOUT DIFFERENT THINGS: MORE THAN HALF THE DAYS
2. NOT BEING ABLE TO STOP OR CONTROL WORRYING: MORE THAN HALF THE DAYS
7. FEELING AFRAID AS IF SOMETHING AWFUL MIGHT HAPPEN: MORE THAN HALF THE DAYS
GAD7 TOTAL SCORE: 14
GAD7 TOTAL SCORE: 14
1. FEELING NERVOUS, ANXIOUS, OR ON EDGE: MORE THAN HALF THE DAYS
IF YOU CHECKED OFF ANY PROBLEMS ON THIS QUESTIONNAIRE, HOW DIFFICULT HAVE THESE PROBLEMS MADE IT FOR YOU TO DO YOUR WORK, TAKE CARE OF THINGS AT HOME, OR GET ALONG WITH OTHER PEOPLE: SOMEWHAT DIFFICULT

## 2025-07-08 ASSESSMENT — PATIENT HEALTH QUESTIONNAIRE - PHQ9
10. IF YOU CHECKED OFF ANY PROBLEMS, HOW DIFFICULT HAVE THESE PROBLEMS MADE IT FOR YOU TO DO YOUR WORK, TAKE CARE OF THINGS AT HOME, OR GET ALONG WITH OTHER PEOPLE: SOMEWHAT DIFFICULT
SUM OF ALL RESPONSES TO PHQ QUESTIONS 1-9: 20
SUM OF ALL RESPONSES TO PHQ QUESTIONS 1-9: 20

## 2025-07-08 ASSESSMENT — COLUMBIA-SUICIDE SEVERITY RATING SCALE - C-SSRS
6. HAVE YOU EVER DONE ANYTHING, STARTED TO DO ANYTHING, OR PREPARED TO DO ANYTHING TO END YOUR LIFE?: NO
2. IN THE PAST MONTH, HAVE YOU ACTUALLY HAD ANY THOUGHTS OF KILLING YOURSELF?: NO
1. WITHIN THE PAST MONTH, HAVE YOU WISHED YOU WERE DEAD OR WISHED YOU COULD GO TO SLEEP AND NOT WAKE UP?: YES

## 2025-07-08 NOTE — PROGRESS NOTES
Shakir is a 40 year old who is being evaluated via a billable video visit.    What phone number would you like to be contacted at? [phone]  How would you like to obtain your AVS? [email or web address]      Assessment & Plan     Panic attacks  - Episodes of increased anxiety, possibly related to cortisol levels, resembling panic attacks.  - Check cortisol levels with a morning lab before 9 o'clock. Referral to endocrinology for further evaluation of cortisol levels at other times of the day as he is interested in that.. Schedule a lab appointment via [facility] for cortisol and thyroid level checks. Follow-up with endocrinologist Meche Jacinot in September for further discussion.  He also could talk with psychiatrist team about maybe taking a low-dose of extra Adderall in the afternoon as it seems to help control the symptoms.  - Cortisol         Consent was obtained from the patient to use an AI documentation tool in the creation of this note.    Depression Screening Follow Up        7/8/2025     8:14 AM   PHQ   PHQ-9 Total Score 20    Q9: Thoughts of better off dead/self-harm past 2 weeks More than half the days   F/U: Thoughts of suicide or self-harm Yes   F/U: Self harm-plan No   F/U: Self-harm action No   F/U: Safety concerns No       Patient-reported           7/8/2025     9:16 AM   C-SSRS (Brief [place])   Within the last month, have you wished you were dead or wished you could go to sleep and not wake up? Yes   Within the last month, have you had any actual thoughts of killing yourself? No   Within the last month, have you ever done anything, started to do anything, or prepared to do anything to end your life? No               Follow Up Actions Taken  Crisis resource information provided in the After Visit Summary    Discussed the following ways the patient can remain in a safe environment:  be around others    No follow-ups on file.    Follow-up Visit   Expected date: Jul 08, 2025      Follow Up Appointment Details:      Follow-up with whom?: Other Primary Care Services    Follow-Up for what?: Lab Visit    How?: In Person                     Jovany Denney MD     [address] [address]  [place]     Office: [phone]           Subjective   Shakir is a 40 year old, presenting for the following health issues:  cortisol levels elevated        7/8/2025     8:36 AM   Additional Questions   Roomed by Abi ONEIL CMA   Consent was obtained from the patient to use an AI documentation tool in the creation of this note.      History of Present Illness       Reason for visit:  High cortisol at night  Symptom onset:  More than a month  Symptom intensity:  Moderate  Symptom progression:  Staying the same  Had these symptoms before:  No   He is taking medications regularly.    For the past 2 months (since May 2025), he has had recurrent episodes of severe anxiety in the late afternoon or early evening, described as building anxiety and possible panic attack, physical symptoms including itchiness and watery eyes, and feeling shut down  - Episodes feel similar to panic attacks.  - Episodes last until able to fall asleep or after taking psychotropic medications  - Symptoms less pronounced during the day  - Noted increase in severity when taking less Adderall; symptoms improve within a few hours after taking Adderall  - Magnesium taken in the afternoon provides partial relief  - Previous increase in lithium dose for about a week led to some improvement at night but caused worsening overall, requiring dose reduction  - No significant physical anxiety symptoms during the day  - No mention of caffeine as a trigger  - No recent behavior changes identified by him    Relevant Prior Evaluations  - Lithium levels checked previously and found to be within normal range  - Thyroid and testosterone levels checked in March 2025; testosterone reported as normal  - No prior abnormal findings from  endocrinology evaluation in March 2025  He is interested in looking at his cortisol levels-          Objective           Vitals:  No vitals were obtained today due to virtual visit.    Physical Exam   GENERAL: alert and no distress  EYES: Eyes grossly normal to inspection.  No discharge or erythema, or obvious scleral/conjunctival abnormalities.  RESP: No audible wheeze, cough, or visible cyanosis.    SKIN: Visible skin clear. No significant rash, abnormal pigmentation or lesions.  NEURO: Cranial nerves grossly intact.  Mentation and speech appropriate for age.  PSYCH: Appropriate affect, tone, and pace of words          Video-Visit Details    Type of service:  Video Visit   Originating Location (pt. Location): Home    Distant Location (provider location):  On-site  Platform used for Video Visit: Charles  Signed Electronically by: David Denney MD

## 2025-07-14 ENCOUNTER — DOCUMENTATION ONLY (OUTPATIENT)
Dept: FAMILY MEDICINE | Facility: CLINIC | Age: 40
End: 2025-07-14
Payer: COMMERCIAL

## 2025-07-14 DIAGNOSIS — I10 HYPERTENSION GOAL BP (BLOOD PRESSURE) < 130/80: Primary | ICD-10-CM

## 2025-07-14 NOTE — PROGRESS NOTES
Abel Ward has an upcoming lab appointment and is eligible to have due Health Maintenance labs drawn per Health Maintenance Protocol Orders (HMPO) process.    Before it can be drawn, a diagnosis is needed for the following lab: MICROALBUMIN    Please review and enter diagnosis as appropriate.         Alma ROJAS

## 2025-07-15 ENCOUNTER — LAB (OUTPATIENT)
Dept: LAB | Facility: CLINIC | Age: 40
End: 2025-07-15
Payer: COMMERCIAL

## 2025-07-15 DIAGNOSIS — F41.0 PANIC ATTACKS: ICD-10-CM

## 2025-07-15 DIAGNOSIS — E03.9 HYPOTHYROIDISM, UNSPECIFIED TYPE: ICD-10-CM

## 2025-07-15 LAB
CORTIS SERPL-MCNC: 10.9 UG/DL
T3FREE SERPL-MCNC: 4.3 PG/ML (ref 2–4.4)
T4 FREE SERPL-MCNC: 0.63 NG/DL (ref 0.9–1.7)
TSH SERPL DL<=0.005 MIU/L-ACNC: 0.39 UIU/ML (ref 0.3–4.2)

## 2025-07-15 PROCEDURE — 84439 ASSAY OF FREE THYROXINE: CPT

## 2025-07-15 PROCEDURE — 84481 FREE ASSAY (FT-3): CPT

## 2025-07-15 PROCEDURE — 84443 ASSAY THYROID STIM HORMONE: CPT

## 2025-07-15 PROCEDURE — 82533 TOTAL CORTISOL: CPT

## 2025-07-15 PROCEDURE — 36415 COLL VENOUS BLD VENIPUNCTURE: CPT

## 2025-07-22 DIAGNOSIS — E03.9 HYPOTHYROIDISM, UNSPECIFIED TYPE: Primary | ICD-10-CM

## 2025-07-29 DIAGNOSIS — Z76.89 PERSONS ENCOUNTERING HEALTH SERVICES IN OTHER SPECIFIED CIRCUMSTANCES: Primary | ICD-10-CM

## 2025-08-03 ENCOUNTER — LAB (OUTPATIENT)
Dept: LAB | Facility: CLINIC | Age: 40
End: 2025-08-03
Payer: COMMERCIAL

## 2025-08-03 DIAGNOSIS — Z76.89 PERSONS ENCOUNTERING HEALTH SERVICES IN OTHER SPECIFIED CIRCUMSTANCES: ICD-10-CM

## 2025-08-03 LAB — LITHIUM SERPL-SCNC: 0.99 MMOL/L (ref 0.6–1.2)

## 2025-08-03 PROCEDURE — 80178 ASSAY OF LITHIUM: CPT

## 2025-08-03 PROCEDURE — 36415 COLL VENOUS BLD VENIPUNCTURE: CPT

## 2025-08-06 DIAGNOSIS — Z92.29 PERSONAL HISTORY OF OTHER DRUG THERAPY: Primary | ICD-10-CM

## 2025-08-13 ENCOUNTER — OFFICE VISIT (OUTPATIENT)
Dept: URGENT CARE | Facility: URGENT CARE | Age: 40
End: 2025-08-13
Payer: COMMERCIAL

## 2025-08-13 VITALS
OXYGEN SATURATION: 99 % | HEIGHT: 70 IN | SYSTOLIC BLOOD PRESSURE: 127 MMHG | RESPIRATION RATE: 15 BRPM | TEMPERATURE: 97.4 F | DIASTOLIC BLOOD PRESSURE: 70 MMHG | BODY MASS INDEX: 35.48 KG/M2 | WEIGHT: 247.8 LBS | HEART RATE: 73 BPM

## 2025-08-13 DIAGNOSIS — R53.83 FATIGUE, UNSPECIFIED TYPE: Primary | ICD-10-CM

## 2025-08-13 LAB
ALBUMIN SERPL-MCNC: 3.9 G/DL (ref 3.4–5)
ALP SERPL-CCNC: 78 U/L (ref 40–150)
ALT SERPL W P-5'-P-CCNC: 22 U/L (ref 0–70)
ANION GAP SERPL CALCULATED.3IONS-SCNC: <1 MMOL/L (ref 3–14)
AST SERPL W P-5'-P-CCNC: 22 U/L (ref 0–45)
BASOPHILS # BLD AUTO: 0.05 10E3/UL (ref 0–0.2)
BASOPHILS NFR BLD AUTO: 0.6 %
BILIRUB SERPL-MCNC: 0.6 MG/DL (ref 0.2–1.3)
BUN SERPL-MCNC: 15 MG/DL (ref 7–30)
CALCIUM SERPL-MCNC: 10.2 MG/DL (ref 8.5–10.1)
CHLORIDE BLD-SCNC: 107 MMOL/L (ref 94–109)
CO2 SERPL-SCNC: 28 MMOL/L (ref 20–32)
CREAT SERPL-MCNC: 1.1 MG/DL (ref 0.66–1.25)
EGFRCR SERPLBLD CKD-EPI 2021: 88 ML/MIN/1.73M2
EOSINOPHIL # BLD AUTO: 0.45 10E3/UL (ref 0–0.7)
EOSINOPHIL NFR BLD AUTO: 5.2 %
ERYTHROCYTE [DISTWIDTH] IN BLOOD BY AUTOMATED COUNT: 11.9 % (ref 10–15)
EST. AVERAGE GLUCOSE BLD GHB EST-MCNC: 94 MG/DL
GLUCOSE BLD-MCNC: 132 MG/DL (ref 70–99)
HBA1C MFR BLD: 4.9 % (ref 0–5.6)
HCT VFR BLD AUTO: 40.4 % (ref 40–53)
HGB BLD-MCNC: 13.5 G/DL (ref 13.3–17.7)
IMM GRANULOCYTES # BLD: <0.04 10E3/UL
IMM GRANULOCYTES NFR BLD: 0.1 %
LYMPHOCYTES # BLD AUTO: 1.79 10E3/UL (ref 0.8–5.3)
LYMPHOCYTES NFR BLD AUTO: 20.8 %
MCH RBC QN AUTO: 29.4 PG (ref 26.5–33)
MCHC RBC AUTO-ENTMCNC: 33.4 G/DL (ref 31.5–36.5)
MCV RBC AUTO: 88 FL (ref 78–100)
MONOCYTES # BLD AUTO: 0.59 10E3/UL (ref 0–1.3)
MONOCYTES NFR BLD AUTO: 6.9 %
NEUTROPHILS # BLD AUTO: 5.72 10E3/UL (ref 1.6–8.3)
NEUTROPHILS NFR BLD AUTO: 66.4 %
PLATELET # BLD AUTO: 248 10E3/UL (ref 150–450)
POTASSIUM BLD-SCNC: 4.1 MMOL/L (ref 3.4–5.3)
PROT SERPL-MCNC: 6.8 G/DL (ref 6.8–8.8)
RBC # BLD AUTO: 4.59 10E6/UL (ref 4.4–5.9)
SODIUM SERPL-SCNC: 135 MMOL/L (ref 135–145)
WBC # BLD AUTO: 8.61 10E3/UL (ref 4–11)

## 2025-08-13 PROCEDURE — 3078F DIAST BP <80 MM HG: CPT | Performed by: PHYSICIAN ASSISTANT

## 2025-08-13 PROCEDURE — 3074F SYST BP LT 130 MM HG: CPT | Performed by: PHYSICIAN ASSISTANT

## 2025-08-13 PROCEDURE — 99214 OFFICE O/P EST MOD 30 MIN: CPT | Performed by: PHYSICIAN ASSISTANT

## 2025-08-13 PROCEDURE — 84443 ASSAY THYROID STIM HORMONE: CPT | Performed by: PHYSICIAN ASSISTANT

## 2025-08-13 PROCEDURE — 80053 COMPREHEN METABOLIC PANEL: CPT | Performed by: PHYSICIAN ASSISTANT

## 2025-08-13 PROCEDURE — 36415 COLL VENOUS BLD VENIPUNCTURE: CPT | Performed by: PHYSICIAN ASSISTANT

## 2025-08-13 PROCEDURE — 85025 COMPLETE CBC W/AUTO DIFF WBC: CPT | Performed by: PHYSICIAN ASSISTANT

## 2025-08-13 PROCEDURE — 83036 HEMOGLOBIN GLYCOSYLATED A1C: CPT | Performed by: PHYSICIAN ASSISTANT

## 2025-08-13 PROCEDURE — 80178 ASSAY OF LITHIUM: CPT | Performed by: PHYSICIAN ASSISTANT

## 2025-08-13 PROCEDURE — 3044F HG A1C LEVEL LT 7.0%: CPT | Performed by: PHYSICIAN ASSISTANT

## 2025-08-14 LAB
LITHIUM SERPL-SCNC: 0.7 MMOL/L (ref 0.6–1.2)
TSH SERPL DL<=0.005 MIU/L-ACNC: 1.01 UIU/ML (ref 0.3–4.2)

## 2025-08-16 ENCOUNTER — HEALTH MAINTENANCE LETTER (OUTPATIENT)
Age: 40
End: 2025-08-16

## 2025-09-01 ENCOUNTER — TELEPHONE (OUTPATIENT)
Dept: BEHAVIORAL HEALTH | Facility: CLINIC | Age: 40
End: 2025-09-01

## 2025-09-01 ENCOUNTER — HOSPITAL ENCOUNTER (INPATIENT)
Facility: CLINIC | Age: 40
End: 2025-09-01
Attending: STUDENT IN AN ORGANIZED HEALTH CARE EDUCATION/TRAINING PROGRAM | Admitting: PSYCHIATRY & NEUROLOGY
Payer: COMMERCIAL

## 2025-09-01 DIAGNOSIS — R45.851 SUICIDAL IDEATION: Primary | ICD-10-CM

## 2025-09-01 DIAGNOSIS — F41.1 GAD (GENERALIZED ANXIETY DISORDER): ICD-10-CM

## 2025-09-01 DIAGNOSIS — F33.2 MAJOR DEPRESSIVE DISORDER, RECURRENT SEVERE WITHOUT PSYCHOTIC FEATURES (H): ICD-10-CM

## 2025-09-01 LAB
AMPHETAMINES UR QL SCN: ABNORMAL
BARBITURATES UR QL SCN: ABNORMAL
BENZODIAZ UR QL SCN: ABNORMAL
BZE UR QL SCN: ABNORMAL
CANNABINOIDS UR QL SCN: ABNORMAL
FENTANYL UR QL: ABNORMAL
OPIATES UR QL SCN: ABNORMAL
PCP QUAL URINE (ROCHE): ABNORMAL

## 2025-09-01 PROCEDURE — 99285 EMERGENCY DEPT VISIT HI MDM: CPT | Performed by: STUDENT IN AN ORGANIZED HEALTH CARE EDUCATION/TRAINING PROGRAM

## 2025-09-01 PROCEDURE — 80307 DRUG TEST PRSMV CHEM ANLYZR: CPT | Performed by: STUDENT IN AN ORGANIZED HEALTH CARE EDUCATION/TRAINING PROGRAM

## 2025-09-01 ASSESSMENT — ACTIVITIES OF DAILY LIVING (ADL)
ADLS_ACUITY_SCORE: 47
ADLS_ACUITY_SCORE: 47

## 2025-09-01 ASSESSMENT — COLUMBIA-SUICIDE SEVERITY RATING SCALE - C-SSRS
6. HAVE YOU EVER DONE ANYTHING, STARTED TO DO ANYTHING, OR PREPARED TO DO ANYTHING TO END YOUR LIFE?: YES
3. HAVE YOU BEEN THINKING ABOUT HOW YOU MIGHT KILL YOURSELF?: YES
4. HAVE YOU HAD THESE THOUGHTS AND HAD SOME INTENTION OF ACTING ON THEM?: YES
5. HAVE YOU STARTED TO WORK OUT OR WORKED OUT THE DETAILS OF HOW TO KILL YOURSELF? DO YOU INTEND TO CARRY OUT THIS PLAN?: NO
2. HAVE YOU ACTUALLY HAD ANY THOUGHTS OF KILLING YOURSELF IN THE PAST MONTH?: YES
1. IN THE PAST MONTH, HAVE YOU WISHED YOU WERE DEAD OR WISHED YOU COULD GO TO SLEEP AND NOT WAKE UP?: YES

## 2025-09-02 ENCOUNTER — TELEPHONE (OUTPATIENT)
Dept: BEHAVIORAL HEALTH | Facility: CLINIC | Age: 40
End: 2025-09-02
Payer: COMMERCIAL

## 2025-09-02 PROCEDURE — 250N000013 HC RX MED GY IP 250 OP 250 PS 637

## 2025-09-02 PROCEDURE — 250N000013 HC RX MED GY IP 250 OP 250 PS 637: Performed by: FAMILY MEDICINE

## 2025-09-02 PROCEDURE — 99285 EMERGENCY DEPT VISIT HI MDM: CPT

## 2025-09-02 PROCEDURE — 250N000013 HC RX MED GY IP 250 OP 250 PS 637: Performed by: EMERGENCY MEDICINE

## 2025-09-02 RX ORDER — VILAZODONE HYDROCHLORIDE 10 MG/1
10 TABLET ORAL DAILY
Status: DISCONTINUED | OUTPATIENT
Start: 2025-09-02 | End: 2025-09-02

## 2025-09-02 RX ORDER — LURASIDONE HYDROCHLORIDE 80 MG/1
80 TABLET, FILM COATED ORAL DAILY
Status: DISCONTINUED | OUTPATIENT
Start: 2025-09-02 | End: 2025-09-02

## 2025-09-02 RX ORDER — PANTOPRAZOLE SODIUM 40 MG/1
40 TABLET, DELAYED RELEASE ORAL
Status: DISPENSED | OUTPATIENT
Start: 2025-09-02

## 2025-09-02 RX ORDER — TRAZODONE HYDROCHLORIDE 100 MG/1
100 TABLET ORAL AT BEDTIME
Status: DISPENSED | OUTPATIENT
Start: 2025-09-02

## 2025-09-02 RX ORDER — DIPHENHYDRAMINE HCL 25 MG
25 CAPSULE ORAL
Status: COMPLETED | OUTPATIENT
Start: 2025-09-02 | End: 2025-09-02

## 2025-09-02 RX ORDER — LEVOTHYROXINE SODIUM 25 UG/1
50 TABLET ORAL DAILY
Status: DISPENSED | OUTPATIENT
Start: 2025-09-02

## 2025-09-02 RX ORDER — DEXTROAMPHETAMINE SACCHARATE, AMPHETAMINE ASPARTATE, DEXTROAMPHETAMINE SULFATE AND AMPHETAMINE SULFATE 3.75; 3.75; 3.75; 3.75 MG/1; MG/1; MG/1; MG/1
15 TABLET ORAL 2 TIMES DAILY
Refills: 0 | Status: DISPENSED | OUTPATIENT
Start: 2025-09-02

## 2025-09-02 RX ORDER — TAMSULOSIN HYDROCHLORIDE 0.4 MG/1
0.4 CAPSULE ORAL DAILY
Status: DISPENSED | OUTPATIENT
Start: 2025-09-02

## 2025-09-02 RX ORDER — OMEPRAZOLE 20 MG/1
40 CAPSULE, DELAYED RELEASE ORAL 2 TIMES DAILY
Status: DISCONTINUED | OUTPATIENT
Start: 2025-09-02 | End: 2025-09-02

## 2025-09-02 RX ORDER — FLUVOXAMINE MALEATE 100 MG
100 TABLET ORAL EVERY EVENING
Status: DISCONTINUED | OUTPATIENT
Start: 2025-09-02 | End: 2025-09-02

## 2025-09-02 RX ORDER — MULTIPLE VITAMINS W/ MINERALS TAB 9MG-400MCG
1 TAB ORAL EVERY MORNING
Status: CANCELLED | OUTPATIENT
Start: 2025-09-02

## 2025-09-02 RX ORDER — AMLODIPINE BESYLATE 5 MG/1
5 TABLET ORAL DAILY
Status: CANCELLED | OUTPATIENT
Start: 2025-09-02

## 2025-09-02 RX ORDER — MAGNESIUM OXIDE 400 MG/1
400 TABLET ORAL DAILY
Status: DISPENSED | OUTPATIENT
Start: 2025-09-02

## 2025-09-02 RX ORDER — LURASIDONE HYDROCHLORIDE 80 MG/1
80 TABLET, FILM COATED ORAL DAILY
Status: CANCELLED | OUTPATIENT
Start: 2025-09-02

## 2025-09-02 RX ORDER — LURASIDONE HYDROCHLORIDE 20 MG/1
20 TABLET, FILM COATED ORAL
Status: DISCONTINUED | OUTPATIENT
Start: 2025-09-02 | End: 2025-09-02

## 2025-09-02 RX ORDER — AMLODIPINE BESYLATE 5 MG/1
5 TABLET ORAL DAILY
Status: DISPENSED | OUTPATIENT
Start: 2025-09-02

## 2025-09-02 RX ORDER — OMEPRAZOLE 20 MG/1
40 CAPSULE, DELAYED RELEASE ORAL 2 TIMES DAILY
Status: CANCELLED | OUTPATIENT
Start: 2025-09-02

## 2025-09-02 RX ORDER — FLUVOXAMINE MALEATE 50 MG
50 TABLET ORAL 2 TIMES DAILY
Status: DISCONTINUED | OUTPATIENT
Start: 2025-09-02 | End: 2025-09-03

## 2025-09-02 RX ORDER — MULTIPLE VITAMINS W/ MINERALS TAB 9MG-400MCG
1 TAB ORAL EVERY MORNING
Status: DISCONTINUED | OUTPATIENT
Start: 2025-09-02 | End: 2025-09-03

## 2025-09-02 RX ORDER — LURASIDONE HYDROCHLORIDE 20 MG/1
20 TABLET, FILM COATED ORAL DAILY
Status: ON HOLD | COMMUNITY
Start: 2025-08-21 | End: 2025-09-04

## 2025-09-02 RX ORDER — QUETIAPINE FUMARATE 100 MG/1
200 TABLET, FILM COATED ORAL AT BEDTIME
Status: DISCONTINUED | OUTPATIENT
Start: 2025-09-02 | End: 2025-09-02

## 2025-09-02 RX ORDER — LITHIUM CARBONATE 300 MG/1
900 CAPSULE ORAL AT BEDTIME
Status: DISCONTINUED | OUTPATIENT
Start: 2025-09-02 | End: 2025-09-04

## 2025-09-02 RX ORDER — QUETIAPINE FUMARATE 100 MG/1
100 TABLET, FILM COATED ORAL AT BEDTIME
Status: CANCELLED | OUTPATIENT
Start: 2025-09-02

## 2025-09-02 RX ORDER — LITHIUM CARBONATE 150 MG/1
150 CAPSULE ORAL AT BEDTIME
Status: DISCONTINUED | OUTPATIENT
Start: 2025-09-02 | End: 2025-09-02

## 2025-09-02 RX ORDER — LITHIUM CARBONATE 150 MG/1
150 CAPSULE ORAL AT BEDTIME
Status: CANCELLED | OUTPATIENT
Start: 2025-09-02

## 2025-09-02 RX ADMIN — PANTOPRAZOLE SODIUM 40 MG: 40 TABLET, DELAYED RELEASE ORAL at 18:14

## 2025-09-02 RX ADMIN — DEXTROAMPHETAMINE SACCHARATE, AMPHETAMINE ASPARTATE, DEXTROAMPHETAMINE SULFATE, AND AMPHETAMINE SULFATE 15 MG: 3.75; 3.75; 3.75; 3.75 TABLET ORAL at 14:48

## 2025-09-02 RX ADMIN — FLUVOXAMINE MALEATE 50 MG: 50 TABLET ORAL at 14:48

## 2025-09-02 RX ADMIN — Medication 300 MG: at 23:51

## 2025-09-02 RX ADMIN — Medication 1 TABLET: at 12:23

## 2025-09-02 RX ADMIN — DIPHENHYDRAMINE HYDROCHLORIDE 25 MG: 25 CAPSULE ORAL at 03:08

## 2025-09-02 RX ADMIN — Medication 600 MG: at 18:14

## 2025-09-02 RX ADMIN — TRAZODONE HYDROCHLORIDE 100 MG: 100 TABLET ORAL at 23:51

## 2025-09-02 RX ADMIN — LITHIUM CARBONATE 900 MG: 600 CAPSULE ORAL at 03:08

## 2025-09-02 RX ADMIN — FLUVOXAMINE MALEATE 50 MG: 50 TABLET ORAL at 21:04

## 2025-09-02 RX ADMIN — AMLODIPINE BESYLATE 5 MG: 5 TABLET ORAL at 12:25

## 2025-09-02 RX ADMIN — DEXTROAMPHETAMINE SACCHARATE, AMPHETAMINE ASPARTATE, DEXTROAMPHETAMINE SULFATE, AND AMPHETAMINE SULFATE 15 MG: 3.75; 3.75; 3.75; 3.75 TABLET ORAL at 12:25

## 2025-09-02 RX ADMIN — LITHIUM CARBONATE 900 MG: 600 CAPSULE ORAL at 21:03

## 2025-09-02 RX ADMIN — LEVOTHYROXINE SODIUM 50 MCG: 0.05 TABLET ORAL at 14:48

## 2025-09-02 RX ADMIN — TAMSULOSIN HYDROCHLORIDE 0.4 MG: 0.4 CAPSULE ORAL at 12:23

## 2025-09-02 RX ADMIN — QUETIAPINE FUMARATE 200 MG: 100 TABLET ORAL at 03:07

## 2025-09-02 RX ADMIN — PANTOPRAZOLE SODIUM 40 MG: 40 TABLET, DELAYED RELEASE ORAL at 12:25

## 2025-09-02 ASSESSMENT — ACTIVITIES OF DAILY LIVING (ADL)
ADLS_ACUITY_SCORE: 47

## 2025-09-03 PROBLEM — R45.851 SUICIDAL IDEATION: Status: ACTIVE | Noted: 2025-09-03

## 2025-09-03 PROBLEM — F32.A DEPRESSION: Status: ACTIVE | Noted: 2025-09-03

## 2025-09-03 LAB
HOLD SPECIMEN: NORMAL
LITHIUM SERPL-SCNC: 1.08 MMOL/L (ref 0.6–1.2)
TSH SERPL DL<=0.005 MIU/L-ACNC: 0.94 UIU/ML (ref 0.3–4.2)

## 2025-09-03 PROCEDURE — 124N000002 HC R&B MH UMMC

## 2025-09-03 PROCEDURE — 250N000013 HC RX MED GY IP 250 OP 250 PS 637: Performed by: PSYCHIATRY & NEUROLOGY

## 2025-09-03 PROCEDURE — 80178 ASSAY OF LITHIUM: CPT

## 2025-09-03 PROCEDURE — 36415 COLL VENOUS BLD VENIPUNCTURE: CPT

## 2025-09-03 PROCEDURE — 250N000013 HC RX MED GY IP 250 OP 250 PS 637

## 2025-09-03 PROCEDURE — 84443 ASSAY THYROID STIM HORMONE: CPT | Performed by: PSYCHIATRY & NEUROLOGY

## 2025-09-03 PROCEDURE — 250N000013 HC RX MED GY IP 250 OP 250 PS 637: Performed by: EMERGENCY MEDICINE

## 2025-09-03 PROCEDURE — 36415 COLL VENOUS BLD VENIPUNCTURE: CPT | Performed by: PSYCHIATRY & NEUROLOGY

## 2025-09-03 PROCEDURE — 99223 1ST HOSP IP/OBS HIGH 75: CPT | Performed by: PSYCHIATRY & NEUROLOGY

## 2025-09-03 PROCEDURE — 250N000013 HC RX MED GY IP 250 OP 250 PS 637: Performed by: FAMILY MEDICINE

## 2025-09-03 RX ORDER — HYDROXYZINE HYDROCHLORIDE 25 MG/1
25 TABLET, FILM COATED ORAL EVERY 4 HOURS PRN
Status: DISPENSED | OUTPATIENT
Start: 2025-09-03

## 2025-09-03 RX ORDER — FLUVOXAMINE MALEATE 50 MG
50 TABLET ORAL EVERY MORNING
Status: DISPENSED | OUTPATIENT
Start: 2025-09-04

## 2025-09-03 RX ORDER — MULTIPLE VITAMINS W/ MINERALS TAB 9MG-400MCG
1 TAB ORAL DAILY
Status: DISPENSED | OUTPATIENT
Start: 2025-09-04

## 2025-09-03 RX ORDER — ACETAMINOPHEN 325 MG/1
650 TABLET ORAL EVERY 4 HOURS PRN
Status: ACTIVE | OUTPATIENT
Start: 2025-09-03

## 2025-09-03 RX ORDER — AMOXICILLIN 250 MG
1 CAPSULE ORAL 2 TIMES DAILY PRN
Status: ACTIVE | OUTPATIENT
Start: 2025-09-03

## 2025-09-03 RX ORDER — FLUVOXAMINE MALEATE 100 MG
100 TABLET ORAL AT BEDTIME
Status: DISPENSED | OUTPATIENT
Start: 2025-09-03

## 2025-09-03 RX ORDER — MAGNESIUM HYDROXIDE/ALUMINUM HYDROXICE/SIMETHICONE 120; 1200; 1200 MG/30ML; MG/30ML; MG/30ML
30 SUSPENSION ORAL EVERY 4 HOURS PRN
Status: ACTIVE | OUTPATIENT
Start: 2025-09-03

## 2025-09-03 RX ORDER — LORAZEPAM 1 MG/1
1 TABLET ORAL DAILY PRN
Status: DISPENSED | OUTPATIENT
Start: 2025-09-03

## 2025-09-03 RX ORDER — LURASIDONE HYDROCHLORIDE 20 MG/1
20 TABLET, FILM COATED ORAL DAILY
Status: DISCONTINUED | OUTPATIENT
Start: 2025-09-03 | End: 2025-09-03

## 2025-09-03 RX ORDER — LANOLIN ALCOHOL/MO/W.PET/CERES
5000 CREAM (GRAM) TOPICAL WEEKLY
Status: DISPENSED | OUTPATIENT
Start: 2025-09-04

## 2025-09-03 RX ADMIN — DEXTROAMPHETAMINE SACCHARATE, AMPHETAMINE ASPARTATE, DEXTROAMPHETAMINE SULFATE, AND AMPHETAMINE SULFATE 15 MG: 3.75; 3.75; 3.75; 3.75 TABLET ORAL at 09:03

## 2025-09-03 RX ADMIN — FLUVOXAMINE MALEATE 50 MG: 50 TABLET ORAL at 09:04

## 2025-09-03 RX ADMIN — Medication 600 MG: at 17:30

## 2025-09-03 RX ADMIN — Medication 300 MG: at 21:36

## 2025-09-03 RX ADMIN — AMLODIPINE BESYLATE 5 MG: 5 TABLET ORAL at 09:04

## 2025-09-03 RX ADMIN — Medication 1 TABLET: at 09:04

## 2025-09-03 RX ADMIN — Medication 600 MG: at 13:20

## 2025-09-03 RX ADMIN — MAGNESIUM OXIDE TAB 400 MG (241.3 MG ELEMENTAL MG) 400 MG: 400 (241.3 MG) TAB at 09:03

## 2025-09-03 RX ADMIN — LITHIUM CARBONATE 900 MG: 600 CAPSULE ORAL at 21:36

## 2025-09-03 RX ADMIN — TRAZODONE HYDROCHLORIDE 100 MG: 100 TABLET ORAL at 21:36

## 2025-09-03 RX ADMIN — TAMSULOSIN HYDROCHLORIDE 0.4 MG: 0.4 CAPSULE ORAL at 09:03

## 2025-09-03 RX ADMIN — FLUVOXAMINE MALEATE 100 MG: 100 TABLET ORAL at 21:36

## 2025-09-03 RX ADMIN — PANTOPRAZOLE SODIUM 40 MG: 40 TABLET, DELAYED RELEASE ORAL at 09:04

## 2025-09-03 RX ADMIN — Medication 5000 UNITS: at 11:45

## 2025-09-03 RX ADMIN — LORAZEPAM 1 MG: 1 TABLET ORAL at 18:28

## 2025-09-03 RX ADMIN — PANTOPRAZOLE SODIUM 40 MG: 40 TABLET, DELAYED RELEASE ORAL at 16:54

## 2025-09-03 RX ADMIN — DEXTROAMPHETAMINE SACCHARATE, AMPHETAMINE ASPARTATE, DEXTROAMPHETAMINE SULFATE, AND AMPHETAMINE SULFATE 15 MG: 3.75; 3.75; 3.75; 3.75 TABLET ORAL at 13:23

## 2025-09-03 RX ADMIN — LEVOTHYROXINE SODIUM 50 MCG: 0.05 TABLET ORAL at 09:04

## 2025-09-03 ASSESSMENT — ACTIVITIES OF DAILY LIVING (ADL)
ADLS_ACUITY_SCORE: 26
ADLS_ACUITY_SCORE: 42
ADLS_ACUITY_SCORE: 42
ADLS_ACUITY_SCORE: 26
DRESS: INDEPENDENT;SCRUBS (BEHAVIORAL HEALTH)
ADLS_ACUITY_SCORE: 26
LAUNDRY: WITH SUPERVISION
ADLS_ACUITY_SCORE: 26
ADLS_ACUITY_SCORE: 26
ADLS_ACUITY_SCORE: 42
ADLS_ACUITY_SCORE: 26
ORAL_HYGIENE: INDEPENDENT
ADLS_ACUITY_SCORE: 26
ADLS_ACUITY_SCORE: 26
ADLS_ACUITY_SCORE: 42
ADLS_ACUITY_SCORE: 26
ADLS_ACUITY_SCORE: 26
ADLS_ACUITY_SCORE: 42
DRESS: SCRUBS (BEHAVIORAL HEALTH);INDEPENDENT
HYGIENE/GROOMING: INDEPENDENT
ADLS_ACUITY_SCORE: 26
ADLS_ACUITY_SCORE: 42
ORAL_HYGIENE: INDEPENDENT
HYGIENE/GROOMING: INDEPENDENT
ADLS_ACUITY_SCORE: 26

## 2025-09-04 VITALS
RESPIRATION RATE: 16 BRPM | DIASTOLIC BLOOD PRESSURE: 82 MMHG | SYSTOLIC BLOOD PRESSURE: 128 MMHG | HEART RATE: 66 BPM | OXYGEN SATURATION: 99 % | BODY MASS INDEX: 35.19 KG/M2 | HEIGHT: 70 IN | TEMPERATURE: 98.6 F | WEIGHT: 245.8 LBS

## 2025-09-04 LAB
ALBUMIN SERPL BCG-MCNC: 4.1 G/DL (ref 3.5–5.2)
ALP SERPL-CCNC: 71 U/L (ref 40–150)
ALT SERPL W P-5'-P-CCNC: 18 U/L (ref 0–70)
ANION GAP SERPL CALCULATED.3IONS-SCNC: 9 MMOL/L (ref 7–15)
AST SERPL W P-5'-P-CCNC: 15 U/L (ref 0–45)
BASOPHILS # BLD AUTO: 0.08 10E3/UL (ref 0–0.2)
BASOPHILS NFR BLD AUTO: 1.2 %
BILIRUB SERPL-MCNC: 0.7 MG/DL
BUN SERPL-MCNC: 18.2 MG/DL (ref 6–20)
CALCIUM SERPL-MCNC: 9.7 MG/DL (ref 8.8–10.4)
CHLORIDE SERPL-SCNC: 105 MMOL/L (ref 98–107)
CHOLEST SERPL-MCNC: 179 MG/DL
CREAT SERPL-MCNC: 1.02 MG/DL (ref 0.67–1.17)
EGFRCR SERPLBLD CKD-EPI 2021: >90 ML/MIN/1.73M2
EOSINOPHIL # BLD AUTO: 0.43 10E3/UL (ref 0–0.7)
EOSINOPHIL NFR BLD AUTO: 6.5 %
ERYTHROCYTE [DISTWIDTH] IN BLOOD BY AUTOMATED COUNT: 11.9 % (ref 10–15)
GLUCOSE SERPL-MCNC: 96 MG/DL (ref 70–99)
HCO3 SERPL-SCNC: 25 MMOL/L (ref 22–29)
HCT VFR BLD AUTO: 40.9 % (ref 40–53)
HDLC SERPL-MCNC: 57 MG/DL
HGB BLD-MCNC: 13.7 G/DL (ref 13.3–17.7)
IMM GRANULOCYTES # BLD: 0.05 10E3/UL
IMM GRANULOCYTES NFR BLD: 0.8 %
LDLC SERPL CALC-MCNC: 102 MG/DL
LYMPHOCYTES # BLD AUTO: 1.31 10E3/UL (ref 0.8–5.3)
LYMPHOCYTES NFR BLD AUTO: 19.8 %
MCH RBC QN AUTO: 30 PG (ref 26.5–33)
MCHC RBC AUTO-ENTMCNC: 33.5 G/DL (ref 31.5–36.5)
MCV RBC AUTO: 89.5 FL (ref 78–100)
MONOCYTES # BLD AUTO: 0.63 10E3/UL (ref 0–1.3)
MONOCYTES NFR BLD AUTO: 9.5 %
NEUTROPHILS # BLD AUTO: 4.1 10E3/UL (ref 1.6–8.3)
NEUTROPHILS NFR BLD AUTO: 62.2 %
NONHDLC SERPL-MCNC: 122 MG/DL
NRBC # BLD AUTO: <0.03 10E3/UL
NRBC BLD AUTO-RTO: 0 /100
PLATELET # BLD AUTO: 236 10E3/UL (ref 150–450)
POTASSIUM SERPL-SCNC: 4.3 MMOL/L (ref 3.4–5.3)
PROT SERPL-MCNC: 6.6 G/DL (ref 6.4–8.3)
RBC # BLD AUTO: 4.57 10E6/UL (ref 4.4–5.9)
SODIUM SERPL-SCNC: 139 MMOL/L (ref 135–145)
TRIGL SERPL-MCNC: 98 MG/DL
WBC # BLD AUTO: 6.6 10E3/UL (ref 4–11)

## 2025-09-04 PROCEDURE — 250N000013 HC RX MED GY IP 250 OP 250 PS 637: Performed by: EMERGENCY MEDICINE

## 2025-09-04 PROCEDURE — 250N000013 HC RX MED GY IP 250 OP 250 PS 637: Performed by: FAMILY MEDICINE

## 2025-09-04 PROCEDURE — 36415 COLL VENOUS BLD VENIPUNCTURE: CPT | Performed by: NURSE PRACTITIONER

## 2025-09-04 PROCEDURE — 124N000002 HC R&B MH UMMC

## 2025-09-04 PROCEDURE — 250N000013 HC RX MED GY IP 250 OP 250 PS 637

## 2025-09-04 PROCEDURE — 82435 ASSAY OF BLOOD CHLORIDE: CPT | Performed by: NURSE PRACTITIONER

## 2025-09-04 PROCEDURE — 85025 COMPLETE CBC W/AUTO DIFF WBC: CPT | Performed by: NURSE PRACTITIONER

## 2025-09-04 PROCEDURE — 80061 LIPID PANEL: CPT | Performed by: NURSE PRACTITIONER

## 2025-09-04 PROCEDURE — 250N000013 HC RX MED GY IP 250 OP 250 PS 637: Performed by: NURSE PRACTITIONER

## 2025-09-04 PROCEDURE — 250N000013 HC RX MED GY IP 250 OP 250 PS 637: Performed by: PSYCHIATRY & NEUROLOGY

## 2025-09-04 PROCEDURE — 97150 GROUP THERAPEUTIC PROCEDURES: CPT | Mod: GO

## 2025-09-04 RX ORDER — FLUVOXAMINE MALEATE 50 MG
50 TABLET ORAL EVERY MORNING
Qty: 30 TABLET | Refills: 1 | Status: SHIPPED | OUTPATIENT
Start: 2025-09-05

## 2025-09-04 RX ORDER — FLUVOXAMINE MALEATE 100 MG
100 TABLET ORAL AT BEDTIME
Qty: 30 TABLET | Refills: 1 | Status: SHIPPED | OUTPATIENT
Start: 2025-09-04

## 2025-09-04 RX ORDER — LITHIUM CARBONATE 300 MG/1
600 CAPSULE ORAL AT BEDTIME
Status: DISPENSED | OUTPATIENT
Start: 2025-09-04

## 2025-09-04 RX ORDER — QUETIAPINE FUMARATE 300 MG/1
300 TABLET, FILM COATED ORAL AT BEDTIME
Qty: 30 TABLET | Refills: 1 | Status: SHIPPED | OUTPATIENT
Start: 2025-09-04

## 2025-09-04 RX ADMIN — Medication 600 MG: at 18:06

## 2025-09-04 RX ADMIN — TAMSULOSIN HYDROCHLORIDE 0.4 MG: 0.4 CAPSULE ORAL at 07:43

## 2025-09-04 RX ADMIN — CYANOCOBALAMIN TAB 1000 MCG 5000 MCG: 1000 TAB at 07:43

## 2025-09-04 RX ADMIN — LORAZEPAM 1 MG: 1 TABLET ORAL at 10:19

## 2025-09-04 RX ADMIN — PANTOPRAZOLE SODIUM 40 MG: 40 TABLET, DELAYED RELEASE ORAL at 18:06

## 2025-09-04 RX ADMIN — MAGNESIUM OXIDE TAB 400 MG (241.3 MG ELEMENTAL MG) 400 MG: 400 (241.3 MG) TAB at 07:43

## 2025-09-04 RX ADMIN — HYDROXYZINE HYDROCHLORIDE 25 MG: 25 TABLET, FILM COATED ORAL at 20:19

## 2025-09-04 RX ADMIN — FLUVOXAMINE MALEATE 50 MG: 50 TABLET ORAL at 07:43

## 2025-09-04 RX ADMIN — LITHIUM CARBONATE 600 MG: 300 CAPSULE, GELATIN COATED ORAL at 21:18

## 2025-09-04 RX ADMIN — Medication 1 TABLET: at 11:33

## 2025-09-04 RX ADMIN — Medication 300 MG: at 21:18

## 2025-09-04 RX ADMIN — TRAZODONE HYDROCHLORIDE 100 MG: 100 TABLET ORAL at 21:18

## 2025-09-04 RX ADMIN — Medication 600 MG: at 11:33

## 2025-09-04 RX ADMIN — DEXTROAMPHETAMINE SACCHARATE, AMPHETAMINE ASPARTATE, DEXTROAMPHETAMINE SULFATE, AND AMPHETAMINE SULFATE 15 MG: 3.75; 3.75; 3.75; 3.75 TABLET ORAL at 07:43

## 2025-09-04 RX ADMIN — AMLODIPINE BESYLATE 5 MG: 5 TABLET ORAL at 07:43

## 2025-09-04 RX ADMIN — LEVOTHYROXINE SODIUM 50 MCG: 0.05 TABLET ORAL at 07:43

## 2025-09-04 RX ADMIN — DEXTROAMPHETAMINE SACCHARATE, AMPHETAMINE ASPARTATE, DEXTROAMPHETAMINE SULFATE, AND AMPHETAMINE SULFATE 15 MG: 3.75; 3.75; 3.75; 3.75 TABLET ORAL at 11:33

## 2025-09-04 RX ADMIN — FLUVOXAMINE MALEATE 100 MG: 100 TABLET ORAL at 21:18

## 2025-09-04 RX ADMIN — Medication 5000 UNITS: at 07:43

## 2025-09-04 RX ADMIN — PANTOPRAZOLE SODIUM 40 MG: 40 TABLET, DELAYED RELEASE ORAL at 07:43

## 2025-09-04 ASSESSMENT — ACTIVITIES OF DAILY LIVING (ADL)
ADLS_ACUITY_SCORE: 42
DRESS: INDEPENDENT
ADLS_ACUITY_SCORE: 42
HYGIENE/GROOMING: INDEPENDENT
ADLS_ACUITY_SCORE: 42
HYGIENE/GROOMING: INDEPENDENT
ADLS_ACUITY_SCORE: 42
LAUNDRY: WITH SUPERVISION
ADLS_ACUITY_SCORE: 42
ORAL_HYGIENE: INDEPENDENT
ADLS_ACUITY_SCORE: 42

## (undated) DEVICE — STPL DAVINCI SUREFORM 60MM RELOAD BLUE 48360B

## (undated) DEVICE — DRAPE BREAST/CHEST 29420

## (undated) DEVICE — GOWN IMPERVIOUS SPECIALTY XLG/XLONG 32474

## (undated) DEVICE — DAVINCI HOT SHEARS TIP COVER  400180

## (undated) DEVICE — SU VICRYL 4-0 PS-2 18" UND J496H

## (undated) DEVICE — DAVINCI XI OBTURATOR BLADELESS 8MM 470359

## (undated) DEVICE — SOL NACL 0.9% INJ 1000ML BAG 2B1324X

## (undated) DEVICE — DAVINCI XI FCP CADIERE 470049

## (undated) DEVICE — DAVINCI XI HANDPIECE ESU VESSEL SEALER 8MM EXT 480422

## (undated) DEVICE — PACK LAP CHOLE SLC15LCFSD

## (undated) DEVICE — DAVINCI XI DRAPE ARM 470015

## (undated) DEVICE — DAVINCI XI SEAL UNIVERSAL 5-8MM 470361

## (undated) DEVICE — DAVINCI SEAL CANNULA AND STPL 12MM 470380

## (undated) DEVICE — SU VICRYL 2-0 SH 27" J317H

## (undated) DEVICE — GLOVE PROTEXIS W/NEU-THERA 8.0  2D73TE80

## (undated) DEVICE — LIGHT HANDLE X2

## (undated) DEVICE — ENDO TROCAR FIRST ENTRY KII FIOS Z-THRD 05X100MM CTF03

## (undated) DEVICE — SU VICRYL 0 TIE 12X18" J906G

## (undated) DEVICE — ENDO TROCAR FIRST ENTRY KII FIOS Z-THRD 11X100MM CTF33

## (undated) DEVICE — LUBRICANT INST ELECTROLUBE EL101

## (undated) DEVICE — SYSTEM CLEARIFY VISUALIZATION 21-345

## (undated) DEVICE — DRSG BANDAID 1X3" FABRIC CURITY LATEX FREE KC44101

## (undated) DEVICE — SU STRATAFIX PDS PLUS 2-0 SPIRAL VIOLET SPXX1B415

## (undated) DEVICE — GASTRECTOMY SLEEVE STABILIZATION SYSTEM VISIGI 3D 36FR 5236B

## (undated) DEVICE — PREP CHLORAPREP 26ML TINTED ORANGE  260815

## (undated) DEVICE — DAVINCI XI GRASPER FENESTRATED TIP UP 8MM 470347

## (undated) DEVICE — LINEN TOWEL PACK X5 5464

## (undated) DEVICE — SOL WATER IRRIG 1000ML BOTTLE 2F7114

## (undated) DEVICE — DAVINCI XI REDUCER 8-12MM 470381

## (undated) DEVICE — ESU GROUND PAD UNIVERSAL W/O CORD

## (undated) DEVICE — SUCTION IRR STRYKERFLOW II W/TIP 250-070-520

## (undated) DEVICE — DAVINCI XI DRAPE COLUMN 470341

## (undated) DEVICE — SUCTION CANISTER MEDIVAC LINER 3000ML W/LID 65651-530

## (undated) DEVICE — STPL DAVINCI SUREFORM 60MM STR 480460

## (undated) DEVICE — DAVINCI XI NDL DRIVER LARGE 470006

## (undated) DEVICE — DRAPE SHEET REV FOLD 3/4 9349

## (undated) DEVICE — SOL NACL 0.9% IRRIG 1000ML BOTTLE 2F7124

## (undated) RX ORDER — ONDANSETRON 2 MG/ML
INJECTION INTRAMUSCULAR; INTRAVENOUS
Status: DISPENSED
Start: 2018-07-23

## (undated) RX ORDER — ONDANSETRON 2 MG/ML
INJECTION INTRAMUSCULAR; INTRAVENOUS
Status: DISPENSED
Start: 2017-11-10

## (undated) RX ORDER — ONDANSETRON 2 MG/ML
INJECTION INTRAMUSCULAR; INTRAVENOUS
Status: DISPENSED
Start: 2021-07-14

## (undated) RX ORDER — FENTANYL CITRATE 50 UG/ML
INJECTION, SOLUTION INTRAMUSCULAR; INTRAVENOUS
Status: DISPENSED
Start: 2021-07-14

## (undated) RX ORDER — ACETAMINOPHEN 325 MG/1
TABLET ORAL
Status: DISPENSED
Start: 2017-11-10

## (undated) RX ORDER — PROPOFOL 10 MG/ML
INJECTION, EMULSION INTRAVENOUS
Status: DISPENSED
Start: 2021-07-14

## (undated) RX ORDER — ONDANSETRON 2 MG/ML
INJECTION INTRAMUSCULAR; INTRAVENOUS
Status: DISPENSED
Start: 2017-11-13

## (undated) RX ORDER — KETOROLAC TROMETHAMINE 30 MG/ML
INJECTION, SOLUTION INTRAMUSCULAR; INTRAVENOUS
Status: DISPENSED
Start: 2018-07-18

## (undated) RX ORDER — KETOROLAC TROMETHAMINE 30 MG/ML
INJECTION, SOLUTION INTRAMUSCULAR; INTRAVENOUS
Status: DISPENSED
Start: 2017-11-17

## (undated) RX ORDER — LIDOCAINE HYDROCHLORIDE 20 MG/ML
INJECTION, SOLUTION EPIDURAL; INFILTRATION; INTRACAUDAL; PERINEURAL
Status: DISPENSED
Start: 2021-07-14

## (undated) RX ORDER — KETOROLAC TROMETHAMINE 30 MG/ML
INJECTION, SOLUTION INTRAMUSCULAR; INTRAVENOUS
Status: DISPENSED
Start: 2018-07-16

## (undated) RX ORDER — CAFFEINE AND SODIUM BENZOATE 125 MG/ML
INJECTION, SOLUTION INTRAMUSCULAR; INTRAVENOUS
Status: DISPENSED
Start: 2017-11-15

## (undated) RX ORDER — CEFAZOLIN SODIUM IN 0.9 % NACL 3 G/100 ML
INTRAVENOUS SOLUTION, PIGGYBACK (ML) INTRAVENOUS
Status: DISPENSED
Start: 2021-07-14

## (undated) RX ORDER — CAFFEINE AND SODIUM BENZOATE 125 MG/ML
INJECTION, SOLUTION INTRAMUSCULAR; INTRAVENOUS
Status: DISPENSED
Start: 2017-11-10

## (undated) RX ORDER — HYDROMORPHONE HYDROCHLORIDE 1 MG/ML
INJECTION, SOLUTION INTRAMUSCULAR; INTRAVENOUS; SUBCUTANEOUS
Status: DISPENSED
Start: 2021-07-14

## (undated) RX ORDER — CAFFEINE AND SODIUM BENZOATE 125 MG/ML
INJECTION, SOLUTION INTRAMUSCULAR; INTRAVENOUS
Status: DISPENSED
Start: 2018-07-20

## (undated) RX ORDER — ALBUTEROL SULFATE 0.83 MG/ML
SOLUTION RESPIRATORY (INHALATION)
Status: DISPENSED
Start: 2018-07-16

## (undated) RX ORDER — KETOROLAC TROMETHAMINE 30 MG/ML
INJECTION, SOLUTION INTRAMUSCULAR; INTRAVENOUS
Status: DISPENSED
Start: 2017-11-15

## (undated) RX ORDER — CAFFEINE AND SODIUM BENZOATE 125 MG/ML
INJECTION, SOLUTION INTRAMUSCULAR; INTRAVENOUS
Status: DISPENSED
Start: 2017-11-13

## (undated) RX ORDER — KETOROLAC TROMETHAMINE 30 MG/ML
INJECTION, SOLUTION INTRAMUSCULAR; INTRAVENOUS
Status: DISPENSED
Start: 2018-07-20

## (undated) RX ORDER — VECURONIUM BROMIDE 1 MG/ML
INJECTION, POWDER, LYOPHILIZED, FOR SOLUTION INTRAVENOUS
Status: DISPENSED
Start: 2021-07-14

## (undated) RX ORDER — KETOROLAC TROMETHAMINE 30 MG/ML
INJECTION, SOLUTION INTRAMUSCULAR; INTRAVENOUS
Status: DISPENSED
Start: 2018-07-23

## (undated) RX ORDER — CAFFEINE AND SODIUM BENZOATE 125 MG/ML
INJECTION, SOLUTION INTRAMUSCULAR; INTRAVENOUS
Status: DISPENSED
Start: 2018-07-27

## (undated) RX ORDER — HEPARIN SODIUM 5000 [USP'U]/.5ML
INJECTION, SOLUTION INTRAVENOUS; SUBCUTANEOUS
Status: DISPENSED
Start: 2021-07-14

## (undated) RX ORDER — KETOROLAC TROMETHAMINE 30 MG/ML
INJECTION, SOLUTION INTRAMUSCULAR; INTRAVENOUS
Status: DISPENSED
Start: 2018-07-27

## (undated) RX ORDER — KETOROLAC TROMETHAMINE 30 MG/ML
INJECTION, SOLUTION INTRAMUSCULAR; INTRAVENOUS
Status: DISPENSED
Start: 2018-07-25

## (undated) RX ORDER — ONDANSETRON 2 MG/ML
INJECTION INTRAMUSCULAR; INTRAVENOUS
Status: DISPENSED
Start: 2018-07-25

## (undated) RX ORDER — ONDANSETRON 2 MG/ML
INJECTION INTRAMUSCULAR; INTRAVENOUS
Status: DISPENSED
Start: 2017-11-15

## (undated) RX ORDER — KETOROLAC TROMETHAMINE 30 MG/ML
INJECTION, SOLUTION INTRAMUSCULAR; INTRAVENOUS
Status: DISPENSED
Start: 2021-07-14

## (undated) RX ORDER — CAFFEINE AND SODIUM BENZOATE 125 MG/ML
INJECTION, SOLUTION INTRAMUSCULAR; INTRAVENOUS
Status: DISPENSED
Start: 2018-07-23

## (undated) RX ORDER — ONDANSETRON 2 MG/ML
INJECTION INTRAMUSCULAR; INTRAVENOUS
Status: DISPENSED
Start: 2018-07-18

## (undated) RX ORDER — KETOROLAC TROMETHAMINE 30 MG/ML
INJECTION, SOLUTION INTRAMUSCULAR; INTRAVENOUS
Status: DISPENSED
Start: 2017-11-13

## (undated) RX ORDER — ONDANSETRON 2 MG/ML
INJECTION INTRAMUSCULAR; INTRAVENOUS
Status: DISPENSED
Start: 2018-07-16

## (undated) RX ORDER — DEXAMETHASONE SODIUM PHOSPHATE 4 MG/ML
INJECTION, SOLUTION INTRA-ARTICULAR; INTRALESIONAL; INTRAMUSCULAR; INTRAVENOUS; SOFT TISSUE
Status: DISPENSED
Start: 2021-07-14

## (undated) RX ORDER — ONDANSETRON 2 MG/ML
INJECTION INTRAMUSCULAR; INTRAVENOUS
Status: DISPENSED
Start: 2018-07-27

## (undated) RX ORDER — KETOROLAC TROMETHAMINE 30 MG/ML
INJECTION, SOLUTION INTRAMUSCULAR; INTRAVENOUS
Status: DISPENSED
Start: 2017-11-10

## (undated) RX ORDER — BUPIVACAINE HYDROCHLORIDE AND EPINEPHRINE 2.5; 5 MG/ML; UG/ML
INJECTION, SOLUTION EPIDURAL; INFILTRATION; INTRACAUDAL; PERINEURAL
Status: DISPENSED
Start: 2021-07-14

## (undated) RX ORDER — HYDROMORPHONE HCL IN WATER/PF 6 MG/30 ML
PATIENT CONTROLLED ANALGESIA SYRINGE INTRAVENOUS
Status: DISPENSED
Start: 2021-07-14

## (undated) RX ORDER — GLYCOPYRROLATE 0.2 MG/ML
INJECTION INTRAMUSCULAR; INTRAVENOUS
Status: DISPENSED
Start: 2017-11-15

## (undated) RX ORDER — ONDANSETRON 2 MG/ML
INJECTION INTRAMUSCULAR; INTRAVENOUS
Status: DISPENSED
Start: 2018-07-20

## (undated) RX ORDER — ALBUTEROL SULFATE 90 UG/1
AEROSOL, METERED RESPIRATORY (INHALATION)
Status: DISPENSED
Start: 2021-07-14

## (undated) RX ORDER — CAFFEINE AND SODIUM BENZOATE 125 MG/ML
INJECTION, SOLUTION INTRAMUSCULAR; INTRAVENOUS
Status: DISPENSED
Start: 2018-07-25